# Patient Record
Sex: MALE | Race: WHITE | Employment: OTHER | URBAN - METROPOLITAN AREA
[De-identification: names, ages, dates, MRNs, and addresses within clinical notes are randomized per-mention and may not be internally consistent; named-entity substitution may affect disease eponyms.]

---

## 2017-01-20 ENCOUNTER — OFFICE VISIT (OUTPATIENT)
Dept: INTERNAL MEDICINE | Facility: CLINIC | Age: 82
End: 2017-01-20
Payer: COMMERCIAL

## 2017-01-20 DIAGNOSIS — I10 ESSENTIAL HYPERTENSION: Primary | ICD-10-CM

## 2017-01-20 DIAGNOSIS — K40.90 LEFT INGUINAL HERNIA: ICD-10-CM

## 2017-01-20 DIAGNOSIS — M54.16 LUMBAR RADICULOPATHY: ICD-10-CM

## 2017-01-20 DIAGNOSIS — N18.30 CKD (CHRONIC KIDNEY DISEASE) STAGE 3, GFR 30-59 ML/MIN (H): ICD-10-CM

## 2017-01-20 PROCEDURE — 99214 OFFICE O/P EST MOD 30 MIN: CPT | Performed by: INTERNAL MEDICINE

## 2017-01-20 RX ORDER — LOSARTAN POTASSIUM 100 MG/1
100 TABLET ORAL DAILY
Qty: 90 TABLET | Refills: 3 | Status: SHIPPED | OUTPATIENT
Start: 2017-01-20 | End: 2017-02-27

## 2017-01-20 NOTE — PATIENT INSTRUCTIONS
Increase Losartan to 50mg tab, 2 tabs daily in AM until run  Out of current meds. Then change to 100mg tab, 1 tab daily for blood pressure  Nonfasting BMP  lab in 2-3 weeks  Referral to General surgery re:  Hernia. Call for appt. Will need preop exam  Before future surgery  After hernia issue resolved, will then refer onto back surgeon if wish to get consultation   Limit sodium intake to 2500mg/day   Reduce carbohydrate (sugars, starchy foods such as bread, rice, potato, pasta, etc) intake  in your diet and increase the amount of color on your plate with fruits and vegetables

## 2017-01-20 NOTE — MR AVS SNAPSHOT
After Visit Summary   1/20/2017    Isael Garcia    MRN: [id number]           Patient Information     Date Of Birth          4/25/1934        Visit Information        Provider Department      1/20/2017 8:00 AM Madhu Velasquez MD [facility]        Today's Diagnoses     Advanced directives, counseling/discussion    -  1     CKD (chronic kidney disease) stage 3, GFR 30-59 ml/min         Left inguinal hernia           Care Instructions    Increase Losartan to 50mg tab, 2 tabs daily in AM until run  Out of current meds. Then change to 100mg tab, 1 tab daily for blood pressure  Nonfasting BMP  lab in 2-3 weeks  Referral to General surgery re:  Hernia. Call for appt. Will need preop exam  Before future surgery  After hernia issue resolved, will then refer onto back surgeon if wish to get consultation   Limit sodium intake to 2500mg/day   Reduce carbohydrate (sugars, starchy foods such as bread, rice, potato, pasta, etc) intake  in your diet and increase the amount of color on your plate with fruits and vegetables        Follow-ups after your visit        Additional Services     GENERAL SURG ADULT REFERRAL       Your provider has referred you to: [facility]: [facility] [place] [phone]   [email or web address]    Please be aware that coverage of these services is subject to the terms and limitations of your health insurance plan.  Call member services at your health plan with any benefit or coverage questions.      Please bring the following with you to your appointment:    (1) Any X-Rays, CTs or MRIs which have been performed.  Contact the facility where they were done to arrange for  prior to your scheduled appointment.   (2) List of current medications   (3) This referral request   (4) Any documents/labs given to you for this referral                  Who to contact     If you have questions or need follow up information  "about today's clinic visit or your schedule please contact [facility][1] directly at [phone].  Normal or non-critical lab and imaging results will be communicated to you by Ideaxishart, letter or phone within 4 business days after the clinic has received the results. If you do not hear from us within 7 days, please contact the clinic through Ideaxishart or phone. If you have a critical or abnormal lab result, we will notify you by phone as soon as possible.  Submit refill requests through YuMingle or call your pharmacy and they will forward the refill request to us. Please allow 3 business days for your refill to be completed.          Additional Information About Your Visit        YuMingle Information     YuMingle gives you secure access to your electronic health record. If you see a primary care provider, you can also send messages to your care team and make appointments. If you have questions, please call your primary care clinic.  If you do not have a primary care provider, please call [phone] and they will assist you.        Care EveryWhere ID     This is your Care EveryWhere ID. This could be used by other organizations to access your [facility] medical records  [id number]        Your Vitals Were     Pulse Temperature Height BMI (Body Mass Index) Pulse Oximetry       55 97.7  F (36.5  C) (Oral) 5' 7\" (1.702 m) 25.52 kg/m2 97%        Blood Pressure from Last 3 Encounters:   01/20/17 140/74   12/16/16 150/74   05/24/16 120/62    Weight from Last 3 Encounters:   01/20/17 163 lb (73.936 kg)   12/16/16 164 lb (74.39 kg)   05/24/16 163 lb (73.936 kg)              We Performed the Following     GENERAL SURG ADULT REFERRAL          Today's Medication Changes          These changes are accurate as of: 1/20/17  8:53 AM.  If you have any questions, ask your nurse or doctor.               Stop taking these medicines if you haven't already. Please contact your care team if you have questions.     " losartan 50 MG tablet   Commonly known as:  COZAAR   Stopped by:  Madhu Velasquez MD                    Primary Care Provider Office Phone # Fax #    Madhu Velasquez [phone]       [facility] [address]  [facility] [address]        Thank you!     Thank you for choosing [facility]  for your care. Our goal is always to provide you with excellent care. Hearing back from our patients is one way we can continue to improve our services. Please take a few minutes to complete the written survey that you may receive in the mail after your visit with us. Thank you!             Your Updated Medication List - Protect others around you: Learn how to safely use, store and throw away your medicines at www.disposemymeds.org.          This list is accurate as of: 1/20/17  8:53 AM.  Always use your most recent med list.                   Brand Name Dispense Instructions for use    amoxicillin 500 MG capsule    AMOXIL    4 capsule    4 capsules (total 2 grams) orally  1 hour prior to dental procedures       apixaban ANTICOAGULANT 2.5 MG tablet    ELIQUIS     Take 1 tablet (2.5 mg) by mouth 2 times daily       aspirin  MG EC tablet      Take 325 mg by mouth daily       atorvastatin 10 MG tablet    LIPITOR    30 tablet    Take 1 tablet (10 mg) by mouth daily       metoprolol 25 MG tablet    LOPRESSOR    90 tablet    Take 0.5 tablets (12.5 mg) by mouth 2 times daily       Multi-vitamin Tabs tablet      Take 1 tablet by mouth daily       OSTEO BI-FLEX ADV TRIPLE ST Tabs      Take 1 tablet by mouth daily       TYLENOL 500 MG tablet   Generic drug:  acetaminophen      1,000 mg daily       vitamin D 2000 UNITS tablet     100 tablet    Take 2,000 Units by mouth daily

## 2017-01-20 NOTE — PROGRESS NOTES
"  SUBJECTIVE:                                                    Isael Garcia is a 82 year old male who presents to clinic today for the following health issues:      Hypertension Follow-up      Outpatient blood pressures are being checked at home.  Results are 130-134/74.    Low Salt Diet: no added salt       Amount of exercise or physical activity: 6-7 days/week for an average of greater than 60 minutes    Problems taking medications regularly: No    Medication side effects: none    Diet: regular (no restrictions)    Pt's past medical history, family history, habits, medications and allergies were reviewed with the patient today.  See snap shot for  HCM status. Most recent lab results reviewed with pt. Problem list and histories reviewed & adjusted, as indicated.  Additional history as below:    Denies chest pain, shortness of breath, abdominal pain, headache, vision changes or side effects with medications.   Hx CKD. Labs reviewed.  Patient was also been having some increasing discomfort with his left inguinal hernia. Has still been able to play pickle ball some however.   Chronic stable low back pain with some radicular symptoms into bilateral lower extremities. The MRI of the lumbar spine from [facility] 2016 in the chart. Results reviewed with the patient again using a model     Additional ROS:   Constitutional, HEENT, Cardiovascular, Pulmonary, GI and , Neuro, MSK and Psych review of systems/symptoms are otherwise negative or unchanged from previous, except as noted above.      OBJECTIVE:  /76 mmHg  Pulse 55  Temp(Src) 97.7  F (36.5  C) (Oral)  Ht 5' 7\" (1.702 m)  Wt 163 lb (73.936 kg)  BMI 25.52 kg/m2  SpO2 97%   Estimated body mass index is 25.52 kg/(m^2) as calculated from the following:    Height as of this encounter: 5' 7\" (1.702 m).    Weight as of this encounter: 163 lb (73.936 kg).  Eye: PERRL, EOMI  HENT: ear canals and TM's normal and nose and mouth without ulcers or lesions "   Neck: no adenopathy. Thyroid normal to palpation. No bruits  Pulm: Lungs clear to auscultation   CV: Regular rates and rhythm  GI: Soft, nontender, Normal active bowel sounds, No hepatosplenomegaly or masses palpable  Ext: Peripheral pulses intact. No edema.  Neuro: Normal strength and tone, sensory exam grossly normal  Back: Mild tenderness to palpation bilateral paralumbar area. Neg SLRT bilaterally.    :  Reducible left inguinal hernia with mild tenderness to palpation    Assessment/Plan:   1. Essential hypertension   needs improved control. See plan discussion below  - losartan (COZAAR) 100 MG tablet; Take 1 tablet (100 mg) by mouth daily  Dispense: 90 tablet; Refill: 3  - Basic metabolic panel; Future    2. CKD (chronic kidney disease) stage 3, GFR 30-59 ml/min   future labs as ordered. Overall stable  - Basic metabolic panel; Future    3. Left inguinal hernia   referral to general surgery to discuss surgical management. Reminded patient will need a preop appointment for clearance within 30 days of future surgery  - GENERAL SURG ADULT REFERRAL    4. Lumbar radiculopathy   see plan discussion below.  Patient declines need for additional medication at this time  for symptom control      Plan discussion:  Increase Losartan to 50mg tab, 2 tabs daily in AM until run  Out of current meds. Then change to 100mg tab, 1 tab daily for blood pressure  Nonfasting BMP  lab in 2-3 weeks  Referral to General surgery re:  Hernia. Call for appt. Will need preop exam  before future surgery  After hernia issue resolved, will then refer onto back surgeon if wish to get consultation re: surgical treatment options vs medication treatment with Gabapentin, etc  Limit sodium intake to 2500mg/day  See me in 1 month for follow-up of blood pressure           Madhu Velasquez MD  Internal Medicine Department  [facility]

## 2017-01-20 NOTE — NURSING NOTE
"Chief Complaint   Patient presents with     Hypertension       Initial /74 mmHg  Pulse 55  Temp(Src) 97.7  F (36.5  C) (Oral)  Ht 5' 7\" (1.702 m)  Wt 163 lb (73.936 kg)  BMI 25.52 kg/m2  SpO2 97% Estimated body mass index is 25.52 kg/(m^2) as calculated from the following:    Height as of this encounter: 5' 7\" (1.702 m).    Weight as of this encounter: 163 lb (73.936 kg).  BP completed using cuff size: regular    "

## 2017-01-22 VITALS
SYSTOLIC BLOOD PRESSURE: 138 MMHG | DIASTOLIC BLOOD PRESSURE: 76 MMHG | HEIGHT: 67 IN | WEIGHT: 163 LBS | OXYGEN SATURATION: 97 % | BODY MASS INDEX: 25.58 KG/M2 | HEART RATE: 55 BPM | TEMPERATURE: 97.7 F

## 2017-01-26 ENCOUNTER — TELEPHONE (OUTPATIENT)
Dept: INTERNAL MEDICINE | Facility: CLINIC | Age: 82
End: 2017-01-26

## 2017-01-26 DIAGNOSIS — I48.91 ATRIAL FIBRILLATION, UNSPECIFIED TYPE (H): Primary | ICD-10-CM

## 2017-01-26 NOTE — TELEPHONE ENCOUNTER
Reason for Call:  Other call back    Detailed comments: Pt was to gather information on Eliquis.  He would like the script changed from 2.5 to 5.    Phone Number Patient can be reached at: Home number on file [phone] (home)    Best Time: anytime    Can we leave a detailed message on this number? YES    Call taken on 1/26/2017 at 8:55 AM by ELIUD WINCHESTER

## 2017-01-30 NOTE — TELEPHONE ENCOUNTER
Rx changed to Eliquis 5mg tab, 1/2 tab twice a day as requested to try and reduce med cost and faxed to  pharmacy. Inform pt

## 2017-01-31 ENCOUNTER — OFFICE VISIT (OUTPATIENT)
Dept: SURGERY | Facility: CLINIC | Age: 82
End: 2017-01-31
Payer: COMMERCIAL

## 2017-01-31 VITALS
OXYGEN SATURATION: 98 % | WEIGHT: 165 LBS | BODY MASS INDEX: 25.01 KG/M2 | DIASTOLIC BLOOD PRESSURE: 78 MMHG | HEIGHT: 68 IN | HEART RATE: 61 BPM | SYSTOLIC BLOOD PRESSURE: 132 MMHG

## 2017-01-31 DIAGNOSIS — K40.90 INGUINAL HERNIA OF LEFT SIDE WITHOUT OBSTRUCTION OR GANGRENE: Primary | ICD-10-CM

## 2017-01-31 PROCEDURE — 99203 OFFICE O/P NEW LOW 30 MIN: CPT | Performed by: SURGERY

## 2017-01-31 NOTE — MR AVS SNAPSHOT
After Visit Summary   1/31/2017    Isael Garcia    MRN: [id number]           Patient Information     Date Of Birth          4/25/1934        Visit Information        Provider Department      1/31/2017 9:30 AM Ranjith Lozoya MD [facility]      Care Instructions    Your surgery is scheduled for 2/28/17 1:00 pm at [facility]        Follow-ups after your visit        Your next 10 appointments already scheduled     Feb 28, 2017   Procedure with Ranjith Lozoya MD   [facility] (--)    [address]  [facility] [address] [phone]              Who to contact     If you have questions or need follow up information about today's clinic visit or your schedule please contact [facility] directly at [phone].  Normal or non-critical lab and imaging results will be communicated to you by MyChart, letter or phone within 4 business days after the clinic has received the results. If you do not hear from us within 7 days, please contact the clinic through ALENTYhart or phone. If you have a critical or abnormal lab result, we will notify you by phone as soon as possible.  Submit refill requests through ProteoGenix or call your pharmacy and they will forward the refill request to us. Please allow 3 business days for your refill to be completed.          Additional Information About Your Visit        MyChart Information     ProteoGenix gives you secure access to your electronic health record. If you see a primary care provider, you can also send messages to your care team and make appointments. If you have questions, please call your primary care clinic.  If you do not have a primary care provider, please call [phone] and they will assist you.        Care EveryWhere ID     This is your Care EveryWhere ID. This could be used by other organizations to access your [employer]  "medical records  [id number]        Your Vitals Were     Pulse Height BMI (Body Mass Index) Pulse Oximetry          61 5' 8\" (1.727 m) 25.09 kg/m2 98%         Blood Pressure from Last 3 Encounters:   01/31/17 132/78   01/20/17 138/76   12/16/16 150/74    Weight from Last 3 Encounters:   01/31/17 165 lb (74.844 kg)   01/20/17 163 lb (73.936 kg)   12/16/16 164 lb (74.39 kg)              Today, you had the following     No orders found for display       Primary Care Provider Office Phone # Fax #    Madhu Velasquez [phone]       [facility] [address] [facility] [address]        Thank you!     Thank you for choosing [facility]  for your care. Our goal is always to provide you with excellent care. Hearing back from our patients is one way we can continue to improve our services. Please take a few minutes to complete the written survey that you may receive in the mail after your visit with us. Thank you!             Your Updated Medication List - Protect others around you: Learn how to safely use, store and throw away your medicines at www.disposemymeds.org.          This list is accurate as of: 1/31/17 10:31 AM.  Always use your most recent med list.                   Brand Name Dispense Instructions for use    amoxicillin 500 MG capsule    AMOXIL    4 capsule    4 capsules (total 2 grams) orally  1 hour prior to dental procedures       apixaban ANTICOAGULANT 5 MG tablet    ELIQUIS    90 tablet    1/2 tab twice a day       aspirin  MG EC tablet      Take 325 mg by mouth daily       atorvastatin 10 MG tablet    LIPITOR    30 tablet    Take 1 tablet (10 mg) by mouth daily       losartan 100 MG tablet    COZAAR    90 tablet    Take 1 tablet (100 mg) by mouth daily       metoprolol 25 MG tablet    LOPRESSOR    90 tablet    Take 0.5 tablets (12.5 mg) by mouth 2 times daily       Multi-vitamin Tabs tablet      Take 1 tablet by mouth daily       OSTEO BI-FLEX ADV TRIPLE ST " Tabs      Take 1 tablet by mouth daily       TYLENOL 500 MG tablet   Generic drug:  acetaminophen      1,000 mg daily       vitamin D 2000 UNITS tablet     100 tablet    Take 2,000 Units by mouth daily

## 2017-01-31 NOTE — NURSING NOTE
Pain Location:L groin    Pain type: dull    Bulge: Yes    Bulge reducible: No    Symptoms: None    Time Frame of Hernia: 1 year(s) ago

## 2017-01-31 NOTE — Clinical Note
2017    RE:  Isael Garcia-:  34    HPI: We are asked by Dr. Madhu Velasquez to see Mr. Garcia in consultation concerning a groin bulge.  Isael is a 82 year old male who presents for evaluation of left groin bulge.  Symptoms began  1 year  ago.  This is described as aching. The pain occurs with prolonged standing.  Associated symptoms include none. The patient has noticed a bulge. The patient has not had a previous herniorrhaphy in this location. Employment does not require heavy lifting.    Cough: No    Past Medical History:   has a past medical history of Unspecified essential hypertension; Allergic rhinitis, cause unspecified; Unspecified cataract; Lumbago (); Shingles (); CKD (chronic kidney disease) stage 3, GFR 30-59 ml/min (2013); Sleep related leg cramps (2013); Vitamin D deficiency disease (2013); OA (osteoarthritis) (2015); PVD (peripheral vascular disease) (H) (3/7/2016); AAA (abdominal aortic aneurysm) (H) (May 2016); and Atrial fibrillation (H).    ROS:  The 10 point review of systems is negative other than noted in the HPI and above.    PE:    General- Well-developed, well-nourished, patient able to stand without difficulty.  HEENT- Normocephalic and atraumatic. Pupils equal and round.  Mucous membranes moist.  Sclera are nonicteric.   Respirations- are regular and non labored  Abdomen is abdomen is soft without significant tenderness, masses, organomegaly or guarding     Umbilicus is non-tender  Groins reveal old scar on right. Angiogram access site on right.    Hernia- Left inguinal hernia is present with valsalva              Right inguinal hernia is not present with valsalva              The hernia is reducible              Testicles are normal   No inguinal lymphadenopathy is present  Neurologic- I find no inguinal neuropathy, he seems to be alert and oriented.               Assesment: Primary, reducible left inguinal hernia.    Plan:    We have  discussed the laparoscopic and open options for hernia repair, the choice of observation, reduction techniques, incarceration and strangulation signs, symptoms and importance as well as need to seek emergency treatment.    We have discussed surgery in detail, including risk, benefits, complications, mesh, infection, nerve and cord damage and their sequelae including chronic pain and testicular loss, lifting and activity limits after surgery. He has been given literature to review. We will schedule surgery at patient's convenience.  He is on apixaban and aspirin, which will need to be held for operation.      Ranjith Lozoya MD

## 2017-02-01 NOTE — PROGRESS NOTES
HPI: We are asked by Dr. Madhu Velasquez to see Mr. Garcia in consultation concerning a groin bulge.  Isael is a 82 year old male who presents for evaluation of left groin bulge.  Symptoms began  1 year  ago.  This is described as aching. The pain occurs with prolonged standing.  Associated symptoms include none. The patient has noticed a bulge. The patient has not had a previous herniorrhaphy in this location. Employment does not require heavy lifting.      Cough: No      Past Medical History:   has a past medical history of Unspecified essential hypertension; Allergic rhinitis, cause unspecified; Unspecified cataract; Lumbago (2000); Shingles (7/09); CKD (chronic kidney disease) stage 3, GFR 30-59 ml/min (2/4/2013); Sleep related leg cramps (2/6/2013); Vitamin D deficiency disease (2/6/2013); OA (osteoarthritis) (2/21/2015); PVD (peripheral vascular disease) (H) (3/7/2016); AAA (abdominal aortic aneurysm) (H) (May 2016); and Atrial fibrillation (H).    Past Surgical History:  Past Surgical History   Procedure Laterality Date     C nonspecific procedure  1960     right inguinal hernia repair     C nonspecific procedure  1990s     TURP     C nonspecific procedure       tonsillectomy     C nonspecific procedure  April 2011     Right total knee arthroplasty     C nonspecific procedure   approx 2009      left  cataract extraction/IOL placement     C nonspecific procedure  May 2016     Endovascular AAA stent graft placed at [facility]. [place] clnic suggests abx prophylaxis for life for procedures (dental/etc)          Social History:  Social History     Social History     Marital Status:      Spouse Name: N/A     Number of Children: N/A     Years of Education: N/A     Occupational History     Not on file.     Social History Main Topics     Smoking status: Never Smoker      Smokeless tobacco: Never Used     Alcohol Use: No     Drug Use: No     Sexual Activity: No     Other Topics Concern     Not on file     Social  History Narrative        Family History:  Family History   Problem Relation Age of Onset     Prostate Cancer Brother          ROS:  The 10 point review of systems is negative other than noted in the HPI and above.    PE:    General- Well-developed, well-nourished, patient able to stand without difficulty.  HEENT- Normocephalic and atraumatic. Pupils equal and round.  Mucous membranes moist.  Sclera are nonicteric.   Respirations- are regular and non labored  Abdomen is abdomen is soft without significant tenderness, masses, organomegaly or guarding     Umbilicus is non-tender  Groins reveal old scar on right. Angiogram access site on right.    Hernia- Left inguinal hernia is present with valsalva              Right inguinal hernia is not present with valsalva              The hernia is reducible              Testicles are normal   No inguinal lymphadenopathy is present  Neurologic- I find no inguinal neuropathy, he seems to be alert and oriented.                  Assesment: Primary, reducible left inguinal hernia.    Plan:    We have discussed the laparoscopic and open options for hernia repair, the choice of observation, reduction techniques, incarceration and strangulation signs, symptoms and importance as well as need to seek emergency treatment.    We have discussed surgery in detail, including risk, benefits, complications, mesh, infection, nerve and cord damage and their sequelae including chronic pain and testicular loss, lifting and activity limits after surgery. He has been given literature to review. We will schedule surgery at patient's convenience.  He is on apixaban and aspirin, which will need to be held for operation.      Ranijth Lozoya MD    Please route or send letter to:  Primary Care Provider (PCP)    HPI      ROS      Physical Exam

## 2017-02-09 DIAGNOSIS — N18.30 CKD (CHRONIC KIDNEY DISEASE) STAGE 3, GFR 30-59 ML/MIN (H): ICD-10-CM

## 2017-02-09 DIAGNOSIS — I10 ESSENTIAL HYPERTENSION: ICD-10-CM

## 2017-02-09 LAB
ANION GAP SERPL CALCULATED.3IONS-SCNC: 6 MMOL/L (ref 3–14)
BUN SERPL-MCNC: 26 MG/DL (ref 7–30)
CALCIUM SERPL-MCNC: 8.5 MG/DL (ref 8.5–10.1)
CHLORIDE SERPL-SCNC: 105 MMOL/L (ref 94–109)
CO2 SERPL-SCNC: 28 MMOL/L (ref 20–32)
CREAT SERPL-MCNC: 1.97 MG/DL (ref 0.66–1.25)
GFR SERPL CREATININE-BSD FRML MDRD: 33 ML/MIN/1.7M2
GLUCOSE SERPL-MCNC: 98 MG/DL (ref 70–99)
POTASSIUM SERPL-SCNC: 4.9 MMOL/L (ref 3.4–5.3)
SODIUM SERPL-SCNC: 139 MMOL/L (ref 133–144)

## 2017-02-09 PROCEDURE — 36415 COLL VENOUS BLD VENIPUNCTURE: CPT | Performed by: INTERNAL MEDICINE

## 2017-02-09 PROCEDURE — 80048 BASIC METABOLIC PNL TOTAL CA: CPT | Performed by: INTERNAL MEDICINE

## 2017-02-16 ENCOUNTER — OFFICE VISIT (OUTPATIENT)
Dept: INTERNAL MEDICINE | Facility: CLINIC | Age: 82
End: 2017-02-16
Payer: COMMERCIAL

## 2017-02-16 VITALS
BODY MASS INDEX: 24.94 KG/M2 | DIASTOLIC BLOOD PRESSURE: 80 MMHG | HEART RATE: 56 BPM | OXYGEN SATURATION: 98 % | WEIGHT: 164 LBS | SYSTOLIC BLOOD PRESSURE: 138 MMHG | TEMPERATURE: 97.8 F

## 2017-02-16 DIAGNOSIS — Z01.818 PREOP GENERAL PHYSICAL EXAM: Primary | ICD-10-CM

## 2017-02-16 DIAGNOSIS — D64.9 ANEMIA, UNSPECIFIED TYPE: ICD-10-CM

## 2017-02-16 DIAGNOSIS — N18.30 CKD (CHRONIC KIDNEY DISEASE) STAGE 3, GFR 30-59 ML/MIN (H): ICD-10-CM

## 2017-02-16 DIAGNOSIS — K40.90 UNILATERAL INGUINAL HERNIA WITHOUT OBSTRUCTION OR GANGRENE, RECURRENCE NOT SPECIFIED: ICD-10-CM

## 2017-02-16 LAB
ANION GAP SERPL CALCULATED.3IONS-SCNC: 6 MMOL/L (ref 3–14)
BUN SERPL-MCNC: 31 MG/DL (ref 7–30)
CALCIUM SERPL-MCNC: 8.8 MG/DL (ref 8.5–10.1)
CHLORIDE SERPL-SCNC: 106 MMOL/L (ref 94–109)
CO2 SERPL-SCNC: 28 MMOL/L (ref 20–32)
CREAT SERPL-MCNC: 1.84 MG/DL (ref 0.66–1.25)
FERRITIN SERPL-MCNC: 260 NG/ML (ref 26–388)
GFR SERPL CREATININE-BSD FRML MDRD: 35 ML/MIN/1.7M2
GLUCOSE SERPL-MCNC: 86 MG/DL (ref 70–99)
HGB BLD-MCNC: 13.4 G/DL (ref 13.3–17.7)
POTASSIUM SERPL-SCNC: 4.9 MMOL/L (ref 3.4–5.3)
SODIUM SERPL-SCNC: 140 MMOL/L (ref 133–144)

## 2017-02-16 PROCEDURE — 99215 OFFICE O/P EST HI 40 MIN: CPT | Performed by: INTERNAL MEDICINE

## 2017-02-16 PROCEDURE — 80048 BASIC METABOLIC PNL TOTAL CA: CPT | Performed by: INTERNAL MEDICINE

## 2017-02-16 PROCEDURE — 36415 COLL VENOUS BLD VENIPUNCTURE: CPT | Performed by: INTERNAL MEDICINE

## 2017-02-16 PROCEDURE — 82728 ASSAY OF FERRITIN: CPT | Performed by: INTERNAL MEDICINE

## 2017-02-16 PROCEDURE — 93000 ELECTROCARDIOGRAM COMPLETE: CPT | Performed by: INTERNAL MEDICINE

## 2017-02-16 PROCEDURE — 85018 HEMOGLOBIN: CPT | Performed by: INTERNAL MEDICINE

## 2017-02-16 NOTE — MR AVS SNAPSHOT
After Visit Summary   2/16/2017    Isael Garcia    MRN: [id number]           Patient Information     Date Of Birth          4/25/1934        Visit Information        Provider Department      2/16/2017 10:00 AM Madhu Velasquez MD [facility]        Today's Diagnoses     Preop general physical exam    -  1    Unilateral inguinal hernia without obstruction or gangrene, recurrence not specified        CKD (chronic kidney disease) stage 3, GFR 30-59 ml/min        Anemia, unspecified type          Care Instructions    Stop Aspirin 1 week prior to surgery  Hold Eliquis 3 days prior to surgery  Stop Losartan and start Amlodipine 5mg tab, 1 tab daily for blood pressure  Repeat nonfasting BMP lab in 2-3 weeks  See me in 4 weeks for follow-up blood pressure  Take Metoprolol the  AM of surgery. Hold other meds the AM of surgery  No solid food after midnight prior to surgery. May have clear liquids up to 8 AM the morning of surgery (5 hrs prior to surgery)  Resume Aspirin and Eliquis post op as previous        Follow-ups after your visit        Your next 10 appointments already scheduled     Feb 28, 2017 12:45 PM CST   [facility] Same Day Surgery with Ranjith Lozoya MD, Tiff Oliveira PA-C   [facility] ([facility])    [facility] ([facility])   [phone]            Feb 28, 2017   Procedure with Ranjith Lozoya MD   [facility] (--)    [address]  [facility] [id number] [phone]              Future tests that were ordered for you today     Open Future Orders        Priority Expected Expires Ordered    Basic metabolic panel Routine 3/13/2017 2/27/2018 2/27/2017            Who to contact     If you have questions or need follow up information about today's clinic visit or your schedule please contact [facility] directly at [phone].  Normal or non-critical lab and imaging results will be communicated to you by MyChart, letter or phone within 4 business days after the clinic has received the results. If you do not hear from us within 7 days, please contact the clinic through retickrhart or phone. If you have a critical or abnormal lab result, we will notify you by phone as soon as possible.  Submit refill requests through [employer] or call your pharmacy and they will forward the refill request to us. Please allow 3 business days for your refill to be completed.          Additional Information About Your Visit        retickrharPPT Reasearch Information     [employer] gives you secure access to your electronic health record. If you see a primary care provider, you can also send messages to your care team and make appointments. If you have questions, please call your primary care clinic.  If you do not have a primary care provider, please call [phone] and they will assist you.        Care EveryWhere ID     This is your Care EveryWhere ID. This could be used by other organizations to access your [place] medical records  [id number]        Your Vitals Were     Pulse Temperature Pulse Oximetry BMI (Body Mass Index)          56 97.8  F (36.6  C) (Oral) 98% 24.94 kg/m2         Blood Pressure from Last 3 Encounters:   02/16/17 138/80   01/31/17 132/78   01/20/17 138/76    Weight from Last 3 Encounters:   02/16/17 164 lb (74.4 kg)   01/31/17 165 lb (74.8 kg)   01/20/17 163 lb (73.9 kg)              We Performed the Following     Basic metabolic panel     EKG 12-lead complete w/read - Clinics     Ferritin     Hemoglobin        Primary Care Provider Office Phone # Fax #    Madhu Velasquez [phone]       [facility] [address]  [facility] [address]        Thank you!     Thank you for choosing [facility]  for your care. Our goal is always to provide you with excellent care. Hearing back  from our patients is one way we can continue to improve our services. Please take a few minutes to complete the written survey that you may receive in the mail after your visit with us. Thank you!             Your Updated Medication List - Protect others around you: Learn how to safely use, store and throw away your medicines at www.disposemymeds.org.          This list is accurate as of: 2/16/17 11:59 PM.  Always use your most recent med list.                   Brand Name Dispense Instructions for use    amoxicillin 500 MG capsule    AMOXIL    4 capsule    4 capsules (total 2 grams) orally  1 hour prior to dental procedures       apixaban ANTICOAGULANT 5 MG tablet    ELIQUIS    90 tablet    1/2 tab twice a day       aspirin  MG EC tablet      Take 325 mg by mouth daily       atorvastatin 10 MG tablet    LIPITOR    30 tablet    Take 1 tablet (10 mg) by mouth daily       losartan 100 MG tablet    COZAAR    90 tablet    Take 1 tablet (100 mg) by mouth daily       metoprolol 25 MG tablet    LOPRESSOR    90 tablet    Take 0.5 tablets (12.5 mg) by mouth 2 times daily       Multi-vitamin Tabs tablet      Take 1 tablet by mouth daily       OSTEO BI-FLEX ADV TRIPLE ST Tabs      Take 1 tablet by mouth daily       TYLENOL 500 MG tablet   Generic drug:  acetaminophen      1,000 mg daily       vitamin D 2000 UNITS tablet     100 tablet    Take 2,000 Units by mouth daily

## 2017-02-16 NOTE — NURSING NOTE
"Chief Complaint   Patient presents with     Pre-Op Exam       Initial /80  Pulse 56  Temp 97.8  F (36.6  C) (Oral)  Wt 164 lb (74.4 kg)  SpO2 98%  BMI 24.94 kg/m2 Estimated body mass index is 24.94 kg/(m^2) as calculated from the following:    Height as of 1/31/17: 5' 8\" (1.727 m).    Weight as of this encounter: 164 lb (74.4 kg).  Medication Reconciliation: complete     Lexus Godfrey, NAYE       "

## 2017-02-16 NOTE — PROGRESS NOTES
[facility]  [address] [address]  [phone]  Dept: [phone]    PRE-OP EVALUATION:  Today's date: 2017    Isael Garcia (: 1934) presents for pre-operative evaluation assessment as requested by Dr. Lozoya.  He requires evaluation and anesthesia risk assessment prior to undergoing surgery/procedure for treatment of left inguinal hernia  Proposed procedure:  LIH repair    Date of Surgery/ Procedure: 2017  Time of Surgery/ Procedure: 1:00pm  Hospital/Surgical Facility: [facility]    Primary Physician: Madhu Velasquez  Type of Anesthesia Anticipated: to be determined          HPI:                                                      Brief HPI related to upcoming procedure:  History of a left groin bulge with discomfort for over one year. Symptoms of been worsening more recently. Patient was noted on exam to have a left reducible inguinal hernia. Following referral to general surgery for consultation, patient has elected to undergo surgical treatment as above. Please see below for other medical issues. History of hypertension and chronic kidney disease stage 3. GFR has worsened some with increasing dosage of Losartan.  Pt does have a history of peripheral vascular disease and s/p previous infrarenal AAA repair in 2016. Pt declines current claudication sx in BLEs now. He is playing pickle ball 2x/week and other general walking without chest pain or shortness of breath or leg cramps. Pt has hx intermittent A fib and is on anticoagulation with Eliquis         MEDICAL HISTORY:                                                      Patient Active Problem List    Diagnosis Date Noted     Atrial fibrillation (H)      Priority: Medium     PVD (peripheral vascular disease) (H) 2016     Priority: Medium     OA (osteoarthritis) 2015     Health Care Home 2013     DX V65.8 REPLACED WITH [id number] [facility] (2013)         Sleep  related leg cramps 02/06/2013     Vitamin D deficiency disease 02/06/2013     CKD (chronic kidney disease) stage 3, GFR 30-59 ml/min 02/04/2013     Advanced directives, counseling/discussion 09/16/2011     Advance Care Planning 1/20/2017: ACP Review of Chart / Resources Provided:  Reviewed chart for advance care plan.  Isael Garcia has an up to date advance care plan on file.  Added by Arabella Garcia        Advance Directive Problem List Overview:   Name Relationship Phone    Primary Health Care Agent            Alternative Health Care Agent          Patient states has Advance Directive and will bring in a copy to clinic. 9/16/2011          S/P TKR (total knee replacement) 05/18/2011     (Problem list name updated by automated process. Provider to review and confirm.)       Aftercare following joint replacement 05/18/2011     Essential hypertension      Problem list name updated by automated process. Provider to review       Allergic rhinitis      Problem list name updated by automated process. Provider to review        Past Medical History   Diagnosis Date     AAA (abdominal aortic aneurysm) (H) May 2016     infrarenal s/p graft placement at [facility]     Allergic rhinitis, cause unspecified      Atrial fibrillation (H)      CKD (chronic kidney disease) stage 3, GFR 30-59 ml/min 2/4/2013     Lumbago 2000     s/p lumbar epidural injection     OA (osteoarthritis) 2/21/2015     PVD (peripheral vascular disease) (H) 3/7/2016     Shingles 7/09      left forehead     Sleep related leg cramps 2/6/2013     Unspecified cataract      bilateral     Unspecified essential hypertension      Vitamin D deficiency disease 2/6/2013     Past Surgical History   Procedure Laterality Date     C nonspecific procedure  1960     right inguinal hernia repair     C nonspecific procedure  1990s     TURP     C nonspecific procedure       tonsillectomy     C nonspecific procedure  April 2011     Right total knee arthroplasty      C nonspecific procedure   approx 2009      left  cataract extraction/IOL placement     C nonspecific procedure  May 2016     Endovascular AAA stent graft placed at [facility]. [place] clnic suggests abx prophylaxis for life for procedures (dental/etc)     Current Outpatient Prescriptions   Medication Sig Dispense Refill     apixaban ANTICOAGULANT (ELIQUIS) 5 MG tablet 1/2 tab twice a day 90 tablet 3     losartan (COZAAR) 100 MG tablet Take 1 tablet (100 mg) by mouth daily 90 tablet 3     metoprolol (LOPRESSOR) 25 MG tablet Take 0.5 tablets (12.5 mg) by mouth 2 times daily 90 tablet 3     amoxicillin (AMOXIL) 500 MG capsule 4 capsules (total 2 grams) orally  1 hour prior to dental procedures 4 capsule 3     atorvastatin (LIPITOR) 10 MG tablet Take 1 tablet (10 mg) by mouth daily 30 tablet 11     multivitamin, therapeutic with minerals (MULTI-VITAMIN) TABS Take 1 tablet by mouth daily       aspirin  MG tablet Take 325 mg by mouth daily        acetaminophen (TYLENOL) 500 MG tablet 1,000 mg daily        Misc Natural Products (OSTEO BI-FLEX ADV TRIPLE ST) TABS Take 1 tablet by mouth daily       Cholecalciferol (VITAMIN D) 2000 UNITS tablet Take 2,000 Units by mouth daily 100 tablet 3     OTC products: None, except as noted above    Allergies   Allergen Reactions     Lisinopril Cramps     Leg cramping      Latex Allergy: NO    Social History   Substance Use Topics     Smoking status: Never Smoker     Smokeless tobacco: Never Used     Alcohol use No     History   Drug Use No       REVIEW OF SYSTEMS:                                                    C: NEGATIVE for fever, chills, change in weight  I: NEGATIVE for worrisome rashes, moles or lesions  E: NEGATIVE for vision changes or irritation. Has glasses  E/M: NEGATIVE for ear, mouth and throat problems  R: NEGATIVE for significant cough or SOB  CV: NEGATIVE for chest pain, palpitations or peripheral edema. Hx intermittent A fib  GI: NEGATIVE for nausea, abdominal  pain, heartburn, or change in bowel habits.   : NEGATIVE for frequency, dysuria, or hematuria. Hx CKD  M:  POSITIVE for chronic  Mild back pain, much improved since previous surgery. No radicular sx now  N: NEGATIVE for weakness, dizziness or paresthesias  E: POSITIVE for statin therapy  H: NEGATIVE for bleeding problems with use of Eliquis  P: NEGATIVE for changes in mood or affect    EXAM:                                                    /80  Pulse 56  Temp 97.8  F (36.6  C) (Oral)  Wt 164 lb (74.4 kg)  SpO2 98%  BMI 24.94 kg/m2  Eye: PERRL, EOMI  HENT: ear canals and TM's normal and nose and mouth without ulcers or lesions   Neck: no adenopathy. Thyroid normal to palpation. No bruits  Pulm: Lungs clear to auscultation   CV: Regular rates and rhythm  GI: Soft, nontender, Normal active bowel sounds, No hepatosplenomegaly or masses palpable  Ext: Peripheral pulses intact. Minimal BLE edema.  Neuro: Normal strength and tone, sensory exam grossly normal  : Left inguinal hernia, reducible and minimally tender to palpaiotn      DIAGNOSTICS:                                                    EKG:  Sinus bradyardia with rate 53. 1st degree AVB (252 ms). No acute ST/ changes    Recent Labs   Lab Test  02/09/17   1539  12/30/16   1003  11/14/16   0836   05/24/16   1432   09/11/15   0840   05/10/13   1053   04/28/11   1130   HGB   --    --   13.2*   --   12.1*   --   14.3   < >   --    < >   --    PLT   --    --    --    --   286   --   222   < >   --    < >   --    INR   --    --    --    --    --    --    --    --    --    --   1.03   NA  139  143  138   < >  139   < >  144   < >  144   < >  142   POTASSIUM  4.9  4.4  4.5   < >  4.2   < >  4.5   < >  4.7   < >  4.7   CR  1.97*  1.66*  1.59*   < >  1.48*   < >  1.38*   < >  1.48*   < >  1.27*   A1C   --    --    --    --    --    --    --    --   5.5   --    --     < > = values in this interval not displayed.      Component      Latest Ref Rng & Units 2/16/2017    Sodium      133 - 144 mmol/L 140   Potassium      3.4 - 5.3 mmol/L 4.9   Chloride      94 - 109 mmol/L 106   Carbon Dioxide      20 - 32 mmol/L 28   Anion Gap      3 - 14 mmol/L 6   Glucose      70 - 99 mg/dL 86   Urea Nitrogen      7 - 30 mg/dL 31 (H)   Creatinine      0.66 - 1.25 mg/dL 1.84 (H)   GFR Estimate      >60 mL/min/1.7m2 35 (L)   GFR Estimate If Black      >60 mL/min/1.7m2 43 (L)   Calcium      8.5 - 10.1 mg/dL 8.8   Ferritin      26 - 388 ng/mL 260   Hemoglobin      13.3 - 17.7 g/dL 13.4     IMPRESSION:                                                    Reason for surgery/procedure: Left inguinal hernia  Diagnosis/reason for consult: hypertension (styable), CKD3 (some recently worsening though improved slightly today compared to 1 week prior -  Worsened further with ARB), intermittent A fib (in sinus rhythm currently), PVD (no current claudication or chest pain sx with exertional exercise of pickleball), Hyperlipidemia    The proposed surgical procedure is considered INTERMEDIATE risk.    REVISED CARDIAC RISK INDEX  The patient has the following serious cardiovascular risks for perioperative complications such as (MI, PE, VFib and 3  AV Block):  No serious cardiac risks per criteria list given for preop. Pt does have hx vascular disease in general s/p surgical intervention and now assymptomatic  INTERPRETATION: 0 risks: Class I (very low risk - 0.4% complication rate)    The patient has the following additional risks for perioperative complications:  No identified additional risks    No diagnosis found.    RECOMMENDATIONS:                                                    APPROVAL GIVEN to proceed with proposed procedure, without further diagnostic evaluation  Stop Aspirin 1 week prior to surgery  Hold Eliquis 3 days prior to surgery  Stop Losartan and start Amlodipine 5mg tab, 1 tab daily for blood pressure  Repeat nonfasting BMP lab in 2-3 weeks  See me in 4 weeks for follow-up blood pressure  Take  Metoprolol the  AM of surgery. Hold other meds the AM of surgery  No solid food after midnight prior to surgery. May have clear liquids up to 8 AM the morning of surgery (5 hrs prior to surgery)  Resume Aspirin and Eliquis post op as previous            Signed Electronically by: Madhu Velasquez MD    Copy of this evaluation report is provided to requesting physician.    [place] Preop Guidelines

## 2017-02-27 ENCOUNTER — TELEPHONE (OUTPATIENT)
Dept: INTERNAL MEDICINE | Facility: CLINIC | Age: 82
End: 2017-02-27

## 2017-02-27 DIAGNOSIS — N18.30 CKD (CHRONIC KIDNEY DISEASE) STAGE 3, GFR 30-59 ML/MIN (H): Primary | ICD-10-CM

## 2017-02-27 DIAGNOSIS — I10 ESSENTIAL HYPERTENSION: ICD-10-CM

## 2017-02-27 RX ORDER — AMLODIPINE BESYLATE 5 MG/1
5 TABLET ORAL DAILY
Qty: 30 TABLET | Refills: 11 | Status: ON HOLD | OUTPATIENT
Start: 2017-02-27 | End: 2017-02-28

## 2017-02-27 NOTE — TELEPHONE ENCOUNTER
surgery center calling requesting that pre op exam encounter is completed and closed before patients surgery tomorrow.  Christina Rod, CMA

## 2017-02-27 NOTE — TELEPHONE ENCOUNTER
Isael Garcia is a 82 year old male who calls with a rash.    NURSING ASSESSMENT:   The rash began  7 day ago.   Patient's rash is located on penis/groin area.  Rash is described as flat, with a color of red with No drainage.  Rash is itching.  Associated symptoms: no  Patient denies exposure to anything, he is concerned states that his dose of losartan was recently doubled and feels this may be the cause.  Patient is not on any new medications.  Patient has not tried new soaps, detergents, perfumes or lotions.  Patients is allergic to NA.  Other members of the household have not had symptoms.  Past medical history includes NA.  Allergies:   Allergies   Allergen Reactions     Amlodipine Cramps     Leg cramping     Lisinopril Cramps     Leg cramping       MEDICATIONS:  Taking medication(s) as prescribed? Yes  Taking over the counter medication(s)? N/A  Any medication side effects? Questions if related to Losartan    Any barriers to taking medication(s) as prescribed?  No  Medication(s) improving/managing symptoms?  N/A  Medication reconciliation completed: Yes    Patient has tried topical antifungals powder, states that it helps some but is not resolving. .  Patient informed of the following home remedies  none    NURSING PLAN: Routed to provider yes, pt wants to hold Losartan to see if rash resolves, did advise to wait until hears back from PCP, should patient be seen?      RECOMMENDED DISPOSITION:  Home care advice - will wait for PCP response.   Will comply with recommendation: Yes  If further questions/concerns or if symptoms do not improve, worsen or new symptoms develop, call your PCP or [facility] as soon as possible.    Antonia Henson RN

## 2017-02-28 ENCOUNTER — HOSPITAL ENCOUNTER (OUTPATIENT)
Facility: CLINIC | Age: 82
Discharge: HOME | End: 2017-02-28
Attending: SURGERY | Admitting: SURGERY
Payer: COMMERCIAL

## 2017-02-28 ENCOUNTER — ANESTHESIA (OUTPATIENT)
Dept: SURGERY | Facility: CLINIC | Age: 82
End: 2017-02-28
Payer: COMMERCIAL

## 2017-02-28 ENCOUNTER — ANESTHESIA EVENT (OUTPATIENT)
Dept: SURGERY | Facility: CLINIC | Age: 82
End: 2017-02-28
Payer: COMMERCIAL

## 2017-02-28 ENCOUNTER — APPOINTMENT (OUTPATIENT)
Dept: SURGERY | Facility: PHYSICIAN GROUP | Age: 82
End: 2017-02-28
Payer: COMMERCIAL

## 2017-02-28 VITALS
TEMPERATURE: 98.3 F | BODY MASS INDEX: 24.94 KG/M2 | DIASTOLIC BLOOD PRESSURE: 78 MMHG | WEIGHT: 164 LBS | OXYGEN SATURATION: 97 % | RESPIRATION RATE: 16 BRPM | SYSTOLIC BLOOD PRESSURE: 149 MMHG

## 2017-02-28 DIAGNOSIS — G89.18 ACUTE POST-OPERATIVE PAIN: Primary | ICD-10-CM

## 2017-02-28 PROCEDURE — 27210794 ZZH OR GENERAL SUPPLY STERILE: Performed by: SURGERY

## 2017-02-28 PROCEDURE — C1781 MESH (IMPLANTABLE): HCPCS | Performed by: SURGERY

## 2017-02-28 PROCEDURE — 25000128 H RX IP 250 OP 636: Performed by: NURSE ANESTHETIST, CERTIFIED REGISTERED

## 2017-02-28 PROCEDURE — 11404 EXC TR-EXT B9+MARG 3.1-4 CM: CPT | Mod: 59 | Performed by: SURGERY

## 2017-02-28 PROCEDURE — 37000008 ZZH ANESTHESIA TECHNICAL FEE, 1ST 30 MIN: Performed by: SURGERY

## 2017-02-28 PROCEDURE — 49505 PRP I/HERN INIT REDUC >5 YR: CPT | Mod: AS | Performed by: PHYSICIAN ASSISTANT

## 2017-02-28 PROCEDURE — 37000009 ZZH ANESTHESIA TECHNICAL FEE, EACH ADDTL 15 MIN: Performed by: SURGERY

## 2017-02-28 PROCEDURE — 36000050 ZZH SURGERY LEVEL 2 1ST 30 MIN: Performed by: SURGERY

## 2017-02-28 PROCEDURE — 25000566 ZZH SEVOFLURANE, EA 15 MIN: Performed by: SURGERY

## 2017-02-28 PROCEDURE — 25000128 H RX IP 250 OP 636: Performed by: SURGERY

## 2017-02-28 PROCEDURE — 25000125 ZZHC RX 250: Performed by: NURSE ANESTHETIST, CERTIFIED REGISTERED

## 2017-02-28 PROCEDURE — 25000125 ZZHC RX 250: Performed by: SURGERY

## 2017-02-28 PROCEDURE — 12031 INTMD RPR S/A/T/EXT 2.5 CM/<: CPT | Mod: 59 | Performed by: SURGERY

## 2017-02-28 PROCEDURE — 40000170 ZZH STATISTIC PRE-PROCEDURE ASSESSMENT II: Performed by: SURGERY

## 2017-02-28 PROCEDURE — 25800025 ZZH RX 258: Performed by: NURSE ANESTHETIST, CERTIFIED REGISTERED

## 2017-02-28 PROCEDURE — 11402 EXC TR-EXT B9+MARG 1.1-2 CM: CPT | Mod: 59 | Performed by: SURGERY

## 2017-02-28 PROCEDURE — 88304 TISSUE EXAM BY PATHOLOGIST: CPT | Mod: 26 | Performed by: SURGERY

## 2017-02-28 PROCEDURE — 49505 PRP I/HERN INIT REDUC >5 YR: CPT | Performed by: SURGERY

## 2017-02-28 PROCEDURE — 36000052 ZZH SURGERY LEVEL 2 EA 15 ADDTL MIN: Performed by: SURGERY

## 2017-02-28 PROCEDURE — 27210995 ZZH RX 272: Performed by: SURGERY

## 2017-02-28 PROCEDURE — 88304 TISSUE EXAM BY PATHOLOGIST: CPT | Performed by: SURGERY

## 2017-02-28 PROCEDURE — 71000012 ZZH RECOVERY PHASE 1 LEVEL 1 FIRST HR: Performed by: SURGERY

## 2017-02-28 PROCEDURE — 71000027 ZZH RECOVERY PHASE 2 EACH 15 MINS: Performed by: SURGERY

## 2017-02-28 DEVICE — MESH ULTRAPRO 03X06": Type: IMPLANTABLE DEVICE | Site: INGUINAL | Status: FUNCTIONAL

## 2017-02-28 RX ORDER — FENTANYL CITRATE 50 UG/ML
25-50 INJECTION, SOLUTION INTRAMUSCULAR; INTRAVENOUS EVERY 5 MIN PRN
Status: DISCONTINUED | OUTPATIENT
Start: 2017-02-28 | End: 2017-02-28 | Stop reason: HOSPADM

## 2017-02-28 RX ORDER — BUPIVACAINE HYDROCHLORIDE AND EPINEPHRINE 5; 5 MG/ML; UG/ML
INJECTION, SOLUTION EPIDURAL; INTRACAUDAL; PERINEURAL
Status: DISCONTINUED
Start: 2017-02-28 | End: 2017-02-28 | Stop reason: HOSPADM

## 2017-02-28 RX ORDER — CEFAZOLIN SODIUM 2 G/100ML
2 INJECTION, SOLUTION INTRAVENOUS
Status: COMPLETED | OUTPATIENT
Start: 2017-02-28 | End: 2017-02-28

## 2017-02-28 RX ORDER — FENTANYL CITRATE 50 UG/ML
25-50 INJECTION, SOLUTION INTRAMUSCULAR; INTRAVENOUS
Status: DISCONTINUED | OUTPATIENT
Start: 2017-02-28 | End: 2017-02-28 | Stop reason: HOSPADM

## 2017-02-28 RX ORDER — LIDOCAINE HYDROCHLORIDE 20 MG/ML
INJECTION, SOLUTION INFILTRATION; PERINEURAL PRN
Status: DISCONTINUED | OUTPATIENT
Start: 2017-02-28 | End: 2017-02-28

## 2017-02-28 RX ORDER — EPHEDRINE SULFATE 50 MG/ML
INJECTION, SOLUTION INTRAMUSCULAR; INTRAVENOUS; SUBCUTANEOUS PRN
Status: DISCONTINUED | OUTPATIENT
Start: 2017-02-28 | End: 2017-02-28

## 2017-02-28 RX ORDER — ONDANSETRON 4 MG/1
4 TABLET, ORALLY DISINTEGRATING ORAL EVERY 30 MIN PRN
Status: DISCONTINUED | OUTPATIENT
Start: 2017-02-28 | End: 2017-02-28 | Stop reason: HOSPADM

## 2017-02-28 RX ORDER — SODIUM CHLORIDE, SODIUM LACTATE, POTASSIUM CHLORIDE, CALCIUM CHLORIDE 600; 310; 30; 20 MG/100ML; MG/100ML; MG/100ML; MG/100ML
INJECTION, SOLUTION INTRAVENOUS CONTINUOUS PRN
Status: DISCONTINUED | OUTPATIENT
Start: 2017-02-28 | End: 2017-02-28

## 2017-02-28 RX ORDER — SODIUM CHLORIDE, SODIUM LACTATE, POTASSIUM CHLORIDE, CALCIUM CHLORIDE 600; 310; 30; 20 MG/100ML; MG/100ML; MG/100ML; MG/100ML
INJECTION, SOLUTION INTRAVENOUS CONTINUOUS
Status: DISCONTINUED | OUTPATIENT
Start: 2017-02-28 | End: 2017-02-28 | Stop reason: HOSPADM

## 2017-02-28 RX ORDER — FENTANYL CITRATE 50 UG/ML
INJECTION, SOLUTION INTRAMUSCULAR; INTRAVENOUS PRN
Status: DISCONTINUED | OUTPATIENT
Start: 2017-02-28 | End: 2017-02-28

## 2017-02-28 RX ORDER — ONDANSETRON 2 MG/ML
INJECTION INTRAMUSCULAR; INTRAVENOUS PRN
Status: DISCONTINUED | OUTPATIENT
Start: 2017-02-28 | End: 2017-02-28

## 2017-02-28 RX ORDER — CEFAZOLIN SODIUM 1 G/3ML
1 INJECTION, POWDER, FOR SOLUTION INTRAMUSCULAR; INTRAVENOUS SEE ADMIN INSTRUCTIONS
Status: DISCONTINUED | OUTPATIENT
Start: 2017-02-28 | End: 2017-02-28 | Stop reason: HOSPADM

## 2017-02-28 RX ORDER — HYDROCODONE BITARTRATE AND ACETAMINOPHEN 5; 325 MG/1; MG/1
1-2 TABLET ORAL EVERY 4 HOURS PRN
Qty: 30 TABLET | Refills: 0 | Status: SHIPPED | OUTPATIENT
Start: 2017-02-28 | End: 2017-04-29

## 2017-02-28 RX ORDER — NALOXONE HYDROCHLORIDE 0.4 MG/ML
.1-.4 INJECTION, SOLUTION INTRAMUSCULAR; INTRAVENOUS; SUBCUTANEOUS
Status: DISCONTINUED | OUTPATIENT
Start: 2017-02-28 | End: 2017-02-28 | Stop reason: HOSPADM

## 2017-02-28 RX ORDER — MEPERIDINE HYDROCHLORIDE 25 MG/ML
12.5 INJECTION INTRAMUSCULAR; INTRAVENOUS; SUBCUTANEOUS
Status: DISCONTINUED | OUTPATIENT
Start: 2017-02-28 | End: 2017-02-28 | Stop reason: HOSPADM

## 2017-02-28 RX ORDER — ONDANSETRON 2 MG/ML
4 INJECTION INTRAMUSCULAR; INTRAVENOUS EVERY 30 MIN PRN
Status: DISCONTINUED | OUTPATIENT
Start: 2017-02-28 | End: 2017-02-28 | Stop reason: HOSPADM

## 2017-02-28 RX ORDER — BUPIVACAINE HYDROCHLORIDE AND EPINEPHRINE 5; 5 MG/ML; UG/ML
INJECTION, SOLUTION EPIDURAL; INTRACAUDAL; PERINEURAL
Status: DISCONTINUED
Start: 2017-02-28 | End: 2017-02-28 | Stop reason: WASHOUT

## 2017-02-28 RX ORDER — PROPOFOL 10 MG/ML
INJECTION, EMULSION INTRAVENOUS PRN
Status: DISCONTINUED | OUTPATIENT
Start: 2017-02-28 | End: 2017-02-28

## 2017-02-28 RX ORDER — PHYSOSTIGMINE SALICYLATE 1 MG/ML
1.2 INJECTION INTRAVENOUS
Status: DISCONTINUED | OUTPATIENT
Start: 2017-02-28 | End: 2017-02-28 | Stop reason: HOSPADM

## 2017-02-28 RX ORDER — MAGNESIUM HYDROXIDE 1200 MG/15ML
LIQUID ORAL PRN
Status: DISCONTINUED | OUTPATIENT
Start: 2017-02-28 | End: 2017-02-28 | Stop reason: HOSPADM

## 2017-02-28 RX ORDER — HYDROCODONE BITARTRATE AND ACETAMINOPHEN 5; 325 MG/1; MG/1
1-2 TABLET ORAL
Status: DISCONTINUED | OUTPATIENT
Start: 2017-02-28 | End: 2017-02-28 | Stop reason: HOSPADM

## 2017-02-28 RX ORDER — DEXAMETHASONE SODIUM PHOSPHATE 4 MG/ML
INJECTION, SOLUTION INTRA-ARTICULAR; INTRALESIONAL; INTRAMUSCULAR; INTRAVENOUS; SOFT TISSUE PRN
Status: DISCONTINUED | OUTPATIENT
Start: 2017-02-28 | End: 2017-02-28

## 2017-02-28 RX ORDER — LIDOCAINE HYDROCHLORIDE 10 MG/ML
INJECTION, SOLUTION EPIDURAL; INFILTRATION; INTRACAUDAL; PERINEURAL
Status: DISCONTINUED
Start: 2017-02-28 | End: 2017-02-28 | Stop reason: HOSPADM

## 2017-02-28 RX ADMIN — CEFAZOLIN SODIUM 2 G: 2 INJECTION, SOLUTION INTRAVENOUS at 13:17

## 2017-02-28 RX ADMIN — FENTANYL CITRATE 25 MCG: 50 INJECTION, SOLUTION INTRAMUSCULAR; INTRAVENOUS at 14:18

## 2017-02-28 RX ADMIN — Medication 5 MG: at 13:50

## 2017-02-28 RX ADMIN — PROPOFOL 100 MG: 10 INJECTION, EMULSION INTRAVENOUS at 13:13

## 2017-02-28 RX ADMIN — ONDANSETRON 4 MG: 2 INJECTION INTRAMUSCULAR; INTRAVENOUS at 13:21

## 2017-02-28 RX ADMIN — FENTANYL CITRATE 25 MCG: 50 INJECTION, SOLUTION INTRAMUSCULAR; INTRAVENOUS at 13:39

## 2017-02-28 RX ADMIN — PROPOFOL 50 MG: 10 INJECTION, EMULSION INTRAVENOUS at 13:14

## 2017-02-28 RX ADMIN — SODIUM CHLORIDE, POTASSIUM CHLORIDE, SODIUM LACTATE AND CALCIUM CHLORIDE: 600; 310; 30; 20 INJECTION, SOLUTION INTRAVENOUS at 13:12

## 2017-02-28 RX ADMIN — LIDOCAINE HYDROCHLORIDE 40 MG: 20 INJECTION, SOLUTION INFILTRATION; PERINEURAL at 13:13

## 2017-02-28 RX ADMIN — FENTANYL CITRATE 25 MCG: 50 INJECTION, SOLUTION INTRAMUSCULAR; INTRAVENOUS at 13:11

## 2017-02-28 RX ADMIN — DEXAMETHASONE SODIUM PHOSPHATE 4 MG: 4 INJECTION, SOLUTION INTRAMUSCULAR; INTRAVENOUS at 13:21

## 2017-02-28 RX ADMIN — FENTANYL CITRATE 25 MCG: 50 INJECTION, SOLUTION INTRAMUSCULAR; INTRAVENOUS at 13:13

## 2017-02-28 ASSESSMENT — ENCOUNTER SYMPTOMS: DYSRHYTHMIAS: 1

## 2017-02-28 NOTE — IP AVS SNAPSHOT
MRN:[id number]                      After Visit Summary   2/28/2017    Isael Garcia    MRN: [id number]           Thank you!     Thank you for choosing [place] for your care. Our goal is always to provide you with excellent care. Hearing back from our patients is one way we can continue to improve our services. Please take a few minutes to complete the written survey that you may receive in the mail after you visit with us. Thank you!        Patient Information     Date Of Birth          4/25/1934        About your hospital stay     You were admitted on:  February 28, 2017 You last received care in the:  [facility]    You were discharged on:  February 28, 2017       Who to Call     For medical emergencies, please call 911.  For non-urgent questions about your medical care, please call your primary care provider or clinic, [phone]  For questions related to your surgery, please call your surgery clinic        Attending Provider     Provider Specialty    Ranjith Lozoya MD Surgery       Primary Care Provider Office Phone # Fax #    Madhu Velasquez [phone]       [facility] [address] [facility] [address]        After Care Instructions     Discharge Instructions       Follow up with Dr. Lozoya or PA, at [address] [address], within 2 weeks, call office for appointment at [phone].    IF you are doing well and have no questions or concerns, you may call our nurse to update on your condition instead of coming in for appointment.            Discharge Instructions       May restart Eliquis tomorrow (3/1)            No lifting        No lifting over 20 lbs and no strenuous physical activity for 2 weeks                  Further instructions from your care team       Same Day Surgery Discharge Instructions for  Sedation and General Anesthesia       It's not unusual to feel dizzy, light-headed or faint for up to  24 hours after surgery or while taking pain medication.  If you have these symptoms: sit for a few minutes before standing and have someone assist you when you get up to walk or use the bathroom.      You should rest and relax for the next 24 hours. We recommend you make arrangements to have an adult stay with you for at least 24 hours after your discharge.  Avoid hazardous and strenuous activity.      DO NOT DRIVE any vehicle or operate mechanical equipment for 24 hours following the end of your surgery.  Even though you may feel normal, your reactions may be affected by the medication you have received.      Do not drink alcoholic beverages for 24 hours following surgery.       Slowly progress to your regular diet as you feel able. It's not unusual to feel nauseated and/or vomit after receiving anesthesia.  If you develop these symptoms, drink clear liquids (apple juice, ginger ale, broth, 7-up, etc. ) until you feel better.  If your nausea and vomiting persists for 24 hours, please notify your surgeon.        All narcotic pain medications, along with inactivity and anesthesia, can cause constipation. Drinking plenty of liquids and increasing fiber intake will help.      For any questions of a medical nature, call your surgeon.      Do not make important decisions for 24 hours.      If you had general anesthesia, you may have a sore throat for a couple of days related to the breathing tube used during surgery.  You may use Cepacol lozenges to help with this discomfort.  If it worsens or if you develop a fever, contact your surgeon.       If you feel your pain is not well managed with the pain medications prescribed by your surgeon, please contact your surgeon's office to let them know so they can address your concerns.               [facility]   Discharge Instructions: Post-Operative Hernia  [facility]    DIET    No restrictions.      Suggest plenty of liquids and high fiber foods  to keep stools soft.      May take 1 oz. (2 tablespoons) Milk of Magnesia the evening following surgery to encourage bowel movement.    BANDAGE    Take the bandage off 48 hours after surgery.      If you have tape strips leave them on.  If they become loose, take them off.      Apply ice to groin periodically during the first 48 hours after surgery.    SHOWER    You may shower after the bandage is off.  Pat the incision dry.    ACTIVITY    You may do what is comfortable.    It is not wise to lift weights, or do anything that requires maximal physical effort for several weeks.      Individual restrictions should be discussed with your surgeon.    You may drive when you are comfortable enough to drive safely.  (Usually 3-4 days.)    WORK      You may return to non-physical work whenever you are comfortable.  (If you can drive, you probably can work.)  Physical work will be decided individually.    PAIN    You may have post-operative pain for several days.    Use the pain medication as directed at your discretion.    Expect gradual resolution of pain over several days.      EXPECTATIONS    You can expect: some swelling; black and blue areas possibly involving the testicles and penis; some numbness.  These are not dangerous.    RETURN APPOINTMENT    Please make your post-operative appointment for 10-15 days after surgery.      We recommend making this appointment soon after your surgery date has been confirmed.    CALL OUR CLINICAL OFFICE AT [phone] IF YOU HAVE:    Fever or chills    Drainage from the incision(s)    Significant bleeding    Increasing pain after the first 36 hours    Any other concerns                           Pending Results     Date and Time Order Name Status Description    2/28/2017 1357 Surgical pathology exam In process             Admission Information     Date & Time Provider Department Dept. Phone    2/28/2017 Ranjith Lozoya MD [facility] Same Day Surgery [phone]       Your Vitals Were     Blood Pressure Temperature Respirations Weight Pulse Oximetry BMI (Body Mass Index)    126/68 98.3  F (36.8  C) (Temporal) 16 74.4 kg (164 lb) 97% 24.94 kg/m2      [employer] Information     [employer] gives you secure access to your electronic health record. If you see a primary care provider, you can also send messages to your care team and make appointments. If you have questions, please call your primary care clinic.  If you do not have a primary care provider, please call [phone] and they will assist you.        Care EveryWhere ID     This is your Care EveryWhere ID. This could be used by other organizations to access your [place] medical records  [id number]           Review of your medicines      START taking        Dose / Directions    HYDROcodone-acetaminophen 5-325 MG per tablet   Commonly known as:  NORCO   Used for:  Acute post-operative pain        Dose:  1-2 tablet   Take 1-2 tablets by mouth every 4 hours as needed for other (Moderate to Severe Pain)   Quantity:  30 tablet   Refills:  0         CONTINUE these medicines which have NOT CHANGED        Dose / Directions    amoxicillin 500 MG capsule   Commonly known as:  AMOXIL   Used for:  PVD (peripheral vascular disease) (H), S/P AAA repair        4 capsules (total 2 grams) orally  1 hour prior to dental procedures   Quantity:  4 capsule   Refills:  3       apixaban ANTICOAGULANT 5 MG tablet   Commonly known as:  ELIQUIS   Used for:  Atrial fibrillation, unspecified type (H)        1/2 tab twice a day   Quantity:  90 tablet   Refills:  3       aspirin  MG EC tablet        Dose:  325 mg   Take 325 mg by mouth daily   Refills:  0       atorvastatin 10 MG tablet   Commonly known as:  LIPITOR   Used for:  Hyperlipidemia LDL goal <70        Dose:  10 mg   Take 1 tablet (10 mg) by mouth daily   Quantity:  30 tablet   Refills:  11       metoprolol 25 MG tablet   Commonly known as:  LOPRESSOR   Used for:  Tachycardia        Dose:   12.5 mg   Take 0.5 tablets (12.5 mg) by mouth 2 times daily   Quantity:  90 tablet   Refills:  3       Multi-vitamin Tabs tablet        Dose:  1 tablet   Take 1 tablet by mouth daily   Refills:  0       OSTEO BI-FLEX ADV TRIPLE ST Tabs        Dose:  1 tablet   Take 1 tablet by mouth daily   Refills:  0       TYLENOL 500 MG tablet   Generic drug:  acetaminophen        Dose:  1000 mg   1,000 mg daily   Refills:  0       vitamin D 2000 UNITS tablet   Used for:  Vitamin D deficiency        Dose:  2000 Units   Take 2,000 Units by mouth daily   Quantity:  100 tablet   Refills:  3            Where to get your medicines      Some of these will need a paper prescription and others can be bought over the counter. Ask your nurse if you have questions.     Bring a paper prescription for each of these medications     HYDROcodone-acetaminophen 5-325 MG per tablet                Protect others around you: Learn how to safely use, store and throw away your medicines at www.disposemymeds.org.             Medication List: This is a list of all your medications and when to take them. Check marks below indicate your daily home schedule. Keep this list as a reference.      Medications           Morning Afternoon Evening Bedtime As Needed    amoxicillin 500 MG capsule   Commonly known as:  AMOXIL   4 capsules (total 2 grams) orally  1 hour prior to dental procedures                                apixaban ANTICOAGULANT 5 MG tablet   Commonly known as:  ELIQUIS   1/2 tab twice a day                                aspirin  MG EC tablet   Take 325 mg by mouth daily                                atorvastatin 10 MG tablet   Commonly known as:  LIPITOR   Take 1 tablet (10 mg) by mouth daily                                HYDROcodone-acetaminophen 5-325 MG per tablet   Commonly known as:  NORCO   Take 1-2 tablets by mouth every 4 hours as needed for other (Moderate to Severe Pain)                                metoprolol 25 MG tablet    Commonly known as:  LOPRESSOR   Take 0.5 tablets (12.5 mg) by mouth 2 times daily                                Multi-vitamin Tabs tablet   Take 1 tablet by mouth daily                                OSTEO BI-FLEX ADV TRIPLE ST Tabs   Take 1 tablet by mouth daily                                TYLENOL 500 MG tablet   1,000 mg daily   Generic drug:  acetaminophen                                vitamin D 2000 UNITS tablet   Take 2,000 Units by mouth daily

## 2017-02-28 NOTE — DISCHARGE INSTRUCTIONS
Same Day Surgery Discharge Instructions for  Sedation and General Anesthesia       It's not unusual to feel dizzy, light-headed or faint for up to 24 hours after surgery or while taking pain medication.  If you have these symptoms: sit for a few minutes before standing and have someone assist you when you get up to walk or use the bathroom.      You should rest and relax for the next 24 hours. We recommend you make arrangements to have an adult stay with you for at least 24 hours after your discharge.  Avoid hazardous and strenuous activity.      DO NOT DRIVE any vehicle or operate mechanical equipment for 24 hours following the end of your surgery.  Even though you may feel normal, your reactions may be affected by the medication you have received.      Do not drink alcoholic beverages for 24 hours following surgery.       Slowly progress to your regular diet as you feel able. It's not unusual to feel nauseated and/or vomit after receiving anesthesia.  If you develop these symptoms, drink clear liquids (apple juice, ginger ale, broth, 7-up, etc. ) until you feel better.  If your nausea and vomiting persists for 24 hours, please notify your surgeon.        All narcotic pain medications, along with inactivity and anesthesia, can cause constipation. Drinking plenty of liquids and increasing fiber intake will help.      For any questions of a medical nature, call your surgeon.      Do not make important decisions for 24 hours.      If you had general anesthesia, you may have a sore throat for a couple of days related to the breathing tube used during surgery.  You may use Cepacol lozenges to help with this discomfort.  If it worsens or if you develop a fever, contact your surgeon.       If you feel your pain is not well managed with the pain medications prescribed by your surgeon, please contact your surgeon's office to let them know so they can address your concerns.               [facility]    Discharge Instructions: Post-Operative Hernia  [facility]    DIET    No restrictions.      Suggest plenty of liquids and high fiber foods to keep stools soft.      May take 1 oz. (2 tablespoons) Milk of Magnesia the evening following surgery to encourage bowel movement.    BANDAGE    Take the bandage off 48 hours after surgery.      If you have tape strips leave them on.  If they become loose, take them off.      Apply ice to groin periodically during the first 48 hours after surgery.    SHOWER    You may shower after the bandage is off.  Pat the incision dry.    ACTIVITY    You may do what is comfortable.    It is not wise to lift weights, or do anything that requires maximal physical effort for several weeks.      Individual restrictions should be discussed with your surgeon.    You may drive when you are comfortable enough to drive safely.  (Usually 3-4 days.)    WORK      You may return to non-physical work whenever you are comfortable.  (If you can drive, you probably can work.)  Physical work will be decided individually.    PAIN    You may have post-operative pain for several days.    Use the pain medication as directed at your discretion.    Expect gradual resolution of pain over several days.      EXPECTATIONS    You can expect: some swelling; black and blue areas possibly involving the testicles and penis; some numbness.  These are not dangerous.    RETURN APPOINTMENT    Please make your post-operative appointment for 10-15 days after surgery.      We recommend making this appointment soon after your surgery date has been confirmed.    CALL OUR CLINICAL OFFICE AT [phone] IF YOU HAVE:    Fever or chills    Drainage from the incision(s)    Significant bleeding    Increasing pain after the first 36 hours    Any other concerns

## 2017-02-28 NOTE — IP AVS SNAPSHOT
[facility] Same Day Surgery    [address]    [place] [address]    Phone:  [phone]    Fax:  [phone]                                       After Visit Summary   2/28/2017    Isael Garcia    MRN: [id number]           After Visit Summary Signature Page     I have received my discharge instructions, and my questions have been answered. I have discussed any challenges I see with this plan with the nurse or doctor.    ..........................................................................................................................................  Patient/Patient Representative Signature      ..........................................................................................................................................  Patient Representative Print Name and Relationship to Patient    ..................................................               ................................................  Date                                            Time    ..........................................................................................................................................  Reviewed by Signature/Title    ...................................................              ..............................................  Date                                                            Time

## 2017-02-28 NOTE — ANESTHESIA CARE TRANSFER NOTE
Patient: Isael Garcia    Procedure(s):  open left inguinal hernia repair with mesh; excision left upper arm mass; excision chest wall mass - Wound Class: I-Clean   - Wound Class: I-Clean   - Wound Class: I-Clean    Diagnosis: left inguinal hernia, left arm mass, chest wall mass  Diagnosis Additional Information: No value filed.    Anesthesia Type:   General, LMA     Note:  Airway :LMA and Face Mask  Patient transferred to:PACU  Comments: 1429:  To par, spontaneous respirations      Vitals: (Last set prior to Anesthesia Care Transfer)    CRNA VITALS  2/28/2017 1359 - 2/28/2017 1429      2/28/2017             Pulse: 67    SpO2: 99 %    Resp Rate (set): 10                Electronically Signed By: MAGNUS Peralta CRNA  February 28, 2017  2:29 PM

## 2017-02-28 NOTE — BRIEF OP NOTE
[facility] Brief Operative Note    Pre-operative diagnosis: left inguinal hernia, left arm mass, chest wall mass   Post-operative diagnosis Left inguinal hernia, mass mid chest and left upper arm      Procedure: Procedure(s):  open left inguinal hernia repair with mesh; excision left upper arm mass; excision chest wall mass - Wound Class: I-Clean   - Wound Class: I-Clean   - Wound Class: I-Clean   Surgeon(s): Surgeon(s) and Role:  Panel 1:     * Ranjith Lozoya MD - Primary    Panel 2:     * Ranjith Lozoya MD - Primary    Panel 3:     * Ranjith Lozoya MD - Primary   Estimated blood loss: 4 mL    Specimens:   ID Type Source Tests Collected by Time Destination   A : left upper arm mass Tissue Arm, Left SURGICAL PATHOLOGY EXAM Ranjith Lozoya MD 2/28/2017  2:16 PM    B : chest wall mass  Tissue Chest SURGICAL PATHOLOGY EXAM Ranjith Lozoya MD 2/28/2017 12:28 PM       Findings: Same as above.  Ultrapro mesh    Tiff Oliveira PA-C

## 2017-02-28 NOTE — ANESTHESIA POSTPROCEDURE EVALUATION
Patient: Isael Garcia    Procedure(s):  open left inguinal hernia repair with mesh; excision left upper arm mass; excision chest wall mass - Wound Class: I-Clean   - Wound Class: I-Clean   - Wound Class: I-Clean    Diagnosis:left inguinal hernia, left arm mass, chest wall mass  Diagnosis Additional Information: No value filed.    Anesthesia Type:  General, LMA    Note:  Anesthesia Post Evaluation    Patient location during evaluation: PACU  Patient participation: Able to fully participate in evaluation  Level of consciousness: awake  Pain management: adequate  Airway patency: patent  Cardiovascular status: acceptable  Respiratory status: acceptable  Hydration status: acceptable  PONV: none     Anesthetic complications: None          Last vitals:  Vitals:    02/28/17 1445 02/28/17 1500 02/28/17 1515   BP: 135/75 131/66 126/68   Resp: 16 20 16   Temp:   36.8  C (98.3  F)   SpO2: 96% 97%          Electronically Signed By: Sam Hinton MD  February 28, 2017  3:22 PM

## 2017-02-28 NOTE — ANESTHESIA PREPROCEDURE EVALUATION
"  Anesthesia Evaluation     . Pt has had prior anesthetic.     No history of anesthetic complications     ROS/MED HX    ENT/Pulmonary:       Neurologic:       Cardiovascular:     (+) Dyslipidemia, hypertension-range: has taken no BP medicine for past several days, Peripheral Vascular Disease (S/P AAA repair at [place])-- Other, --. Taking blood thinners (off Eliquis X 5 days) : . . . :. dysrhythmias (intermittent; ) a-fib, . Previous cardiac testing date:results:date: results:ECG reviewed date:2/16/2017 results:Sinus fermín; first degree AV block date: results:          METS/Exercise Tolerance: Comment: Plays \"pickle ball\" regularly    Hematologic:         Musculoskeletal:   (+) arthritis (osteo), , , -       GI/Hepatic:         Renal/Genitourinary:     (+) chronic renal disease (Creat: 1.84 2/16/2017), type: CRI, Pt does not require dialysis, Pt has no history of transplant,       Endo:         Psychiatric:         Infectious Disease:         Malignancy:         Other:               Physical Exam  Normal systems: cardiovascular and pulmonary    Airway   Mallampati: II  Neck ROM: limited    Dental     Cardiovascular       Pulmonary                     Anesthesia Plan      History & Physical Review  History and physical reviewed and following examination; no interval change.    ASA Status:  3 .    NPO Status:  > 6 hours    Plan for General and LMA with Intravenous and Propofol induction. Maintenance will be Inhalation.    PONV prophylaxis:  Ondansetron (or other 5HT-3) and Dexamethasone or Solumedrol       Postoperative Care  Postoperative pain management:  IV analgesics and Oral pain medications.      Consents  Anesthetic plan, risks, benefits and alternatives discussed with:  Patient..                          .  DPreop diagnosis: left inguinal hernia, left arm mass, chest wall mass  Procedure(s):  HERNIORRHAPHY INGUINAL  EXCISE MASS UPPER EXTREMITY  EXCISE MASS TRUNK  Allergies   Allergen Reactions     Lisinopril " Cramps     Leg cramping       No current facility-administered medications on file prior to encounter.   Current Outpatient Prescriptions on File Prior to Encounter:  atorvastatin (LIPITOR) 10 MG tablet Take 1 tablet (10 mg) by mouth daily   multivitamin, therapeutic with minerals (MULTI-VITAMIN) TABS Take 1 tablet by mouth daily   acetaminophen (TYLENOL) 500 MG tablet 1,000 mg daily    Misc Natural Products (OSTEO BI-FLEX ADV TRIPLE ST) TABS Take 1 tablet by mouth daily   Cholecalciferol (VITAMIN D) 2000 UNITS tablet Take 2,000 Units by mouth daily   apixaban ANTICOAGULANT (ELIQUIS) 5 MG tablet 1/2 tab twice a day   metoprolol (LOPRESSOR) 25 MG tablet Take 0.5 tablets (12.5 mg) by mouth 2 times daily   amoxicillin (AMOXIL) 500 MG capsule 4 capsules (total 2 grams) orally  1 hour prior to dental procedures   aspirin  MG tablet Take 325 mg by mouth daily      Hemoglobin   Date Value Ref Range Status   02/16/2017 13.4 13.3 - 17.7 g/dL Final       Potassium   Date Value Ref Range Status   02/16/2017 4.9 3.4 - 5.3 mmol/L Final

## 2017-02-28 NOTE — TELEPHONE ENCOUNTER
Spoke with pt earlier tonight. Pt to try Clotrimazole cream prn groin rash. Losartan changed to Amlodipine in addition not due to skin issue but due to worsened GFR and ? Bilateral RYAN given hx abd vasculature pathology. Pt will have repeat BMP lab in 2 weeks as ordered and see me in 3-4 weeks for blood pressure recheck. Rx faxed

## 2017-02-28 NOTE — H&P (VIEW-ONLY)
[facility]  [address] [address]  [phone]  Dept: [phone]    PRE-OP EVALUATION:  Today's date: 2017    Isael Garcia (: 1934) presents for pre-operative evaluation assessment as requested by Dr. Lozoya.  He requires evaluation and anesthesia risk assessment prior to undergoing surgery/procedure for treatment of left inguinal hernia  Proposed procedure:  LIH repair    Date of Surgery/ Procedure: 2017  Time of Surgery/ Procedure: 1:00pm  Hospital/Surgical Facility: [facility]    Primary Physician: Madhu Velasquez  Type of Anesthesia Anticipated: to be determined          HPI:                                                      Brief HPI related to upcoming procedure:  History of a left groin bulge with discomfort for over one year. Symptoms of been worsening more recently. Patient was noted on exam to have a left reducible inguinal hernia. Following referral to general surgery for consultation, patient has elected to undergo surgical treatment as above. Please see below for other medical issues. History of hypertension and chronic kidney disease stage 3. GFR has worsened some with increasing dosage of Losartan.  Pt does have a history of peripheral vascular disease and s/p previous infrarenal AAA repair in 2016. Pt declines current claudication sx in BLEs now. He is playing pickle ball 2x/week and other general walking without chest pain or shortness of breath or leg cramps. Pt has hx intermittent A fib and is on anticoagulation with Eliquis         MEDICAL HISTORY:                                                      Patient Active Problem List    Diagnosis Date Noted     Atrial fibrillation (H)      Priority: Medium     PVD (peripheral vascular disease) (H) 2016     Priority: Medium     OA (osteoarthritis) 2015     Health Care Home 2013     DX V65.8 REPLACED WITH [id number] (2013)         Sleep  related leg cramps 02/06/2013     Vitamin D deficiency disease 02/06/2013     CKD (chronic kidney disease) stage 3, GFR 30-59 ml/min 02/04/2013     Advanced directives, counseling/discussion 09/16/2011     Advance Care Planning 1/20/2017: ACP Review of Chart / Resources Provided:  Reviewed chart for advance care plan.  Isael Garcia has an up to date advance care plan on file.  Added by Arabella Garcia        Advance Directive Problem List Overview:   Name Relationship Phone    Primary Health Care Agent            Alternative Health Care Agent          Patient states has Advance Directive and will bring in a copy to clinic. 9/16/2011          S/P TKR (total knee replacement) 05/18/2011     (Problem list name updated by automated process. Provider to review and confirm.)       Aftercare following joint replacement 05/18/2011     Essential hypertension      Problem list name updated by automated process. Provider to review       Allergic rhinitis      Problem list name updated by automated process. Provider to review        Past Medical History   Diagnosis Date     AAA (abdominal aortic aneurysm) (H) May 2016     infrarenal s/p graft placement at [facility]     Allergic rhinitis, cause unspecified      Atrial fibrillation (H)      CKD (chronic kidney disease) stage 3, GFR 30-59 ml/min 2/4/2013     Lumbago 2000     s/p lumbar epidural injection     OA (osteoarthritis) 2/21/2015     PVD (peripheral vascular disease) (H) 3/7/2016     Shingles 7/09      left forehead     Sleep related leg cramps 2/6/2013     Unspecified cataract      bilateral     Unspecified essential hypertension      Vitamin D deficiency disease 2/6/2013     Past Surgical History   Procedure Laterality Date     C nonspecific procedure  1960     right inguinal hernia repair     C nonspecific procedure  1990s     TURP     C nonspecific procedure       tonsillectomy     C nonspecific procedure  April 2011     Right total knee arthroplasty      C nonspecific procedure   approx 2009      left  cataract extraction/IOL placement     C nonspecific procedure  May 2016     Endovascular AAA stent graft placed at [facility]. [place] [facility] suggests abx prophylaxis for life for procedures (dental/etc)     Current Outpatient Prescriptions   Medication Sig Dispense Refill     apixaban ANTICOAGULANT (ELIQUIS) 5 MG tablet 1/2 tab twice a day 90 tablet 3     losartan (COZAAR) 100 MG tablet Take 1 tablet (100 mg) by mouth daily 90 tablet 3     metoprolol (LOPRESSOR) 25 MG tablet Take 0.5 tablets (12.5 mg) by mouth 2 times daily 90 tablet 3     amoxicillin (AMOXIL) 500 MG capsule 4 capsules (total 2 grams) orally  1 hour prior to dental procedures 4 capsule 3     atorvastatin (LIPITOR) 10 MG tablet Take 1 tablet (10 mg) by mouth daily 30 tablet 11     multivitamin, therapeutic with minerals (MULTI-VITAMIN) TABS Take 1 tablet by mouth daily       aspirin  MG tablet Take 325 mg by mouth daily        acetaminophen (TYLENOL) 500 MG tablet 1,000 mg daily        Misc Natural Products (OSTEO BI-FLEX ADV TRIPLE ST) TABS Take 1 tablet by mouth daily       Cholecalciferol (VITAMIN D) 2000 UNITS tablet Take 2,000 Units by mouth daily 100 tablet 3     OTC products: None, except as noted above    Allergies   Allergen Reactions     Lisinopril Cramps     Leg cramping      Latex Allergy: NO    Social History   Substance Use Topics     Smoking status: Never Smoker     Smokeless tobacco: Never Used     Alcohol use No     History   Drug Use No       REVIEW OF SYSTEMS:                                                    C: NEGATIVE for fever, chills, change in weight  I: NEGATIVE for worrisome rashes, moles or lesions  E: NEGATIVE for vision changes or irritation. Has glasses  E/M: NEGATIVE for ear, mouth and throat problems  R: NEGATIVE for significant cough or SOB  CV: NEGATIVE for chest pain, palpitations or peripheral edema. Hx intermittent A fib  GI: NEGATIVE for nausea, abdominal  pain, heartburn, or change in bowel habits.   : NEGATIVE for frequency, dysuria, or hematuria. Hx CKD  M:  POSITIVE for chronic  Mild back pain, much improved since previous surgery. No radicular sx now  N: NEGATIVE for weakness, dizziness or paresthesias  E: POSITIVE for statin therapy  H: NEGATIVE for bleeding problems with use of Eliquis  P: NEGATIVE for changes in mood or affect    EXAM:                                                    /80  Pulse 56  Temp 97.8  F (36.6  C) (Oral)  Wt 164 lb (74.4 kg)  SpO2 98%  BMI 24.94 kg/m2  Eye: PERRL, EOMI  HENT: ear canals and TM's normal and nose and mouth without ulcers or lesions   Neck: no adenopathy. Thyroid normal to palpation. No bruits  Pulm: Lungs clear to auscultation   CV: Regular rates and rhythm  GI: Soft, nontender, Normal active bowel sounds, No hepatosplenomegaly or masses palpable  Ext: Peripheral pulses intact. Minimal BLE edema.  Neuro: Normal strength and tone, sensory exam grossly normal  : Left inguinal hernia, reducible and minimally tender to palpaiotn      DIAGNOSTICS:                                                    EKG:  Sinus bradyardia with rate 53. 1st degree AVB (252 ms). No acute ST/ changes    Recent Labs   Lab Test  02/09/17   1539  12/30/16   1003  11/14/16   0836   05/24/16   1432   09/11/15   0840   05/10/13   1053   04/28/11   1130   HGB   --    --   13.2*   --   12.1*   --   14.3   < >   --    < >   --    PLT   --    --    --    --   286   --   222   < >   --    < >   --    INR   --    --    --    --    --    --    --    --    --    --   1.03   NA  139  143  138   < >  139   < >  144   < >  144   < >  142   POTASSIUM  4.9  4.4  4.5   < >  4.2   < >  4.5   < >  4.7   < >  4.7   CR  1.97*  1.66*  1.59*   < >  1.48*   < >  1.38*   < >  1.48*   < >  1.27*   A1C   --    --    --    --    --    --    --    --   5.5   --    --     < > = values in this interval not displayed.      Component      Latest Ref Rng & Units 2/16/2017    Sodium      133 - 144 mmol/L 140   Potassium      3.4 - 5.3 mmol/L 4.9   Chloride      94 - 109 mmol/L 106   Carbon Dioxide      20 - 32 mmol/L 28   Anion Gap      3 - 14 mmol/L 6   Glucose      70 - 99 mg/dL 86   Urea Nitrogen      7 - 30 mg/dL 31 (H)   Creatinine      0.66 - 1.25 mg/dL 1.84 (H)   GFR Estimate      >60 mL/min/1.7m2 35 (L)   GFR Estimate If Black      >60 mL/min/1.7m2 43 (L)   Calcium      8.5 - 10.1 mg/dL 8.8   Ferritin      26 - 388 ng/mL 260   Hemoglobin      13.3 - 17.7 g/dL 13.4     IMPRESSION:                                                    Reason for surgery/procedure: Left inguinal hernia  Diagnosis/reason for consult: hypertension (styable), CKD3 (some recently worsening though improved slightly today compared to 1 week prior -  Worsened further with ARB), intermittent A fib (in sinus rhythm currently), PVD (no current claudication or chest pain sx with exertional exercise of pickleball), Hyperlipidemia    The proposed surgical procedure is considered INTERMEDIATE risk.    REVISED CARDIAC RISK INDEX  The patient has the following serious cardiovascular risks for perioperative complications such as (MI, PE, VFib and 3  AV Block):  No serious cardiac risks per criteria list given for preop. Pt does have hx vascular disease in general s/p surgical intervention and now assymptomatic  INTERPRETATION: 0 risks: Class I (very low risk - 0.4% complication rate)    The patient has the following additional risks for perioperative complications:  No identified additional risks    No diagnosis found.    RECOMMENDATIONS:                                                    APPROVAL GIVEN to proceed with proposed procedure, without further diagnostic evaluation  Stop Aspirin 1 week prior to surgery  Hold Eliquis 3 days prior to surgery  Stop Losartan and start Amlodipine 5mg tab, 1 tab daily for blood pressure  Repeat nonfasting BMP lab in 2-3 weeks  See me in 4 weeks for follow-up blood pressure  Take  Metoprolol the  AM of surgery. Hold other meds the AM of surgery  No solid food after midnight prior to surgery. May have clear liquids up to 8 AM the morning of surgery (5 hrs prior to surgery)  Resume Aspirin and Eliquis post op as previous            Signed Electronically by: Madhu Velasquez MD    Copy of this evaluation report is provided to requesting physician.    [facility] Preop Guidelines

## 2017-02-28 NOTE — PROGRESS NOTES
Transfer to Phase II placed on hold.  Patient unable to keep O2 sat > 90 on RA.  O2 sat would drop to 88 and with IS  and deep breathing it would go up to  93.  MDA and family informed.  Family at bedside.

## 2017-03-01 NOTE — OP NOTE
PREOPERATIVE DIAGNOSES:     1.  Left indirect inguinal hernia.   2.  Anterior chest wall mass.   3.  Large left lateral upper arm mass.      POSTOPERATIVE DIAGNOSES:     1.  Left indirect inguinal hernia.   2.  Anterior chest wall mass,   3.  Large left lateral upper arm mass.      PROCEDURES:    1.  Open repair of left inguinal hernia with mesh.   2.  Wide excision of chest wall mass.   3.  Wide excision of large lateral upper left arm mass:        SURGEON:  Ranjith Lozoya MD      ASSISTANT:  Cheryl Halsted, PA-C assisted during the entirety of the case.  Her help was essential    for retraction, cauterization, suturing and dissection.      INDICATIONS:  The patient Isael Garcia is an 82-year-old man with an inguinal hernia and    2 masses as noted above.  He elected to have the masses excised and the hernia repaired.  We discussed the risks of recurrent hernia or cyst, bleeding, infection, scarring, anesthesia and/or unsightly postoperative appearance.  The patient appeared to understand his choices and wished to proceed.      DESCRIPTION OF PROCEDURE:  The patient was brought to the operating room on 02/28/2017. Under general anesthesia, local anesthetic was instilled in the left lower quadrant.        A transverse inguinal incision was made.  We dissected down through the external oblique after instillation of further anesthetic in this area.  We split the external oblique.  We encircled the cord and the sac with a Penrose drain.  The large indirect sac was carefully dissected away and reduced.     An indirect lipoma was also reduced.  The direct floor was fairly strong.  There was no femoral hernia.     I selected a 15 x 7 cm rectangle of UltraPro mesh which was cut appropriately.  It was secured to the inguinal ligament with running 2-0 Novafil.  This was secured superiorly with interrupted 2-0 PDS with one suture of Novafil in the center.  The mesh was slit laterally and sutured to itself around the cord     to create a new internal ring that would not admit a fingertip.  There was a good pulse in the cord following this.  The mesh lay with no tension.  The external oblique was closed over the mesh with running 2-0 Vicryl.  Sponge count was reported as correct.  Further Marcaine was instilled.  The deep tissues were closed with 3-0 Vicryl, and the skin was closed with 4-0 Vicryl and Steri-Strips.  A sterile dressing was applied.        An elliptical incision was made around the anterior chest wall mass.  The mass was sharply dissected out, and the base was dissected out with cautery.  The mass was set aside.  Undermining was done    in both directions.  The wound was closed with a deep layer of 2-0 Vicryl and a skin layer    of 4-0 Monocryl, and a sterile dressing was applied.  Sponge count was reported as correct.        The left upper arm was prepped and draped.  A vertical incision was made over the palpable mass,    and a small piece of skin was taken from what looked like a pore from a probable cyst.  The palpable mass lay very superficially, and there was very little skin lying over this.  We dissected medially and then posteriorly and then dissected laterally and anteriorly.  Bit by bit we freed up the mass.  We then went posterior to the mass in the deep layer using cautery and sharp dissection.  The mass was then excised en bloc.  Hemostasis was assured with cautery.  No other masses were seen.  Sponge count was done and was reported as correct.  Deep tissues were closed with 3-0 Vicryl, and local anesthetic was instilled.  Skin was closed with 4-0 Monocryl and Steri-Strips.  Sterile dressing was applied.  Sponge count was again reported as correct.         LATOYA STACY MD             D: 2017 19:46   T: 2017 15:54   MT: [id number]      Name:     JOHN MATA   MRN:      [id number]        Account:        [id number]   :      1934           Procedure Date: 2017      Document:  [id number]       cc: Madhu Velasquez MD

## 2017-03-02 LAB — COPATH REPORT: NORMAL

## 2017-03-22 ENCOUNTER — TELEPHONE (OUTPATIENT)
Dept: INTERNAL MEDICINE | Facility: CLINIC | Age: 82
End: 2017-03-22

## 2017-03-22 DIAGNOSIS — I10 ESSENTIAL HYPERTENSION: Primary | ICD-10-CM

## 2017-03-22 DIAGNOSIS — N18.30 CKD (CHRONIC KIDNEY DISEASE) STAGE 3, GFR 30-59 ML/MIN (H): ICD-10-CM

## 2017-03-22 DIAGNOSIS — Z53.9 ERRONEOUS ENCOUNTER--DISREGARD: Primary | ICD-10-CM

## 2017-03-22 DIAGNOSIS — E78.5 HYPERLIPIDEMIA LDL GOAL <70: ICD-10-CM

## 2017-03-22 LAB
ANION GAP SERPL CALCULATED.3IONS-SCNC: 7 MMOL/L (ref 3–14)
BUN SERPL-MCNC: 29 MG/DL (ref 7–30)
CALCIUM SERPL-MCNC: 8.7 MG/DL (ref 8.5–10.1)
CHLORIDE SERPL-SCNC: 110 MMOL/L (ref 94–109)
CHOLEST SERPL-MCNC: 136 MG/DL
CO2 SERPL-SCNC: 28 MMOL/L (ref 20–32)
CREAT SERPL-MCNC: 1.65 MG/DL (ref 0.66–1.25)
GFR SERPL CREATININE-BSD FRML MDRD: 40 ML/MIN/1.7M2
GLUCOSE SERPL-MCNC: 89 MG/DL (ref 70–99)
HDLC SERPL-MCNC: 38 MG/DL
LDLC SERPL CALC-MCNC: 82 MG/DL
NONHDLC SERPL-MCNC: 98 MG/DL
POTASSIUM SERPL-SCNC: 4.4 MMOL/L (ref 3.4–5.3)
SODIUM SERPL-SCNC: 145 MMOL/L (ref 133–144)
TRIGL SERPL-MCNC: 82 MG/DL

## 2017-03-22 PROCEDURE — 80048 BASIC METABOLIC PNL TOTAL CA: CPT | Performed by: INTERNAL MEDICINE

## 2017-03-22 PROCEDURE — 80061 LIPID PANEL: CPT | Performed by: INTERNAL MEDICINE

## 2017-03-22 PROCEDURE — 36415 COLL VENOUS BLD VENIPUNCTURE: CPT | Performed by: INTERNAL MEDICINE

## 2017-03-22 RX ORDER — AMLODIPINE BESYLATE 5 MG/1
5 TABLET ORAL DAILY
Qty: 30 TABLET | Refills: 11 | Status: SHIPPED | OUTPATIENT
Start: 2017-03-22 | End: 2017-04-27 | Stop reason: DRUGHIGH

## 2017-03-22 NOTE — TELEPHONE ENCOUNTER
" Pt was seen for preop mid February and taken off of Losartan and started on Amlodipine 5mg daily for blood pressure instead. Was instructed to have labs done in 1 month and see me a few days later for blood pressure recheck. A nurse right before surgery  Did \"med reconcile\" in chart and cancelled pt's Amlodipine from chart. Pt needs to be taking Amlodipine 5mg daily for  blood pressure and, if not taking now, needs to restart. RX put on file if needed at pt's pharmacy. Labs today show choelsterol at goal and  Kidney function still abnormal but improved off of Losartan Was a little dehydrated  With fasting labs today. Pt to have an extra glass of water/day also. See me in the next 1 month for follow-up of blood pressure while taking Amlodipine 5mg daily for blood pressure. Continue other meds also.  Inform pt  "

## 2017-03-29 ENCOUNTER — OFFICE VISIT (OUTPATIENT)
Dept: URGENT CARE | Facility: URGENT CARE | Age: 82
End: 2017-03-29
Payer: COMMERCIAL

## 2017-03-29 VITALS
TEMPERATURE: 96.5 F | SYSTOLIC BLOOD PRESSURE: 145 MMHG | DIASTOLIC BLOOD PRESSURE: 82 MMHG | HEART RATE: 77 BPM | BODY MASS INDEX: 25.54 KG/M2 | WEIGHT: 168 LBS

## 2017-03-29 DIAGNOSIS — L03.113 CELLULITIS OF RIGHT UPPER EXTREMITY: Primary | ICD-10-CM

## 2017-03-29 PROCEDURE — 99213 OFFICE O/P EST LOW 20 MIN: CPT | Performed by: PHYSICIAN ASSISTANT

## 2017-03-29 RX ORDER — SULFAMETHOXAZOLE/TRIMETHOPRIM 800-160 MG
1 TABLET ORAL 2 TIMES DAILY
Qty: 20 TABLET | Refills: 0 | Status: SHIPPED | OUTPATIENT
Start: 2017-03-29 | End: 2017-04-08

## 2017-03-29 ASSESSMENT — ENCOUNTER SYMPTOMS
HEADACHES: 0
ABDOMINAL PAIN: 0
FEVER: 0
SHORTNESS OF BREATH: 0

## 2017-03-29 NOTE — PATIENT INSTRUCTIONS
Follow up with primary care in 2 days if symptoms have not improved. Return to clinic or go to ER if symptoms worsen.      Discharge Instructions for Cellulitis  You have been diagnosed with cellulitis. This is an infection in the deepest layer of the skin. In some cases, the infection also affects the muscle. Cellulitis is caused by bacteria. The bacteria can enter the body through broken skin. This can happen with a cut, scratch, animal bite, or an insect bite that has been scratched. You may have been treated in the hospital with antibiotics and fluids. You will likely be given a prescription for antibiotics to take at home. This sheet will help you take care of yourself at home.  Home Care  When you are home:    Take the prescribed antibiotic medication you are given as directed until it is gone. Take it even if you feel better. It treats the infection and stops it from returning. Not taking all of the medication can make future infections hard to treat.    Keep the infected area clean.    When possible, raise the infected area above the level of your heart. This helps keep swelling down.    Talk to your doctor if you are in pain. Ask what kind of over-the-counter medication you can take for pain.    Apply clean bandages as advised.    Take your temperature once a day for a week.    Wash your hands often to prevent spreading the infection.  In the future, wash your hands before and after you touch cuts, scratches, or bandages. This will help prevent infection.   When to Call Your Doctor  Call your doctor immediately if you have any of the following:    Vomiting    Fever of100.4 F (38 C) or higher, or as directed by your health care provider    Shaking chills    Redness that gets worse in or around the infected area    Swelling of the infected area    Pain that gets worse in or around the infected area    Difficulty or pain when moving the joints above or below the infected area    Discharge or pus draining from  the area     [id number] [employer]. [address] [place] [address], [place] [address]. All rights reserved. This information is not intended as a substitute for professional medical care. Always follow your healthcare professional's instructions.

## 2017-03-29 NOTE — MR AVS SNAPSHOT
After Visit Summary   3/29/2017    Isael Garcia    MRN: [id number]           Patient Information     Date Of Birth          4/25/1934        Visit Information        Provider Department      3/29/2017 10:30 AM Gin Rogers PA-C [facility]        Today's Diagnoses     Cellulitis of right upper extremity    -  1      Care Instructions    Follow up with primary care in 2 days if symptoms have not improved. Return to clinic or go to ER if symptoms worsen.      Discharge Instructions for Cellulitis  You have been diagnosed with cellulitis. This is an infection in the deepest layer of the skin. In some cases, the infection also affects the muscle. Cellulitis is caused by bacteria. The bacteria can enter the body through broken skin. This can happen with a cut, scratch, animal bite, or an insect bite that has been scratched. You may have been treated in the hospital with antibiotics and fluids. You will likely be given a prescription for antibiotics to take at home. This sheet will help you take care of yourself at home.  Home Care  When you are home:    Take the prescribed antibiotic medication you are given as directed until it is gone. Take it even if you feel better. It treats the infection and stops it from returning. Not taking all of the medication can make future infections hard to treat.    Keep the infected area clean.    When possible, raise the infected area above the level of your heart. This helps keep swelling down.    Talk to your doctor if you are in pain. Ask what kind of over-the-counter medication you can take for pain.    Apply clean bandages as advised.    Take your temperature once a day for a week.    Wash your hands often to prevent spreading the infection.  In the future, wash your hands before and after you touch cuts, scratches, or bandages. This will help prevent infection.   When to Call Your Doctor  Call your doctor  immediately if you have any of the following:    Vomiting    Fever of100.4 F (38 C) or higher, or as directed by your health care provider    Shaking chills    Redness that gets worse in or around the infected area    Swelling of the infected area    Pain that gets worse in or around the infected area    Difficulty or pain when moving the joints above or below the infected area    Discharge or pus draining from the area     [id number] [employer]. [address] [place] [address], [place] [address]. All rights reserved. This information is not intended as a substitute for professional medical care. Always follow your healthcare professional's instructions.                  Follow-ups after your visit        Follow-up notes from your care team     Return if symptoms worsen or fail to improve.      Your next 10 appointments already scheduled     Apr 27, 2017 10:00 AM CDT   Office Visit with Madhu Velasquez MD   [facility]    [address] [place] [id number] [phone]           Bring a current list of meds and any records pertaining to this visit.  For Physicals, please bring immunization records and any forms needing to be filled out.  Please arrive 10 minutes early to complete paperwork.              Who to contact     If you have questions or need follow up information about today's clinic visit or your schedule please contact [facility] directly at [phone].  Normal or non-critical lab and imaging results will be communicated to you by MyChart, letter or phone within 4 business days after the clinic has received the results. If you do not hear from us within 7 days, please contact the clinic through MyChart or phone. If you have a critical or abnormal lab result, we will notify you by phone as soon as possible.  Submit refill requests through [employer] or call your pharmacy and they will forward the  refill request to us. Please allow 3 business days for your refill to be completed.          Additional Information About Your Visit        [employer] Information     [employer] gives you secure access to your electronic health record. If you see a primary care provider, you can also send messages to your care team and make appointments. If you have questions, please call your primary care clinic.  If you do not have a primary care provider, please call [phone] and they will assist you.        Care EveryWhere ID     This is your Care EveryWhere ID. This could be used by other organizations to access your [facility] medical records  [id number]        Your Vitals Were     Pulse Temperature BMI (Body Mass Index)             77 96.5  F (35.8  C) (Oral) 25.54 kg/m2          Blood Pressure from Last 3 Encounters:   03/29/17 145/82   02/28/17 149/78   02/16/17 138/80    Weight from Last 3 Encounters:   03/29/17 168 lb (76.2 kg)   02/28/17 164 lb (74.4 kg)   02/16/17 164 lb (74.4 kg)              Today, you had the following     No orders found for display         Today's Medication Changes          These changes are accurate as of: 3/29/17 10:56 AM.  If you have any questions, ask your nurse or doctor.               Start taking these medicines.        Dose/Directions    sulfamethoxazole-trimethoprim 800-160 MG per tablet   Commonly known as:  BACTRIM DS   Used for:  Cellulitis of right upper extremity   Started by:  Gin Rogers PA-C        Dose:  1 tablet   Take 1 tablet by mouth 2 times daily for 10 days   Quantity:  20 tablet   Refills:  0            Where to get your medicines      These medications were sent to [facility] [address] [address] & [address], [place] [address]     Phone:  [phone]     sulfamethoxazole-trimethoprim 800-160 MG per tablet                Primary Care Provider Office Phone # Fax #    Madhu Velasquez [phone]  [phone]       [facility] [address]  [facility] [address]        Thank you!     Thank you for choosing [facility]  for your care. Our goal is always to provide you with excellent care. Hearing back from our patients is one way we can continue to improve our services. Please take a few minutes to complete the written survey that you may receive in the mail after your visit with us. Thank you!             Your Updated Medication List - Protect others around you: Learn how to safely use, store and throw away your medicines at www.disposemymeds.org.          This list is accurate as of: 3/29/17 10:56 AM.  Always use your most recent med list.                   Brand Name Dispense Instructions for use    amLODIPine 5 MG tablet    NORVASC    30 tablet    Take 1 tablet (5 mg) by mouth daily       amoxicillin 500 MG capsule    AMOXIL    4 capsule    4 capsules (total 2 grams) orally  1 hour prior to dental procedures       apixaban ANTICOAGULANT 5 MG tablet    ELIQUIS    90 tablet    1/2 tab twice a day       aspirin  MG EC tablet      Take 325 mg by mouth daily       atorvastatin 10 MG tablet    LIPITOR    30 tablet    Take 1 tablet (10 mg) by mouth daily       HYDROcodone-acetaminophen 5-325 MG per tablet    NORCO    30 tablet    Take 1-2 tablets by mouth every 4 hours as needed for other (Moderate to Severe Pain)       metoprolol 25 MG tablet    LOPRESSOR    90 tablet    Take 0.5 tablets (12.5 mg) by mouth 2 times daily       Multi-vitamin Tabs tablet      Take 1 tablet by mouth daily       OSTEO BI-FLEX ADV TRIPLE ST Tabs      Take 1 tablet by mouth daily       sulfamethoxazole-trimethoprim 800-160 MG per tablet    BACTRIM DS    20 tablet    Take 1 tablet by mouth 2 times daily for 10 days       TYLENOL 500 MG tablet   Generic drug:  acetaminophen      1,000 mg daily       vitamin D 2000 UNITS tablet     100 tablet    Take 2,000 Units by mouth daily

## 2017-03-29 NOTE — PROGRESS NOTES
HPI  March 29, 2017    HPI: Isael Garcia is a 82 year old male who complains of moderate erythema, swelling, and tenderness to RUE onset 5 days ago. Pt denies injury or trauma. Symptoms are constant in duration. No treatments tried. Denies fever/chills, purulent drainage, decreased ROM in R elbow, numbness, or any other symptoms.     Past Medical History:   Diagnosis Date     AAA (abdominal aortic aneurysm) (H) May 2016    infrarenal s/p graft placement at [facility]     Allergic rhinitis, cause unspecified      Atrial fibrillation (H)      CKD (chronic kidney disease) stage 3, GFR 30-59 ml/min 2/4/2013     Lumbago 2000    s/p lumbar epidural injection     OA (osteoarthritis) 2/21/2015     PVD (peripheral vascular disease) (H) 3/7/2016     Shingles 7/09     left forehead     Sleep related leg cramps 2/6/2013     Unspecified cataract     bilateral     Unspecified essential hypertension      Vitamin D deficiency disease 2/6/2013     Past Surgical History:   Procedure Laterality Date     C NONSPECIFIC PROCEDURE  1960    right inguinal hernia repair     C NONSPECIFIC PROCEDURE  1990s    TURP     C NONSPECIFIC PROCEDURE      tonsillectomy     C NONSPECIFIC PROCEDURE  April 2011    Right total knee arthroplasty     C NONSPECIFIC PROCEDURE   approx 2009     left  cataract extraction/IOL placement     C NONSPECIFIC PROCEDURE  May 2016    Endovascular AAA stent graft placed at [facility]. [facility] suggests abx prophylaxis for life for procedures (dental/etc)     EXCISE MASS TRUNK N/A 2/28/2017    Procedure: EXCISE MASS TRUNK;  Surgeon: Ranjith Lozoya MD;  Location: [facility]     EXCISE MASS UPPER EXTREMITY Left 2/28/2017    Procedure: EXCISE MASS UPPER EXTREMITY;  Surgeon: Ranjith Lozoya MD;  Location: [facility]     HERNIORRHAPHY INGUINAL Left 2/28/2017    Procedure: HERNIORRHAPHY INGUINAL;  Surgeon: Ranjith Lozoya MD;  Location: [facility]     Social History   Substance Use Topics     Smoking status: Never  Smoker     Smokeless tobacco: Never Used     Alcohol use No       Problem list, Medication list, Allergies, and Medical/Social/Surgical histories reviewed in [facility] and updated as appropriate.      Review of Systems   Constitutional: Negative for fever.   Respiratory: Negative for shortness of breath.    Cardiovascular: Negative for chest pain.   Gastrointestinal: Negative for abdominal pain.   Musculoskeletal: Negative for joint pain.   Skin: Negative for rash.        Erythema, swelling, tenderness RUE   Neurological: Negative for headaches.   All other systems reviewed and are negative.        Physical Exam   Constitutional: He is oriented to person, place, and time and well-developed, well-nourished, and in no distress.   HENT:   Head: Normocephalic and atraumatic.   Cardiovascular: Normal rate, regular rhythm and normal heart sounds.    Pulses:       Radial pulses are 2+ on the right side   Pulmonary/Chest: Effort normal and breath sounds normal.   Musculoskeletal: Normal range of motion.        Right elbow: Normal.  Neurological: He is alert and oriented to person, place, and time. Gait normal.   Skin: Skin is warm and dry. There is erythema.        Nursing note and vitals reviewed.    Vital Signs  /82 (BP Location: Left arm, Patient Position: Chair, Cuff Size: Adult Regular)  Pulse 77  Temp 96.5  F (35.8  C) (Oral)  Wt 168 lb (76.2 kg)  BMI 25.54 kg/m2     Diagnostic Test Results:  none     ASSESSMENT/PLAN      ICD-10-CM    1. Cellulitis of right upper extremity L03.113 sulfamethoxazole-trimethoprim (BACTRIM DS) 800-160 MG per tablet      Pt afebrile, NAD. C/w cellulitis- no sign of involvement of joint, no abscess. Rx Bactrim, f/u in 2 days if no improvement.      I have discussed any lab or imaging results, the patient's diagnosis, and my plan of treatment with the patient and/or family. Patient is aware to come back in if with worsening symptoms or if no relief despite treatment plan.  Patient  voiced understanding and had no further questions.       Follow Up: Return if symptoms worsen or fail to improve.    ANDREA Mata, PA-C  [facility]

## 2017-03-29 NOTE — NURSING NOTE
"Chief Complaint   Patient presents with     Urgent Care     lump just below right elbow for3-4 days.        Initial /81 (BP Location: Left arm, Patient Position: Chair, Cuff Size: Adult Regular)  Pulse 75  Temp 96.5  F (35.8  C) (Oral)  Wt 168 lb (76.2 kg)  BMI 25.54 kg/m2 Estimated body mass index is 25.54 kg/(m^2) as calculated from the following:    Height as of 1/31/17: 5' 8\" (1.727 m).    Weight as of this encounter: 168 lb (76.2 kg).  Medication Reconciliation: complete    "

## 2017-04-27 ENCOUNTER — OFFICE VISIT (OUTPATIENT)
Dept: INTERNAL MEDICINE | Facility: CLINIC | Age: 82
End: 2017-04-27
Payer: COMMERCIAL

## 2017-04-27 VITALS
HEART RATE: 59 BPM | BODY MASS INDEX: 24.94 KG/M2 | OXYGEN SATURATION: 97 % | WEIGHT: 164 LBS | DIASTOLIC BLOOD PRESSURE: 68 MMHG | SYSTOLIC BLOOD PRESSURE: 142 MMHG | TEMPERATURE: 97.5 F

## 2017-04-27 DIAGNOSIS — Z86.79 S/P AAA REPAIR: ICD-10-CM

## 2017-04-27 DIAGNOSIS — E78.5 HYPERLIPIDEMIA LDL GOAL <70: ICD-10-CM

## 2017-04-27 DIAGNOSIS — N18.30 CKD (CHRONIC KIDNEY DISEASE) STAGE 3, GFR 30-59 ML/MIN (H): ICD-10-CM

## 2017-04-27 DIAGNOSIS — I10 ESSENTIAL HYPERTENSION: ICD-10-CM

## 2017-04-27 DIAGNOSIS — Z98.890 S/P AAA REPAIR: ICD-10-CM

## 2017-04-27 PROCEDURE — 99214 OFFICE O/P EST MOD 30 MIN: CPT | Performed by: INTERNAL MEDICINE

## 2017-04-27 RX ORDER — AMOXICILLIN 500 MG/1
CAPSULE ORAL
Qty: 4 CAPSULE | Refills: 3 | Status: SHIPPED | OUTPATIENT
Start: 2017-04-27 | End: 2018-01-16

## 2017-04-27 RX ORDER — ATORVASTATIN CALCIUM 10 MG/1
10 TABLET, FILM COATED ORAL DAILY
Qty: 90 TABLET | Refills: 3 | Status: SHIPPED | OUTPATIENT
Start: 2017-04-27 | End: 2018-05-14

## 2017-04-27 RX ORDER — AMLODIPINE BESYLATE 5 MG/1
TABLET ORAL
Qty: 145 TABLET | Refills: 3 | Status: SHIPPED | OUTPATIENT
Start: 2017-04-27 | End: 2018-01-29

## 2017-04-27 NOTE — PATIENT INSTRUCTIONS
Increase Amlodipine to 5mg tab, 1/2 tab in AM and 1 tab in PM for blood pressure. If increased lightheadedness, then go back to 1 tab in PM as you are doing now and contact clinic [phone] or send message in [employer]  Nonfasting kidney lab in 3 months. See me a few days later to review results and follow-upon blood pressure   Continue other medications

## 2017-04-27 NOTE — NURSING NOTE
"Chief Complaint   Patient presents with     Hypertension       Initial /68  Pulse 59  Temp 97.5  F (36.4  C) (Oral)  Wt 164 lb (74.4 kg)  SpO2 97%  BMI 24.94 kg/m2 Estimated body mass index is 24.94 kg/(m^2) as calculated from the following:    Height as of 1/31/17: 5' 8\" (1.727 m).    Weight as of this encounter: 164 lb (74.4 kg).  Medication Reconciliation: complete    "

## 2017-04-27 NOTE — PROGRESS NOTES
SUBJECTIVE:                                                    Isael Garcia is a 83 year old male who presents to clinic today for the following health issues:      Hypertension Follow-up      Outpatient blood pressures are being checked at home.  Results are 114-150/ ?.    Low Salt Diet: no added salt       Amount of exercise or physical activity: 6-7 days/week for an average of greater than 60 minutes    Problems taking medications regularly: No    Medication side effects: none    Diet: regular (no restrictions)  Pt's past medical history, family history, habits, medications and allergies were reviewed with the patient today.  See snap shot for  HCM status. Most recent lab results reviewed with pt. Problem list and histories reviewed & adjusted, as indicated.  Additional history as below:      Denies chest pain, shortness of breath, abdominal pain, headache, vision changes or side effects with medications.Playing pickleball 2x/week.  BPs occ high and elevated some today. BMPs reviewed and GFR improving. Has dental appt coming up. Needs abx per [facility] MD notes after previous AAA repair       Lab Results   Component Value Date    GLC 89 03/22/2017     Lab Results   Component Value Date    A1C 5.5 05/10/2013     Lab Results   Component Value Date    CHOL 136 03/22/2017     Lab Results   Component Value Date    LDL 82 03/22/2017     Lab Results   Component Value Date    HDL 38 03/22/2017     Lab Results   Component Value Date    TRIG 82 03/22/2017     Lab Results   Component Value Date    CR 1.65 03/22/2017     Lab Results   Component Value Date    ALT 31 07/15/2016     Lab Results   Component Value Date    AST 20 07/15/2016     Lab Results   Component Value Date    MICROL <5 09/11/2015     Lab Results   Component Value Date    TSH 1.89 09/11/2015       Additional ROS:   Constitutional, HEENT, Cardiovascular, Pulmonary, GI and , Neuro, MSK and Psych review of systems/symptoms are otherwise negative or  "unchanged from previous, except as noted above.      OBJECTIVE:  /68  Pulse 59  Temp 97.5  F (36.4  C) (Oral)  Wt 164 lb (74.4 kg)  SpO2 97%  BMI 24.94 kg/m2   Estimated body mass index is 24.94 kg/(m^2) as calculated from the following:    Height as of 1/31/17: 5' 8\" (1.727 m).    Weight as of this encounter: 164 lb (74.4 kg).  Eye: PERRL, EOMI  HENT: ear canals and TM's normal and nose and mouth without ulcers or lesions   Neck: no adenopathy. Thyroid normal to palpation. No bruits  Pulm: Lungs clear to auscultation   CV: Regular rates and rhythm  GI: Soft, nontender, Normal active bowel sounds, No hepatosplenomegaly or masses palpable  Ext: Peripheral pulses intact. Trace BLE edema.  Neuro: Normal strength and tone, sensory exam grossly normal    Assessment/Plan: (See plan discussion below for further details)  1. Essential hypertension  Needs improved blood pressure control. See plan discussion below  - amLODIPine (NORVASC) 5 MG tablet; 1/2 tab in AM and 1 tab in PM  Dispense: 145 tablet; Refill: 3    2. Hyperlipidemia LDL goal <70  Lipids at goal. Continue current medication. Repeat labs in one year  - atorvastatin (LIPITOR) 10 MG tablet; Take 1 tablet (10 mg) by mouth daily  Dispense: 90 tablet; Refill: 3  - Lipid panel reflex to direct LDL; Future  - Comprehensive metabolic panel; Future    3. S/P AAA repair  [place] surgeons have recommended prophylaxis prior to dental procedures with previous surgical history. Patient having dental appointment. Amoxicillin 1 hour prior to procedure as below  - amoxicillin (AMOXIL) 500 MG capsule; 4 capsules (total 2 grams) orally  1 hour prior to dental procedures  Dispense: 4 capsule; Refill: 3    4. CKD (chronic kidney disease) stage 3, GFR 30-59 ml/min  Improved. Repeat labs in 3 months      Plan discussion:  Increase Amlodipine to 5mg tab, 1/2 tab in AM and 1 tab in PM for blood pressure. If increased lightheadedness, then go back to 1 tab in PM as you are doing " now and contact clinic [phone] or send message in [employer]  Nonfasting kidney lab in 3 months. See me a few days later to review results and follow-upon blood pressure   Continue other medications  Amoxicillin prior to dental appt  per [place] with hx AAA repair    Madhu Velasquez MD  Internal Medicine Department  [facility]

## 2017-04-27 NOTE — MR AVS SNAPSHOT
After Visit Summary   4/27/2017    Isael Garcia    MRN: [id number]           Patient Information     Date Of Birth          4/25/1934        Visit Information        Provider Department      4/27/2017 10:00 AM Madhu [facility]        Today's Diagnoses     Essential hypertension        Hyperlipidemia LDL goal <70          Care Instructions    Increase Amlodipine to 5mg tab, 1/2 tab in AM and 1 tab in PM for blood pressure. If increased lightheadedness, then go back to 1 tab in PM as you are doing now and contact clinic [phone] or send message in [employer]  Nonfasting kidney lab in 3 months. See me a few days later to review results and follow-upon blood pressure   Continue other medications        Follow-ups after your visit        Who to contact     If you have questions or need follow up information about today's clinic visit or your schedule please contact [facility] directly at [phone].  Normal or non-critical lab and imaging results will be communicated to you by [employer], letter or phone within 4 business days after the clinic has received the results. If you do not hear from us within 7 days, please contact the clinic through 51edj or phone. If you have a critical or abnormal lab result, we will notify you by phone as soon as possible.  Submit refill requests through [employer] or call your pharmacy and they will forward the refill request to us. Please allow 3 business days for your refill to be completed.          Additional Information About Your Visit        [employer] Information     [employer] gives you secure access to your electronic health record. If you see a primary care provider, you can also send messages to your care team and make appointments. If you have questions, please call your primary care clinic.  If you do not have a primary care provider, please call [phone] and they will assist you.        Care  EveryWhere ID     This is your Care EveryWhere ID. This could be used by other organizations to access your [facility] medical records  [id number]        Your Vitals Were     Pulse Temperature Pulse Oximetry BMI (Body Mass Index)          59 97.5  F (36.4  C) (Oral) 97% 24.94 kg/m2         Blood Pressure from Last 3 Encounters:   04/27/17 142/68   03/29/17 145/82   02/28/17 149/78    Weight from Last 3 Encounters:   04/27/17 164 lb (74.4 kg)   03/29/17 168 lb (76.2 kg)   02/28/17 164 lb (74.4 kg)              Today, you had the following     No orders found for display         Today's Medication Changes          These changes are accurate as of: 4/27/17 10:45 AM.  If you have any questions, ask your nurse or doctor.               These medicines have changed or have updated prescriptions.        Dose/Directions    amLODIPine 5 MG tablet   Commonly known as:  NORVASC   This may have changed:    - how much to take  - how to take this  - when to take this  - additional instructions   Used for:  Essential hypertension   Changed by:  Madhu Velasquez MD        1/2 tab in AM and 1 tab in PM   Quantity:  145 tablet   Refills:  3            Where to get your medicines      These medications were sent to [facility] [address] [place] [address] & [address]  [address] [address]     Phone:  [phone]     amLODIPine 5 MG tablet    atorvastatin 10 MG tablet                Primary Care Provider Office Phone # Fax #    Madhu Velasquez [phone]       [facility] [address] [facility] [address]        Thank you!     Thank you for choosing [facility]  for your care. Our goal is always to provide you with excellent care. Hearing back from our patients is one way we can continue to improve our services. Please take a few minutes to complete the written survey that you may receive in the mail after your visit with us. Thank  you!             Your Updated Medication List - Protect others around you: Learn how to safely use, store and throw away your medicines at www.disposemymeds.org.          This list is accurate as of: 4/27/17 10:45 AM.  Always use your most recent med list.                   Brand Name Dispense Instructions for use    amLODIPine 5 MG tablet    NORVASC    145 tablet    1/2 tab in AM and 1 tab in PM       amoxicillin 500 MG capsule    AMOXIL    4 capsule    4 capsules (total 2 grams) orally  1 hour prior to dental procedures       apixaban ANTICOAGULANT 5 MG tablet    ELIQUIS    90 tablet    1/2 tab twice a day       aspirin  MG EC tablet      Take 325 mg by mouth daily       atorvastatin 10 MG tablet    LIPITOR    90 tablet    Take 1 tablet (10 mg) by mouth daily       HYDROcodone-acetaminophen 5-325 MG per tablet    NORCO    30 tablet    Take 1-2 tablets by mouth every 4 hours as needed for other (Moderate to Severe Pain)       metoprolol 25 MG tablet    LOPRESSOR    90 tablet    Take 0.5 tablets (12.5 mg) by mouth 2 times daily       Multi-vitamin Tabs tablet      Take 1 tablet by mouth daily       OSTEO BI-FLEX ADV TRIPLE ST Tabs      Take 1 tablet by mouth daily       TYLENOL 500 MG tablet   Generic drug:  acetaminophen      1,000 mg daily       vitamin D 2000 UNITS tablet     100 tablet    Take 2,000 Units by mouth daily

## 2017-05-26 ENCOUNTER — DOCUMENTATION ONLY (OUTPATIENT)
Dept: OTHER | Facility: CLINIC | Age: 82
End: 2017-05-26

## 2017-05-26 DIAGNOSIS — Z71.89 ACP (ADVANCE CARE PLANNING): Chronic | ICD-10-CM

## 2017-08-22 DIAGNOSIS — E78.5 HYPERLIPIDEMIA LDL GOAL <70: ICD-10-CM

## 2017-08-22 LAB
ALBUMIN SERPL-MCNC: 3.6 G/DL (ref 3.4–5)
ALP SERPL-CCNC: 52 U/L (ref 40–150)
ALT SERPL W P-5'-P-CCNC: 26 U/L (ref 0–70)
ANION GAP SERPL CALCULATED.3IONS-SCNC: 8 MMOL/L (ref 3–14)
AST SERPL W P-5'-P-CCNC: 22 U/L (ref 0–45)
BILIRUB SERPL-MCNC: 0.3 MG/DL (ref 0.2–1.3)
BUN SERPL-MCNC: 30 MG/DL (ref 7–30)
CALCIUM SERPL-MCNC: 8.5 MG/DL (ref 8.5–10.1)
CHLORIDE SERPL-SCNC: 107 MMOL/L (ref 94–109)
CO2 SERPL-SCNC: 23 MMOL/L (ref 20–32)
CREAT SERPL-MCNC: 1.79 MG/DL (ref 0.66–1.25)
GFR SERPL CREATININE-BSD FRML MDRD: 36 ML/MIN/1.7M2
GLUCOSE SERPL-MCNC: 131 MG/DL (ref 70–99)
POTASSIUM SERPL-SCNC: 4.4 MMOL/L (ref 3.4–5.3)
PROT SERPL-MCNC: 7 G/DL (ref 6.8–8.8)
SODIUM SERPL-SCNC: 138 MMOL/L (ref 133–144)

## 2017-08-22 PROCEDURE — 80053 COMPREHEN METABOLIC PANEL: CPT | Performed by: INTERNAL MEDICINE

## 2017-08-22 PROCEDURE — 36415 COLL VENOUS BLD VENIPUNCTURE: CPT | Performed by: INTERNAL MEDICINE

## 2017-08-22 RX ORDER — ATORVASTATIN CALCIUM 10 MG/1
TABLET, FILM COATED ORAL
Qty: 90 TABLET | Refills: 0 | OUTPATIENT
Start: 2017-08-22

## 2017-09-05 ENCOUNTER — OFFICE VISIT (OUTPATIENT)
Dept: INTERNAL MEDICINE | Facility: CLINIC | Age: 82
End: 2017-09-05
Payer: COMMERCIAL

## 2017-09-05 VITALS
WEIGHT: 168 LBS | OXYGEN SATURATION: 98 % | DIASTOLIC BLOOD PRESSURE: 70 MMHG | HEART RATE: 64 BPM | SYSTOLIC BLOOD PRESSURE: 138 MMHG | TEMPERATURE: 97.5 F | BODY MASS INDEX: 25.54 KG/M2

## 2017-09-05 DIAGNOSIS — N18.30 CKD (CHRONIC KIDNEY DISEASE) STAGE 3, GFR 30-59 ML/MIN (H): ICD-10-CM

## 2017-09-05 DIAGNOSIS — G89.29 CHRONIC BILATERAL LOW BACK PAIN WITHOUT SCIATICA: Primary | ICD-10-CM

## 2017-09-05 DIAGNOSIS — I48.91 ATRIAL FIBRILLATION, UNSPECIFIED TYPE (H): ICD-10-CM

## 2017-09-05 DIAGNOSIS — M54.50 CHRONIC BILATERAL LOW BACK PAIN WITHOUT SCIATICA: Primary | ICD-10-CM

## 2017-09-05 PROCEDURE — 99214 OFFICE O/P EST MOD 30 MIN: CPT | Mod: 25 | Performed by: INTERNAL MEDICINE

## 2017-09-05 PROCEDURE — 99207 C PAF COMPLETED  NO CHARGE: CPT | Performed by: INTERNAL MEDICINE

## 2017-09-05 NOTE — NURSING NOTE
"Chief Complaint   Patient presents with     Results       Initial /70  Pulse 64  Temp 97.5  F (36.4  C) (Oral)  Wt 168 lb (76.2 kg)  SpO2 98%  BMI 25.54 kg/m2 Estimated body mass index is 25.54 kg/(m^2) as calculated from the following:    Height as of 1/31/17: 5' 8\" (1.727 m).    Weight as of this encounter: 168 lb (76.2 kg).  Medication Reconciliation: complete  "

## 2017-09-05 NOTE — PATIENT INSTRUCTIONS
Referral to [facility] for physical therapy. They will call to schedule  Continue current meds  Repeat fasting labs in 6 months

## 2017-09-05 NOTE — MR AVS SNAPSHOT
After Visit Summary   9/5/2017    Isael Garcia    MRN: [id number]           Patient Information     Date Of Birth          4/25/1934        Visit Information        Provider Department      9/5/2017 9:30 AM Madhu Velasquez MD [facility]        Today's Diagnoses     Chronic bilateral low back pain without sciatica    -  1      Care Instructions     Referral to [facility] for physical therapy. They will call to schedule  Continue current meds  Repeat fasting labs in 6 months          Follow-ups after your visit        Additional Services     [facility] PT, HAND, AND CHIROPRACTIC REFERRAL       **This order will print in the MAO Scheduling Office**    Physical Therapy, Hand Therapy and Chiropractic Care are available through:    *[facility]  *[facility]  *[facility]    Call one number to schedule at any of the above locations: [phone].    Your provider has referred you to: Physical Therapy at [facility] or [facility]    Indication/Reason for Referral: low back pain  Onset of Illness: > 1 year  Therapy Orders: Evaluate and Treat  Special Programs: None  Special Request: None    Nia Kramer      Additional Comments for the Therapist or Chiropractor:  MRI Lumbar spine   EPIC  from 2016    Please be aware that coverage of these services is subject to the terms and limitations of your health insurance plan.  Call member services at your health plan with any benefit or coverage questions.      Please bring the following to your appointment:    *Your personal calendar for scheduling future appointments  *Comfortable clothing                  Who to contact     If you have questions or need follow up information about today's clinic visit or your schedule please contact [facility] directly at [phone].  Normal or non-critical lab and imaging results will be communicated to you by MyChart, letter or phone within  4 business days after the clinic has received the results. If you do not hear from us within 7 days, please contact the clinic through [employer] or phone. If you have a critical or abnormal lab result, we will notify you by phone as soon as possible.  Submit refill requests through [employer] or call your pharmacy and they will forward the refill request to us. Please allow 3 business days for your refill to be completed.          Additional Information About Your Visit        [employer] Information     [employer] gives you secure access to your electronic health record. If you see a primary care provider, you can also send messages to your care team and make appointments. If you have questions, please call your primary care clinic.  If you do not have a primary care provider, please call [phone] and they will assist you.        Care EveryWhere ID     This is your Care EveryWhere ID. This could be used by other organizations to access your [place] medical records  [id number]        Your Vitals Were     Pulse Temperature Pulse Oximetry BMI (Body Mass Index)          64 97.5  F (36.4  C) (Oral) 98% 25.54 kg/m2         Blood Pressure from Last 3 Encounters:   09/05/17 138/70   04/27/17 142/68   03/29/17 145/82    Weight from Last 3 Encounters:   09/05/17 168 lb (76.2 kg)   04/27/17 164 lb (74.4 kg)   03/29/17 168 lb (76.2 kg)              We Performed the Following     [facility] PT, HAND, AND CHIROPRACTIC REFERRAL     PAF COMPLETED        Primary Care Provider Office Phone # Fax #    Madhu Velasquez [phone]       [address] [place] [id number]        Equal Access to Services     [facility] CenterJEANNINE : Hadii aad ku hadasho Soursulaali, waaxda luqadaha, qaybta kaalmada sav peterson. So [facility] [phone].    ATENCIÓN: Si habla español, tiene a manzo disposición servicios gratuitos de asistencia lingüística. Llame al [phone].    We comply with applicable federal civil rights  laws and [place] laws. We do not discriminate on the basis of race, color, national origin, age, disability sex, sexual orientation or gender identity.            Thank you!     Thank you for choosing [facility]  for your care. Our goal is always to provide you with excellent care. Hearing back from our patients is one way we can continue to improve our services. Please take a few minutes to complete the written survey that you may receive in the mail after your visit with us. Thank you!             Your Updated Medication List - Protect others around you: Learn how to safely use, store and throw away your medicines at www.disposemymeds.org.          This list is accurate as of: 9/5/17 10:19 AM.  Always use your most recent med list.                   Brand Name Dispense Instructions for use Diagnosis    amLODIPine 5 MG tablet    NORVASC    145 tablet    1/2 tab in AM and 1 tab in PM    Essential hypertension       amoxicillin 500 MG capsule    AMOXIL    4 capsule    4 capsules (total 2 grams) orally  1 hour prior to dental procedures    S/P AAA repair       apixaban ANTICOAGULANT 5 MG tablet    ELIQUIS    90 tablet    1/2 tab twice a day    Atrial fibrillation, unspecified type (H)       aspirin  MG EC tablet      Take 325 mg by mouth daily        atorvastatin 10 MG tablet    LIPITOR    90 tablet    Take 1 tablet (10 mg) by mouth daily    Hyperlipidemia LDL goal <70       metoprolol 25 MG tablet    LOPRESSOR    90 tablet    Take 0.5 tablets (12.5 mg) by mouth 2 times daily    Tachycardia       Multi-vitamin Tabs tablet      Take 1 tablet by mouth daily        OSTEO BI-FLEX ADV TRIPLE ST Tabs      Take 1 tablet by mouth daily        TYLENOL 500 MG tablet   Generic drug:  acetaminophen      1,000 mg daily        vitamin D 2000 UNITS tablet     100 tablet    Take 2,000 Units by mouth daily    Vitamin D deficiency

## 2017-09-05 NOTE — PROGRESS NOTES
"  SUBJECTIVE:   Isael Garcia is a 83 year old male who presents to clinic today for the following health issues:    Review Lab Results and discuss back pain    Problems taking medications regularly: No    Medication side effects: none    Diet: regular (no restrictions)      Pt's past medical history, family history, habits, medications and allergies were reviewed with the patient today.  See snap shot for  HCM status. Most recent lab results reviewed with pt. Problem list and histories reviewed & adjusted, as indicated.  Additional history as below:    Having low back pain that is \"cramping lifestyle. \" Dull achiness in bilateral lumbar area. No radiation into distal BLEs. Pain worse with standing and leaning over. OK with sitting and moving a lot.  No B/B control loss. Patient denies weakness in his legs. Denies recent chest pain, shortness of breath, abdominal pain.  Fairly stable chronic kidney disease status as below:    Component      Latest Ref Rng & Units 12/30/2016 2/9/2017 2/16/2017 3/22/2017   Sodium      133 - 144 mmol/L 143 139 140 145 (H)   Potassium      3.4 - 5.3 mmol/L 4.4 4.9 4.9 4.4   Chloride      94 - 109 mmol/L 109 105 106 110 (H)   Carbon Dioxide      20 - 32 mmol/L 24 28 28 28   Anion Gap      3 - 14 mmol/L 10 6 6 7   Glucose      70 - 99 mg/dL 97 98 86 89   Urea Nitrogen      7 - 30 mg/dL 29 26 31 (H) 29   Creatinine      0.66 - 1.25 mg/dL 1.66 (H) 1.97 (H) 1.84 (H) 1.65 (H)   GFR Estimate      >60 mL/min/1.7m2 40 (L) 33 (L) 35 (L) 40 (L)   GFR Estimate If Black      >60 mL/min/1.7m2 48 (L) 40 (L) 43 (L) 48 (L)   Calcium      8.5 - 10.1 mg/dL 9.1 8.5 8.8 8.7   Bilirubin Total      0.2 - 1.3 mg/dL       Albumin      3.4 - 5.0 g/dL       Protein Total      6.8 - 8.8 g/dL       Alkaline Phosphatase      40 - 150 U/L       ALT      0 - 70 U/L       AST      0 - 45 U/L         Component      Latest Ref Rng & Units 8/22/2017   Sodium      133 - 144 mmol/L 138   Potassium      3.4 - 5.3 " mmol/L 4.4   Chloride      94 - 109 mmol/L 107   Carbon Dioxide      20 - 32 mmol/L 23   Anion Gap      3 - 14 mmol/L 8   Glucose      70 - 99 mg/dL 131 (H)   Urea Nitrogen      7 - 30 mg/dL 30   Creatinine      0.66 - 1.25 mg/dL 1.79 (H)   GFR Estimate      >60 mL/min/1.7m2 36 (L)   GFR Estimate If Black      >60 mL/min/1.7m2 44 (L)   Calcium      8.5 - 10.1 mg/dL 8.5   Bilirubin Total      0.2 - 1.3 mg/dL 0.3   Albumin      3.4 - 5.0 g/dL 3.6   Protein Total      6.8 - 8.8 g/dL 7.0   Alkaline Phosphatase      40 - 150 U/L 52   ALT      0 - 70 U/L 26   AST      0 - 45 U/L 22       MRI LS spine from Feb 2016 reviewed with pt. Summary of scan as below. Not good candidate for surgical treatment of back given multiple level disease and absence of actual radicular sx currently    IMPRESSION:    1. At L2-L3 there is moderate-to-severe left foraminal stenosis.  Moderate central and left subarticular stenosis affecting the  descending left L3 nerve root.  2. At L3-L4 there is moderate central stenosis. Right central focal  disc protrusion appears to contact and may mildly compress the  descending right L4 nerve root. Mild-to-moderate left foraminal  stenosis and left subarticular stenosis affecting the descending left  L4 nerve root.  3. At L4-L5 there is moderate-to-severe right and mild left foraminal  stenosis. Moderate central stenosis. Severe right subarticular  stenosis affecting the descending right L5 nerve root.  4. At L5-S1 there is moderate-to-severe bilateral foraminal stenosis,  slightly greater on the right. Right greater than left subarticular  stenosis with contact and possible compression of the descending right  S1 nerve root.  5. Small infrarenal abdominal aortic aneurysm only partially imaged  likely measures approximately 3.4 cm in diameter. Recommend followup  aortic ultrasound.     COSME WHITE MD     Additional ROS:   Constitutional, HEENT, Cardiovascular, Pulmonary, GI and , Neuro, MSK and Psych  "review of systems/symptoms are otherwise negative or unchanged from previous, except as noted above.      OBJECTIVE:  /70  Pulse 64  Temp 97.5  F (36.4  C) (Oral)  Wt 168 lb (76.2 kg)  SpO2 98%  BMI 25.54 kg/m2   Estimated body mass index is 25.54 kg/(m^2) as calculated from the following:    Height as of 1/31/17: 5' 8\" (1.727 m).    Weight as of this encounter: 168 lb (76.2 kg).  Eye: PERRL, EOMI  HENT: ear canals and TM's normal and nose and mouth without ulcers or lesions   Neck: no adenopathy. Thyroid normal to palpation. No bruits  Pulm: Lungs clear to auscultation   CV: Irregular rates and rhythm  GI: Soft, nontender, Normal active bowel sounds, No hepatosplenomegaly or masses palpable  Ext: Peripheral pulses mildly reduced by palpable distal BLE.  Trace BLE ankle edema.  Neuro: Normal strength and tone, sensory exam grossly normal    Assessment/Plan: (See plan discussion below for further details)  1. Chronic bilateral low back pain without sciatica  Sx appear mostly mechanical. Not candidate for LESI. See plan below. May use Tylenol therapy/ heat/stretching in AM  - [facility] PT, HAND, AND CHIROPRACTIC REFERRAL    2. CKD (chronic kidney disease) stage 3, GFR 30-59 ml/min  Overall stable. Pt off NSAIDS.  Future labs as ordered  - Comprehensive metabolic panel; Future  - Lipid panel reflex to direct LDL; Future  - Albumin Random Urine Quantitative with Creat Ratio; Future  - CBC with platelets; Future    3. Atrial fibrillation, unspecified type (H)  Good rate control with Metoprolol. On Eliquis for AC  - metoprolol (LOPRESSOR) 25 MG tablet; Take 0.5 tablets (12.5 mg) by mouth 2 times daily  Dispense: 90 tablet; Refill: 3    Plan discussion:   Referral to [facility] for physical therapy. They will call to schedule  Continue current meds. RF done  Repeat fasting labs in 6 months       Madhu Velasquez MD  Internal Medicine Department  [facility]        "

## 2017-09-06 RX ORDER — METOPROLOL TARTRATE 25 MG/1
12.5 TABLET, FILM COATED ORAL 2 TIMES DAILY
Qty: 90 TABLET | Refills: 3 | Status: SHIPPED | OUTPATIENT
Start: 2017-09-06 | End: 2018-09-10

## 2017-09-14 DIAGNOSIS — R00.0 TACHYCARDIA: ICD-10-CM

## 2017-09-14 RX ORDER — METOPROLOL TARTRATE 25 MG/1
TABLET, FILM COATED ORAL
Qty: 90 TABLET | Refills: 0 | OUTPATIENT
Start: 2017-09-14

## 2018-01-16 DIAGNOSIS — Z86.79 S/P AAA REPAIR: ICD-10-CM

## 2018-01-16 DIAGNOSIS — Z98.890 S/P AAA REPAIR: ICD-10-CM

## 2018-01-16 RX ORDER — AMOXICILLIN 500 MG/1
CAPSULE ORAL
Qty: 4 CAPSULE | Refills: 3 | Status: ON HOLD | OUTPATIENT
Start: 2018-01-16 | End: 2020-04-06

## 2018-01-16 NOTE — TELEPHONE ENCOUNTER
Patient having a dental procedure tomorrow and is wondering if he still needs to use the amoxicillin (AMOXIL) 500 MG capsule, if so he would like this sent over to his preferred pharmacy asap.

## 2018-01-16 NOTE — TELEPHONE ENCOUNTER
Pt to take Amoxicillin  500mg capsule, 4 capsules  (2 grams) 1 hr prior to the dental procedure. Rx faxed. Inform pt

## 2018-01-29 DIAGNOSIS — I10 ESSENTIAL HYPERTENSION: ICD-10-CM

## 2018-01-31 NOTE — TELEPHONE ENCOUNTER
"Requested Prescriptions   Pending Prescriptions Disp Refills     amLODIPine (NORVASC) 5 MG tablet [Pharmacy Med Name: AMLODIPINE BESYLATE 5MG TABLETS]  Last Written Prescription Date:  4/27/2017  Last Fill Quantity: 145,  # refills: 3   Last Office Visit with G provider:  9/05/2017   Future Office Visit:      145 tablet 0     Sig: TAKE 1/2 TABLET BY MOUTH EVERY MORNING AND 1 TABLET BY MOUTH EVERY EVENING    Calcium Channel Blockers Protocol  Failed    1/29/2018  5:22 PM       Failed - Normal serum creatinine on file in past 12 months    Recent Labs   Lab Test  08/22/17   1457   CR  1.79*            Passed - Blood pressure under 140/90    BP Readings from Last 3 Encounters:   09/05/17 138/70   04/27/17 142/68   03/29/17 145/82                Passed - Recent or future visit with authorizing provider    Patient had office visit in the last year or has a visit in the next 30 days with authorizing provider.  See \"Patient Info\" tab in inbasket, or \"Choose Columns\" in Meds & Orders section of the refill encounter.            Passed - Patient is age 18 or older          "

## 2018-02-01 DIAGNOSIS — I48.91 ATRIAL FIBRILLATION, UNSPECIFIED TYPE (H): ICD-10-CM

## 2018-02-01 RX ORDER — AMLODIPINE BESYLATE 5 MG/1
TABLET ORAL
Qty: 145 TABLET | Refills: 1 | Status: SHIPPED | OUTPATIENT
Start: 2018-02-01 | End: 2018-08-09

## 2018-02-02 RX ORDER — APIXABAN 5 MG/1
TABLET, FILM COATED ORAL
Qty: 90 TABLET | Refills: 3 | Status: SHIPPED | OUTPATIENT
Start: 2018-02-02 | End: 2018-09-10

## 2018-02-02 NOTE — TELEPHONE ENCOUNTER
Patient has creatinine greater than 1.5 and age greater than 80 so correct dose is 2.5 mg twice a day.  refill done.

## 2018-05-14 DIAGNOSIS — E78.5 HYPERLIPIDEMIA LDL GOAL <70: ICD-10-CM

## 2018-05-14 NOTE — TELEPHONE ENCOUNTER
"Requested Prescriptions   Pending Prescriptions Disp Refills     atorvastatin (LIPITOR) 10 MG tablet [Pharmacy Med Name: ATORVASTATIN 10MG TABLETS]  Last Written Prescription Date:  4/27/2017  Last Fill Quantity: 90,  # refills: 3   Last office visit: 9/5/2017 with prescribing provider:  9/05/2017   Future Office Visit:     90 tablet 0     Sig: TAKE 1 TABLET(10 MG) BY MOUTH DAILY    Statins Protocol Failed    5/14/2018  9:45 AM       Failed - LDL on file in past 12 months    Recent Labs   Lab Test  03/22/17   0952   LDL  82            Passed - No abnormal creatine kinase in past 12 months    Recent Labs   Lab Test  07/15/16   0917   CKT  109               Passed - Recent (12 mo) or future (30 days) visit within the authorizing provider's specialty    Patient had office visit in the last 12 months or has a visit in the next 30 days with authorizing provider or within the authorizing provider's specialty.  See \"Patient Info\" tab in inbasket, or \"Choose Columns\" in Meds & Orders section of the refill encounter.           Passed - Patient is age 18 or older          "

## 2018-05-14 NOTE — LETTER
[facility] [place]  [address] [id number] [phone]            Isael Partha Garcia  [address] [place] [id number]        May 15, 2018    Dear Isael,    While refilling your prescription today, we noticed that you are due to have labs drawn.  We will refill your prescription for 30 days, but a follow-up appointment must be made before any additional refills can be approved.     Taking care of your health is important to us and we look forward to seeing you in the near future.  Please call us at [phone] or [phone] (or use [employer]) to schedule an appointment.     Please disregard this notice if you have already made an appointment.    Sincerely,        [facility]

## 2018-05-15 RX ORDER — ATORVASTATIN CALCIUM 10 MG/1
TABLET, FILM COATED ORAL
Qty: 30 TABLET | Refills: 0 | Status: SHIPPED | OUTPATIENT
Start: 2018-05-15 | End: 2018-06-18

## 2018-05-22 DIAGNOSIS — N18.30 CKD (CHRONIC KIDNEY DISEASE) STAGE 3, GFR 30-59 ML/MIN (H): ICD-10-CM

## 2018-05-22 LAB
ALBUMIN SERPL-MCNC: 3.8 G/DL (ref 3.4–5)
ALP SERPL-CCNC: 55 U/L (ref 40–150)
ALT SERPL W P-5'-P-CCNC: 28 U/L (ref 0–70)
ANION GAP SERPL CALCULATED.3IONS-SCNC: 7 MMOL/L (ref 3–14)
AST SERPL W P-5'-P-CCNC: 23 U/L (ref 0–45)
BILIRUB SERPL-MCNC: 0.6 MG/DL (ref 0.2–1.3)
BUN SERPL-MCNC: 27 MG/DL (ref 7–30)
CALCIUM SERPL-MCNC: 8.8 MG/DL (ref 8.5–10.1)
CHLORIDE SERPL-SCNC: 108 MMOL/L (ref 94–109)
CHOLEST SERPL-MCNC: 111 MG/DL
CO2 SERPL-SCNC: 26 MMOL/L (ref 20–32)
CREAT SERPL-MCNC: 1.68 MG/DL (ref 0.66–1.25)
CREAT UR-MCNC: 63 MG/DL
ERYTHROCYTE [DISTWIDTH] IN BLOOD BY AUTOMATED COUNT: 13.1 % (ref 10–15)
GFR SERPL CREATININE-BSD FRML MDRD: 39 ML/MIN/1.7M2
GLUCOSE SERPL-MCNC: 91 MG/DL (ref 70–99)
HCT VFR BLD AUTO: 42.6 % (ref 40–53)
HDLC SERPL-MCNC: 45 MG/DL
HGB BLD-MCNC: 14 G/DL (ref 13.3–17.7)
LDLC SERPL CALC-MCNC: 53 MG/DL
MCH RBC QN AUTO: 32.6 PG (ref 26.5–33)
MCHC RBC AUTO-ENTMCNC: 32.9 G/DL (ref 31.5–36.5)
MCV RBC AUTO: 99 FL (ref 78–100)
MICROALBUMIN UR-MCNC: <5 MG/L
MICROALBUMIN/CREAT UR: NORMAL MG/G CR (ref 0–17)
NONHDLC SERPL-MCNC: 66 MG/DL
PLATELET # BLD AUTO: 189 10E9/L (ref 150–450)
POTASSIUM SERPL-SCNC: 4.5 MMOL/L (ref 3.4–5.3)
PROT SERPL-MCNC: 7.2 G/DL (ref 6.8–8.8)
RBC # BLD AUTO: 4.3 10E12/L (ref 4.4–5.9)
SODIUM SERPL-SCNC: 141 MMOL/L (ref 133–144)
TRIGL SERPL-MCNC: 63 MG/DL
WBC # BLD AUTO: 6.6 10E9/L (ref 4–11)

## 2018-05-22 PROCEDURE — 82043 UR ALBUMIN QUANTITATIVE: CPT | Performed by: INTERNAL MEDICINE

## 2018-05-22 PROCEDURE — 36415 COLL VENOUS BLD VENIPUNCTURE: CPT | Performed by: INTERNAL MEDICINE

## 2018-05-22 PROCEDURE — 85027 COMPLETE CBC AUTOMATED: CPT | Performed by: INTERNAL MEDICINE

## 2018-05-22 PROCEDURE — 80061 LIPID PANEL: CPT | Performed by: INTERNAL MEDICINE

## 2018-05-22 PROCEDURE — 80053 COMPREHEN METABOLIC PANEL: CPT | Performed by: INTERNAL MEDICINE

## 2018-06-18 DIAGNOSIS — E78.5 HYPERLIPIDEMIA LDL GOAL <70: ICD-10-CM

## 2018-06-19 RX ORDER — ATORVASTATIN CALCIUM 10 MG/1
TABLET, FILM COATED ORAL
Qty: 90 TABLET | Refills: 0 | Status: SHIPPED | OUTPATIENT
Start: 2018-06-19 | End: 2018-09-10

## 2018-06-19 NOTE — TELEPHONE ENCOUNTER
"Requested Prescriptions   Pending Prescriptions Disp Refills     atorvastatin (LIPITOR) 10 MG tablet [Pharmacy Med Name: ATORVASTATIN 10MG TABLETS] 30 tablet 0     Sig: TAKE 1 TABLET BY MOUTH DAILY    Statins Protocol Passed    6/18/2018  9:45 AM       Passed - LDL on file in past 12 months    Recent Labs   Lab Test  05/22/18   1106   LDL  53            Passed - No abnormal creatine kinase in past 12 months    Recent Labs   Lab Test  07/15/16   0917   CKT  109               Passed - Recent (12 mo) or future (30 days) visit within the authorizing provider's specialty    Patient had office visit in the last 12 months or has a visit in the next 30 days with authorizing provider or within the authorizing provider's specialty.  See \"Patient Info\" tab in inbasket, or \"Choose Columns\" in Meds & Orders section of the refill encounter.           Passed - Patient is age 18 or older        Last office visit: 9/5/2017 with prescribing provider:     Future Office Visit:    Prescription approved per [facility] Refill Protocol.    "

## 2018-07-06 ENCOUNTER — TRANSFERRED RECORDS (OUTPATIENT)
Dept: HEALTH INFORMATION MANAGEMENT | Facility: CLINIC | Age: 83
End: 2018-07-06

## 2018-08-06 ENCOUNTER — TRANSFERRED RECORDS (OUTPATIENT)
Dept: HEALTH INFORMATION MANAGEMENT | Facility: CLINIC | Age: 83
End: 2018-08-06

## 2018-08-09 DIAGNOSIS — N18.30 CKD (CHRONIC KIDNEY DISEASE) STAGE 3, GFR 30-59 ML/MIN (H): Primary | ICD-10-CM

## 2018-08-09 DIAGNOSIS — I10 ESSENTIAL HYPERTENSION: ICD-10-CM

## 2018-08-10 RX ORDER — AMLODIPINE BESYLATE 5 MG/1
TABLET ORAL
Qty: 45 TABLET | Refills: 0 | Status: SHIPPED | OUTPATIENT
Start: 2018-08-10 | End: 2018-09-10

## 2018-08-10 NOTE — TELEPHONE ENCOUNTER
Pt not seen in nearly a year. BP med refilled for 30 days. Pt to have nonfasting  Kidney lab done sometime in the next 3 weeks and then see me a few days after to review reuslts and f/u on BP control, etc. Lab ordered in chart. Assist pt with scheduling appts

## 2018-08-10 NOTE — TELEPHONE ENCOUNTER
"Requested Prescriptions   Pending Prescriptions Disp Refills     amLODIPine (NORVASC) 5 MG tablet [Pharmacy Med Name: AMLODIPINE BESYLATE 5MG TABLETS] 145 tablet 0    Last Written Prescription Date:  2/1/2018  Last Fill Quantity: 145,  # refills: 1   Last office visit: 9/5/2017 with prescribing provider:  9/5/2017   Future Office Visit:     Sig: TAKE 1/2 TABLET BY MOUTH EVERY MORNING AND 1 TABLET BY MOUTH EVERY EVENING    Calcium Channel Blockers Protocol  Failed    8/9/2018  6:56 PM       Failed - Normal serum creatinine on file in past 12 months    Recent Labs   Lab Test  05/22/18   1106   CR  1.68*            Passed - Blood pressure under 140/90 in past 12 months    BP Readings from Last 3 Encounters:   09/05/17 138/70   04/27/17 142/68   03/29/17 145/82                Passed - Recent (12 mo) or future (30 days) visit within the authorizing provider's specialty    Patient had office visit in the last 12 months or has a visit in the next 30 days with authorizing provider or within the authorizing provider's specialty.  See \"Patient Info\" tab in inbasket, or \"Choose Columns\" in Meds & Orders section of the refill encounter.           Passed - Patient is age 18 or older          "

## 2018-09-06 NOTE — PROGRESS NOTES
"SUBJECTIVE:  Isael Garcia is a 84 year old male who presents regarding  HTN, CKD, A fib, etc.      Pt's past medical history, family history, habits, medications and allergies were reviewed with the patient today.  See snap shot for  HCM status. Most recent lab results reviewed with pt. Problem list and histories reviewed & adjusted, as indicated.  Additional history as below:    Denies chest pain, shortness of breath, abdominal pain, headache, vision changes or side effects with medications.   Seeing OK with glasses. No exam in past 1 year   No palpitations with hx A fib. On Eliquis  Has some chronic right lumbar pain issues radiating to RLE. [facility] and had  L5-S1 transforaminal steroid injection Aug 2018 with shortterm relief. Will be seeing radiology interventionalist at [facility] to discuss other treatment options besides surgery. No B/B incontinence. Denies  LR weakness     Lab Results   Component Value Date     09/07/2018       Lab Results   Component Value Date    CHOL 111 05/22/2018     Lab Results   Component Value Date    LDL 53 05/22/2018     Lab Results   Component Value Date    HDL 45 05/22/2018     Lab Results   Component Value Date    TRIG 63 05/22/2018     Lab Results   Component Value Date    CR 1.62 09/07/2018     Lab Results   Component Value Date    ALT 28 05/22/2018     Lab Results   Component Value Date    AST 23 05/22/2018     Lab Results   Component Value Date    MICROL <5 05/22/2018     Lab Results   Component Value Date    TSH 1.89 09/11/2015          Additional ROS:   Constitutional, HEENT, Cardiovascular, Pulmonary, GI and , Neuro, MSK and Psych review of systems/symptoms are otherwise negative or unchanged from previous, except as noted above.      OBJECTIVE:  /70  Pulse 63  Resp 15  Ht 5' 7\" (1.702 m)  Wt 165 lb 4.8 oz (75 kg)  SpO2 98%  BMI 25.89 kg/m2   Estimated body mass index is 25.54 kg/(m^2) as calculated from the following:    " "Height as of 1/31/17: 5' 8\" (1.727 m).    Weight as of 9/5/17: 168 lb (76.2 kg).     HENT: ear canals and TM's normal and nose and mouth without ulcers or lesions   Neck: no adenopathy. Thyroid normal to palpation. No bruits  Pulm: Lungs clear to auscultation   CV: Irregular rates and rhythm  GI: Soft, nontender, Normal active bowel sounds, No hepatosplenomegaly or masses palpable  Ext: Peripheral pulses intact. No edema.  Neuro: Normal strength and tone, sensory exam grossly normal    Assessment/Plan: (See plan discussion below for further details)  1. Atrial fibrillation, unspecified type (H)  Controlled with meds  - metoprolol tartrate (LOPRESSOR) 25 MG tablet; Take 0.5 tablets (12.5 mg) by mouth 2 times daily  Dispense: 90 tablet; Refill: 3  - apixaban ANTICOAGULANT (ELIQUIS) 5 MG tablet; TAKE 1/2 TABLET BY MOUTH TWICE A DAY  Dispense: 90 tablet; Refill: 3    2. Hyperlipidemia LDL goal <70  Controlled with med. See plan discussion below.   - atorvastatin (LIPITOR) 10 MG tablet; Take 1 tablet (10 mg) by mouth daily  Dispense: 90 tablet; Refill: 3  - Comprehensive metabolic panel; Future  - Lipid panel reflex to direct LDL Fasting; Future    3. Essential hypertension  contgrolled  - amLODIPine (NORVASC) 5 MG tablet; Take 1 tablet (5 mg) by mouth daily  Dispense: 90 tablet; Refill: 3    4. CKD (chronic kidney disease) stage 3, GFR 30-59 ml/min  stable  - Comprehensive metabolic panel; Future  - Lipid panel reflex to direct LDL Fasting; Future  - Basic metabolic panel; Future  - CBC with platelets; Future  - Albumin Random Urine Quantitative with Creat Ratio; Future    5. Lumbar radiculopathy   Chronic. Pt to see [facility] per  [facility] recs    6. Need for prophylactic vaccination with tetanus-diphtheria (TD)  Pt prefers to get at pharmacy    Plan discussion:  Continue current meds  Prescriptions refilled.    Call  [phone]  to schedule a future lab appointment  non-fasting in 6 months.   Follow up with me a few " days after these future labs are drawn to review results and other medical issues as necessary  Back  management per [facility]  Would recommend flu shot this Fall (pt declines at this time)              Madhu Velasquez MD  Internal Medicine Department  [facility]

## 2018-09-06 NOTE — PATIENT INSTRUCTIONS
Continue current meds  Prescriptions refilled.    Call  [phone]  to schedule a future lab appointment  non-fasting in 6 months.   Follow up with me a few days after these future labs are drawn to review results and other medical issues as necessary  Back  management per [facility]  Would recommend flu shot this Fall

## 2018-09-07 DIAGNOSIS — N18.30 CKD (CHRONIC KIDNEY DISEASE) STAGE 3, GFR 30-59 ML/MIN (H): ICD-10-CM

## 2018-09-07 LAB
ANION GAP SERPL CALCULATED.3IONS-SCNC: 7 MMOL/L (ref 3–14)
BUN SERPL-MCNC: 24 MG/DL (ref 7–30)
CALCIUM SERPL-MCNC: 8.8 MG/DL (ref 8.5–10.1)
CHLORIDE SERPL-SCNC: 111 MMOL/L (ref 94–109)
CO2 SERPL-SCNC: 25 MMOL/L (ref 20–32)
CREAT SERPL-MCNC: 1.62 MG/DL (ref 0.66–1.25)
GFR SERPL CREATININE-BSD FRML MDRD: 41 ML/MIN/1.7M2
GLUCOSE SERPL-MCNC: 115 MG/DL (ref 70–99)
POTASSIUM SERPL-SCNC: 4.4 MMOL/L (ref 3.4–5.3)
SODIUM SERPL-SCNC: 143 MMOL/L (ref 133–144)

## 2018-09-07 PROCEDURE — 80048 BASIC METABOLIC PNL TOTAL CA: CPT | Performed by: INTERNAL MEDICINE

## 2018-09-07 PROCEDURE — 36415 COLL VENOUS BLD VENIPUNCTURE: CPT | Performed by: INTERNAL MEDICINE

## 2018-09-10 ENCOUNTER — OFFICE VISIT (OUTPATIENT)
Dept: INTERNAL MEDICINE | Facility: CLINIC | Age: 83
End: 2018-09-10
Payer: COMMERCIAL

## 2018-09-10 VITALS
RESPIRATION RATE: 15 BRPM | WEIGHT: 165.3 LBS | SYSTOLIC BLOOD PRESSURE: 136 MMHG | BODY MASS INDEX: 25.94 KG/M2 | HEIGHT: 67 IN | DIASTOLIC BLOOD PRESSURE: 70 MMHG | HEART RATE: 63 BPM | OXYGEN SATURATION: 98 %

## 2018-09-10 DIAGNOSIS — N18.30 CKD (CHRONIC KIDNEY DISEASE) STAGE 3, GFR 30-59 ML/MIN (H): ICD-10-CM

## 2018-09-10 DIAGNOSIS — M54.16 LUMBAR RADICULOPATHY: ICD-10-CM

## 2018-09-10 DIAGNOSIS — I48.91 ATRIAL FIBRILLATION, UNSPECIFIED TYPE (H): ICD-10-CM

## 2018-09-10 DIAGNOSIS — E78.5 HYPERLIPIDEMIA LDL GOAL <70: ICD-10-CM

## 2018-09-10 DIAGNOSIS — I10 ESSENTIAL HYPERTENSION: ICD-10-CM

## 2018-09-10 DIAGNOSIS — Z23 NEED FOR PROPHYLACTIC VACCINATION WITH TETANUS-DIPHTHERIA (TD): Primary | ICD-10-CM

## 2018-09-10 PROCEDURE — 99214 OFFICE O/P EST MOD 30 MIN: CPT | Performed by: INTERNAL MEDICINE

## 2018-09-10 RX ORDER — METOPROLOL TARTRATE 25 MG/1
12.5 TABLET, FILM COATED ORAL 2 TIMES DAILY
Qty: 90 TABLET | Refills: 3 | Status: SHIPPED | OUTPATIENT
Start: 2018-09-10 | End: 2019-03-07

## 2018-09-10 RX ORDER — ATORVASTATIN CALCIUM 10 MG/1
10 TABLET, FILM COATED ORAL DAILY
Qty: 90 TABLET | Refills: 3 | Status: SHIPPED | OUTPATIENT
Start: 2018-09-10 | End: 2019-03-07

## 2018-09-10 RX ORDER — AMLODIPINE BESYLATE 5 MG/1
5 TABLET ORAL DAILY
Qty: 90 TABLET | Refills: 3 | Status: SHIPPED | OUTPATIENT
Start: 2018-09-10 | End: 2019-03-07

## 2018-09-10 NOTE — MR AVS SNAPSHOT
After Visit Summary   9/10/2018    Isael Garcia    MRN: [id number]           Patient Information     Date Of Birth          4/25/1934        Visit Information        Provider Department      9/10/2018 11:30 AM Madhu Velasquez MD [facility]        Today's Diagnoses     Preop general physical exam    -  1    Need for prophylactic vaccination with tetanus-diphtheria (TD)        Atrial fibrillation, unspecified type (H)        Hyperlipidemia LDL goal <70        Essential hypertension          Care Instructions    Continue current meds  Prescriptions refilled.    Call  [phone]  to schedule a future lab appointment  non-fasting in 6 months.   Follow up with me a few days after these future labs are drawn to review results and other medical issues as necessary  Neck management per  [facility]                 Follow-ups after your visit        Who to contact     If you have questions or need follow up information about today's clinic visit or your schedule please contact [facility] directly at [phone].  Normal or non-critical lab and imaging results will be communicated to you by "2,10E+07"hart, letter or phone within 4 business days after the clinic has received the results. If you do not hear from us within 7 days, please contact the clinic through [employer] or phone. If you have a critical or abnormal lab result, we will notify you by phone as soon as possible.  Submit refill requests through [employer] or call your pharmacy and they will forward the refill request to us. Please allow 3 business days for your refill to be completed.          Additional Information About Your Visit        MyChart Information     [employer] gives you secure access to your electronic health record. If you see a primary care provider, you can also send messages to your care team and make appointments. If you have questions, please call your primary care clinic.  If you  "do not have a primary care provider, please call [phone] and they will assist you.        Care EveryWhere ID     This is your Care EveryWhere ID. This could be used by other organizations to access your [place] medical records  [id number]        Your Vitals Were     Pulse Respirations Height Pulse Oximetry BMI (Body Mass Index)       63 15 5' 7\" (1.702 m) 98% 25.89 kg/m2        Blood Pressure from Last 3 Encounters:   09/10/18 136/70   09/05/17 138/70   04/27/17 142/68    Weight from Last 3 Encounters:   09/10/18 165 lb 4.8 oz (75 kg)   09/05/17 168 lb (76.2 kg)   04/27/17 164 lb (74.4 kg)              We Performed the Following     PAF COMPLETED          Today's Medication Changes          These changes are accurate as of 9/10/18 12:17 PM.  If you have any questions, ask your nurse or doctor.               These medicines have changed or have updated prescriptions.        Dose/Directions    amLODIPine 5 MG tablet   Commonly known as:  NORVASC   This may have changed:  See the new instructions.   Used for:  Essential hypertension        Dose:  5 mg   Take 1 tablet (5 mg) by mouth daily   Quantity:  90 tablet   Refills:  3       atorvastatin 10 MG tablet   Commonly known as:  LIPITOR   This may have changed:  See the new instructions.   Used for:  Hyperlipidemia LDL goal <70        Dose:  10 mg   Take 1 tablet (10 mg) by mouth daily   Quantity:  90 tablet   Refills:  3            Where to get your medicines      These medications were sent to [facility] [address] [place] [address] & [address], [place] [address]     Phone:  [phone]     amLODIPine 5 MG tablet    apixaban ANTICOAGULANT 5 MG tablet    atorvastatin 10 MG tablet    metoprolol tartrate 25 MG tablet                Primary Care Provider Office Phone # Fax #    Madhu Velsaquez [phone]       [address] [address]        Equal Access to Services     [facility] " GAAR : Hadii aad ku hadjosianeo Francescoali, waaxda luqadaha, qaybta kaalmada ademonica, sav orozcon aliyadameon lozada diya . So [facility] [phone].    ATENCIÓN: Si habla español, tiene a manzo disposición servicios gratuitos de asistencia lingüística. Llame al [phone].    We comply with applicable federal civil rights laws and [place] laws. We do not discriminate on the basis of race, color, national origin, age, disability, sex, sexual orientation, or gender identity.            Thank you!     Thank you for choosing [facility]  for your care. Our goal is always to provide you with excellent care. Hearing back from our patients is one way we can continue to improve our services. Please take a few minutes to complete the written survey that you may receive in the mail after your visit with us. Thank you!             Your Updated Medication List - Protect others around you: Learn how to safely use, store and throw away your medicines at www.disposemymeds.org.          This list is accurate as of 9/10/18 12:17 PM.  Always use your most recent med list.                   Brand Name Dispense Instructions for use Diagnosis    amLODIPine 5 MG tablet    NORVASC    90 tablet    Take 1 tablet (5 mg) by mouth daily    Essential hypertension       amoxicillin 500 MG capsule    AMOXIL    4 capsule    4 capsules (total 2 grams) orally  1 hour prior to dental procedures    S/P AAA repair       apixaban ANTICOAGULANT 5 MG tablet    ELIQUIS    90 tablet    TAKE 1/2 TABLET BY MOUTH TWICE A DAY    Atrial fibrillation, unspecified type (H)       aspirin 325 MG EC tablet      Take 325 mg by mouth daily        atorvastatin 10 MG tablet    LIPITOR    90 tablet    Take 1 tablet (10 mg) by mouth daily    Hyperlipidemia LDL goal <70       metoprolol tartrate 25 MG tablet    LOPRESSOR    90 tablet    Take 0.5 tablets (12.5 mg) by mouth 2 times daily    Atrial fibrillation, unspecified type (H)       Multi-vitamin  Tabs tablet      Take 1 tablet by mouth daily        OSTEO BI-FLEX ADV TRIPLE ST Tabs      Take 1 tablet by mouth daily        TYLENOL 500 MG tablet   Generic drug:  acetaminophen      1,000 mg daily        vitamin D 2000 units tablet     100 tablet    Take 2,000 Units by mouth daily    Vitamin D deficiency

## 2019-03-07 ENCOUNTER — TELEPHONE (OUTPATIENT)
Dept: INTERNAL MEDICINE | Facility: CLINIC | Age: 84
End: 2019-03-07

## 2019-03-07 DIAGNOSIS — I10 ESSENTIAL HYPERTENSION: ICD-10-CM

## 2019-03-07 DIAGNOSIS — E78.5 HYPERLIPIDEMIA LDL GOAL <70: ICD-10-CM

## 2019-03-07 DIAGNOSIS — I48.91 ATRIAL FIBRILLATION, UNSPECIFIED TYPE (H): ICD-10-CM

## 2019-03-07 RX ORDER — METOPROLOL TARTRATE 25 MG/1
12.5 TABLET, FILM COATED ORAL 2 TIMES DAILY
Qty: 90 TABLET | Refills: 1 | Status: SHIPPED | OUTPATIENT
Start: 2019-03-07 | End: 2019-08-23

## 2019-03-07 RX ORDER — ATORVASTATIN CALCIUM 10 MG/1
10 TABLET, FILM COATED ORAL DAILY
Qty: 90 TABLET | Refills: 1 | Status: SHIPPED | OUTPATIENT
Start: 2019-03-07 | End: 2019-08-23

## 2019-03-07 RX ORDER — AMLODIPINE BESYLATE 5 MG/1
5 TABLET ORAL DAILY
Qty: 90 TABLET | Refills: 1 | Status: SHIPPED | OUTPATIENT
Start: 2019-03-07 | End: 2019-08-23

## 2019-03-07 NOTE — TELEPHONE ENCOUNTER
Pt due for nonfasting lab to f/u on kidney function. Lab is ordered.  Assist pt with scheduling lab appt  Refills of prescription meds sent to [facility] mail order pharmacy for 90 days and 1 refill. Pt to also have fasting labs done in early September before running out of medication and then see me in clinic  a couple days after labs done in September to review results, blood pressure stratus, etc Inform pt

## 2019-03-07 NOTE — TELEPHONE ENCOUNTER
Reason for Call:  Other call back    Detailed comments: Patient would like for his medications to be sent to [facility] [phone].  Patient needs Amlodipine sent to [facility] asap, patient is almost out of medication.    Phone Number Patient can be reached at: Home number on file [phone] (home)    Best Time: anytime    Can we leave a detailed message on this number? YES    Call taken on 3/7/2019 at 12:18 PM by Darius Escobar

## 2019-03-11 NOTE — TELEPHONE ENCOUNTER
Patient informed. Lab only scheduled for this month. And reminded him to follow up in September.    Neelam HARRELL RN, BSN, PHN

## 2019-03-12 DIAGNOSIS — E78.5 HYPERLIPIDEMIA LDL GOAL <70: ICD-10-CM

## 2019-03-12 DIAGNOSIS — N18.30 CKD (CHRONIC KIDNEY DISEASE) STAGE 3, GFR 30-59 ML/MIN (H): ICD-10-CM

## 2019-03-12 LAB
ALBUMIN SERPL-MCNC: 3.7 G/DL (ref 3.4–5)
ALP SERPL-CCNC: 61 U/L (ref 40–150)
ALT SERPL W P-5'-P-CCNC: 23 U/L (ref 0–70)
ANION GAP SERPL CALCULATED.3IONS-SCNC: 6 MMOL/L (ref 3–14)
AST SERPL W P-5'-P-CCNC: 23 U/L (ref 0–45)
BILIRUB SERPL-MCNC: 0.6 MG/DL (ref 0.2–1.3)
BUN SERPL-MCNC: 26 MG/DL (ref 7–30)
CALCIUM SERPL-MCNC: 9.2 MG/DL (ref 8.5–10.1)
CHLORIDE SERPL-SCNC: 110 MMOL/L (ref 94–109)
CHOLEST SERPL-MCNC: 110 MG/DL
CO2 SERPL-SCNC: 24 MMOL/L (ref 20–32)
CREAT SERPL-MCNC: 1.62 MG/DL (ref 0.66–1.25)
CREAT UR-MCNC: 84 MG/DL
ERYTHROCYTE [DISTWIDTH] IN BLOOD BY AUTOMATED COUNT: 13.1 % (ref 10–15)
GFR SERPL CREATININE-BSD FRML MDRD: 38 ML/MIN/{1.73_M2}
GLUCOSE SERPL-MCNC: 87 MG/DL (ref 70–99)
HCT VFR BLD AUTO: 42.5 % (ref 40–53)
HDLC SERPL-MCNC: 33 MG/DL
HGB BLD-MCNC: 14 G/DL (ref 13.3–17.7)
LDLC SERPL CALC-MCNC: 61 MG/DL
MCH RBC QN AUTO: 32.1 PG (ref 26.5–33)
MCHC RBC AUTO-ENTMCNC: 32.9 G/DL (ref 31.5–36.5)
MCV RBC AUTO: 98 FL (ref 78–100)
MICROALBUMIN UR-MCNC: <5 MG/L
MICROALBUMIN/CREAT UR: NORMAL MG/G CR (ref 0–17)
NONHDLC SERPL-MCNC: 77 MG/DL
PLATELET # BLD AUTO: 194 10E9/L (ref 150–450)
POTASSIUM SERPL-SCNC: 4.6 MMOL/L (ref 3.4–5.3)
PROT SERPL-MCNC: 7.1 G/DL (ref 6.8–8.8)
RBC # BLD AUTO: 4.36 10E12/L (ref 4.4–5.9)
SODIUM SERPL-SCNC: 140 MMOL/L (ref 133–144)
TRIGL SERPL-MCNC: 81 MG/DL
WBC # BLD AUTO: 6.7 10E9/L (ref 4–11)

## 2019-03-12 PROCEDURE — 85027 COMPLETE CBC AUTOMATED: CPT | Performed by: INTERNAL MEDICINE

## 2019-03-12 PROCEDURE — 80053 COMPREHEN METABOLIC PANEL: CPT | Performed by: INTERNAL MEDICINE

## 2019-03-12 PROCEDURE — 36415 COLL VENOUS BLD VENIPUNCTURE: CPT | Performed by: INTERNAL MEDICINE

## 2019-03-12 PROCEDURE — 80061 LIPID PANEL: CPT | Performed by: INTERNAL MEDICINE

## 2019-03-12 PROCEDURE — 82043 UR ALBUMIN QUANTITATIVE: CPT | Performed by: INTERNAL MEDICINE

## 2019-05-30 ENCOUNTER — HOSPITAL ENCOUNTER (INPATIENT)
Facility: CLINIC | Age: 84
LOS: 2 days | Discharge: HOME | DRG: 038 | End: 2019-06-01
Attending: EMERGENCY MEDICINE | Admitting: INTERNAL MEDICINE
Payer: COMMERCIAL

## 2019-05-30 ENCOUNTER — APPOINTMENT (OUTPATIENT)
Dept: CARDIOLOGY | Facility: CLINIC | Age: 84
DRG: 038 | End: 2019-05-30
Attending: INTERNAL MEDICINE
Payer: COMMERCIAL

## 2019-05-30 ENCOUNTER — APPOINTMENT (OUTPATIENT)
Dept: CT IMAGING | Facility: CLINIC | Age: 84
DRG: 038 | End: 2019-05-30
Attending: EMERGENCY MEDICINE
Payer: COMMERCIAL

## 2019-05-30 ENCOUNTER — NURSE TRIAGE (OUTPATIENT)
Dept: INTERNAL MEDICINE | Facility: CLINIC | Age: 84
End: 2019-05-30

## 2019-05-30 ENCOUNTER — APPOINTMENT (OUTPATIENT)
Dept: MRI IMAGING | Facility: CLINIC | Age: 84
DRG: 038 | End: 2019-05-30
Attending: INTERNAL MEDICINE
Payer: COMMERCIAL

## 2019-05-30 DIAGNOSIS — I65.21 CAROTID STENOSIS, ASYMPTOMATIC, RIGHT: ICD-10-CM

## 2019-05-30 DIAGNOSIS — I48.91 ATRIAL FIBRILLATION, UNSPECIFIED TYPE (H): ICD-10-CM

## 2019-05-30 DIAGNOSIS — I10 ESSENTIAL HYPERTENSION: ICD-10-CM

## 2019-05-30 DIAGNOSIS — I63.49 CEREBROVASCULAR ACCIDENT (CVA) DUE TO EMBOLISM OF OTHER CEREBRAL ARTERY (H): Primary | ICD-10-CM

## 2019-05-30 DIAGNOSIS — R29.898 TRANSIENT RIGHT LEG WEAKNESS: ICD-10-CM

## 2019-05-30 DIAGNOSIS — I63.132 CEREBROVASCULAR ACCIDENT (CVA) DUE TO EMBOLISM OF LEFT CAROTID ARTERY (H): ICD-10-CM

## 2019-05-30 DIAGNOSIS — I65.22 SYMPTOMATIC CAROTID ARTERY STENOSIS, LEFT: ICD-10-CM

## 2019-05-30 PROBLEM — G45.9 TIA (TRANSIENT ISCHEMIC ATTACK): Status: ACTIVE | Noted: 2019-05-30

## 2019-05-30 PROBLEM — I63.139: Status: ACTIVE | Noted: 2019-05-30

## 2019-05-30 LAB
ALBUMIN SERPL-MCNC: 3.5 G/DL (ref 3.4–5)
ALP SERPL-CCNC: 55 U/L (ref 40–150)
ALT SERPL W P-5'-P-CCNC: 24 U/L (ref 0–70)
ANION GAP SERPL CALCULATED.3IONS-SCNC: 5 MMOL/L (ref 3–14)
ANION GAP SERPL CALCULATED.3IONS-SCNC: 5 MMOL/L (ref 3–14)
ANION GAP SERPL CALCULATED.3IONS-SCNC: NORMAL MMOL/L (ref 6–17)
APTT PPP: 33 SEC (ref 22–37)
APTT PPP: NORMAL SEC (ref 22–37)
AST SERPL W P-5'-P-CCNC: 17 U/L (ref 0–45)
BASOPHILS # BLD AUTO: 0 10E9/L (ref 0–0.2)
BASOPHILS NFR BLD AUTO: 0.1 %
BILIRUB DIRECT SERPL-MCNC: <0.1 MG/DL (ref 0–0.2)
BILIRUB SERPL-MCNC: 0.2 MG/DL (ref 0.2–1.3)
BUN SERPL-MCNC: 27 MG/DL (ref 7–30)
BUN SERPL-MCNC: 29 MG/DL (ref 7–30)
BUN SERPL-MCNC: NORMAL MG/DL (ref 7–30)
CALCIUM SERPL-MCNC: 8.4 MG/DL (ref 8.5–10.1)
CALCIUM SERPL-MCNC: 8.4 MG/DL (ref 8.5–10.1)
CALCIUM SERPL-MCNC: NORMAL MG/DL (ref 8.5–10.1)
CHLORIDE SERPL-SCNC: 111 MMOL/L (ref 94–109)
CHLORIDE SERPL-SCNC: 112 MMOL/L (ref 94–109)
CHLORIDE SERPL-SCNC: NORMAL MMOL/L (ref 94–109)
CHOLEST SERPL-MCNC: 101 MG/DL
CO2 SERPL-SCNC: 23 MMOL/L (ref 20–32)
CO2 SERPL-SCNC: 24 MMOL/L (ref 20–32)
CO2 SERPL-SCNC: NORMAL MMOL/L (ref 20–32)
CREAT SERPL-MCNC: 1.57 MG/DL (ref 0.66–1.25)
CREAT SERPL-MCNC: 1.65 MG/DL (ref 0.66–1.25)
CREAT SERPL-MCNC: NORMAL MG/DL (ref 0.66–1.25)
DIFFERENTIAL METHOD BLD: ABNORMAL
EOSINOPHIL # BLD AUTO: 0.2 10E9/L (ref 0–0.7)
EOSINOPHIL NFR BLD AUTO: 2.1 %
ERYTHROCYTE [DISTWIDTH] IN BLOOD BY AUTOMATED COUNT: 13 % (ref 10–15)
ERYTHROCYTE [DISTWIDTH] IN BLOOD BY AUTOMATED COUNT: 13.1 % (ref 10–15)
ERYTHROCYTE [DISTWIDTH] IN BLOOD BY AUTOMATED COUNT: 13.1 % (ref 10–15)
GFR SERPL CREATININE-BSD FRML MDRD: 37 ML/MIN/{1.73_M2}
GFR SERPL CREATININE-BSD FRML MDRD: 40 ML/MIN/{1.73_M2}
GFR SERPL CREATININE-BSD FRML MDRD: NORMAL ML/MIN/{1.73_M2}
GLUCOSE BLDC GLUCOMTR-MCNC: 75 MG/DL (ref 70–99)
GLUCOSE SERPL-MCNC: 84 MG/DL (ref 70–99)
GLUCOSE SERPL-MCNC: 94 MG/DL (ref 70–99)
GLUCOSE SERPL-MCNC: NORMAL MG/DL (ref 70–99)
HBA1C MFR BLD: 5.6 % (ref 0–5.6)
HCT VFR BLD AUTO: 39.7 % (ref 40–53)
HCT VFR BLD AUTO: 41.4 % (ref 40–53)
HCT VFR BLD AUTO: 41.5 % (ref 40–53)
HDLC SERPL-MCNC: 34 MG/DL
HGB BLD-MCNC: 13.6 G/DL (ref 13.3–17.7)
HGB BLD-MCNC: 13.9 G/DL (ref 13.3–17.7)
HGB BLD-MCNC: 14.2 G/DL (ref 13.3–17.7)
IMM GRANULOCYTES # BLD: 0 10E9/L (ref 0–0.4)
IMM GRANULOCYTES NFR BLD: 0.1 %
INR PPP: 1.21 (ref 0.86–1.14)
INR PPP: 1.22 (ref 0.86–1.14)
INR PPP: NORMAL (ref 0.86–1.14)
INTERPRETATION ECG - MUSE: NORMAL
LDLC SERPL CALC-MCNC: 50 MG/DL
LYMPHOCYTES # BLD AUTO: 1.5 10E9/L (ref 0.8–5.3)
LYMPHOCYTES NFR BLD AUTO: 20.6 %
MCH RBC QN AUTO: 31.7 PG (ref 26.5–33)
MCH RBC QN AUTO: 32.3 PG (ref 26.5–33)
MCH RBC QN AUTO: 32.6 PG (ref 26.5–33)
MCHC RBC AUTO-ENTMCNC: 33.5 G/DL (ref 31.5–36.5)
MCHC RBC AUTO-ENTMCNC: 34.3 G/DL (ref 31.5–36.5)
MCHC RBC AUTO-ENTMCNC: 34.3 G/DL (ref 31.5–36.5)
MCV RBC AUTO: 94 FL (ref 78–100)
MCV RBC AUTO: 95 FL (ref 78–100)
MCV RBC AUTO: 95 FL (ref 78–100)
MONOCYTES # BLD AUTO: 0.6 10E9/L (ref 0–1.3)
MONOCYTES NFR BLD AUTO: 7.8 %
NEUTROPHILS # BLD AUTO: 5 10E9/L (ref 1.6–8.3)
NEUTROPHILS NFR BLD AUTO: 69.3 %
NONHDLC SERPL-MCNC: 67 MG/DL
NRBC # BLD AUTO: 0 10*3/UL
NRBC BLD AUTO-RTO: 0 /100
PLATELET # BLD AUTO: 179 10E9/L (ref 150–450)
PLATELET # BLD AUTO: 187 10E9/L (ref 150–450)
PLATELET # BLD AUTO: 190 10E9/L (ref 150–450)
POTASSIUM SERPL-SCNC: 4.5 MMOL/L (ref 3.4–5.3)
POTASSIUM SERPL-SCNC: 4.6 MMOL/L (ref 3.4–5.3)
POTASSIUM SERPL-SCNC: NORMAL MMOL/L (ref 3.4–5.3)
PROT SERPL-MCNC: 6.8 G/DL (ref 6.8–8.8)
RBC # BLD AUTO: 4.21 10E12/L (ref 4.4–5.9)
RBC # BLD AUTO: 4.35 10E12/L (ref 4.4–5.9)
RBC # BLD AUTO: 4.39 10E12/L (ref 4.4–5.9)
SODIUM SERPL-SCNC: 139 MMOL/L (ref 133–144)
SODIUM SERPL-SCNC: 141 MMOL/L (ref 133–144)
SODIUM SERPL-SCNC: NORMAL MMOL/L (ref 133–144)
TRIGL SERPL-MCNC: 84 MG/DL
TROPONIN I SERPL-MCNC: <0.015 UG/L (ref 0–0.04)
TROPONIN I SERPL-MCNC: <0.015 UG/L (ref 0–0.04)
TSH SERPL DL<=0.005 MIU/L-ACNC: 1.43 MU/L (ref 0.4–4)
WBC # BLD AUTO: 5.8 10E9/L (ref 4–11)
WBC # BLD AUTO: 6.1 10E9/L (ref 4–11)
WBC # BLD AUTO: 7.2 10E9/L (ref 4–11)

## 2019-05-30 PROCEDURE — 25000128 H RX IP 250 OP 636

## 2019-05-30 PROCEDURE — 70498 CT ANGIOGRAPHY NECK: CPT

## 2019-05-30 PROCEDURE — 36415 COLL VENOUS BLD VENIPUNCTURE: CPT

## 2019-05-30 PROCEDURE — 12000000 ZZH R&B MED SURG/OB

## 2019-05-30 PROCEDURE — 99207 ZZC CDG-CODE CATEGORY CHANGED: CPT | Performed by: INTERNAL MEDICINE

## 2019-05-30 PROCEDURE — 40000895 ZZH STATISTIC SLP IP EVAL DEFER: Performed by: SPEECH-LANGUAGE PATHOLOGIST

## 2019-05-30 PROCEDURE — G0378 HOSPITAL OBSERVATION PER HR: HCPCS

## 2019-05-30 PROCEDURE — 83036 HEMOGLOBIN GLYCOSYLATED A1C: CPT | Performed by: INTERNAL MEDICINE

## 2019-05-30 PROCEDURE — 93005 ELECTROCARDIOGRAM TRACING: CPT

## 2019-05-30 PROCEDURE — 36415 COLL VENOUS BLD VENIPUNCTURE: CPT | Performed by: INTERNAL MEDICINE

## 2019-05-30 PROCEDURE — 93306 TTE W/DOPPLER COMPLETE: CPT | Mod: 26 | Performed by: INTERNAL MEDICINE

## 2019-05-30 PROCEDURE — 40000264 ECHOCARDIOGRAM COMPLETE

## 2019-05-30 PROCEDURE — 80076 HEPATIC FUNCTION PANEL: CPT | Performed by: INTERNAL MEDICINE

## 2019-05-30 PROCEDURE — 00000146 ZZHCL STATISTIC GLUCOSE BY METER IP

## 2019-05-30 PROCEDURE — 80048 BASIC METABOLIC PNL TOTAL CA: CPT | Performed by: EMERGENCY MEDICINE

## 2019-05-30 PROCEDURE — 85025 COMPLETE CBC W/AUTO DIFF WBC: CPT | Performed by: EMERGENCY MEDICINE

## 2019-05-30 PROCEDURE — 85027 COMPLETE CBC AUTOMATED: CPT | Performed by: INTERNAL MEDICINE

## 2019-05-30 PROCEDURE — 70450 CT HEAD/BRAIN W/O DYE: CPT

## 2019-05-30 PROCEDURE — 85027 COMPLETE CBC AUTOMATED: CPT

## 2019-05-30 PROCEDURE — 85610 PROTHROMBIN TIME: CPT | Performed by: EMERGENCY MEDICINE

## 2019-05-30 PROCEDURE — 99223 1ST HOSP IP/OBS HIGH 75: CPT | Mod: AI | Performed by: INTERNAL MEDICINE

## 2019-05-30 PROCEDURE — 85730 THROMBOPLASTIN TIME PARTIAL: CPT | Performed by: EMERGENCY MEDICINE

## 2019-05-30 PROCEDURE — 25000132 ZZH RX MED GY IP 250 OP 250 PS 637: Performed by: INTERNAL MEDICINE

## 2019-05-30 PROCEDURE — 25000128 H RX IP 250 OP 636: Performed by: EMERGENCY MEDICINE

## 2019-05-30 PROCEDURE — 25500064 ZZH RX 255 OP 636: Performed by: INTERNAL MEDICINE

## 2019-05-30 PROCEDURE — 25800030 ZZH RX IP 258 OP 636: Performed by: INTERNAL MEDICINE

## 2019-05-30 PROCEDURE — 25000125 ZZHC RX 250: Performed by: EMERGENCY MEDICINE

## 2019-05-30 PROCEDURE — 99291 CRITICAL CARE FIRST HOUR: CPT | Mod: 25

## 2019-05-30 PROCEDURE — 80048 BASIC METABOLIC PNL TOTAL CA: CPT | Performed by: INTERNAL MEDICINE

## 2019-05-30 PROCEDURE — A9585 GADOBUTROL INJECTION: HCPCS | Performed by: INTERNAL MEDICINE

## 2019-05-30 PROCEDURE — 84484 ASSAY OF TROPONIN QUANT: CPT | Performed by: INTERNAL MEDICINE

## 2019-05-30 PROCEDURE — 85610 PROTHROMBIN TIME: CPT

## 2019-05-30 PROCEDURE — 80061 LIPID PANEL: CPT | Performed by: INTERNAL MEDICINE

## 2019-05-30 PROCEDURE — 99222 1ST HOSP IP/OBS MODERATE 55: CPT | Mod: 57 | Performed by: SURGERY

## 2019-05-30 PROCEDURE — 70553 MRI BRAIN STEM W/O & W/DYE: CPT

## 2019-05-30 PROCEDURE — 84443 ASSAY THYROID STIM HORMONE: CPT | Performed by: INTERNAL MEDICINE

## 2019-05-30 RX ORDER — ACETAMINOPHEN 650 MG/1
650 SUPPOSITORY RECTAL EVERY 4 HOURS PRN
Status: DISCONTINUED | OUTPATIENT
Start: 2019-05-30 | End: 2019-05-31

## 2019-05-30 RX ORDER — ATORVASTATIN CALCIUM 40 MG/1
40 TABLET, FILM COATED ORAL EVERY EVENING
Status: DISCONTINUED | OUTPATIENT
Start: 2019-05-30 | End: 2019-06-01 | Stop reason: HOSPADM

## 2019-05-30 RX ORDER — ACETAMINOPHEN 325 MG/1
650 TABLET ORAL EVERY 4 HOURS PRN
Status: DISCONTINUED | OUTPATIENT
Start: 2019-05-30 | End: 2019-05-30

## 2019-05-30 RX ORDER — AMLODIPINE BESYLATE 5 MG/1
5 TABLET ORAL DAILY
Status: DISCONTINUED | OUTPATIENT
Start: 2019-05-31 | End: 2019-06-01 | Stop reason: HOSPADM

## 2019-05-30 RX ORDER — ONDANSETRON 2 MG/ML
4 INJECTION INTRAMUSCULAR; INTRAVENOUS EVERY 6 HOURS PRN
Status: DISCONTINUED | OUTPATIENT
Start: 2019-05-30 | End: 2019-05-31

## 2019-05-30 RX ORDER — SODIUM CHLORIDE 9 MG/ML
INJECTION, SOLUTION INTRAVENOUS CONTINUOUS
Status: ACTIVE | OUTPATIENT
Start: 2019-05-30 | End: 2019-05-30

## 2019-05-30 RX ORDER — ACETAMINOPHEN 500 MG
1000 TABLET ORAL EVERY 8 HOURS PRN
Status: DISCONTINUED | OUTPATIENT
Start: 2019-05-30 | End: 2019-05-31

## 2019-05-30 RX ORDER — IOPAMIDOL 755 MG/ML
120 INJECTION, SOLUTION INTRAVASCULAR ONCE
Status: COMPLETED | OUTPATIENT
Start: 2019-05-30 | End: 2019-05-30

## 2019-05-30 RX ORDER — LABETALOL 20 MG/4 ML (5 MG/ML) INTRAVENOUS SYRINGE
10-40 EVERY 10 MIN PRN
Status: DISCONTINUED | OUTPATIENT
Start: 2019-05-30 | End: 2019-05-31

## 2019-05-30 RX ORDER — OXYCODONE HYDROCHLORIDE 5 MG/1
5-10 TABLET ORAL
Status: DISCONTINUED | OUTPATIENT
Start: 2019-05-30 | End: 2019-06-01

## 2019-05-30 RX ORDER — HYDRALAZINE HYDROCHLORIDE 20 MG/ML
10-20 INJECTION INTRAMUSCULAR; INTRAVENOUS
Status: DISCONTINUED | OUTPATIENT
Start: 2019-05-30 | End: 2019-05-31

## 2019-05-30 RX ORDER — NALOXONE HYDROCHLORIDE 0.4 MG/ML
.1-.4 INJECTION, SOLUTION INTRAMUSCULAR; INTRAVENOUS; SUBCUTANEOUS
Status: DISCONTINUED | OUTPATIENT
Start: 2019-05-30 | End: 2019-05-31

## 2019-05-30 RX ORDER — ONDANSETRON 4 MG/1
4 TABLET, ORALLY DISINTEGRATING ORAL EVERY 6 HOURS PRN
Status: DISCONTINUED | OUTPATIENT
Start: 2019-05-30 | End: 2019-05-31

## 2019-05-30 RX ORDER — GADOBUTROL 604.72 MG/ML
6 INJECTION INTRAVENOUS ONCE
Status: COMPLETED | OUTPATIENT
Start: 2019-05-30 | End: 2019-05-30

## 2019-05-30 RX ADMIN — IOPAMIDOL 70 ML: 755 INJECTION, SOLUTION INTRAVENOUS at 12:01

## 2019-05-30 RX ADMIN — SODIUM CHLORIDE 100 ML: 9 INJECTION, SOLUTION INTRAVENOUS at 12:01

## 2019-05-30 RX ADMIN — SODIUM CHLORIDE: 9 INJECTION, SOLUTION INTRAVENOUS at 14:39

## 2019-05-30 RX ADMIN — METOPROLOL TARTRATE 12.5 MG: 25 TABLET ORAL at 20:45

## 2019-05-30 RX ADMIN — HEPARIN SODIUM 850 UNITS/HR: 10000 INJECTION, SOLUTION INTRAVENOUS at 22:43

## 2019-05-30 RX ADMIN — GADOBUTROL 6 ML: 604.72 INJECTION INTRAVENOUS at 17:17

## 2019-05-30 RX ADMIN — ATORVASTATIN CALCIUM 40 MG: 40 TABLET, FILM COATED ORAL at 20:45

## 2019-05-30 ASSESSMENT — ENCOUNTER SYMPTOMS
NECK PAIN: 0
SPEECH DIFFICULTY: 0
CHILLS: 0
HEADACHES: 0
NUMBNESS: 1
WEAKNESS: 1
FEVER: 0

## 2019-05-30 ASSESSMENT — MIFFLIN-ST. JEOR
SCORE: 1376.76
SCORE: 1392.07

## 2019-05-30 ASSESSMENT — VISUAL ACUITY: OU: NORMAL ACUITY;GLASSES

## 2019-05-30 NOTE — CONSULTS
[facility]      Stroke Consult Note    Reason for Consult:  Stroke Code    HPI  Isael Garcia is a 85 year old male who presented with right sided weakness. He was normal when he went to bed last night around 10:30pm, got up to use the bathroom around 3:00am and felt he was fine at that time, but notes that when he awoke around 8:30am he felt his right side was weak and slightly numb. He couldn't get his right leg to work right when he tried walking, but was able to get to the bathroom and have breakfast. He denies any trouble with speech or swallowing his food at breakfast, but had a friend drive him into the ED later because his right side still was not working right. Stroke code was called in the ED. On exam just prior to CT, patient reports being 95% back to normal, no focal weakness identified on exam. Imaging negative for acute abnormality. Patient takes eliquis for Afib, so with that and last known well around 3:00am, he is outside the window for alteplase. Carotid stenosis identified, but no occlusion, so no endovascular intervention sought. Recommend admission for TIA workup.    TPA Treatment   Not given due to outside the time window, minor / isolated / quickly resolving stroke symptoms, current / recent anticoagulant use with INR > 1.7.    Endovascular Treatment  Not initiated due to absence of proximal vessel occlusion    Impression  Transient Ischemic Attack    Recommendations  Transient Ischemic Attack Recommendations  - ABCD2 Score: 6  - Neurochecks  - Euthermia, Euglycemia  - Statin  - MRI/MRA Stroke Protocol  - TTE with Bubble Study  - 24-hour Telemetry  - Bedside Glucose Monitoring  - A1c, Lipid Panel  - PT/OT/SLP  - Stroke Education  - continue eliquis as PTA    Patient Follow-up    - final recommendation pending work-up      Please contact the Stroke Service with any questions.    Text Page    Lo Norris, MSN, FNP-BC, RN CNRN  JANETN    __________________________________________________    Past Medical History:   Diagnosis Date     AAA (abdominal aortic aneurysm) (H) May 2016    infrarenal s/p graft placement at [facility]     Allergic rhinitis, cause unspecified      Atrial fibrillation (H)      CKD (chronic kidney disease) stage 3, GFR 30-59 ml/min 2/4/2013     Lumbago 2000    s/p lumbar epidural injection     OA (osteoarthritis) 2/21/2015     PVD (peripheral vascular disease) (H) 3/7/2016     Shingles 7/09     left forehead     Sleep related leg cramps 2/6/2013     Unspecified cataract     bilateral     Unspecified essential hypertension      Vitamin D deficiency disease 2/6/2013       Past Surgical History:   Procedure Laterality Date     C NONSPECIFIC PROCEDURE  1960    right inguinal hernia repair     C NONSPECIFIC PROCEDURE  1990s    TURP     C NONSPECIFIC PROCEDURE      tonsillectomy     C NONSPECIFIC PROCEDURE  April 2011    Right total knee arthroplasty     C NONSPECIFIC PROCEDURE   approx 2009     left  cataract extraction/IOL placement     C NONSPECIFIC PROCEDURE  May 2016    Endovascular AAA stent graft placed at [facility]. [facility] suggests abx prophylaxis for life for procedures (dental/etc)     EXCISE MASS TRUNK N/A 2/28/2017    Procedure: EXCISE MASS TRUNK;  Surgeon: aRnjith Lozoya MD;  Location: [facility]     EXCISE MASS UPPER EXTREMITY Left 2/28/2017    Procedure: EXCISE MASS UPPER EXTREMITY;  Surgeon: Ranjith Lozoya MD;  Location: [facility]     HERNIORRHAPHY INGUINAL Left 2/28/2017    Procedure: HERNIORRHAPHY INGUINAL;  Surgeon: Ranjith Lozoya MD;  Location: [facility]       Medications   No current facility-administered medications for this encounter.      Current Outpatient Medications   Medication Sig     acetaminophen (TYLENOL) 500 MG tablet 1,000 mg daily      amLODIPine (NORVASC) 5 MG tablet Take 1 tablet (5 mg) by mouth daily     amoxicillin (AMOXIL) 500 MG capsule 4 capsules (total 2 grams) orally  1 hour  prior to dental procedures     apixaban ANTICOAGULANT (ELIQUIS) 5 MG tablet TAKE 1/2 TABLET BY MOUTH TWICE A DAY     aspirin  MG tablet Take 325 mg by mouth daily      atorvastatin (LIPITOR) 10 MG tablet Take 1 tablet (10 mg) by mouth daily     Cholecalciferol (VITAMIN D) 2000 UNITS tablet Take 2,000 Units by mouth daily     metoprolol tartrate (LOPRESSOR) 25 MG tablet Take 0.5 tablets (12.5 mg) by mouth 2 times daily     Misc Natural Products (OSTEO BI-FLEX ADV TRIPLE ST) TABS Take 1 tablet by mouth daily     multivitamin, therapeutic with minerals (MULTI-VITAMIN) TABS Take 1 tablet by mouth daily         Allergies   Allergies   Allergen Reactions     Lisinopril Cramps     Leg cramping       Family History   Family History     Problem (# of Occurrences) Relation (Name,Age of Onset)    Prostate Cancer (1) Brother          Social History   Social History     Socioeconomic History     Marital status:      Spouse name: Not on file     Number of children: Not on file     Years of education: Not on file     Highest education level: Not on file   Occupational History     Not on file   Social Needs     Financial resource strain: Not on file     Food insecurity:     Worry: Not on file     Inability: Not on file     Transportation needs:     Medical: Not on file     Non-medical: Not on file   Tobacco Use     Smoking status: Never Smoker     Smokeless tobacco: Never Used   Substance and Sexual Activity     Alcohol use: No     Drug use: No     Sexual activity: Never   Lifestyle     Physical activity:     Days per week: Not on file     Minutes per session: Not on file     Stress: Not on file   Relationships     Social connections:     Talks on phone: Not on file     Gets together: Not on file     Attends [employer] service: Not on file     Active member of club or organization: Not on file     Attends meetings of clubs or organizations: Not on file     Relationship status: Not on file     Intimate partner violence:      Fear of current or ex partner: Not on file     Emotionally abused: Not on file     Physically abused: Not on file     Forced sexual activity: Not on file   Other Topics Concern     Parent/sibling w/ CABG, MI or angioplasty before 65F 55M? Not Asked   Social History Narrative     Not on file          ROS  The 10 point Review of Systems is negative other than noted in the HPI or here.     PHYSICAL EXAMINATION  B/P:     136/65 (05/30/19 1221)    Heart Rate: 52 (05/30/19 1202)    Pulse: 50 (05/30/19 1221)   Resp:  13 (05/30/19 1202)  Temp: 97.6  F (36.4  C)   Oral (05/30/19 1118)    Neuro:       Mental Status Exam:   Awake, alert, oriented X3. Speech and language are intact. Mental status is normal       Cranial Nerves:  Pupils 3 mm, reactive. EOMI. Face sensation is normal. Face is symmetric. Tongue and uvula are midline. Other CN are normal           Motor:  5/5 X 4. Tone and bulk are normal           Reflexes:  Normal DTR. Toes downgoing.        Sensory:  Normal to light touch               Coordination:   Intact finger-to-nose        Gait:  No significant difficulties      Stroke Scales      NIHSS  Interval and Comments Interval: baseline (05/30/19 1150)   1a. Level of Consciousness 0-->Alert, keenly responsive   1b. LOC Questions 0-->Answers both questions correctly   1c. LOC Commands 0-->Performs both tasks correctly   2.   Best Gaze 0-->Normal   3.   Visual 0-->No visual loss   4.   Facial Palsy 0-->Normal symmetrical movements   5a. Motor Arm, Left 0-->No drift, limb holds 90 (or 45) degrees for full 10 secs   5b. Motor Arm, Right 0-->No drift, limb holds 90 (or 45) degrees for full 10 secs   6a. Motor Leg, Left 0-->No drift, leg holds 30 degree position for full 5 secs   6b. Motor Leg, right 0-->No drift, leg holds 30 degree position for full 5 secs   7.   Limb Ataxia 0-->Absent   8.   Sensory 0-->Normal, no sensory loss   9.   Best Language 0-->No aphasia, normal   10. Dysarthria 0-->Normal   11.  Extinction and Inattention  0-->No abnormality   Total 0 (05/30/19 1150)       Labs/Imaging  Labs and imaging were reviewed and used in developing plan; pertinent results included.  Data   CBC  WBC (10e9/L)   Date Value   05/30/2019 7.2   03/12/2019 6.7   05/22/2018 6.6    RBC Count (10e12/L)   Date Value   05/30/2019 4.35 (L)   03/12/2019 4.36 (L)   05/22/2018 4.30 (L)    Hemoglobin (g/dL)   Date Value   05/30/2019 14.2   03/12/2019 14.0   05/22/2018 14.0      Hematocrit (%)   Date Value   05/30/2019 41.4   03/12/2019 42.5   05/22/2018 42.6    Platelet Count (10e9/L)   Date Value   05/30/2019 187   03/12/2019 194   05/22/2018 189         BMP  Sodium (mmol/L)   Date Value   05/30/2019 Canceled, Test credited, specimen discarded   03/12/2019 140   09/07/2018 143    Potassium (mmol/L)   Date Value   05/30/2019 Canceled, Test credited, specimen discarded   03/12/2019 4.6   09/07/2018 4.4    Chloride (mmol/L)   Date Value   05/30/2019 Canceled, Test credited, specimen discarded   03/12/2019 110 (H)   09/07/2018 111 (H)      Carbon Dioxide (mmol/L)   Date Value   05/30/2019 Canceled, Test credited, specimen discarded   03/12/2019 24   09/07/2018 25    Glucose (mg/dL)   Date Value   05/30/2019 Canceled, Test credited, specimen discarded   03/12/2019 87   09/07/2018 115 (H)    Urea Nitrogen (mg/dL)   Date Value   05/30/2019 Canceled, Test credited, specimen discarded   03/12/2019 26   09/07/2018 24      Creatinine (mg/dL)   Date Value   05/30/2019 Canceled, Test credited, specimen discarded   03/12/2019 1.62 (H)   09/07/2018 1.62 (H)    Calcium (mg/dL)   Date Value   05/30/2019 Canceled, Test credited, specimen discarded   03/12/2019 9.2   09/07/2018 8.8         INR Troponin I A1C   INR (no units)   Date Value   05/30/2019 Canceled, Test credited, specimen discarded   04/28/2011 1.03    No results found for: TROPI Hemoglobin A1C (%)   Date Value   05/10/2013 5.5        Liver Panel  Protein Total (g/dL)   Date Value    03/12/2019 7.1   05/22/2018 7.2   08/22/2017 7.0    Albumin (g/dL)   Date Value   03/12/2019 3.7   05/22/2018 3.8   08/22/2017 3.6    Bilirubin Total (mg/dL)   Date Value   03/12/2019 0.6   05/22/2018 0.6   08/22/2017 0.3      Alkaline Phosphatase (U/L)   Date Value   03/12/2019 61   05/22/2018 55   08/22/2017 52    AST (U/L)   Date Value   03/12/2019 23   05/22/2018 23   08/22/2017 22    ALT (U/L)   Date Value   03/12/2019 23   05/22/2018 28   08/22/2017 26      No results found for: BILIDIRECT       Lipid Profile  Cholesterol (mg/dL)   Date Value   03/12/2019 110   05/22/2018 111   03/22/2017 136    HDL Cholesterol (mg/dL)   Date Value   03/12/2019 33 (L)   05/22/2018 45   03/22/2017 38 (L)    LDL Cholesterol Calculated (mg/dL)   Date Value   03/12/2019 61   05/22/2018 53   03/22/2017 82      Triglycerides (mg/dL)   Date Value   03/12/2019 81   05/22/2018 63   03/22/2017 82    Cholesterol/HDL Ratio (no units)   Date Value   09/11/2015 3.5   02/11/2014 4.4   05/10/2013 4.5              I have personally spent a total of 60 minutes providing critical care services supervising this patient's stroke code activation.  I personally reviewed all lab values and radiology images.  I evaluated and directed care for the patient's critical condition.     Stroke Code Data  (for stroke code without tele)  Stroke code activated 05/30/19   1134   First stroke provider response 05/30/19   1137   Last known normal 05/29/19   2230   Time of discovery   (or onset of symptoms) 05/30/19   0830   Head CT read by me 05/30/19   1155   Was stroke code de-escalated? Yes 05/30/19 1206  other (see comments) rapidly improving symptoms, outside treatment window

## 2019-05-30 NOTE — PLAN OF CARE
Discharge Planner SLP   Patient plan for discharge: Did not meet with pt  Current status: SLP: ST orders received. Chart reviewed and consulted RN. Pt passed nursing screen. No SLP services indicated at this time.  Barriers to return to prior living situation: None  Recommendations for discharge: Per PT/OT  Rationale for recommendations: No SLP needs identified at this time       Entered by: Ciara Fortune 05/30/2019 2:36 PM

## 2019-05-30 NOTE — H&P
Admitted:     05/30/2019      HISTORY OF PRESENT ILLNESS:  This is an 85-year-old male with history of atrial fibrillation on chronic anticoagulation with Eliquis, history of hypertension, peripheral vascular disease, chronic kidney disease stage III, osteoarthritis, vitamin D deficiency, comes to the ER with complaint of right-sided weakness.      According to the patient, he was feeling well last night when he went to bed around 10-10:30.  He woke up in the middle of the night around 3 a.m. and was fine at that time as well.  This morning around 8:30 when he woke up, he had a strange feeling in his right leg and right arm, unable to describe fully how it felt like.  When he tried to get up, he had some weakness in his right leg and his right leg was not moving as it is supposed to move normally.  He denies having any headache, double vision, slurred speech or dizziness or lightheadedness at that time.  Denies any chest pain, back pain, dysuria, hematuria, constipation, diarrhea.  Then he tried to come around 10-10:30 to the ER and then his symptoms started to get better by then.      The patient told me that he was running out of Eliquis, so he is taking once a day and taking aspirin twice a day to compensate for it.  So he did not take the dose of Eliquis last evening but took the additional dose of his aspirin 325 mg.  He denies any fever, chills, chest pain or palpitation at that time.      Now at the time of my visit, the patient is feeling much better.  I think his weakness is resolved and he is back to his baseline.  Denies any fever, chills, chest pain, dizziness, lightheadedness.  No dysuria, hematuria, constipation, diarrhea.  The patient has no history of any hemoptysis, hematemesis, melena or hematochezia.  In the ER, the patient had a code stroke, had a CT of the head done and will reportedly be seen by the neurologist.      The rest of the review of system is negative.      ASSESSMENT AND PLAN:   1.   Transient ischemic attack:  Need to rule out acute ischemic stroke.  This is an 85-year-old male with history of atrial fibrillation on chronic anticoagulation with Eliquis.  He did skip a dose of Eliquis last night and took additional dose of aspirin instead.  He presented with right-sided weakness which is suggestive of possible transient ischemic attack  versus acute ischemic stroke.  CT scan of the head is negative for any acute stroke, hemorrhage or mass lesion.  CT of the head and neck:  No large vessel occlusion but does show ulcerative plaque in the proximal left internal carotid artery with 90% stenosis and moderate atherosclerotic disease in the right carotid bifurcation with 70% stenosis of the proximal internal carotid artery.  No evidence of any intracranial thromboembolism.  At this time, we will refer him to observation, keep him on telemetry.  PT and OT needs to evaluate him.  We will continue with Eliquis 2.5 mg b.i.d. and aspirin 325 mg daily. MRI Brain with and without Contrast, Echocardiogram. Neurology consult, and we will consult Vascular Surgery as well to evaluate for significant carotid artery stenosis.   2.  Bilateral carotid artery stenosis, left more than right:  We will consult Vascular Surgery to evaluate the patient.   3.  Hypertension:  We will give him permissive hypertension.  We will start his medication, amlodipine and metoprolol from tomorrow.   4.  Chronic kidney disease, stage III:  I do not have his recent labs here yet.  We will follow that.   5.  History of abdominal aortic aneurysm, status post as per patient endovascular repair.  He is on aspirin and metoprolol for that.  We will continue with that.   6.  Chronic back pain:  Stable on Tylenol.  We will keep him on that.   7.  Atrial fibrillation:  Rate control with metoprolol.  We will do the EKG today.   8.  Chronic anticoagulation with Eliquis 2.5 mg b.i.d.  Agree with that.  His previous creatinine was 1.62 on 03/2019  and being on more than 8 years, appropriate dose for atrial fibrillation.   9.  Deep venous thrombosis prophylaxis:  With Eliquis.  Continue with that.      CODE STATUS:  I had a detailed discussion with the patient regarding his code status and he wants to be FULL CODE at this time.      The case was discussed with the ER physician and the nursing staff taking care of the patient.         TALIA BLACKBURN MD             D: 2019   T: 2019   MT: LACY      Name:     JOHN MATA   MRN:      [id number]        Account:      [id number]   :      1934        Admitted:     2019                   Document: [id number]       cc: Madhu Velasquez MD

## 2019-05-30 NOTE — PLAN OF CARE
PRIMARY DIAGNOSIS: SYNCOPE/TIA  OUTPATIENT/OBSERVATION GOALS TO BE MET BEFORE DISCHARGE:  1. Orthostatic performed: N/A    2. Diagnostic testing complete & at baseline neurologic testing: No    3. Cleared by consultants (if involved): No    4. Interpretation of cardiac rhythm per telemetry tech: SB w/1st degree AV block    5. Tolerating adequate PO diet and medications: Yes    6. Return to near baseline physical activity or neurologic status: Yes    Discharge Planner Nurse   Safe discharge environment identified: Yes  Barriers to discharge: Yes       Entered by: Kyra Friend 05/30/2019 3:02 PM     Please review provider order for any additional goals.   Nurse to notify provider when observation goals have been met and patient is ready for discharge.    A&Ox4. Bradycardic, other VSS. Neuros intact. Denies numbness/tingling. Tele- SB w/1st degree AV block. Ambulating SBA. Passed swallow test, advanced to regular diet. IV infusing. Denies pain. MRI checklist faxed.

## 2019-05-30 NOTE — TELEPHONE ENCOUNTER
"    Reason for Disposition    New neurologic deficit that is present NOW, sudden onset of ANY of the following: * Weakness of the face, arm, or leg on one side of the body * Numbness of the face, arm, or leg on one side of the body * Loss of speech or garbled speech    Additional Information    Negative: Difficult to awaken or acting confused (e.g., disoriented, slurred speech)    Answer Assessment - Initial Assessment Questions  1. SYMPTOM: \"What is the main symptom you are concerned about?\" (e.g., weakness, numbness)      Numbness and weakness of both right arm and right leg.  2. ONSET: \"When did this start?\" (minutes, hours, days; while sleeping)      Started this morning when he woke up.   3. LAST NORMAL: \"When was the last time you were normal (no symptoms)?\"      Everything was normal when he went to bed.   4. PATTERN \"Does this come and go, or has it been constant since it started?\"  \"Is it present now?\"      This is the first time he has experienced this.   5. CARDIAC SYMPTOMS: \"Have you had any of the following symptoms: chest pain, difficulty breathing, palpitations?\"      No chest pain or SOB.  6. NEUROLOGIC SYMPTOMS: \"Have you had any of the following symptoms: headache--slight dull pain in head, dizziness--balance & coordination are off, vision loss--NO, double vision--NO, changes in speech--NO, unsteady on your feet--YES?\"    Protocols used: NEUROLOGIC DEFICIT-A-OH      "

## 2019-05-30 NOTE — PLAN OF CARE
RECEIVING UNIT ED HANDOFF REVIEW    ED Nurse Handoff Report was reviewed by: Kyra Friend on May 30, 2019 at 1:15 PM

## 2019-05-30 NOTE — PHARMACY-ADMISSION MEDICATION HISTORY
Admission medication history interview status for the 5/30/2019  admission is complete. See EPIC admission navigator for prior to admission medications     Medication history source reliability:Good    Actions taken by pharmacist (provider contacted, etc):None     Additional medication history information not noted on PTA med list : patient interview and Sure[employer]s. Patient reports he took his medications this morning.  He takes Eliquis 5 mg tablets -  One-half tablet (2.5 mg) PO twice daily. He tries to take his multivitamin daily, though he sometimes forgets. He takes Lipitor in the mornings.     Medication reconciliation/reorder completed by provider prior to medication history? Yes    Time spent in this activity: 5    Prior to Admission medications    Medication Sig Last Dose Taking? Auth Provider   acetaminophen (TYLENOL) 500 MG tablet Take 1,000 mg by mouth daily  5/30/2019 at Unknown time Yes Madhu Velasquez MD   amLODIPine (NORVASC) 5 MG tablet Take 1 tablet (5 mg) by mouth daily 5/30/2019 at Unknown time Yes Madhu Velasquez MD   amoxicillin (AMOXIL) 500 MG capsule 4 capsules (total 2 grams) orally  1 hour prior to dental procedures just prior to dental procedures Yes Madhu Velasquez MD   apixaban ANTICOAGULANT (ELIQUIS) 5 MG tablet TAKE 1/2 TABLET BY MOUTH TWICE A DAY 5/30/2019 at AM Yes Madhu Velasquez MD   aspirin  MG tablet Take 325 mg by mouth daily  5/30/2019 at Unknown time Yes Madhu Velasquez MD   atorvastatin (LIPITOR) 10 MG tablet Take 1 tablet (10 mg) by mouth daily 5/30/2019 at Unknown time Yes Madhu Velasquez MD   Cholecalciferol (VITAMIN D) 2000 UNITS tablet Take 2,000 Units by mouth daily 5/30/2019 at Unknown time Yes Madhu Velasquez MD   metoprolol tartrate (LOPRESSOR) 25 MG tablet Take 0.5 tablets (12.5 mg) by mouth 2 times daily 5/30/2019 at Unknown time Yes Madhu Velasquez MD   Misc Natural Products (OSTEO BI-FLEX ADV TRIPLE ST) TABS Take 1 tablet by mouth daily 5/30/2019 at Unknown time Yes Eva  Madhu CLARK MD   multivitamin, therapeutic with minerals (MULTI-VITAMIN) TABS Take 1 tablet by mouth daily Past Week at Unknown time Yes Veum, Madhu CLARK MD

## 2019-05-30 NOTE — ED PROVIDER NOTES
"  History     Chief Complaint:  Numbness    HPI   Isael Garcia is an 85 year old male with a history of atrial fibrillation on Eliquis who presents with numbness. The patient states he was feeling fine last night when he went to bed around 1030 and woke up sometime around 0300 to use the bathroom, still asymptomatic at that time. Around 0845 this morning, the patient states he woke up and noticed some numbness and weakness in his right leg and arm. He notes he was able to get dressed, but almost felt what he describes as \"tipsy\" and off-balanced throughout the rest of the morning, so he called his primary care clinic who recommended he come to the ED for further evaluation. Here, he notes his symptoms are almost totally resolved upon arrival but still does not feel quite back to normal. He denies any neck pain, headache, chest pain, speech changes, or other acute symptoms.    Allergies:  Lisinopril    Medications:    Amlodipine  Eliquis  Aspirin 325 mg  Atorvastatin  Metoprolol    Past Medical History:    Abdominal aortic aneurysm  Allergic rhinitis  Atrial fibrillation  Chronic kidney disease  Osteoarthritis  Peripheral vascular disease  Hypertension  Cataracts    Past Surgical History:    Right inguinal hernia repair  Transurethral resection of prostate  Tonsillectomy  Right total knee arthroplasty  Left cataract extraction with intraocular lens placement  Abdominal aortic aneurysm graft placement  Excise mass trunk  Excise mass, left upper extremity  Inguinal hernia repair    Family History:    Prostate cancer    Social History:  Smoking status: No  Alcohol use: No  Drug use: No  PCP: Madhu Velasquez  Marital Status:  [2]     Review of Systems   Constitutional: Negative for chills and fever.   Cardiovascular: Negative for chest pain.   Musculoskeletal: Negative for neck pain.   Neurological: Positive for weakness and numbness. Negative for speech difficulty and headaches.   All other systems " "reviewed and are negative.    Physical Exam     Patient Vitals for the past 24 hrs:   BP Temp Temp src Pulse Heart Rate Resp SpO2 Height Weight   05/30/19 1348 143/66 95.9  F (35.5  C) Oral (!) 46 123 16 96 % -- --   05/30/19 1336 144/70 -- -- (!) 47 -- 16 100 % -- --   05/30/19 1315 130/68 -- -- 51 -- -- 97 % -- --   05/30/19 1300 133/70 -- -- (!) 46 -- -- 98 % -- --   05/30/19 1245 124/70 -- -- (!) 46 -- -- 99 % -- --   05/30/19 1230 122/59 -- -- 53 -- -- 96 % -- --   05/30/19 1221 136/65 -- -- 50 -- -- -- -- --   05/30/19 1202 131/57 -- -- -- 52 13 -- -- --   05/30/19 1140 145/78 -- -- -- -- -- -- -- --   05/30/19 1118 146/73 97.6  F (36.4  C) Oral 58 -- 14 97 % 1.702 m (5' 7\") 74.8 kg (165 lb)     Physical Exam  General:          Cooperative  Head:               The scalp, face, and head appear normal and symmetric  Eyes:               Sclera white; PERRL; EOMI  ENT:                External ears and nares normal  Neck:               No meningismus or bruit  CV:                  Rate as above with regular rhythm   Resp:               Breath sounds clear and equal bilaterally  GI:                   Abdomen is soft, non-tender, non-distended  MS:                  Moves all extremities  Neuro:             Speech is normal and fluent. No apparent deficit                          Undressed self, hung clothes in closet in room    Emergency Department Course   ECG (12:56:53):  Rate 46 bpm. MD interval 278. QRS duration 78. QT/QTc 454/397. P-R-T axes 35 -16 2. Sinus bradycardia with 1st degree AV block. Inferior infarct, age undetermined. Abnormal ECG. No significant change compared to ECG dated 2/16/17. Interpreted at 1258 by Danitza Connelly MD.    Imaging:  Radiographic findings were communicated with the patient who voiced understanding of the findings.    CT Head w/o Contrast:  Mild cerebral atrophy. No evidence for intracranial  hemorrhage or any acute process.  As read by Radiology.    CTA Head Neck with " Contrast:  1. Ulcerative plaque in the proximal left internal carotid artery with  90% stenosis.  2. Moderate atherosclerotic disease of the right carotid bifurcation  with 70% stenosis of the proximal internal carotid artery.  3. No evidence for any intracranial thromboembolism.  As read by Radiology.    Laboratory:  CBC: WNL (WBC 7.2, HGB 14.2, )  BMP: Chloride 111 (H), Creatinine 1.65 (H), GFR 37 (L), Calcium 8.4 (L), o/w WNL  INR: 1.22 (H)  PTT: 33    Emergency Department Course:  Past medical records, nursing notes, and vitals reviewed.   1130: I performed an exam of the patient, as documented above.   ECG performed, results above.  IV inserted and blood drawn. The patient was placed on continuous cardiac monitoring and pulse oximetry.    1133: Code stroke called.    1142: I spoke to Lo Norris CNP on call for neurology after she evaluated the patient.    1145: The patient was sent for CT/CTA head neck, results above.     1216: I spoke to Dr. Hoffmann of radiology and discussed results of the imaging. No masses or hemorrhage on CT.     1219: I spoke with Lo Hernandez CNP of neurology again. We will de-escalate at this time.    1232: I spoke to Dr. Robins of the hospitalist service who accepts the patient for admission. Plan discussed with the patient and family who agree with admission. The patient will be admitted to an observation bed for further management and treatment.    1338: Patient left the ED.     Impression & Plan    CMS Diagnoses: The patient has stroke symptoms:           ED Stroke specific documentation           NIHSS PDF          Protocol PDF     Patient last known well time: 0300  ED Provider first to bedside at: 1130  CT Results received at: 1216    tPA:   Not given due to outside the time window, minor / isolated / quickly resolving stroke symptoms, current / recent anticoagulant use with INR > 1.7.    If treating with tPA: Ensure SBP<185 and DBP<105 prior to treatment with IV tPA.   Administering IV tPA after treatment with IV labetalol, hydralazine, or nicardipine is reasonable once BP control is established.    Endovascular Retrieval:  Not initiated due to absence of proximal vessel occlusion    [employer] Stroke Scale (Baseline)  Time Performed: 1130      Score    Level of consciousness: (0)   Alert, keenly responsive    LOC questions: (0)   Answers both questions correctly    LOC commands: (0)   Performs both tasks correctly    Best gaze: (0)   Normal    Visual: (0)   No visual loss    Facial palsy: (0)   Normal symmetrical movements    Motor arm (left): (0)   No drift    Motor arm (right): (0)   No drift    Motor leg (left): (0)   No drift    Motor leg (right): (0)   No drift    Limb ataxia: (0)   Absent    Sensory: (0)   Normal- no sensory loss    Best language: (0)   Normal- no aphasia    Dysarthria: (0)   Normal    Extinction and inattention: (0)   No abnormality        Total Score:  0      Stroke Mimics were considered (including migraine headache, seizure disorder, hypoglycemia (or hyperglycemia), head or spinal trauma, CNS infection, Toxin ingestion and shock state (e.g. sepsis) .    Medical Decision Making:  Isael Garcia is an 85 year old male who presents to the ED for evaluation of right-sided weakness when he woke up this morning. Fortunately, he is nearly back to baseline right now. At this point, he has no deficits that I can appreciate. However, given the fact that he states he is not quite back to normal, I think a code stroke is appropriate. Certainly, if his symptoms are stuttering, he could be an interventional candidate for IR. The case was discussed with neurology who evaluated him at bedside. Ultimately, his work-up demonstrated no large vessel occlusion. He is not a TPA candidate due to improving symptoms and being on anticoagulants. He has an ABCD2 score of 6 and will be brought into the hospital for further management. The case was  discussed with radiology showing high-grade stenosis in the left carotid, which may be contributing to his symptoms and will need to have a plan made during his hospitalization.    Diagnosis:    ICD-10-CM   1. TIA (transient ischemic attack) G45.9   2. Transient right arm & leg weakness R29.898   3. Carotid stenosis, left I65.22     Disposition: Admitted to observation    Geneva Espinosa  5/30/2019    EMERGENCY DEPARTMENT    I, Geneva Espinosa, am serving as a scribe at 11:30 AM on 5/30/2019 to document services personally performed by Danitza Connelly MD based on my observations and the provider's statements to me.     Danitza Connelly MD  05/31/19 2101

## 2019-05-30 NOTE — PROVIDER NOTIFICATION
Lazaro ORDONEZ about Echo result complete and MRI results pending.    Dr. Gonsalves talked to the pt. Will update Dr. Ahn.   Recommending to hold the Eliquis and start heparin drip. Waiting for orders.

## 2019-05-30 NOTE — H&P
[facility]    History and Physical  Hospitalist       Date of Admission:  5/30/2019    Assessment & Plan      This is an 85-year-old male with history of atrial fibrillation on chronic anticoagulation with Eliquis, history of hypertension, peripheral vascular disease, chronic kidney disease stage III, osteoarthritis, vitamin D deficiency, comes to the ER with complaint of right-sided weakness.     ASSESSMENT AND PLAN:   1.  Transient ischemic attack:  Need to rule out acute ischemic stroke.  This is an 85-year-old male with history of atrial fibrillation on chronic anticoagulation with Eliquis.  He did skip a dose of Eliquis last night and took additional dose of aspirin instead.  He presented with right-sided weakness which is suggestive of possible transient ischemic attack  versus acute ischemic stroke.  CT scan of the head is negative for any acute stroke, hemorrhage or mass lesion.  CT of the head and neck:  No large vessel occlusion but does show ulcerative plaque in the proximal left internal carotid artery with 90% stenosis and moderate atherosclerotic disease in the right carotid bifurcation with 70% stenosis of the proximal internal carotid artery.  No evidence of any intracranial thromboembolism.  At this time, we will refer him to observation, keep him on telemetry.  PT and OT needs to evaluate him.  We will continue with Eliquis 2.5 mg b.i.d. and aspirin 325 mg daily. MRI Brain with and without Contrast, Echocardiogram. Neurology consult, and we will consult Vascular Surgery as well to evaluate for significant carotid artery stenosis.   2.  Bilateral carotid artery stenosis, left more than right:  We will consult Vascular Surgery to evaluate the patient.   3.  Hypertension:  We will give him permissive hypertension.  We will start his medication, amlodipine and metoprolol from tomorrow.   4.  Chronic kidney disease, stage III:  I do not have his recent labs here yet.  We will follow  that.   5.  History of abdominal aortic aneurysm, status post as per patient endovascular repair.  He is on aspirin and metoprolol for that.  We will continue with that.   6.  Chronic back pain:  Stable on Tylenol.  We will keep him on that.   7.  Atrial fibrillation:  Rate control with metoprolol.  We will do the EKG today.   8.  Chronic anticoagulation with Eliquis 2.5 mg b.i.d.  Agree with that.  His previous creatinine was 1.62 on 03/2019 and being on more than 8 years, appropriate dose for atrial fibrillation.   9.  Deep venous thrombosis prophylaxis:  With Eliquis.  Continue with that.      CODE STATUS:  I had a detailed discussion with the patient regarding his code status and he wants to be FULL CODE at this time.      The case was discussed with the ER physician and the nursing staff taking care of the patient.         NATAN ROBINS MD         DVT Prophylaxis: Eliquis     Code Status: Full Code    Disposition: Expected discharge in 1 days once stable.    Natan Robins MD    Primary Care Physician   Madhu Velasquez    Chief Complaint   Right sided weakness.     History is obtained from the patient    History of Present Illness   Admitted:     05/30/2019      HISTORY OF PRESENT ILLNESS:  This is an 85-year-old male with history of atrial fibrillation on chronic anticoagulation with Eliquis, history of hypertension, peripheral vascular disease, chronic kidney disease stage III, osteoarthritis, vitamin D deficiency, comes to the ER with complaint of right-sided weakness.      According to the patient, he was feeling well last night when he went to bed around 10-10:30.  He woke up in the middle of the night around 3 a.m. and was fine at that time as well.  This morning around 8:30 when he woke up, he had a strange feeling in his right leg and right arm, unable to describe fully how it felt like.  When he tried to get up, he had some weakness in his right leg and his right leg was not moving as it is supposed to  move normally.  He denies having any headache, double vision, slurred speech or dizziness or lightheadedness at that time.  Denies any chest pain, back pain, dysuria, hematuria, constipation, diarrhea.  Then he tried to come around 10-10:30 to the ER and then his symptoms started to get better by then.      The patient told me that he was running out of Eliquis, so he is taking once a day and taking aspirin twice a day to compensate for it.  So he did not take the dose of Eliquis last evening but took the additional dose of his aspirin 325 mg.  He denies any fever, chills, chest pain or palpitation at that time.      Now at the time of my visit, the patient is feeling much better.  I think his weakness is resolved and he is back to his baseline.  Denies any fever, chills, chest pain, dizziness, lightheadedness.  No dysuria, hematuria, constipation, diarrhea.  The patient has no history of any hemoptysis, hematemesis, melena or hematochezia.  In the ER, the patient had a code stroke, had a CT of the head done and will reportedly be seen by the neurologist.      The rest of the review of system is negative.     Past Medical History    I have reviewed this patient's medical history and updated it with pertinent information if needed.   Past Medical History:   Diagnosis Date     AAA (abdominal aortic aneurysm) (H) May 2016    infrarenal s/p graft placement at [facility]     Allergic rhinitis, cause unspecified      Atrial fibrillation (H)      CKD (chronic kidney disease) stage 3, GFR 30-59 ml/min (H) 2/4/2013     Lumbago 2000    s/p lumbar epidural injection     OA (osteoarthritis) 2/21/2015     PVD (peripheral vascular disease) (H) 3/7/2016     Shingles 7/09     left forehead     Sleep related leg cramps 2/6/2013     Unspecified cataract     bilateral     Unspecified essential hypertension      Vitamin D deficiency disease 2/6/2013       Past Surgical History   I have reviewed this patient's surgical history and  updated it with pertinent information if needed.  Past Surgical History:   Procedure Laterality Date     C NONSPECIFIC PROCEDURE  1960    right inguinal hernia repair     C NONSPECIFIC PROCEDURE      TURP     C NONSPECIFIC PROCEDURE      tonsillectomy     C NONSPECIFIC PROCEDURE  2011    Right total knee arthroplasty     C NONSPECIFIC PROCEDURE   2009     left  cataract extraction/IOL placement     C NONSPECIFIC PROCEDURE  May 2016    Endovascular AAA stent graft placed at [facility]. [place] [facility] suggests abx prophylaxis for life for procedures (dental/etc)     EXCISE MASS TRUNK N/A 2017    Procedure: EXCISE MASS TRUNK;  Surgeon: Ranjith Lozoya MD;  [facility]     EXCISE MASS UPPER EXTREMITY Left 2017    Procedure: EXCISE MASS UPPER EXTREMITY;  Surgeon: Ranjith Lozoya MD;  [facility]     HERNIORRHAPHY INGUINAL Left 2017    Procedure: HERNIORRHAPHY INGUINAL;  Surgeon: Ranjith Lozoya MD;  [facility]  [facility]       Prior to Admission Medications   Prior to Admission Medications   Prescriptions Last Dose Informant Patient Reported? Taking?   Cholecalciferol (VITAMIN D) 2000 UNITS tablet   No No   Sig: Take 2,000 Units by mouth daily   Misc Natural Products (OSTEO BI-FLEX ADV TRIPLE ST) TABS   Yes No   Sig: Take 1 tablet by mouth daily   acetaminophen (TYLENOL) 500 MG tablet   Yes No   Si,000 mg daily    amLODIPine (NORVASC) 5 MG tablet   No No   Sig: Take 1 tablet (5 mg) by mouth daily   amoxicillin (AMOXIL) 500 MG capsule   No No   Si capsules (total 2 grams) orally  1 hour prior to dental procedures   apixaban ANTICOAGULANT (ELIQUIS) 5 MG tablet   No No   Sig: TAKE 1/2 TABLET BY MOUTH TWICE A DAY   aspirin  MG tablet   Yes No   Sig: Take 325 mg by mouth daily    atorvastatin (LIPITOR) 10 MG tablet   No No   Sig: Take 1 tablet (10 mg) by mouth daily   metoprolol tartrate (LOPRESSOR) 25 MG tablet   No No   Sig: Take 0.5 tablets (12.5 mg) by mouth 2 times  daily   multivitamin, therapeutic with minerals (MULTI-VITAMIN) TABS   Yes No   Sig: Take 1 tablet by mouth daily      Facility-Administered Medications: None     Allergies   Allergies   Allergen Reactions     Lisinopril Cramps     Leg cramping       Social History   I have reviewed this patient's social history and updated it with pertinent information if needed. Isael Garcia  reports that he has never smoked. He has never used smokeless tobacco. He reports that he does not drink alcohol or use drugs.    Family History   I have reviewed this patient's family history and updated it with pertinent information if needed.   Family History   Problem Relation Age of Onset     Prostate Cancer Brother        Review of Systems   CONSTITUTIONAL:  negative  EYES:  negative  HEENT:  negative  RESPIRATORY:  negative  CARDIOVASCULAR:  negative  GASTROINTESTINAL:  negative  GENITOURINARY:  negative  INTEGUMENT/BREAST:  negative  HEMATOLOGIC/LYMPHATIC:  negative  ALLERGIC/IMMUNOLOGIC:  negative  ENDOCRINE:  negative  MUSCULOSKELETAL:  positive for  Back pain  NEUROLOGICAL:  positive for weakness  BEHAVIOR/PSYCH:  negative    Physical Exam   Temp: 97.6  F (36.4  C) Temp src: Oral BP: 136/65 Pulse: 50 Heart Rate: 52 Resp: 13 SpO2: 97 % O2 Device: None (Room air)    Vital Signs with Ranges  Temp:  [97.6  F (36.4  C)] 97.6  F (36.4  C)  Pulse:  [50-58] 50  Heart Rate:  [52] 52  Resp:  [13-14] 13  BP: (131-146)/(57-78) 136/65  SpO2:  [97 %] 97 %  165 lbs 0 oz    Constitutional: Awake, alert, cooperative, no apparent distress.  Eyes: Conjunctiva and pupils examined and normal.  HEENT: Moist mucous membranes, normal dentition.  Respiratory: Clear to auscultation bilaterally, no crackles or wheezing.  Cardiovascular: Regular rate and rhythm, normal S1 and S2, and no murmur noted.  GI: Soft, non-distended, non-tender, normal bowel sounds.  Lymph/Hematologic: No anterior cervical or supraclavicular adenopathy.  Skin: No rashes,  no cyanosis, no edema.  Musculoskeletal: No joint swelling, erythema or tenderness.  Neurologic: Cranial nerves 2-12 intact, normal strength and sensation.  Psychiatric: Alert, oriented to person, place and time, no obvious anxiety or depression.    Data   Data reviewed today:  I personally reviewed CT head and CTA head as reported below, no EKG or labs available at this time. Ordered.    Recent Labs   Lab 05/30/19  1143   WBC 7.2   HGB 14.2   MCV 95      INR Canceled, Test credited, specimen discarded   NA Canceled, Test credited, specimen discarded   POTASSIUM Canceled, Test credited, specimen discarded   CHLORIDE Canceled, Test credited, specimen discarded   CO2 Canceled, Test credited, specimen discarded   BUN Canceled, Test credited, specimen discarded   CR Canceled, Test credited, specimen discarded   ANIONGAP Canceled, Test credited, specimen discarded   KULDIP Canceled, Test credited, specimen discarded   GLC Canceled, Test credited, specimen discarded       Recent Results (from the past 24 hour(s))   CT Head w/o Contrast    Narrative    CT SCAN OF THE HEAD WITHOUT CONTRAST   5/30/2019 12:02 PM     HISTORY: Transient ischemic attack, right-sided weakness. Initial  exam. Code Stroke.    TECHNIQUE: Axial images of the head and coronal reformations without  IV contrast material. Radiation dose for this scan was reduced using  automated exposure control, adjustment of the mA and/or kV according  to patient size, or iterative reconstruction technique.    COMPARISON: None.    FINDINGS: Mild cerebral atrophy is present. The brain parenchyma,  brainstem, ventricular system, subarachnoid spaces, and vascular  structures are normal in appearance. Vascular calcifications are noted  at the skull base.      Impression    IMPRESSION: Mild cerebral atrophy. No evidence for intracranial  hemorrhage or any acute process.    SADE JAIMES MD   CTA Head Neck with Contrast    Narrative    CTA  HEAD AND NECK WITH CONTRAST  5/30/2019 12:12 PM     HISTORY: Transient ischemic attack. Right- sided weakness.    TECHNIQUE: Axial images were obtained through the head and neck with  intravenous contrast. 70 mL of Isovue-370 was given. Multiplanar  reconstructions were performed. 3-D reconstructions off a remote  workstation for CT angiography were also acquired. Carotid stenoses  were evaluated by comparing the caliber of the proximal internal  carotid artery to the caliber of the distal internal carotid artery.  Radiation dose for this scan was reduced using automated exposure  control, adjustment of the mA and/or kV according to patient size, or  iterative reconstruction technique.    FINDINGS:    Brachiocephalic vessels: There is a ductus bump or shallow ulcer  crater off the thoracic arch along with some calcified plaque. The  proximal brachiocephalic vessels show some minimal calcified plaque  but no stenosis.    Right carotid system: There is moderate calcified and noncalcified  plaque at the carotid bifurcation resulting in approximately 70%  stenosis.    Left carotid system: There is extensive noncalcified and calcified  plaque in the proximal left internal carotid artery with an associated  ulcer crater. This is causing 90% stenosis.    Right vertebral artery: Normal.    Left vertebral artery: Normal.    [place]: Normal. Incidental note is made of direct origin of  the left posterior cerebral artery off the left internal carotid  artery.      Impression    IMPRESSION:  1. Ulcerative plaque in the proximal left internal carotid artery with  90% stenosis.  2. Moderate atherosclerotic disease of the right carotid bifurcation  with 70% stenosis of the proximal internal carotid artery.  3. No evidence for any intracranial thromboembolism.

## 2019-05-30 NOTE — CONSULTS
[facility] PATIENT CONSULTATION NOTE  Consulted by:hospitalist  Reason for consultation: L ICA stenosis    HPI:  Isael Garcia is a 85 year old male admitted with transient R sided weakness for approximately 3 hours prior to resolution. He denies visual changes or trouble speaking. He was admitted for CVA workup and was found to have a 80% L ICA stenosis consistent with symptomatic carotid stenosis. He denies a history of stroke or CAD. He has never been a smoker.    Review Of Systems: see HPI    PAST MEDICAL HISTORY:  Past Medical History:   Diagnosis Date     AAA (abdominal aortic aneurysm) (H) May 2016    infrarenal s/p graft placement at [facility]     Allergic rhinitis, cause unspecified      Atrial fibrillation (H)      CKD (chronic kidney disease) stage 3, GFR 30-59 ml/min (H) 2/4/2013     Lumbago 2000    s/p lumbar epidural injection     OA (osteoarthritis) 2/21/2015     PVD (peripheral vascular disease) (H) 3/7/2016     Shingles 7/09     left forehead     Sleep related leg cramps 2/6/2013     Unspecified cataract     bilateral     Unspecified essential hypertension      Vitamin D deficiency disease 2/6/2013       PAST SURGICAL HISTORY:  Past Surgical History:   Procedure Laterality Date     C NONSPECIFIC PROCEDURE  1960    right inguinal hernia repair     C NONSPECIFIC PROCEDURE  1990s    TURP     C NONSPECIFIC PROCEDURE      tonsillectomy     C NONSPECIFIC PROCEDURE  April 2011    Right total knee arthroplasty     C NONSPECIFIC PROCEDURE   approx 2009     left  cataract extraction/IOL placement     C NONSPECIFIC PROCEDURE  May 2016    Endovascular AAA stent graft placed at [facility]. [facility] suggests abx prophylaxis for life for procedures (dental/etc)     EXCISE MASS TRUNK N/A 2/28/2017    Procedure: EXCISE MASS TRUNK;  Surgeon: Ranjith Lozoya MD;  Location: [facility]     EXCISE MASS UPPER EXTREMITY Left 2/28/2017    Procedure: EXCISE MASS UPPER EXTREMITY;  Surgeon: Darell  "Ranjith Doherty MD;  Location: [facility]     HERNIORRHAPHY INGUINAL Left 2/28/2017    Procedure: HERNIORRHAPHY INGUINAL;  Surgeon: Ranjith Lozoya MD;  Location: [facility]       FAMILY HISTORY:  Family History   Problem Relation Age of Onset     Prostate Cancer Brother        SOCIAL HISTORY:   Social History     Tobacco Use     Smoking status: Never Smoker     Smokeless tobacco: Never Used   Substance Use Topics     Alcohol use: No       HOME MEDICATIONS:  Prior to Admission medications    Medication Sig Start Date End Date Taking? Authorizing Provider   acetaminophen (TYLENOL) 500 MG tablet Take 1,000 mg by mouth daily  2/21/15  Yes Madhu Velasquez MD   amLODIPine (NORVASC) 5 MG tablet Take 1 tablet (5 mg) by mouth daily 3/7/19  Yes Madhu Velasquez MD   amoxicillin (AMOXIL) 500 MG capsule 4 capsules (total 2 grams) orally  1 hour prior to dental procedures 1/16/18  Yes Madhu Velasquez MD   apixaban ANTICOAGULANT (ELIQUIS) 5 MG tablet TAKE 1/2 TABLET BY MOUTH TWICE A DAY 3/7/19  Yes Madhu Velasquez MD   aspirin  MG tablet Take 325 mg by mouth daily  9/11/15  Yes Madhu Velasquez MD   atorvastatin (LIPITOR) 10 MG tablet Take 1 tablet (10 mg) by mouth daily 3/7/19  Madhu Lopez MD   Cholecalciferol (VITAMIN D) 2000 UNITS tablet Take 2,000 Units by mouth daily 2/26/14  Yes Madhu Velasquez MD   metoprolol tartrate (LOPRESSOR) 25 MG tablet Take 0.5 tablets (12.5 mg) by mouth 2 times daily 3/7/19  Yes Madhu Velasquez MD   Misc Natural Products (OSTEO BI-FLEX ADV TRIPLE ST) TABS Take 1 tablet by mouth daily 2/20/15  Yes Madhu Velasquez MD   multivitamin, therapeutic with minerals (MULTI-VITAMIN) TABS Take 1 tablet by mouth daily 5/18/16  Yes Madhu Velasquez MD       VITAL SIGNS:  /65 (BP Location: Right arm)   Pulse (!) 47   Temp 95.9  F (35.5  C) (Oral)   Resp 16   Ht 1.702 m (5' 7\")   Wt 74.8 kg (165 lb)   SpO2 96%   BMI 25.84 kg/m    No intake or output data in the 24 hours ending 05/30/19 4652    Labs:  ROUTINE IP LABS " (Last four results)  BMP  Recent Labs   Lab 05/30/19  1438 05/30/19  1243 05/30/19  1143    139 Canceled, Test credited, specimen discarded   POTASSIUM 4.5 4.6 Canceled, Test credited, specimen discarded   CHLORIDE 112* 111* Canceled, Test credited, specimen discarded   KULDIP 8.4* 8.4* Canceled, Test credited, specimen discarded   CO2 24 23 Canceled, Test credited, specimen discarded   BUN 27 29 Canceled, Test credited, specimen discarded   CR 1.57* 1.65* Canceled, Test credited, specimen discarded   GLC 84 94 Canceled, Test credited, specimen discarded     CBC  Recent Labs   Lab 05/30/19  1438 05/30/19  1143   WBC 6.1 7.2   RBC 4.21* 4.35*   HGB 13.6 14.2   HCT 39.7* 41.4   MCV 94 95   MCH 32.3 32.6   MCHC 34.3 34.3   RDW 13.1 13.1    187     INR  Recent Labs   Lab 05/30/19  1243 05/30/19  1143   INR 1.22* Canceled, Test credited, specimen discarded       PHYSICAL EXAM:  Constitutional: healthy, alert, no acute distress and cooperative   Cardiovascular: RRR  Respiratory: CTAB anteriorly, breathing unlabored without secondary muscle use  Psychiatric: mentation appears normal and affect normal/bright  Neck: no asymmetry, no scars, mild limitation to rotation  GI/Abdomen: +BS, abdomen soft, non-tender. No masses, no CVAT  MSK: able to move all extremities without weakness or ataxia  Extremities: no open lesions, extremities warm and well perfused  Hematology: no bruising on visible skin  Neurological: no motor/sensory deficits, B/L upper & lower extremity strength 5/5, CN intact    IMAGING:  CTA  HEAD AND NECK WITH CONTRAST 5/30/2019 12:12 PM      HISTORY: Transient ischemic attack. Right- sided weakness.     TECHNIQUE: Axial images were obtained through the head and neck with  intravenous contrast. 70 mL of Isovue-370 was given. Multiplanar  reconstructions were performed. 3-D reconstructions off a remote  workstation for CT angiography were also acquired. Carotid stenoses  were evaluated by comparing the  caliber of the proximal internal  carotid artery to the caliber of the distal internal carotid artery.  Radiation dose for this scan was reduced using automated exposure  control, adjustment of the mA and/or kV according to patient size, or  iterative reconstruction technique.     FINDINGS:     Brachiocephalic vessels: There is a ductus bump or shallow ulcer  crater off the thoracic arch along with some calcified plaque. The  proximal brachiocephalic vessels show some minimal calcified plaque  but no stenosis.     Right carotid system: There is moderate calcified and noncalcified  plaque at the carotid bifurcation resulting in approximately 70%  stenosis.     Left carotid system: There is extensive noncalcified and calcified  plaque in the proximal left internal carotid artery with an associated  ulcer crater. This is causing 80% stenosis.     Right vertebral artery: Normal.     Left vertebral artery: Normal.     [place]: Normal. Incidental note is made of direct origin of  the left posterior cerebral artery off the left internal carotid  artery.                                                                      IMPRESSION:  1. Ulcerative plaque in the proximal left internal carotid artery with  80% stenosis.  2. Moderate atherosclerotic disease of the right carotid bifurcation  with 70% stenosis of the proximal internal carotid artery.  3. No evidence for any intracranial thromboembolism.    MRI OF THE BRAIN WITHOUT AND WITH CONTRAST 5/30/2019 5:00 PM      COMPARISON: Head CT same day.     HISTORY: Right-sided weakness      TECHNIQUE: Axial diffusion-weighted with ADC map, axial T2-weighted  with fat saturation, axial T1-weighted, axial turboFLAIR and coronal  T1-weighted images of the brain were acquired without intravenous  contrast. Following intravenous administration of gadolinium (6mL  Gadavist), axial T1-weighted images of the brain were acquired.      FINDINGS: There are tiny recent cortical  infarcts at the high lateral  aspect of the left parietal lobe (series 602, image 39) and in the  posterolateral aspect of the left occipital lobe (series 602, image  25). No other recent infarcts noted.     There is moderate diffuse cerebral volume loss. There are a few  scattered focal and minimal patchy periventricular areas of abnormal  T2 signal hyperintensity in the cerebral white matter bilaterally that  are consistent with sequela of chronic small vessel ischemic disease.  The ventricles and basal cisterns are within normal limits in  configuration given the degree of cerebral volume loss. There is no  midline shift. There are no extra-axial fluid collections. There is no  evidence for acute intracranial hemorrhage. There is no abnormal  contrast enhancement in the brain or its coverings.      There is no sinusitis or mastoiditis.                                                                      IMPRESSION:   1. Two tiny recent cortical infarcts in the left parietal and left  occipital lobes. No other recent infarcts.  2. Diffuse cerebral volume loss and cerebral white matter changes  consistent with chronic small vessel ischemic disease.     Patient Active Problem List   Diagnosis     Essential hypertension     Allergic rhinitis     S/P TKR (total knee replacement)     Aftercare following joint replacement     ACP (advance care planning)     CKD (chronic kidney disease) stage 3, GFR 30-59 ml/min (H)     Sleep related leg cramps     Vitamin D deficiency disease     Health Care Home     OA (osteoarthritis)     PVD (peripheral vascular disease) (H)     Atrial fibrillation (H)     TIA (transient ischemic attack)       ASSESSMENT:  85M with symptomatic L ICA stenosis with soft plaque.      PLAN:  Will need CEA which patient & family agrees to  Stop Eliquis and start heparin bridge for potential OR  Defer to neuro on Plavix - already on ASA & high dose statin      Alva Gonsalves MD  Vascular Surgery  Fellow  [facility]   Pager: [phone]      STAFF: Patient admitted with onset this morning of initially right leg tingling and weakness followed by his right arm or is quite unstable.  No aphasia.  No prior symptoms.  Symptoms lasted approximately 3 hours and are almost resolved when he arrived in the hospital.  Patient is undergone evaluation including an MRI which revealed small multiple infarcts in the left hemisphere explaining his TIA symptoms.  Echocardiogram revealed normal cardiac function with no thrombus but a patent foramen ovale.    CTA was performed earlier today and the images are reviewed with the patient along with his daughter and brother were in the room with him now.  Right carotid system has some calcification with a smooth approximately 70% stenosis.  At the left proximal ICA there is a 80% stenosis with a central ulcer which is the most likely etiology of his embolic infarct.  Vertebral arteries along with intracerebral arteries and [place] were normal.    Apparently patient is undergone an endovascular repair of a AAA at the [facility] in the past of these records are not available to us and patient just reports that he had a stent in his aorta.  He has a history of sciatica due to back issues.  He reports that he notices pain in the left hip and leg after walking several minutes that resolved with resting for several minutes.  We know from a FLY in 2016 that he had severe PAD in the right leg and mild to moderate changes in the left leg.  It is unclear whether any of these issues were evaluated at the [facility] and records will need to be reviewed for ongoing care.    While at the [facility] he was found to have intermittent atrial fibrillation and thus started on Eliquis which she was taking at the time of the cerebral event.  This is now on hold.    On my exam patient is alert and appropriate.  Strength and motor and sensory function normal to his right arm  and leg.  +3 femoral pulses bilaterally.  +3 right posterior tibial pulse with no palpable left popliteal or pedal pulses.  No ischemic changes to either leg and no edema.    Impression: #1.  Symptomatic ulcerated left carotid stenosis.  This is the most likely cause of his TIA.  This occurred despite anticoagulation.  I recommend a left carotid enterectomy and vein patch angioplasty with intraoperative shunting and EEG monitoring.  This will be scheduled performed later tomorrow if we are able to get on the OR schedule.  Our procedure with its risks and benefits discussed at length with the patient all questions answered.                        #2.  Suspect peripheral artery disease particularly involving the left leg.  Once he is recovered from his carotid surgery further vascular evaluation and review of the records from the [facility] will be performed.    I spent over 30 minutes of face-to-face time with the patient his family today.      Pedro Ahn MD

## 2019-05-30 NOTE — ED TRIAGE NOTES
Patient comes in with complaints of right sided weakness and numbness when he woke up this morning at 0845. Denies speaking changes. Denies vision changes or confusion. States mild headache this morning, took something for the headache and now has no headache, per patient.

## 2019-05-30 NOTE — PLAN OF CARE
PT: order received; Per chart review, patient noted to be admitted to Observation today and is currently undergoing workup for stroke. Evaluation will be rescheduled for 5/31/19.

## 2019-05-30 NOTE — ED NOTES
"[facility]  ED Nurse Handoff Report    ED Chief complaint: Numbness      ED Diagnosis:   Final diagnoses:   TIA (transient ischemic attack)   Transient right arm & leg weakness   Carotid stenosis, left       Code Status: not addressed in er.    Allergies:   Allergies   Allergen Reactions     Lisinopril Cramps     Leg cramping       Activity level - Baseline/Home:  Independent    Activity Level - Current:   Stand with Assist     Needed?: No    Isolation: No  Infection: Not Applicable  Bariatric?: No    Vital Signs:   Vitals:    05/30/19 1118 05/30/19 1140 05/30/19 1202   BP: 146/73 145/78 131/57   Pulse: 58     Resp: 14  13   Temp: 97.6  F (36.4  C)     TempSrc: Oral     SpO2: 97%     Weight: 74.8 kg (165 lb)     Height: 1.702 m (5' 7\")         Cardiac Rhythm: ,        Pain level:      Is this patient confused?: No   Does this patient have a guardian?  No         If yes, is there guardianship documents in the Epic \"Code/ACP\" activity?  N/A         Guardian Notified?  N/A  [place] - Suicide Severity Rating Scale Completed?  Yes  If yes, what color did the patient score?  White    Patient Report: Initial Complaint: weakness. Pt states he woke with right arm and leg weakness. Unable to  with hand. Pt states he is 95% improved on arrival to room 27.strong equal push/pull with feet. Strong equal hand grasps  Focused Assessment: see above  Tests Performed: blood,head ct  Abnormal Results: labs being redrawn  Treatments provided: 0    Family Comments: friend here    OBS brochure/video discussed/provided to patient/family: Yes              Name of person given brochure if not patient: 0                Relationship to patient:0    ED Medications:   Medications   100mL Saline Flush (100 mLs Intravenous Given 5/30/19 1201)   iopamidol (ISOVUE-370) solution 120 mL (70 mLs Intravenous Given 5/30/19 1201)       Drips infusing?:  No    For the majority of the shift this patient was Green. "   Interventions performed were 0  .    Severe Sepsis OR Septic Shock Diagnosis Present: No    To be done/followed up on inpatient unit:  monitor pt    ED NURSE PHONE NUMBER:[phone]

## 2019-05-31 ENCOUNTER — ANESTHESIA (OUTPATIENT)
Dept: SURGERY | Facility: CLINIC | Age: 84
DRG: 038 | End: 2019-05-31
Payer: COMMERCIAL

## 2019-05-31 ENCOUNTER — ANESTHESIA EVENT (OUTPATIENT)
Dept: SURGERY | Facility: CLINIC | Age: 84
DRG: 038 | End: 2019-05-31
Payer: COMMERCIAL

## 2019-05-31 PROBLEM — I63.9 STROKE (H): Status: ACTIVE | Noted: 2019-05-31

## 2019-05-31 LAB
APTT PPP: 76 SEC (ref 22–37)
GLUCOSE BLDC GLUCOMTR-MCNC: 65 MG/DL (ref 70–99)
GLUCOSE BLDC GLUCOMTR-MCNC: 76 MG/DL (ref 70–99)
LMWH PPP CHRO-ACNC: 1.09 IU/ML

## 2019-05-31 PROCEDURE — 27211022 ZZHC OR IOM SUPPLIES OPNP: Performed by: SURGERY

## 2019-05-31 PROCEDURE — 27210794 ZZH OR GENERAL SUPPLY STERILE: Performed by: SURGERY

## 2019-05-31 PROCEDURE — 25000128 H RX IP 250 OP 636: Performed by: ANESTHESIOLOGY

## 2019-05-31 PROCEDURE — 25000128 H RX IP 250 OP 636

## 2019-05-31 PROCEDURE — 25800030 ZZH RX IP 258 OP 636: Performed by: SURGERY

## 2019-05-31 PROCEDURE — 85730 THROMBOPLASTIN TIME PARTIAL: CPT | Performed by: INTERNAL MEDICINE

## 2019-05-31 PROCEDURE — 25800030 ZZH RX IP 258 OP 636: Performed by: ANESTHESIOLOGY

## 2019-05-31 PROCEDURE — 71000012 ZZH RECOVERY PHASE 1 LEVEL 1 FIRST HR: Performed by: SURGERY

## 2019-05-31 PROCEDURE — 25000125 ZZHC RX 250: Performed by: NURSE ANESTHETIST, CERTIFIED REGISTERED

## 2019-05-31 PROCEDURE — 35301 RECHANNELING OF ARTERY: CPT | Mod: LT | Performed by: SURGERY

## 2019-05-31 PROCEDURE — 37000008 ZZH ANESTHESIA TECHNICAL FEE, 1ST 30 MIN: Performed by: SURGERY

## 2019-05-31 PROCEDURE — 00000146 ZZHCL STATISTIC GLUCOSE BY METER IP

## 2019-05-31 PROCEDURE — 12000000 ZZH R&B MED SURG/OB

## 2019-05-31 PROCEDURE — 25000125 ZZHC RX 250: Performed by: SURGERY

## 2019-05-31 PROCEDURE — 95940 IONM IN OPERATNG ROOM 15 MIN: CPT | Performed by: SURGERY

## 2019-05-31 PROCEDURE — 71000013 ZZH RECOVERY PHASE 1 LEVEL 1 EA ADDTL HR: Performed by: SURGERY

## 2019-05-31 PROCEDURE — 25000566 ZZH SEVOFLURANE, EA 15 MIN: Performed by: SURGERY

## 2019-05-31 PROCEDURE — 25000128 H RX IP 250 OP 636: Performed by: NURSE ANESTHETIST, CERTIFIED REGISTERED

## 2019-05-31 PROCEDURE — 03CN0ZZ EXTIRPATION OF MATTER FROM LEFT EXTERNAL CAROTID ARTERY, OPEN APPROACH: ICD-10-PCS | Performed by: SURGERY

## 2019-05-31 PROCEDURE — 99232 SBSQ HOSP IP/OBS MODERATE 35: CPT | Performed by: PSYCHIATRY & NEUROLOGY

## 2019-05-31 PROCEDURE — 25000128 H RX IP 250 OP 636: Performed by: SURGERY

## 2019-05-31 PROCEDURE — 25000128 H RX IP 250 OP 636: Performed by: INTERNAL MEDICINE

## 2019-05-31 PROCEDURE — 36415 COLL VENOUS BLD VENIPUNCTURE: CPT | Performed by: INTERNAL MEDICINE

## 2019-05-31 PROCEDURE — 36000063 ZZH SURGERY LEVEL 4 EA 15 ADDTL MIN: Performed by: SURGERY

## 2019-05-31 PROCEDURE — C1758 CATHETER, URETERAL: HCPCS | Performed by: SURGERY

## 2019-05-31 PROCEDURE — 03UL07Z SUPPLEMENT LEFT INTERNAL CAROTID ARTERY WITH AUTOLOGOUS TISSUE SUBSTITUTE, OPEN APPROACH: ICD-10-PCS | Performed by: SURGERY

## 2019-05-31 PROCEDURE — 36000093 ZZH SURGERY LEVEL 4 1ST 30 MIN: Performed by: SURGERY

## 2019-05-31 PROCEDURE — 85520 HEPARIN ASSAY: CPT | Performed by: INTERNAL MEDICINE

## 2019-05-31 PROCEDURE — 99232 SBSQ HOSP IP/OBS MODERATE 35: CPT | Performed by: INTERNAL MEDICINE

## 2019-05-31 PROCEDURE — 06BP0ZZ EXCISION OF RIGHT SAPHENOUS VEIN, OPEN APPROACH: ICD-10-PCS | Performed by: SURGERY

## 2019-05-31 PROCEDURE — 25000132 ZZH RX MED GY IP 250 OP 250 PS 637: Performed by: INTERNAL MEDICINE

## 2019-05-31 PROCEDURE — 25800030 ZZH RX IP 258 OP 636: Performed by: NURSE ANESTHETIST, CERTIFIED REGISTERED

## 2019-05-31 PROCEDURE — 03CL0ZZ EXTIRPATION OF MATTER FROM LEFT INTERNAL CAROTID ARTERY, OPEN APPROACH: ICD-10-PCS | Performed by: SURGERY

## 2019-05-31 PROCEDURE — 40000170 ZZH STATISTIC PRE-PROCEDURE ASSESSMENT II: Performed by: SURGERY

## 2019-05-31 PROCEDURE — 21000003 ZZH R&B HEART CARE OVERFLOW

## 2019-05-31 PROCEDURE — 37000009 ZZH ANESTHESIA TECHNICAL FEE, EACH ADDTL 15 MIN: Performed by: SURGERY

## 2019-05-31 RX ORDER — METOCLOPRAMIDE 5 MG/1
5 TABLET ORAL EVERY 6 HOURS PRN
Status: DISCONTINUED | OUTPATIENT
Start: 2019-05-31 | End: 2019-06-01

## 2019-05-31 RX ORDER — ONDANSETRON 4 MG/1
4 TABLET, ORALLY DISINTEGRATING ORAL EVERY 6 HOURS PRN
Status: DISCONTINUED | OUTPATIENT
Start: 2019-05-31 | End: 2019-06-01

## 2019-05-31 RX ORDER — HEPARIN SODIUM 1000 [USP'U]/ML
INJECTION, SOLUTION INTRAVENOUS; SUBCUTANEOUS PRN
Status: DISCONTINUED | OUTPATIENT
Start: 2019-05-31 | End: 2019-05-31 | Stop reason: HOSPADM

## 2019-05-31 RX ORDER — HYDROMORPHONE HYDROCHLORIDE 1 MG/ML
.3-.5 INJECTION, SOLUTION INTRAMUSCULAR; INTRAVENOUS; SUBCUTANEOUS EVERY 5 MIN PRN
Status: DISCONTINUED | OUTPATIENT
Start: 2019-05-31 | End: 2019-05-31 | Stop reason: HOSPADM

## 2019-05-31 RX ORDER — CEFAZOLIN SODIUM 1 G/3ML
INJECTION, POWDER, FOR SOLUTION INTRAMUSCULAR; INTRAVENOUS PRN
Status: DISCONTINUED | OUTPATIENT
Start: 2019-05-31 | End: 2019-05-31

## 2019-05-31 RX ORDER — HYDRALAZINE HYDROCHLORIDE 20 MG/ML
2.5-5 INJECTION INTRAMUSCULAR; INTRAVENOUS EVERY 10 MIN PRN
Status: DISCONTINUED | OUTPATIENT
Start: 2019-05-31 | End: 2019-05-31 | Stop reason: HOSPADM

## 2019-05-31 RX ORDER — LIDOCAINE HYDROCHLORIDE 20 MG/ML
INJECTION, SOLUTION INFILTRATION; PERINEURAL PRN
Status: DISCONTINUED | OUTPATIENT
Start: 2019-05-31 | End: 2019-05-31

## 2019-05-31 RX ORDER — FENTANYL CITRATE 50 UG/ML
25-50 INJECTION, SOLUTION INTRAMUSCULAR; INTRAVENOUS
Status: DISCONTINUED | OUTPATIENT
Start: 2019-05-31 | End: 2019-05-31 | Stop reason: HOSPADM

## 2019-05-31 RX ORDER — SODIUM CHLORIDE, SODIUM LACTATE, POTASSIUM CHLORIDE, CALCIUM CHLORIDE 600; 310; 30; 20 MG/100ML; MG/100ML; MG/100ML; MG/100ML
INJECTION, SOLUTION INTRAVENOUS CONTINUOUS
Status: DISCONTINUED | OUTPATIENT
Start: 2019-05-31 | End: 2019-05-31 | Stop reason: HOSPADM

## 2019-05-31 RX ORDER — ACETAMINOPHEN 500 MG
1000 TABLET ORAL EVERY 8 HOURS
Status: DISCONTINUED | OUTPATIENT
Start: 2019-05-31 | End: 2019-06-01 | Stop reason: HOSPADM

## 2019-05-31 RX ORDER — CEFAZOLIN SODIUM 2 G/100ML
2 INJECTION, SOLUTION INTRAVENOUS ONCE
Status: COMPLETED | OUTPATIENT
Start: 2019-05-31 | End: 2019-05-31

## 2019-05-31 RX ORDER — LIDOCAINE 40 MG/G
CREAM TOPICAL
Status: DISCONTINUED | OUTPATIENT
Start: 2019-05-31 | End: 2019-06-01

## 2019-05-31 RX ORDER — DEXAMETHASONE SODIUM PHOSPHATE 4 MG/ML
4 INJECTION, SOLUTION INTRA-ARTICULAR; INTRALESIONAL; INTRAMUSCULAR; INTRAVENOUS; SOFT TISSUE
Status: DISCONTINUED | OUTPATIENT
Start: 2019-05-31 | End: 2019-05-31 | Stop reason: HOSPADM

## 2019-05-31 RX ORDER — LABETALOL 20 MG/4 ML (5 MG/ML) INTRAVENOUS SYRINGE
10
Status: DISCONTINUED | OUTPATIENT
Start: 2019-05-31 | End: 2019-05-31 | Stop reason: HOSPADM

## 2019-05-31 RX ORDER — FENTANYL CITRATE 50 UG/ML
INJECTION, SOLUTION INTRAMUSCULAR; INTRAVENOUS PRN
Status: DISCONTINUED | OUTPATIENT
Start: 2019-05-31 | End: 2019-05-31

## 2019-05-31 RX ORDER — BUPIVACAINE HYDROCHLORIDE 5 MG/ML
INJECTION, SOLUTION PERINEURAL PRN
Status: DISCONTINUED | OUTPATIENT
Start: 2019-05-31 | End: 2019-05-31 | Stop reason: HOSPADM

## 2019-05-31 RX ORDER — METOCLOPRAMIDE HYDROCHLORIDE 5 MG/ML
5 INJECTION INTRAMUSCULAR; INTRAVENOUS EVERY 6 HOURS PRN
Status: DISCONTINUED | OUTPATIENT
Start: 2019-05-31 | End: 2019-06-01

## 2019-05-31 RX ORDER — HYDRALAZINE HYDROCHLORIDE 20 MG/ML
10-20 INJECTION INTRAMUSCULAR; INTRAVENOUS
Status: DISCONTINUED | OUTPATIENT
Start: 2019-05-31 | End: 2019-06-01

## 2019-05-31 RX ORDER — MAGNESIUM HYDROXIDE 1200 MG/15ML
LIQUID ORAL PRN
Status: DISCONTINUED | OUTPATIENT
Start: 2019-05-31 | End: 2019-05-31 | Stop reason: HOSPADM

## 2019-05-31 RX ORDER — ONDANSETRON 2 MG/ML
4 INJECTION INTRAMUSCULAR; INTRAVENOUS EVERY 30 MIN PRN
Status: DISCONTINUED | OUTPATIENT
Start: 2019-05-31 | End: 2019-05-31 | Stop reason: HOSPADM

## 2019-05-31 RX ORDER — PROPOFOL 10 MG/ML
INJECTION, EMULSION INTRAVENOUS PRN
Status: DISCONTINUED | OUTPATIENT
Start: 2019-05-31 | End: 2019-05-31

## 2019-05-31 RX ORDER — ONDANSETRON 2 MG/ML
4 INJECTION INTRAMUSCULAR; INTRAVENOUS EVERY 6 HOURS PRN
Status: DISCONTINUED | OUTPATIENT
Start: 2019-05-31 | End: 2019-06-01

## 2019-05-31 RX ORDER — GLYCOPYRROLATE 0.2 MG/ML
INJECTION, SOLUTION INTRAMUSCULAR; INTRAVENOUS PRN
Status: DISCONTINUED | OUTPATIENT
Start: 2019-05-31 | End: 2019-05-31

## 2019-05-31 RX ORDER — NALOXONE HYDROCHLORIDE 0.4 MG/ML
.1-.4 INJECTION, SOLUTION INTRAMUSCULAR; INTRAVENOUS; SUBCUTANEOUS
Status: DISCONTINUED | OUTPATIENT
Start: 2019-05-31 | End: 2019-05-31

## 2019-05-31 RX ORDER — PROCHLORPERAZINE MALEATE 5 MG
5 TABLET ORAL EVERY 6 HOURS PRN
Status: DISCONTINUED | OUTPATIENT
Start: 2019-05-31 | End: 2019-06-01

## 2019-05-31 RX ORDER — FENTANYL CITRATE 50 UG/ML
100 INJECTION, SOLUTION INTRAMUSCULAR; INTRAVENOUS ONCE
Status: COMPLETED | OUTPATIENT
Start: 2019-05-31 | End: 2019-05-31

## 2019-05-31 RX ORDER — ONDANSETRON 4 MG/1
4 TABLET, ORALLY DISINTEGRATING ORAL EVERY 30 MIN PRN
Status: DISCONTINUED | OUTPATIENT
Start: 2019-05-31 | End: 2019-05-31 | Stop reason: HOSPADM

## 2019-05-31 RX ORDER — ONDANSETRON 2 MG/ML
INJECTION INTRAMUSCULAR; INTRAVENOUS PRN
Status: DISCONTINUED | OUTPATIENT
Start: 2019-05-31 | End: 2019-05-31

## 2019-05-31 RX ORDER — HEPARIN SODIUM 1000 [USP'U]/ML
INJECTION, SOLUTION INTRAVENOUS; SUBCUTANEOUS PRN
Status: DISCONTINUED | OUTPATIENT
Start: 2019-05-31 | End: 2019-05-31

## 2019-05-31 RX ORDER — SODIUM CHLORIDE, SODIUM LACTATE, POTASSIUM CHLORIDE, CALCIUM CHLORIDE 600; 310; 30; 20 MG/100ML; MG/100ML; MG/100ML; MG/100ML
INJECTION, SOLUTION INTRAVENOUS CONTINUOUS PRN
Status: DISCONTINUED | OUTPATIENT
Start: 2019-05-31 | End: 2019-05-31

## 2019-05-31 RX ORDER — NALOXONE HYDROCHLORIDE 0.4 MG/ML
.1-.4 INJECTION, SOLUTION INTRAMUSCULAR; INTRAVENOUS; SUBCUTANEOUS
Status: DISCONTINUED | OUTPATIENT
Start: 2019-05-31 | End: 2019-06-01

## 2019-05-31 RX ORDER — LABETALOL 20 MG/4 ML (5 MG/ML) INTRAVENOUS SYRINGE
10-40 EVERY 10 MIN PRN
Status: DISCONTINUED | OUTPATIENT
Start: 2019-05-31 | End: 2019-06-01

## 2019-05-31 RX ORDER — NEOSTIGMINE METHYLSULFATE 1 MG/ML
VIAL (ML) INJECTION PRN
Status: DISCONTINUED | OUTPATIENT
Start: 2019-05-31 | End: 2019-05-31

## 2019-05-31 RX ADMIN — SODIUM CHLORIDE, POTASSIUM CHLORIDE, SODIUM LACTATE AND CALCIUM CHLORIDE: 600; 310; 30; 20 INJECTION, SOLUTION INTRAVENOUS at 17:45

## 2019-05-31 RX ADMIN — MIDAZOLAM HYDROCHLORIDE 1 MG: 1 INJECTION, SOLUTION INTRAMUSCULAR; INTRAVENOUS at 17:30

## 2019-05-31 RX ADMIN — FENTANYL CITRATE 50 MCG: 50 INJECTION, SOLUTION INTRAMUSCULAR; INTRAVENOUS at 20:33

## 2019-05-31 RX ADMIN — ROCURONIUM BROMIDE 20 MG: 10 INJECTION INTRAVENOUS at 20:33

## 2019-05-31 RX ADMIN — METOPROLOL TARTRATE 12.5 MG: 25 TABLET ORAL at 08:30

## 2019-05-31 RX ADMIN — FENTANYL CITRATE 25 MCG: 50 INJECTION, SOLUTION INTRAMUSCULAR; INTRAVENOUS at 19:10

## 2019-05-31 RX ADMIN — HEPARIN SODIUM 700 UNITS/HR: 10000 INJECTION, SOLUTION INTRAVENOUS at 10:15

## 2019-05-31 RX ADMIN — ONDANSETRON 4 MG: 2 INJECTION INTRAMUSCULAR; INTRAVENOUS at 21:24

## 2019-05-31 RX ADMIN — ACETAMINOPHEN 500 MG: 500 TABLET, FILM COATED ORAL at 15:27

## 2019-05-31 RX ADMIN — PHENYLEPHRINE HYDROCHLORIDE 0.25 MCG/KG/MIN: 10 INJECTION INTRAVENOUS at 19:26

## 2019-05-31 RX ADMIN — PHENYLEPHRINE HYDROCHLORIDE 100 MCG: 10 INJECTION INTRAVENOUS at 20:19

## 2019-05-31 RX ADMIN — PHENYLEPHRINE HYDROCHLORIDE 100 MCG: 10 INJECTION INTRAVENOUS at 19:18

## 2019-05-31 RX ADMIN — SODIUM CHLORIDE, POTASSIUM CHLORIDE, SODIUM LACTATE AND CALCIUM CHLORIDE: 600; 310; 30; 20 INJECTION, SOLUTION INTRAVENOUS at 20:58

## 2019-05-31 RX ADMIN — FENTANYL CITRATE 75 MCG: 50 INJECTION, SOLUTION INTRAMUSCULAR; INTRAVENOUS at 19:44

## 2019-05-31 RX ADMIN — PROPOFOL 200 MG: 10 INJECTION, EMULSION INTRAVENOUS at 19:10

## 2019-05-31 RX ADMIN — SODIUM CHLORIDE, SODIUM LACTATE, POTASSIUM CHLORIDE, CALCIUM CHLORIDE: 600; 310; 30; 20 INJECTION, SOLUTION INTRAVENOUS at 19:17

## 2019-05-31 RX ADMIN — AMLODIPINE BESYLATE 5 MG: 5 TABLET ORAL at 08:30

## 2019-05-31 RX ADMIN — LABETALOL 20 MG/4 ML (5 MG/ML) INTRAVENOUS SYRINGE 5 MG: at 21:36

## 2019-05-31 RX ADMIN — LIDOCAINE HYDROCHLORIDE 100 MG: 20 INJECTION, SOLUTION INFILTRATION; PERINEURAL at 19:10

## 2019-05-31 RX ADMIN — ROCURONIUM BROMIDE 50 MG: 10 INJECTION INTRAVENOUS at 19:10

## 2019-05-31 RX ADMIN — GLYCOPYRROLATE 0.6 MG: 0.2 INJECTION, SOLUTION INTRAMUSCULAR; INTRAVENOUS at 21:29

## 2019-05-31 RX ADMIN — NEOSTIGMINE METHYLSULFATE 3 MG: 1 INJECTION, SOLUTION INTRAVENOUS at 21:29

## 2019-05-31 RX ADMIN — CEFAZOLIN 1 G: 1 INJECTION, POWDER, FOR SOLUTION INTRAMUSCULAR; INTRAVENOUS at 21:28

## 2019-05-31 RX ADMIN — FENTANYL CITRATE 50 MCG: 50 INJECTION, SOLUTION INTRAMUSCULAR; INTRAVENOUS at 17:30

## 2019-05-31 RX ADMIN — LABETALOL 20 MG/4 ML (5 MG/ML) INTRAVENOUS SYRINGE 5 MG: at 21:33

## 2019-05-31 RX ADMIN — CEFAZOLIN SODIUM 2 G: 2 INJECTION, SOLUTION INTRAVENOUS at 19:28

## 2019-05-31 RX ADMIN — ASPIRIN 325 MG: 325 TABLET, DELAYED RELEASE ORAL at 08:30

## 2019-05-31 RX ADMIN — PHENYLEPHRINE HYDROCHLORIDE 100 MCG: 10 INJECTION INTRAVENOUS at 21:17

## 2019-05-31 RX ADMIN — HEPARIN SODIUM 5000 UNITS: 1000 INJECTION, SOLUTION INTRAVENOUS; SUBCUTANEOUS at 20:14

## 2019-05-31 RX ADMIN — PHENYLEPHRINE HYDROCHLORIDE 100 MCG: 10 INJECTION INTRAVENOUS at 20:41

## 2019-05-31 ASSESSMENT — ACTIVITIES OF DAILY LIVING (ADL)
ADLS_ACUITY_SCORE: 15
ADLS_ACUITY_SCORE: 13
ADLS_ACUITY_SCORE: 15

## 2019-05-31 ASSESSMENT — ENCOUNTER SYMPTOMS: DYSRHYTHMIAS: 1

## 2019-05-31 NOTE — PROGRESS NOTES
[facility]  Neurology Daily Note      Admission Date:5/30/2019   Date of service: 05/31/2019   Hospital Day: 2      Assessment & Plan   _______________________________  #. (I63.49) Cerebrovascular accident (CVA) due to embolism of other cerebral artery (H)  (primary encounter diagnosis)  --CT negative for acute abnormality  --CTA with carotid stenosis, no occlusion  --MRI brain with small infarcts in cortical left parietal and occipital lobes  -----likely related to ulcerated severe stenosis of L carotid  --------vascular surgery consulted as below  --on eliquis and aspirin PTA  #. (R29.898) Transient right arm & leg weakness  --related to small stroke  #. (I65.22) Symptomatic carotid artery stenosis, left  --80% stenosis with ulceration  -----vascular surgery consulted, plan for CEA  #. (I65.21) Carotid stenosis, asymptomatic, right  --70% stenosis  #. (I48.91) Atrial fibrillation, unspecified type (H)  --on eliquis PTA  ----on hold for CEA, heparin drip started  -----restart eliquis after procedure when ok from vascular  #. (I10) Essential hypertension  --management per primary service  #. PT/OT/Speech  --continue evaluations  #. Nutrition  --Per speech therapy evaluation   #. DVT Prophylaxis  --Mechanical + heparin    Code Status: Full Code    Disposition: pending     Interval History   _______________________________  Patient presented with right sided weakness present when he woke up in the morning, improving in the ED. CT negative for acute abnormality. CTA with severe carotid stenosis. MRI identified small infarcts in cortical left parietal and occipital lobes, likely related to carotid stenosis. On Eliquis for Afib. Vascular surgery was consulted and planning for CEA, stopped eliquis and put patient on heparin drip.   Today no new neurological concerns. Essentially back to baseline. Awaiting CEA, possibly this afternoon.    Review of Systems   _______________________________  The Review of  Systems is negative other than noted in the HPI  Physical Exam   _______________________________  Vitals: Temp: 98  F (36.7  C) Temp src: Oral BP: 115/53 Pulse: 52 Heart Rate: 58 Resp: 16 SpO2: 95 % O2 Device: None (Room air)    Vital Signs with Ranges: Temp:  [95.5  F (35.3  C)-98  F (36.7  C)] 98  F (36.7  C)  Pulse:  [46-59] 52  Heart Rate:  [] 58  Resp:  [13-16] 16  BP: (115-146)/(53-80) 115/53  SpO2:  [93 %-100 %] 95 %    General Appearance:  No acute distress  Neuro:       Mental Status Exam:   Awake, alert, oriented X3. Speech and language are intact. Mental status is normal       Cranial Nerves:  Pupils 3 mm, reactive. EOMI. Face sensation is normal. Face is symmetric. Tongue and uvula are midline. Other CN are normal           Motor:  5/5 X 4. Tone and bulk are normal           Reflexes:  Normal DTR. Toes downgoing.        Sensory:  Normal to light touch               Coordination:   Intact finger-to-nose        Gait:  No significant difficulties  Cardiovascular: Regular rate and rhythm, no m/r/g  Lungs: Clear to auscultation  Abdomen: Soft, not tender, not distended  Extremities: No clubbing, no cyanosis, no edema    Medications   _______________________________    HEParin 850 Units/hr (05/30/19 2243)     - MEDICATION INSTRUCTIONS -       - MEDICATION INSTRUCTIONS -         amLODIPine  5 mg Oral Daily     aspirin  325 mg Oral Daily     atorvastatin  40 mg Oral QPM     metoprolol tartrate  12.5 mg Oral BID       Data   _______________________________      Lab Data:   All data was reviewed by me personally  CBC RESULTS:  Recent Labs   Lab Test 05/30/19  1917 05/30/19  1438 05/30/19  1143   WBC 5.8 6.1 7.2   RBC 4.39* 4.21* 4.35*   HGB 13.9 13.6 14.2   HCT 41.5 39.7* 41.4    179 187     Basic Metabolic Panel:  Recent Labs   Lab Test 05/30/19  1438 05/30/19  1243 05/30/19  1143    139 Canceled, Test credited, specimen discarded   POTASSIUM 4.5 4.6 Canceled, Test credited, specimen discarded    CHLORIDE 112* 111* Canceled, Test credited, specimen discarded   CO2 24 23 Canceled, Test credited, specimen discarded   BUN 27 29 Canceled, Test credited, specimen discarded   CR 1.57* 1.65* Canceled, Test credited, specimen discarded   GLC 84 94 Canceled, Test credited, specimen discarded   KULDIP 8.4* 8.4* Canceled, Test credited, specimen discarded     Liver panel:  Recent Labs   Lab Test 05/30/19  1438 03/12/19  1004 05/22/18  1106 08/22/17  1457 07/15/16  0917   PROTTOTAL 6.8 7.1 7.2 7.0 7.1   ALBUMIN 3.5 3.7 3.8 3.6 3.6   BILITOTAL 0.2 0.6 0.6 0.3 0.3   ALKPHOS 55 61 55 52 50   AST 17 23 23 22 20   ALT 24 23 28 26 31     Coagulation  Recent Labs   Lab Test 05/31/19  0725 05/30/19  1917 05/30/19  1243 05/30/19  1143 04/28/11  1130   INR  --  1.21* 1.22* Canceled, Test credited, specimen discarded 1.03   PTT 76*  --  33 Canceled, Test credited, specimen discarded 28      Lipid Profile:  Recent Labs   Lab Test 05/30/19  1438 03/12/19  1004  09/11/15  0840 02/11/14  1233   CHOL 101 110   < > 130 149   HDL 34* 33*   < > 37* 34*   LDL 50 61   < > 82 101   TRIG 84 81   < > 57 73   CHOLHDLRATIO  --   --   --  3.5 4.4    < > = values in this interval not displayed.     Thyroid Panel:  Recent Labs   Lab Test 05/30/19  1438 09/11/15  0840 05/10/13  1053   TSH 1.43 1.89 2.46      Vitamin B12:   Recent Labs   Lab Test 11/14/16  0836   B12 307      Vitamin D level:   Recent Labs   Lab Test 09/11/15  0840 06/03/14  0936 02/11/14  1233 05/10/13  1053 02/04/13  1015   VITDT 57 40 29* 27* 20*     A1C:   Recent Labs   Lab Test 05/30/19  1438 05/10/13  1053   A1C 5.6 5.5     Troponin I:   Recent Labs   Lab Test 05/30/19  2147 05/30/19  1438   TROPI <0.015 <0.015     CK:   Recent Labs   Lab Test 07/15/16  0917 05/10/13  1053    83       CRP inflammation:   Recent Labs   Lab Test 05/10/13  1053   CRP <5.0     ESR:   Recent Labs   Lab Test 05/10/13  1053   SED 8       JEFERSON:   Recent Labs   Lab Test 05/10/13  1053   ANNIE <1.0  Interpretation:  Negative       UA Results:  Recent Labs   Lab Test 07/14/14  1110   COLOR Yellow   APPEARANCE Clear   URINEGLC Negative   URINEBILI Negative   URINEKETONE Negative   SG 1.015   UBLD Negative   URINEPH 5.5   PROTEIN Negative   UROBILINOGEN 0.2   NITRITE Negative   LEUKEST Negative   RBCU O - 2   WBCU O - 2        Cardiac US:   The left ventricle is normal in size. Left ventricular systolic function is normal. The visual ejection fraction is estimated at 55-60%. The aortic valve is trileaflet with aortic valve sclerosis. There is mild (1+) aortic regurgitation. A contrast injection (Bubble Study) was performed that was mildly positive for flow across the interatrial septum. Right to left atrial shunt, small a patent foramen ovale is present. Mild aortic root dilatation. There is no comparison study available.       Neurophysiology:   --       Imaging:   All imaging studies were reviewed personally  CT head 5/30/19:   Mild cerebral atrophy. No evidence for intracranial hemorrhage or any acute process.    CTA head/neck 5/30/19:   1. Ulcerative plaque in the proximal left internal carotid artery with 80% stenosis.  2. Moderate atherosclerotic disease of the right carotid bifurcation with 70% stenosis of the proximal internal carotid artery.  3. No evidence for any intracranial thromboembolism.    MRI brain 5/30/19:   1. Two tiny recent cortical infarcts in the left parietal and left occipital lobes. No other recent infarcts.  2. Diffuse cerebral volume loss and cerebral white matter changes consistent with chronic small vessel ischemic disease.       Text Page    Lo Norris, MSN, FNP-BC, RN CNRN SCRN

## 2019-05-31 NOTE — PLAN OF CARE
OT; Spoke with PT pt currently with not weakness and scheduled for CEA today, will hold OT today until after procedure.

## 2019-05-31 NOTE — PLAN OF CARE
PT-Reviewed chart and discussed with nurse. Patient is currently not having any weakness on right side and is up amb in room independently. Scheduled for CEA this afternoon. Nurse and therapist agreed to hold PT today and check tomorrow after CEA.

## 2019-05-31 NOTE — PROGRESS NOTES
Cross cover note     Paged by RN regarding heparin.  Patient currently on Eliquis for A. fib, Eliquis discontinued by vascular surgery.  Vascular team planning for possible  Left CEA tomorrow and recommend IV heparin.  Started on low intensity IV heparin.  Discussed with patient's RN.

## 2019-05-31 NOTE — PLAN OF CARE
A&O x4, VSS except bradycardic. No c/o chest pain or SOB. Neuro's intact. DP pulses weak. IV fluids at 100ml/hr. Echo and MRI completed.  Tele is SB w/1st degree AVB and occasionally PVC's. BG 95. Trop 1x negative. C/o a slight dull headachePt was seen from Dr. Ahn and updated about the next steps. Pt will be on a heparin drip and transferred to station 33, procedure scheduled for tomorrow. Tolerating regular diet and ambulates SBA. Pt has restless legs and like to walk. Will continue to monitor. Report given to JENNIFER Peterson/station 33.

## 2019-05-31 NOTE — PLAN OF CARE
A/Ox4. Neuros intact. Up independent. Pulses weak but palpable. LS-diminished, Bs+. Heparin running @ 8.5. Plan is to go to surgery today after 3pm.

## 2019-05-31 NOTE — PROGRESS NOTES
"VASCULAR SURGERY PROGRESS NOTE    Subjective:  No acute events overnight. Started heparin gtt.    Objective:    Intake/Output Summary (Last 24 hours) at 5/31/2019 0725  Last data filed at 5/30/2019 2300  Gross per 24 hour   Intake 1085 ml   Output --   Net 1085 ml     Labs:  ROUTINE IP LABS (Last four results)  BMP  Recent Labs   Lab 05/30/19  1438 05/30/19  1243 05/30/19  1143    139 Canceled, Test credited, specimen discarded   POTASSIUM 4.5 4.6 Canceled, Test credited, specimen discarded   CHLORIDE 112* 111* Canceled, Test credited, specimen discarded   KULDIP 8.4* 8.4* Canceled, Test credited, specimen discarded   CO2 24 23 Canceled, Test credited, specimen discarded   BUN 27 29 Canceled, Test credited, specimen discarded   CR 1.57* 1.65* Canceled, Test credited, specimen discarded   GLC 84 94 Canceled, Test credited, specimen discarded     CBC  Recent Labs   Lab 05/30/19  1917 05/30/19  1438 05/30/19  1143   WBC 5.8 6.1 7.2   RBC 4.39* 4.21* 4.35*   HGB 13.9 13.6 14.2   HCT 41.5 39.7* 41.4   MCV 95 94 95   MCH 31.7 32.3 32.6   MCHC 33.5 34.3 34.3   RDW 13.0 13.1 13.1    179 187     INR  Recent Labs   Lab 05/30/19 1917 05/30/19  1243 05/30/19  1143   INR 1.21* 1.22* Canceled, Test credited, specimen discarded     PHYSICAL EXAM:  /80 (BP Location: Left arm)   Pulse 52   Temp 97.3  F (36.3  C) (Oral)   Resp 16   Ht 1.702 m (5' 7.01\")   Wt 73.3 kg (161 lb 9.6 oz)   SpO2 96%   BMI 25.30 kg/m    General: The patient is alert and oriented. Appropriate. No acute distress  Psych: pleasant affect, answers questions appropriately  Skin: Color appropriate for race, warm, dry.  Respiratory: The patient does not require supplemental oxygen. Breathing unlabored  GI:  Abdomen soft, nontender to light palpation.  Neuro: no focal deficits      ASSESSMENT:  85M with symptomatic L ICA stenosis      PLAN:  NPO this AM at 7  Possible OR this PM  Heparin gtt in interim - hold 2 hours prior  Continue " ASA/statin    Alva Gonsalves MD  Vascular Surgery Fellow  [facility] [phone]

## 2019-05-31 NOTE — ANESTHESIA PREPROCEDURE EVALUATION
Anesthesia Pre-Procedure Evaluation    Patient: Isael Garcia   MRN: [id number] : 1934          Preoperative Diagnosis: CAROTID ULCER.    Procedure(s):  LEFT ENDARTERECTOMY, CAROTID WITH EEG.    Past Medical History:   Diagnosis Date     AAA (abdominal aortic aneurysm) (H) May 2016    infrarenal s/p graft placement at [facility]     Allergic rhinitis, cause unspecified      Atrial fibrillation (H)      CKD (chronic kidney disease) stage 3, GFR 30-59 ml/min (H) 2013     Lumbago 2000    s/p lumbar epidural injection     OA (osteoarthritis) 2015     PVD (peripheral vascular disease) (H) 3/7/2016     Shingles      left forehead     Sleep related leg cramps 2013     Unspecified cataract     bilateral     Unspecified essential hypertension      Vitamin D deficiency disease 2013     Past Surgical History:   Procedure Laterality Date     C NONSPECIFIC PROCEDURE      right inguinal hernia repair     C NONSPECIFIC PROCEDURE      TURP     C NONSPECIFIC PROCEDURE      tonsillectomy     C NONSPECIFIC PROCEDURE  2011    Right total knee arthroplasty     C NONSPECIFIC PROCEDURE   2009     left  cataract extraction/IOL placement     C NONSPECIFIC PROCEDURE  May 2016    Endovascular AAA stent graft placed at [facility]. [facility] suggests abx prophylaxis for life for procedures (dental/etc)     EXCISE MASS TRUNK N/A 2017    Procedure: EXCISE MASS TRUNK;  Surgeon: Ranjith Lozoya MD;  Location: [facility]     EXCISE MASS UPPER EXTREMITY Left 2017    Procedure: EXCISE MASS UPPER EXTREMITY;  Surgeon: Ranjith Lozoya MD;  Location: [facility]     HERNIORRHAPHY INGUINAL Left 2017    Procedure: HERNIORRHAPHY INGUINAL;  Surgeon: Ranjith Lozoya MD;  Location: [facility]       Anesthesia Evaluation     . Pt has had prior anesthetic.     No history of anesthetic complications          ROS/MED HX    ENT/Pulmonary:      (-) sleep apnea   Neurologic:     (+)TIA date:  "5/30/2019     Cardiovascular: Comment: S/p AAA repair    (+) Dyslipidemia, hypertension-range: has taken no BP medicine for past several days, Peripheral Vascular Disease (S/P AAA repair at [place])-- Other, Carotid Stenosis and Symptomatic, --. Taking blood thinners (off Eliquis X 5 days) : . . . :. dysrhythmias (intermittent; ) a-fib, . congenital heart disease PFO:, Previous cardiac testing Echodate:5/31/2019results:The left ventricle is normal in size.  Left ventricular systolic function is normal.  The visual ejection fraction is estimated at 55-60%.  The aortic valve is trileaflet with aortic valve sclerosis.  There is mild (1+) aortic regurgitation.  A contrast injection (Bubble Study) was performed that was mildly positive for  flow across the interatrial septum.  Right to left atrial shunt, small  A patent foramen ovale is present.  Mild aortic root dilatation.  There is no comparison study available.date: results:ECG reviewed date:5/2019 results:Sinus bradycardia. Inferior infarct date: results:          METS/Exercise Tolerance: Comment: Plays \"pickle ball\" regularly    Hematologic:         Musculoskeletal:   (+) arthritis (osteo),  -       GI/Hepatic:  - neg GI/hepatic ROS      (-) GERD   Renal/Genitourinary:     (+) chronic renal disease (Creat: 1.84 2/16/2017), type: CRI, Pt does not require dialysis, Pt has no history of transplant,       Endo:  - neg endo ROS       Psychiatric:         Infectious Disease:         Malignancy:         Other:                                 Lab Results   Component Value Date    WBC 5.8 05/30/2019    HGB 13.9 05/30/2019    HCT 41.5 05/30/2019     05/30/2019    CRP <5.0 05/10/2013    SED 8 05/10/2013     05/30/2019    POTASSIUM 4.5 05/30/2019    CHLORIDE 112 (H) 05/30/2019    CO2 24 05/30/2019    BUN 27 05/30/2019    CR 1.57 (H) 05/30/2019    GLC 84 05/30/2019    KULDIP 8.4 (L) 05/30/2019    MAG 2.0 07/08/2009    ALBUMIN 3.5 05/30/2019    PROTTOTAL 6.8 05/30/2019    " "ALT 24 05/30/2019    AST 17 05/30/2019    ALKPHOS 55 05/30/2019    BILITOTAL 0.2 05/30/2019    PTT 76 (H) 05/31/2019    INR 1.21 (H) 05/30/2019    TSH 1.43 05/30/2019       Preop Vitals  BP Readings from Last 3 Encounters:   05/31/19 128/59   09/10/18 136/70   09/05/17 138/70    Pulse Readings from Last 3 Encounters:   05/31/19 52   09/10/18 63   09/05/17 64      Resp Readings from Last 3 Encounters:   05/31/19 16   09/10/18 15   02/28/17 16    SpO2 Readings from Last 3 Encounters:   05/31/19 97%   09/10/18 98%   09/05/17 98%      Temp Readings from Last 1 Encounters:   05/31/19 36.3  C (97.3  F) (Oral)    Ht Readings from Last 1 Encounters:   05/30/19 1.702 m (5' 7.01\")      Wt Readings from Last 1 Encounters:   05/30/19 73.3 kg (161 lb 9.6 oz)    Estimated body mass index is 25.3 kg/m  as calculated from the following:    Height as of this encounter: 1.702 m (5' 7.01\").    Weight as of this encounter: 73.3 kg (161 lb 9.6 oz).       Anesthesia Plan      History & Physical Review  History and physical reviewed and following examination; no interval change.    ASA Status:  3 emergent.        Plan for General and ETT with Intravenous and Propofol induction. Maintenance will be Balanced.    PONV prophylaxis:  Ondansetron (or other 5HT-3)  Additional equipment: Arterial Line and 2nd IV      Postoperative Care  Postoperative pain management:  Multi-modal analgesia.      Consents                 Rosie Marin MD  "

## 2019-05-31 NOTE — PLAN OF CARE
A/O x 4, neuros intact. VSS ex fermín. Tele NS with 1st degree AVB  Diminished LS +Flatus +BS +BM Voiding adequately. Denies nausea.  Denies pain. Up independent. NPO ex medications for surgery this evening, surgery set to be for 1700 this evening. Heparin at 7, hold heparin 2 hours prior to procedure.

## 2019-05-31 NOTE — PLAN OF CARE
PRIMARY DIAGNOSIS: SYNCOPE/TIA  OUTPATIENT/OBSERVATION GOALS TO BE MET BEFORE DISCHARGE:  1. Orthostatic performed: N/A    2. Diagnostic testing complete & at baseline neurologic testing: Echo in process, MRI scheduled    3. Cleared by consultants (if involved): No    4. Interpretation of cardiac rhythm per telemetry tech: SB w/1st degree AV block    5. Tolerating adequate PO diet and medications: Yes    6. Return to near baseline physical activity or neurologic status: Yes    Discharge Planner Nurse   Safe discharge environment identified: Yes  Barriers to discharge: Yes       Entered by: Elizabeth Bianchi 05/30/2019 8:58 PM     Please review provider order for any additional goals.   Nurse to notify provider when observation goals have been met and patient is ready for discharge.    A&Ox4. Bradycardic, other VSS. Neuros intact. Denies numbness/tingling. Tele- SB w/1st degree AV block. Ambulating SBA. Passed swallow test, advanced to regular diet. IV infusing. Denies pain. MRI checklist faxed.

## 2019-05-31 NOTE — PLAN OF CARE
PRIMARY DIAGNOSIS: SYNCOPE/TIA  OUTPATIENT/OBSERVATION GOALS TO BE MET BEFORE DISCHARGE:  1. Orthostatic performed: N/A    2. Diagnostic testing complete & at baseline neurologic testing: Echo and MRI results competed    3. Cleared by consultants (if involved): No, pt will be inpatient    4. Interpretation of cardiac rhythm per telemetry tech: SB w/1st degree AV block and occasionally PVC'x    5. Tolerating adequate PO diet and medications: Yes    6. Return to near baseline physical activity or neurologic status: Yes    Discharge Planner Nurse   Safe discharge environment identified: Yes  Barriers to discharge: Yes       Entered by: Elizabeth Bianchi 05/30/2019 8:59 PM     Please review provider order for any additional goals.   Nurse to notify provider when observation goals have been met and patient is ready for discharge.    A&Ox4. Bradycardic, other VSS. Neuros intact. Denies numbness/tingling. Tele- SB w/1st degree AV block. Ambulating SBA. Passed swallow test, advanced to regular diet. IV infusing. Denies pain. MRI checklist faxed.

## 2019-05-31 NOTE — PLAN OF CARE
Patient is alert and orientated X 4 up independently, vital signs stable except fermín. Denies pain, CMS and Neuro intact. Dorsalis pedis 1+ and posterior tibial 2+. Heparin infusing at 8.5 ml/hr.

## 2019-05-31 NOTE — PROGRESS NOTES
[facility]    Hospitalist Progress Note      Assessment & Plan   Isael Garcia is a 85 year old male  with history of atrial fibrillation on chronic anticoagulation with Eliquis, history of hypertension, peripheral vascular disease, chronic kidney disease stage III, osteoarthritis, vitamin D deficiency, comes to the ER with complaint of right-sided weakness    Ischemic stroke  Symptomatic L ICA stenosis  He presented with right-sided weakness that improved.   CTA showed Ulcerative plaque in the proximal left internal carotid artery with 80% stenosis and moderate atherosclerotic disease of the right carotid bifurcation with 70% stenosis of the proximal internal carotid artery.  MRI showed two tiny recent cortical infarcts in the left parietal and left occipital lobes.   - TTE LVEF 55-60%, bubble study mildly positive, PFO present  - LDL 50, HDL 34, TG 84  - Eliquis  On hold>>currently on heparin drip  - on ASA, statin  - vascular surgery following, plan for possible OR this afternoon  - neuro following  - PT/OT    Bilateral carotid artery stenosis, left more than right  - management as above for treatment of left symptomatic ICA stenosis    Hypertension:    - continue with amlodipine and metoprolol     Chronic kidney disease, stage III:   - cr baseline around 1.6-1.8. Currently at baseline    History of abdominal aortic aneurysm, status post as per patient endovascular repair.      Chronic back pain:  Stable on Tylenol.      Atrial fibrillation:  Rate control with metoprolol.  on heparin drip as above. eliquis on hold    DVT Prophylaxis: Heparin   Code Status: Full Code    Disposition: Expected discharge above surgery, likely in 2 days    Jake Tirado MD  Text Page  (7am to 6pm)    Interval History   States everything is back to normal. Denies focal weakness today or headaches, nausea or change in vision.   Plan for possible L CEA this afternoon.    -Data reviewed today: I reviewed  all new labs and imaging results over the last 24 hours. I personally reviewed the brain MRI image(s) showing as mentioned above and the echo report image(s) showing as mentioned above.    Physical Exam   Temp: 98  F (36.7  C) Temp src: Oral BP: 115/53 Pulse: 52 Heart Rate: 58 Resp: 16 SpO2: 95 % O2 Device: None (Room air)    Vitals:    05/30/19 1118 05/30/19 1552   Weight: 74.8 kg (165 lb) 73.3 kg (161 lb 9.6 oz)     Vital Signs with Ranges  Temp:  [95.5  F (35.3  C)-98  F (36.7  C)] 98  F (36.7  C)  Pulse:  [46-59] 52  Heart Rate:  [] 58  Resp:  [13-16] 16  BP: (115-146)/(53-80) 115/53  SpO2:  [93 %-100 %] 95 %  I/O last 3 completed shifts:  In: 1085 [P.O.:250; I.V.:835]  Out: -     Constitutional: Alert, awake and no apparent distress  Respiratory: Clear to ausculation bilaterally, no wheezing  Cardiovascular: fermín, regular  GI: soft and non-tender  Skin/Integumen: warm and dry  Other:      Medications     HEParin 850 Units/hr (05/30/19 2243)     - MEDICATION INSTRUCTIONS -       - MEDICATION INSTRUCTIONS -         amLODIPine  5 mg Oral Daily     aspirin  325 mg Oral Daily     atorvastatin  40 mg Oral QPM     metoprolol tartrate  12.5 mg Oral BID       Data   Recent Labs   Lab 05/30/19  2147 05/30/19  1917 05/30/19  1438 05/30/19  1243 05/30/19  1143   WBC  --  5.8 6.1  --  7.2   HGB  --  13.9 13.6  --  14.2   MCV  --  95 94  --  95   PLT  --  190 179  --  187   INR  --  1.21*  --  1.22* Canceled, Test credited, specimen discarded   NA  --   --  141 139 Canceled, Test credited, specimen discarded   POTASSIUM  --   --  4.5 4.6 Canceled, Test credited, specimen discarded   CHLORIDE  --   --  112* 111* Canceled, Test credited, specimen discarded   CO2  --   --  24 23 Canceled, Test credited, specimen discarded   BUN  --   --  27 29 Canceled, Test credited, specimen discarded   CR  --   --  1.57* 1.65* Canceled, Test credited, specimen discarded   ANIONGAP  --   --  5 5 Canceled, Test credited, specimen  discarded   KULDIP  --   --  8.4* 8.4* Canceled, Test credited, specimen discarded   GLC  --   --  84 94 Canceled, Test credited, specimen discarded   ALBUMIN  --   --  3.5  --   --    PROTTOTAL  --   --  6.8  --   --    BILITOTAL  --   --  0.2  --   --    ALKPHOS  --   --  55  --   --    ALT  --   --  24  --   --    AST  --   --  17  --   --    TROPI <0.015  --  <0.015  --   --        Recent Results (from the past 24 hour(s))   CT Head w/o Contrast    Narrative    CT SCAN OF THE HEAD WITHOUT CONTRAST   5/30/2019 12:02 PM     HISTORY: Transient ischemic attack, right-sided weakness. Initial  exam. Code Stroke.    TECHNIQUE: Axial images of the head and coronal reformations without  IV contrast material. Radiation dose for this scan was reduced using  automated exposure control, adjustment of the mA and/or kV according  to patient size, or iterative reconstruction technique.    COMPARISON: None.    FINDINGS: Mild cerebral atrophy is present. The brain parenchyma,  brainstem, ventricular system, subarachnoid spaces, and vascular  structures are normal in appearance. Vascular calcifications are noted  at the skull base.      Impression    IMPRESSION: Mild cerebral atrophy. No evidence for intracranial  hemorrhage or any acute process.    SADE JAIMES MD   CTA Head Neck with Contrast    Narrative    CTA  HEAD AND NECK WITH CONTRAST 5/30/2019 12:12 PM     HISTORY: Transient ischemic attack. Right- sided weakness.    TECHNIQUE: Axial images were obtained through the head and neck with  intravenous contrast. 70 mL of Isovue-370 was given. Multiplanar  reconstructions were performed. 3-D reconstructions off a remote  workstation for CT angiography were also acquired. Carotid stenoses  were evaluated by comparing the caliber of the proximal internal  carotid artery to the caliber of the distal internal carotid artery.  Radiation dose for this scan was reduced using automated exposure  control, adjustment of the mA and/or kV  according to patient size, or  iterative reconstruction technique.    FINDINGS:    Brachiocephalic vessels: There is a ductus bump or shallow ulcer  crater off the thoracic arch along with some calcified plaque. The  proximal brachiocephalic vessels show some minimal calcified plaque  but no stenosis.    Right carotid system: There is moderate calcified and noncalcified  plaque at the carotid bifurcation resulting in approximately 70%  stenosis.    Left carotid system: There is extensive noncalcified and calcified  plaque in the proximal left internal carotid artery with an associated  ulcer crater. This is causing 80% stenosis.    Right vertebral artery: Normal.    Left vertebral artery: Normal.    [place]: Normal. Incidental note is made of direct origin of  the left posterior cerebral artery off the left internal carotid  artery.      Impression    IMPRESSION:  1. Ulcerative plaque in the proximal left internal carotid artery with  80% stenosis.  2. Moderate atherosclerotic disease of the right carotid bifurcation  with 70% stenosis of the proximal internal carotid artery.  3. No evidence for any intracranial thromboembolism.    SADE JAIMES MD   MRI Brain w & w/o contrast    Narrative    MRI OF THE BRAIN WITHOUT AND WITH CONTRAST 5/30/2019 5:00 PM     COMPARISON: Head CT same day.    HISTORY: Right-sided weakness     TECHNIQUE: Axial diffusion-weighted with ADC map, axial T2-weighted  with fat saturation, axial T1-weighted, axial turboFLAIR and coronal  T1-weighted images of the brain were acquired without intravenous  contrast. Following intravenous administration of gadolinium (6mL  Gadavist), axial T1-weighted images of the brain were acquired.     FINDINGS: There are tiny recent cortical infarcts at the high lateral  aspect of the left parietal lobe (series 602, image 39) and in the  posterolateral aspect of the left occipital lobe (series 602, image  25). No other recent infarcts noted.    There is  moderate diffuse cerebral volume loss. There are a few  scattered focal and minimal patchy periventricular areas of abnormal  T2 signal hyperintensity in the cerebral white matter bilaterally that  are consistent with sequela of chronic small vessel ischemic disease.  The ventricles and basal cisterns are within normal limits in  configuration given the degree of cerebral volume loss. There is no  midline shift. There are no extra-axial fluid collections. There is no  evidence for acute intracranial hemorrhage. There is no abnormal  contrast enhancement in the brain or its coverings.     There is no sinusitis or mastoiditis.      Impression    IMPRESSION:   1. Two tiny recent cortical infarcts in the left parietal and left  occipital lobes. No other recent infarcts.  2. Diffuse cerebral volume loss and cerebral white matter changes  consistent with chronic small vessel ischemic disease.     BARRETT HEWITT MD

## 2019-05-31 NOTE — ANESTHESIA PROCEDURE NOTES
ARTERIAL LINE PROCEDURE NOTE:   Pre-Procedure  Performed by Rosie Marin MD  Location: pre-op      Pre-Anesthestic Checklist: patient identified, IV checked, risks and benefits discussed, informed consent, monitors and equipment checked and pre-op evaluation    Timeout  Correct Patient: Yes   Correct Procedure: Yes   Correct Site: Yes     Correct Position: Yes     .   Procedure Documentation  Procedure: arterial line  ASA Emergent  Supine  Insertion Site:radial, left.Skin infiltrated with 1 mL of 1% lidocaine. Injection technique: Seldinger Technique and Filipe's test completed  .  .  Patient Prep;all elements of maximal sterile barrier technique followed, mask, hat, sterile gown, sterile gloves, draped, hand hygiene, chlorhexidine gluconate and isopropyl alcohol    Assessment/Narrative    Catheter: 20 gauge, 12 cm     Secured by other  Tegaderm dressing used.    Arterial waveform: Yes IBP within 10% of NIBP: Yes

## 2019-06-01 VITALS
HEART RATE: 61 BPM | BODY MASS INDEX: 25.36 KG/M2 | TEMPERATURE: 98.4 F | WEIGHT: 161.6 LBS | DIASTOLIC BLOOD PRESSURE: 55 MMHG | SYSTOLIC BLOOD PRESSURE: 105 MMHG | RESPIRATION RATE: 16 BRPM | OXYGEN SATURATION: 92 % | HEIGHT: 67 IN

## 2019-06-01 PROCEDURE — 40000893 ZZH STATISTIC PT IP EVAL DEFER: Performed by: PHYSICAL THERAPIST

## 2019-06-01 PROCEDURE — 25000132 ZZH RX MED GY IP 250 OP 250 PS 637: Performed by: SURGERY

## 2019-06-01 PROCEDURE — 25000132 ZZH RX MED GY IP 250 OP 250 PS 637

## 2019-06-01 PROCEDURE — 40000894 ZZH STATISTIC OT IP EVAL DEFER: Performed by: REHABILITATION PRACTITIONER

## 2019-06-01 PROCEDURE — 99239 HOSP IP/OBS DSCHRG MGMT >30: CPT | Performed by: INTERNAL MEDICINE

## 2019-06-01 RX ORDER — ASPIRIN 81 MG/1
81 TABLET ORAL DAILY
Status: DISCONTINUED | OUTPATIENT
Start: 2019-06-01 | End: 2019-06-01 | Stop reason: HOSPADM

## 2019-06-01 RX ADMIN — ASPIRIN 81 MG: 81 TABLET, COATED ORAL at 10:40

## 2019-06-01 RX ADMIN — ACETAMINOPHEN 1000 MG: 500 TABLET, FILM COATED ORAL at 06:33

## 2019-06-01 RX ADMIN — Medication 2 LOZENGE: at 00:10

## 2019-06-01 RX ADMIN — ACETAMINOPHEN 1000 MG: 500 TABLET, FILM COATED ORAL at 00:10

## 2019-06-01 RX ADMIN — APIXABAN 2.5 MG: 2.5 TABLET, FILM COATED ORAL at 10:40

## 2019-06-01 ASSESSMENT — ACTIVITIES OF DAILY LIVING (ADL)
ADLS_ACUITY_SCORE: 15
ADLS_ACUITY_SCORE: 15
ADLS_ACUITY_SCORE: 14
ADLS_ACUITY_SCORE: 14

## 2019-06-01 NOTE — PROGRESS NOTES
[facility]  Neurology Daily Note      Admission Date:5/30/2019   Date of service: 06/01/2019   Hospital Day: 3            Assessment & Plan   Acute ischemic stroke 2/2 symptomatic carotid artery, now s/p CEA:  - resume eliquis and aspirin  - follow up with vascular neurology  - SBP goal normotension, ideally <140  - follow up with N for stroke follow up, 4-6 weeks  - atorvastatin 40 mg daily    Tiarra Nielson MD  Stroke fellow      -----------------------------------------------------------------------------------    Interim:  POD 1 after CEA, pt is doing well and eager to return home.      Review of Systems  5 point ROS negative aside from some urinary retention which is improving.      Physical Exam   _______________________________  Vitals: Temp: 98.4  F (36.9  C) Temp src: Oral BP: 105/55 Pulse: 61 Heart Rate: 58 Resp: 16 SpO2: 92 % O2 Device: None (Room air) Oxygen Delivery: 2 LPM  Vital Signs with Ranges: Temp:  [96.8  F (36  C)-98.4  F (36.9  C)] 98.4  F (36.9  C)  Pulse:  [52-70] 61  Heart Rate:  [50-71] 58  Resp:  [8-17] 16  BP: ()/(49-66) 105/55  MAP:  [72 mmHg-81 mmHg] 78 mmHg  Arterial Line BP: (116-132)/(47-53) 128/50  SpO2:  [91 %-100 %] 92 %    General Appearance:  No acute distress  Neuro:       Mental Status Exam:   Awake, alert. No aphasia in casual conversation.       Cranial Nerves:  Face symmetric, EOM intact, gaze conjugate, no dysarthria       Motor:  No pronator drift, standing without support, no apparent leg weakness.       Reflexes:  defer       Sensory:  No numbness reported               Coordination:   Intact finger-to-nose        Gait:  Standing, able to walk few steps w/o any difficulty.    Medications   _______________________________    - MEDICATION INSTRUCTIONS -         acetaminophen  1,000 mg Oral Q8H     amLODIPine  5 mg Oral Daily     apixaban ANTICOAGULANT  2.5 mg Oral BID     aspirin  81 mg Oral Daily     atorvastatin  40 mg Oral QPM     metoprolol  tartrate  12.5 mg Oral BID     sodium chloride (PF)  3 mL Intracatheter Q8H       Data   _______________________________      Lab Data:   All data was reviewed by me personally  CBC RESULTS:  Recent Labs   Lab Test 05/30/19 1917 05/30/19  1438 05/30/19  1143   WBC 5.8 6.1 7.2   RBC 4.39* 4.21* 4.35*   HGB 13.9 13.6 14.2   HCT 41.5 39.7* 41.4    179 187     Basic Metabolic Panel:  Recent Labs   Lab Test 05/30/19 1438 05/30/19  1243 05/30/19  1143    139 Canceled, Test credited, specimen discarded   POTASSIUM 4.5 4.6 Canceled, Test credited, specimen discarded   CHLORIDE 112* 111* Canceled, Test credited, specimen discarded   CO2 24 23 Canceled, Test credited, specimen discarded   BUN 27 29 Canceled, Test credited, specimen discarded   CR 1.57* 1.65* Canceled, Test credited, specimen discarded   GLC 84 94 Canceled, Test credited, specimen discarded   KULDIP 8.4* 8.4* Canceled, Test credited, specimen discarded     Liver panel:  Recent Labs   Lab Test 05/30/19 1438 03/12/19  1004 05/22/18  1106 08/22/17  1457 07/15/16  0917   PROTTOTAL 6.8 7.1 7.2 7.0 7.1   ALBUMIN 3.5 3.7 3.8 3.6 3.6   BILITOTAL 0.2 0.6 0.6 0.3 0.3   ALKPHOS 55 61 55 52 50   AST 17 23 23 22 20   ALT 24 23 28 26 31     Coagulation  Recent Labs   Lab Test 05/31/19  0725 05/30/19 1917 05/30/19  1243 05/30/19  1143 04/28/11  1130   INR  --  1.21* 1.22* Canceled, Test credited, specimen discarded 1.03   PTT 76*  --  33 Canceled, Test credited, specimen discarded 28   AXA 1.09*  --   --   --   --       Lipid Profile:  Recent Labs   Lab Test 05/30/19  1438 03/12/19  1004  09/11/15  0840 02/11/14  1233   CHOL 101 110   < > 130 149   HDL 34* 33*   < > 37* 34*   LDL 50 61   < > 82 101   TRIG 84 81   < > 57 73   CHOLHDLRATIO  --   --   --  3.5 4.4    < > = values in this interval not displayed.     Thyroid Panel:  Recent Labs   Lab Test 05/30/19  1438 09/11/15  0840 05/10/13  1053   TSH 1.43 1.89 2.46      Vitamin B12:   Recent Labs   Lab Test  11/14/16  0836   B12 307      Vitamin D level:   Recent Labs   Lab Test 09/11/15  0840 06/03/14  0936 02/11/14  1233 05/10/13  1053 02/04/13  1015   VITDT 57 40 29* 27* 20*     A1C:   Recent Labs   Lab Test 05/30/19  1438 05/10/13  1053   A1C 5.6 5.5     Troponin I:   Recent Labs   Lab Test 05/30/19  2147 05/30/19  1438   TROPI <0.015 <0.015     CK:   Recent Labs   Lab Test 07/15/16  0917 05/10/13  1053    83       CRP inflammation:   Recent Labs   Lab Test 05/10/13  1053   CRP <5.0     ESR:   Recent Labs   Lab Test 05/10/13  1053   SED 8       JEFERSON:   Recent Labs   Lab Test 05/10/13  1053   ANNIE <1.0 Interpretation:  Negative       UA Results:  Recent Labs   Lab Test 07/14/14  1110   COLOR Yellow   APPEARANCE Clear   URINEGLC Negative   URINEBILI Negative   URINEKETONE Negative   SG 1.015   UBLD Negative   URINEPH 5.5   PROTEIN Negative   UROBILINOGEN 0.2   NITRITE Negative   LEUKEST Negative   RBCU O - 2   WBCU O - 2        Cardiac US:   The left ventricle is normal in size. Left ventricular systolic function is normal. The visual ejection fraction is estimated at 55-60%. The aortic valve is trileaflet with aortic valve sclerosis. There is mild (1+) aortic regurgitation. A contrast injection (Bubble Study) was performed that was mildly positive for flow across the interatrial septum. Right to left atrial shunt, small a patent foramen ovale is present. Mild aortic root dilatation. There is no comparison study available.       Neurophysiology:   --       Imaging:   All imaging studies were reviewed personally  CT head 5/30/19:   Mild cerebral atrophy. No evidence for intracranial hemorrhage or any acute process.    CTA head/neck 5/30/19:   1. Ulcerative plaque in the proximal left internal carotid artery with 80% stenosis.  2. Moderate atherosclerotic disease of the right carotid bifurcation with 70% stenosis of the proximal internal carotid artery.  3. No evidence for any intracranial thromboembolism.    MRI  brain 5/30/19:   1. Two tiny recent cortical infarcts in the left parietal and left occipital lobes. No other recent infarcts.  2. Diffuse cerebral volume loss and cerebral white matter changes consistent with chronic small vessel ischemic disease.

## 2019-06-01 NOTE — PLAN OF CARE
Discharge Planner PT   Patient plan for discharge: Home  Current status: Evaluation orders received, pt is observed up in the hallway with independence. Family at bedside confirms pt is having no issues with mobility. Does not have stairs he needs to use, has a ramp into the home. Family will stay with him for a bit at discharge.    Barriers to return to prior living situation: none  Recommendations for discharge: Agree with pt home  Rationale for recommendations: No skilled PT needs identified. Pt and family in agreement. Order completed.       Entered by: Laina Hernandez 06/01/2019 2:05 PM

## 2019-06-01 NOTE — ANESTHESIA POSTPROCEDURE EVALUATION
Patient: Isael Garcia    Procedure(s):  LEFT CAROTID ENDARTERECTOMY WITH GREAT SAPHENOUS VEIN PATCH WITH EEG MONITORING    Diagnosis:CAROTID ULCER.  Diagnosis Additional Information: No value filed.    Anesthesia Type:  General, ETT    Note:  Anesthesia Post Evaluation    Patient location during evaluation: PACU  Patient participation: Able to fully participate in evaluation  Level of consciousness: awake  Pain management: adequate  Airway patency: patent  Cardiovascular status: acceptable  Respiratory status: acceptable  Hydration status: acceptable  PONV: controlled     Anesthetic complications: None          Last vitals:  Vitals:    05/31/19 2230 05/31/19 2240 05/31/19 2250   BP:  125/59    Pulse:  53    Resp: 12 14 14   Temp: 36.6  C (97.8  F)     SpO2: 99% 98% 100%         Electronically Signed By: Sue Shelton MD  May 31, 2019  11:16 PM

## 2019-06-01 NOTE — PLAN OF CARE
Patient discharged at 2:20 PM to home.  IV was discontinued.  Belongings returned to patient.  Discharge paperwork and medications given to patient and daughter.  Patient and daughter verbalized understanding and all questions were answered.  Prescriptions given to patient.  At time of discharge, patient condition was stable and left the unit via wheelchair escorted by nursing assistant.

## 2019-06-01 NOTE — PROVIDER NOTIFICATION
Notified Dr. Ahn pt was scanned for 319ccs urine, no voiding.  Will need to cath if pt unable to void.     1206: pt voided 100ccs, PVR was 277.  Dr. Ahn paged, should we keep him, straight cath, or place hollins?  Per Dr. Ahn, keep pt longer until he is able to void on his own.     1405: pt unable to void again, states he is going to leave.   Dr. Ahn notified.  Per Dr. Ahn, ok, pt likely has some BPH.

## 2019-06-01 NOTE — PLAN OF CARE
Returned to floor from PACU at 0000. Alert and oriented x4. Vital signs stable on 2L nasal cannula. Standby assist. Tolerating regular diet. Lung sounds diminished. Bowel sounds hypoactive, denies flatus. Due to void. Bladder scanned at 0430 for 319 mL, pt stated he would to void again later. Attempted second void at 0630 with no success, pt refused straight cath. Right carotid dressing marked with moderate serosanguinous drianage. Right ankle dressing CDI. Throat pain managed with tylenol and lozenges. Denies nausea. Tele sinus bradycardic with 1st-degree AV block. Neuros intact.

## 2019-06-01 NOTE — PLAN OF CARE
Discharge Planner OT   Patient plan for discharge: Home with A family as needed  Current status: Attempted to see pt for scheduled evaluation, however, pt and vascular note indicate that pt is not currently experiencing any functional deficits and all symptoms have resolved. Pt currently demo's I functional mobility and reports no changes with functional mobility, coordination, vision, or cognition. Education provided (to pt, dtr, and SO) on signs/symptoms to watch for at home and seek out outpatient evaluation as needed. Cancel OT evaluation.  Barriers to return to prior living situation: None noted  Recommendations for discharge: Home  Rationale for recommendations: Pt does not demo any functional deficits, and no skilled OT intervention is warranted at this time.       Entered by: Salma Mcleod 06/01/2019 2:49 PM

## 2019-06-01 NOTE — PROGRESS NOTES
Vascular Surgery Progress Note:  POD#1  Left CEA    S:No complaints.  Very comfortable with only Tylenol    O:   Vitals:  BP  Min: 96/50  Max: 132/63  Temp  Av.5  F (36.4  C)  Min: 96.8  F (36  C)  Max: 98  F (36.7  C)  Pulse  Av.6  Min: 52  Max: 70  I/O last 3 completed shifts:  In: 1840 [P.O.:240; I.V.:1600]  Out: -     Physical Exam:  Alert   Neuro=A                            Wd=A   CN XII= normal                                 + palpable right pedal pulse with +3 biphasic Doppler to PT                              No palpable left pedal pulses.  Minimal PT Doppler.                                      ++2 Doppler to left distal AT      Assessment/Plan: #1.   Doing well after CEA for symptomatic ulcerated left carotis ulcerated stenosis.  Minimal Neuro changes with no need for rehab.  Restart Eliquist (for intermittent AFib).  ASA  and statin.  Home with family today.  RTO 2 weeks.                                  #2.  Left leg PAD.  Further evaluation in hospital.      Wm. Jimy MD

## 2019-06-01 NOTE — ANESTHESIA CARE TRANSFER NOTE
Patient: Isael Garcia    Procedure(s):  LEFT CAROTID ENDARTERECTOMY WITH GREAT SAPHENOUS VEIN PATCH WITH EEG MONITORING    Diagnosis: CAROTID ULCER.  Diagnosis Additional Information: No value filed.    Anesthesia Type:   General, ETT     Note:  Airway :Room Air  Patient transferred to:PACU  Comments:     TOF 4/4 with sustained tetany > 5secs. Spontaneous resp with tidal volume >400ml.. Followed commands et purposeful. Strong tongue thrust. Extubated with cuff down. Pt maintains resp. O2 sat > 97%. Simple face mask on with 10l O2.To PACU: VSS, placed on monitors with alarms on. Report given to RN.;       Vitals: (Last set prior to Anesthesia Care Transfer)    CRNA VITALS  5/31/2019 2128 - 5/31/2019 2158      5/31/2019             Pulse:  90    SpO2:  97 %                Electronically Signed By: MAGNUS Pacheco CRNA  May 31, 2019  9:58 PM

## 2019-06-01 NOTE — PROVIDER NOTIFICATION
Paged Dr. Tirado, Dr. Ahn said pt is ok to discharge today, can you complete med rec?  Pt is due to void, or I will straight cath.     Per Dr. Tirado, have pt attempt to void, do one PVR, then send home.  She will come see pt and finish med rec.

## 2019-06-01 NOTE — DISCHARGE INSTRUCTIONS
Carotid Endartectomy  Post-Operative Instructions    Typical length of stay in the hospital is 1-2 days.  On occasion, an evening in the Intensive Care Unit may be required for blood pressure regulation.    Activity    Most people are able to resume normal activities 1-2 weeks after surgery.  The key is to gradually increase your level of activity as you are able.  It is not uncommon to feel easily fatigued - this will improve with time.      You should avoid heavy lifting more than 30 lbs for 1 week.      You may return to work when you feel able - typically 1-2 weeks after surgery, depending on the type of work you do.      You should not drive a car for 1 week after surgery.  In addition,  you can not be taking prescription strength pain medication and you must feel strong enough to drive safely.    Incision Care    You can shower or bathe 2 days after surgery - avoid rubbing and soaking your incision.      You may have strips of white tape called  steri-strips  across your incision; they should stay on for 5-7 days or until the ends start to curl up.  You can then remove the steri-strips, or we will remove them at your post-operative appointment.      Avoid shaving directly over the incision for 2-3 weeks.    Common Concerns    You may notice mild skin numbness on the side of your surgery in your neck, chin, face, and earlobe.  This is due to nerve  irritation  and will gradually resolve over the next several months.  Call our office if you experience any short-lasting episode of numbness or weakness affecting one side of your body.      Some bruising and firmness around you incision can be expected.  This will soften and resolve over the next few weeks.  You may notice that some discoloration spreads to an area lower than the incision, such as the shoulder, neck or upper chest.  Likewise, swelling can occur near the incision or below the chin.  Call our office if the swelling or bruising worsens, or if you have  difficulty swallowing.    Diet    You may resume a regular diet before you leave the hospital.  You may find that it is best to try smaller, more frequent meals until your appetite returns.    Medications    You may be started on a blood thinner after surgery - typically Aspirin and / or Plavix.      You may be given a prescription for pain medication after surgery.  If you need to have this refilled, please call your pharmacy where you had it filled.  They will then call us for approval.  Please do not call after hours for a refill on pain medication.    Post-Operative Appointments    You need to see your surgeon in the clinic approximately 1-2 weeks after surgery.  Post-operative appointment:   Date: _____________________________  Time: ____________________________  Dr. ______________________________      You will have an ultrasound test and evaluation with your surgeon 90 days (3 months) after surgery.      We will notify you by mail when you are due to schedule further ultrasound testing and evaluation; typically this is done annually.     When You Should Call Our Office    Body aches, chills or temperature greater than 101      Incision redness or drainage      Loss of vision in one eye      Loss of strength / weakness in one side of your body      Severe headache      Difficulty with speech      If you will be having an invasive procedure, such as a colonoscopy or dental work within one year of your surgery, you may need to take an antibiotic prior to the procedure if you had a Dacron patch with your surgery; please call us so we can assist you with this.    Call our main number, [phone], for assistance with any concerns or questions.     Revised 5/2014

## 2019-06-01 NOTE — BRIEF OP NOTE
[facility]    Brief Operative Note    Pre-operative diagnosis: CAROTID ULCER.  Post-operative diagnosis * No post-op diagnosis entered *  Procedure: Procedure(s):  LEFT CAROTID ENDARTERECTOMY WITH GREAT SAPHENOUS VEIN PATCH WITH EEG MONITORING  Surgeon: Surgeon(s) and Role:     * Pedro Ahn MD - Primary     * Alva Gonsalves MD - Assisting  Anesthesia: General   Estimated blood loss: Less than 50 ml  Drains: None  Specimens: * No specimens in log *  Findings:   hemorrhagic ulcerated plaque in bifurcation/proximal ICA, EEG changes with clamp immediately reversed with shunting.  Complications: None.  Implants:  * No implants in log *

## 2019-06-01 NOTE — OP NOTE
Procedure Date: 05/31/2019      PREOPERATIVE DIAGNOSIS:  Symptomatic ulcerated left carotid stenosis.      POSTOPERATIVE DIAGNOSIS:  Symptomatic ulcerated left carotid stenosis.      OPERATIVE PROCEDURES:   1.  Left carotid endarterectomy with greater saphenous vein patch angioplasty.   a.  [place] right ankle greater saphenous vein.   b  Intraoperative shunting and EEG monitoring.      SURGEON:  Pedro Ahn MD      FIRST ASSISTANT:   JEFFREY TIMMONS MD (vascular fellow).      PREOPERATIVE MEDICATIONS:  Ancef 2 grams IV.      INDICATIONS:  An 85-year-old patient who had suffered a left hemispheric stroke 36 hours ago with good neurological recovery.  He was found to have an ulcerated irregular high-grade left carotid stenosis with moderate disease on the right.  The patient presents to the operating today under informed consent for semi-emergent carotid endarterectomy.      OPERATIVE PROCEDURE:  Radial arterial line was placed in preinduction.  He was brought to the operative suite, induced under general anesthesia, orally intubated without difficulty.  A rolled towel was placed under the shoulders.  He has no identified external jugular vein on the left side and thus we prepped out the right ankle and foot.  The right neck was prepped and draped in standard fashion.  Continuous EEG monitoring was performed.      Vascular exposure:  A standard left carotid incision was made in a relatively thin neck.  Dissection was carried down to the platysma.  We dissected down to the internal jugular vein.  The facial venous branches were divided between 2-0 silk suture along with several other smaller branches between 3-0 silk suture.  The patient had a relatively normal level of bifurcation which had marked inflammation consistent with his underlying disease.  The more proximal common carotid artery was free of disease and encircled with Silastic vessel loops.  We dissected free the external carotid artery, which was encircled.  We  avoided the carotid bulb and proximal ICA due to the embolic recent stroke and dissected free the more distal ICA, which measured approximately 3.5 mm in diameter and completely free of disease.  We had excellent visualization of the ansa cervicalis and hypoglossal and vagus nerves with very minimal retraction on the hypoglossal required.      Carotid endarterectomy: Heparin 5000 units intravenous were given.  After 5 minutes, the vessel was sequentially tightened.  There was a gradual slowing in the left hemispheric EEG.  An 11 blade was used to enter the common carotid.  Forbes scissors extended the arteriotomy to the distal ICA.  We had ulcerative plaque with thrombus noted as the cause his stroke.  Distal ICA was free of disease with back bleeding.  A preheparinized Sundt shunt was inserted and secured with vessel proximally and Riky clamp distally with the EEG returning back to its baseline very rapidly.      Endarterectomy was performed to the deep media with the aid of a spatula.  The distal endpoint in the ICA was made with a Ashby blade and loupe magnification with an excellent smooth minimal transition.  This was tacked down with interrupted 7-0 Prolene suture.  An eversion endarterectomy on the external carotid with a good endpoint and backbleeding.  CC endpoint was transected with a Forbes scissors with mild 1 mm step-off noted.  All loose medial fibers were removed down to a smooth plane.      Greater saphenous vein patch angioplasty:  Due to the smaller size of the ICA, it was felt the patch would be beneficial.  We made an incision in the right ankle and harvested 6 cm with an excellent greater saphenous vein.  Ligated proximally with 2-0 silk suture.  Gently dilated with 0.5% Marcaine.  The vein was spatulated.  This was slightly trimmed to decrease the size, it was not too large.  It had no valve leaflets.  This was then brought up to the neck.  This was sewn to our arteriotomy with running 6-0  Prolene suture and loupe magnification.  Prior to complete closure, the shunt was removed and the vessel was flushed and blood flow was sequentially allowed to go up the ICA with an excellent pulse noted, good hemostasis, and a stable EEG.      Our patch measured 6 cm in length.  A small amount of Surgicel was placed over this.  Neck was closed in layers with interrupted running 3-0 Vicryl suture.  Mandibular cutaneous nerve was quite diminutive and was not re-anastomosed.  Skin was closed with 4-0 Monocryl in subcuticular fashion.      Ankle was closed with interrupted 3-0 Vicryl deep and skin with 4-0 Monocryl in subcuticular fashion.  Wounds were infiltrated with 0.5% Marcaine for postop analgesia.  Due to his elevated serum creatinine, Toradol was not used.  Steri-Strips and gauze dressings applied.      The patient tolerated the procedure well.      ESTIMATED BLOOD LOSS:  Less than 20 mL.      COMPLICATIONS:  None.      The patient was neurologically intact in recovery.         PEDRO ASHBY MD             D: 2019   T: 2019   MT: JON      Name:     JOHN MATA   MRN:      [id number]        Account:        [id number]   :      1934           Procedure Date: 2019      Document: [id number]       cc: Pedro Ashby MD

## 2019-06-01 NOTE — DISCHARGE SUMMARY
[facility]  Hospitalist Discharge Summary       Date of Admission:  5/30/2019  Date of Discharge:  6/1/2019  Discharging Provider: Jake Tirado MD      Discharge Diagnoses   Ischemic stroke  Symptomatic L ICA stenosis Status post left CEA  Asymptomatic right internal carotid artery stenosis        Follow-ups Needed After Discharge   Follow-up Appointments     Follow-up and recommended labs and tests       Follow up with me,  Pedro Ahn, within 2 weeks. to evaluate   after surgery.  The following labs/tests are recommended: Left Carotid   post-op Duplex in 3 months.             Unresulted Labs Ordered in the Past 30 Days of this Admission     No orders found from 3/31/2019 to 5/31/2019.          Discharge Disposition   Discharged to home  Condition at discharge: Stable    Hospital Course      Isael Garcia is a 85 year old male  with history of atrial fibrillation on chronic anticoagulation with Eliquis, history of hypertension, peripheral vascular disease, chronic kidney disease stage III, osteoarthritis, vitamin D deficiency, comes to the ER with complaint of right-sided weakness    Ischemic stroke  Symptomatic L ICA stenosis s/p L CEA on 5/31  He presented with right-sided weakness that improved. CTA showed Ulcerative plaque in the proximal left internal carotid artery with 80% stenosis and moderate atherosclerotic disease of the right carotid bifurcation with 70% stenosis of the proximal internal carotid artery. MRI showed two tiny recent cortical infarcts in the left parietal and left occipital lobes.   - TTE LVEF 55-60%, bubble study mildly positive, PFO present  - LDL 50, HDL 34, TG 84  - Eliquis resumed post op, ASA decreased from 325 to 81mg daily per surgery.  - provided him with Rx for 1 week supply of Eliquis until his mail order arrives     Bilateral carotid artery stenosis, left more than right  - f/u with vascular surgery for R ICA  stenosis    Hypertension:  continue with amlodipine and metoprolol     Chronic kidney disease, stage III: cr baseline around 1.6-1.8. Currently at baseline    History of abdominal aortic aneurysm, status post as per patient endovascular repair.      Chronic back pain:  Stable on Tylenol.      Atrial fibrillation: metoprolol and Eliquis as above      Consultations This Hospital Stay   NEUROLOGY IP CONSULT  PHYSICAL THERAPY ADULT IP CONSULT  OCCUPATIONAL THERAPY ADULT IP CONSULT  SPEECH LANGUAGE PATH ADULT IP CONSULT  SWALLOW EVAL SPEECH PATH AT BEDSIDE IP CONSULT  SMOKING CESSATION PROGRAM IP CONSULT  VASCULAR SURGERY IP CONSULT  PHARMACY TO DOSE HEPARIN    Code Status   Full Code    Time Spent on this Encounter   I, Jake Tirado, personally saw the patient today and spent 35 minutes discharging this patient.       Jake Tirado MD  [facility]  ______________________________________________________________________    Physical Exam   Vital Signs: Temp: 98.4  F (36.9  C) Temp src: Oral BP: 105/55 Pulse: 61 Heart Rate: 58 Resp: 16 SpO2: 92 % O2 Device: None (Room air) Oxygen Delivery: 2 LPM  Weight: 161 lbs 9.6 oz  Constitutional: Alert, awake and no apparent distress  Respiratory:     Clear to ausculation bilaterally, no wheezing  Cardiovascular: regular rate and rhythm  GI: soft and non-tender  Skin/Integumen: warm and dry       Primary Care Physician   Madhu Velasquez    Discharge Orders      Follow-up and recommended labs and tests     Follow up with me,  Pedro Ahn, within 2 weeks. to evaluate after surgery.  The following labs/tests are recommended: Left Carotid post-op Duplex in 3 months.     Activity    Your activity upon discharge: activity as tolerated.  May shower.     Diet    Follow this diet upon discharge: Advance to a regular diet as tolerated       Significant Results and Procedures   Most Recent 3 CBC's:  Recent Labs   Lab Test 05/30/19  1917 05/30/19  1438  05/30/19  1143   WBC 5.8 6.1 7.2   HGB 13.9 13.6 14.2   MCV 95 94 95    179 187     Most Recent 3 BMP's:  Recent Labs   Lab Test 05/30/19  1438 05/30/19  1243 05/30/19  1143    139 Canceled, Test credited, specimen discarded   POTASSIUM 4.5 4.6 Canceled, Test credited, specimen discarded   CHLORIDE 112* 111* Canceled, Test credited, specimen discarded   CO2 24 23 Canceled, Test credited, specimen discarded   BUN 27 29 Canceled, Test credited, specimen discarded   CR 1.57* 1.65* Canceled, Test credited, specimen discarded   ANIONGAP 5 5 Canceled, Test credited, specimen discarded   KULDIP 8.4* 8.4* Canceled, Test credited, specimen discarded   GLC 84 94 Canceled, Test credited, specimen discarded   ,   Results for orders placed or performed during the hospital encounter of 05/30/19   CTA Head Neck with Contrast    Narrative    CTA  HEAD AND NECK WITH CONTRAST 5/30/2019 12:12 PM     HISTORY: Transient ischemic attack. Right- sided weakness.    TECHNIQUE: Axial images were obtained through the head and neck with  intravenous contrast. 70 mL of Isovue-370 was given. Multiplanar  reconstructions were performed. 3-D reconstructions off a remote  workstation for CT angiography were also acquired. Carotid stenoses  were evaluated by comparing the caliber of the proximal internal  carotid artery to the caliber of the distal internal carotid artery.  Radiation dose for this scan was reduced using automated exposure  control, adjustment of the mA and/or kV according to patient size, or  iterative reconstruction technique.    FINDINGS:    Brachiocephalic vessels: There is a ductus bump or shallow ulcer  crater off the thoracic arch along with some calcified plaque. The  proximal brachiocephalic vessels show some minimal calcified plaque  but no stenosis.    Right carotid system: There is moderate calcified and noncalcified  plaque at the carotid bifurcation resulting in approximately 70%  stenosis.    Left carotid  system: There is extensive noncalcified and calcified  plaque in the proximal left internal carotid artery with an associated  ulcer crater. This is causing 80% stenosis.    Right vertebral artery: Normal.    Left vertebral artery: Normal.    [place]: Normal. Incidental note is made of direct origin of  the left posterior cerebral artery off the left internal carotid  artery.      Impression    IMPRESSION:  1. Ulcerative plaque in the proximal left internal carotid artery with  80% stenosis.  2. Moderate atherosclerotic disease of the right carotid bifurcation  with 70% stenosis of the proximal internal carotid artery.  3. No evidence for any intracranial thromboembolism.    SADE JAIMES MD   CT Head w/o Contrast    Narrative    CT SCAN OF THE HEAD WITHOUT CONTRAST   5/30/2019 12:02 PM     HISTORY: Transient ischemic attack, right-sided weakness. Initial  exam. Code Stroke.    TECHNIQUE: Axial images of the head and coronal reformations without  IV contrast material. Radiation dose for this scan was reduced using  automated exposure control, adjustment of the mA and/or kV according  to patient size, or iterative reconstruction technique.    COMPARISON: None.    FINDINGS: Mild cerebral atrophy is present. The brain parenchyma,  brainstem, ventricular system, subarachnoid spaces, and vascular  structures are normal in appearance. Vascular calcifications are noted  at the skull base.      Impression    IMPRESSION: Mild cerebral atrophy. No evidence for intracranial  hemorrhage or any acute process.    SADE JAIMES MD   MRI Brain w & w/o contrast    Narrative    MRI OF THE BRAIN WITHOUT AND WITH CONTRAST 5/30/2019 5:00 PM     COMPARISON: Head CT same day.    HISTORY: Right-sided weakness     TECHNIQUE: Axial diffusion-weighted with ADC map, axial T2-weighted  with fat saturation, axial T1-weighted, axial turboFLAIR and coronal  T1-weighted images of the brain were acquired without intravenous  contrast.  Following intravenous administration of gadolinium (6mL  Gadavist), axial T1-weighted images of the brain were acquired.     FINDINGS: There are tiny recent cortical infarcts at the high lateral  aspect of the left parietal lobe (series 602, image 39) and in the  posterolateral aspect of the left occipital lobe (series 602, image  25). No other recent infarcts noted.    There is moderate diffuse cerebral volume loss. There are a few  scattered focal and minimal patchy periventricular areas of abnormal  T2 signal hyperintensity in the cerebral white matter bilaterally that  are consistent with sequela of chronic small vessel ischemic disease.  The ventricles and basal cisterns are within normal limits in  configuration given the degree of cerebral volume loss. There is no  midline shift. There are no extra-axial fluid collections. There is no  evidence for acute intracranial hemorrhage. There is no abnormal  contrast enhancement in the brain or its coverings.     There is no sinusitis or mastoiditis.      Impression    IMPRESSION:   1. Two tiny recent cortical infarcts in the left parietal and left  occipital lobes. No other recent infarcts.  2. Diffuse cerebral volume loss and cerebral white matter changes  consistent with chronic small vessel ischemic disease.     BARRETT HEWITT MD       Discharge Medications   Current Discharge Medication List      CONTINUE these medications which have CHANGED    Details   apixaban ANTICOAGULANT (ELIQUIS) 2.5 MG tablet Take 1 tablet (2.5 mg) by mouth 2 times daily for 7 days  Qty: 14 tablet, Refills: 0    Associated Diagnoses: Atrial fibrillation, unspecified type (H)      aspirin (ASA) 81 MG EC tablet Take 1 tablet (81 mg) by mouth daily  Qty: 10 tablet, Refills: 0    Associated Diagnoses: Cerebrovascular accident (CVA) due to embolism of left carotid artery (H)         CONTINUE these medications which have NOT CHANGED    Details   acetaminophen (TYLENOL) 500 MG tablet Take 1,000  mg by mouth daily       amLODIPine (NORVASC) 5 MG tablet Take 1 tablet (5 mg) by mouth daily  Qty: 90 tablet, Refills: 1    Associated Diagnoses: Essential hypertension      amoxicillin (AMOXIL) 500 MG capsule 4 capsules (total 2 grams) orally  1 hour prior to dental procedures  Qty: 4 capsule, Refills: 3    Associated Diagnoses: S/P AAA repair      atorvastatin (LIPITOR) 10 MG tablet Take 1 tablet (10 mg) by mouth daily  Qty: 90 tablet, Refills: 1    Associated Diagnoses: Hyperlipidemia LDL goal <70      Cholecalciferol (VITAMIN D) 2000 UNITS tablet Take 2,000 Units by mouth daily  Qty: 100 tablet, Refills: 3    Associated Diagnoses: Vitamin D deficiency      metoprolol tartrate (LOPRESSOR) 25 MG tablet Take 0.5 tablets (12.5 mg) by mouth 2 times daily  Qty: 90 tablet, Refills: 1    Associated Diagnoses: Atrial fibrillation, unspecified type (H)      Misc Natural Products (OSTEO BI-FLEX ADV TRIPLE ST) TABS Take 1 tablet by mouth daily      multivitamin, therapeutic with minerals (MULTI-VITAMIN) TABS Take 1 tablet by mouth daily           Allergies   Allergies   Allergen Reactions     Lisinopril Cramps     Leg cramping

## 2019-06-03 ENCOUNTER — TELEPHONE (OUTPATIENT)
Dept: OTHER | Facility: CLINIC | Age: 84
End: 2019-06-03

## 2019-06-03 ENCOUNTER — TELEPHONE (OUTPATIENT)
Dept: INTERNAL MEDICINE | Facility: CLINIC | Age: 84
End: 2019-06-03

## 2019-06-03 NOTE — TELEPHONE ENCOUNTER
Pt is s/p left carotid endarterectomy on 5/31/19 with Dr. Ahn.  Pt LVM on nurse triage line to schedule post op appointment.  Routing to scheduling to contact pt to scheduling post op for pt.    JUDITH Rueda RN

## 2019-06-03 NOTE — TELEPHONE ENCOUNTER
[place] [facility]    I called Isael Garcia today.  He suffered a left hemispheric stroke due to an ulcerated left carotid stenosis.  He underwent emergent left CEA with saphenous vein patch on 5/31/2019 with no complications.  He was discharged home in the first postoperative day back on his chronic Eliquis and aspirin.    He had no prior voiding issues.  However, on residual urines on postop day #1 they were somewhat higher than we would expect but he was still able to void.  He felt that he could do well at home and he was discharged later that afternoon.  He reports today that he is having no issues at all voiding.  Some very mild neck discomfort as expected.  No recurrent neurological problems.    We did notice on his preoperative history and exam the pulses were diminished in the left leg though Doppler signals were present and there was no concerns about acute limb ischemia that we may be having disabling claudication.  Eventually an FLY with exercise will be obtained and this was discussed.    We will see him in 2 weeks for follow-up exam in a duplex ultrasound at 3 months which may be the time when the FLY with exercise would be best done that we will discuss this.        Pedro Ahn MD

## 2019-06-03 NOTE — TELEPHONE ENCOUNTER
"Follow-up and recommended labs and tests  Follow up with me, Pedro Ahn, within 2 weeks. to evaluate after surgery. The following labs/tests are  recommended: Left Carotid post-op Duplex in 3 months.      ED for acute condition Discharge Protocol    \"Hi, my name is Neelam Yancey, a registered nurse, and I am calling from [facility].  I am calling to follow up and see how things are going for you after your recent emergency visit.\"    Tell me how you are doing now that you are home?\" pretty good. Glad things are pretty minimal. Site looks ok. Pain and swelling, using tylenol which seems to help.       Discharge Instructions    \"Let's review your discharge instructions.  What is/are the follow-up recommendations?  Pt. Response: F/u with Dr. Ahn    \"Has an appointment with your primary care provider been scheduled?\"  No (not needed) will follow up with Cardiologist. Will see prior to September for annual visit    Medications    \"Tell me what changed about your medicines when you discharged?\"    Added blood thinner    \"What questions do you have about your medications?\"   None        Call Summary    \"What questions or concerns do you have about your recent visit and your follow-up care?\"     none    \"If you have questions or things don't continue to improve, we encourage you contact us through the main clinic number (give number).  Even if the clinic is not open, triage nurses are available 24/7 to help you.     We would like you to know that our clinic has extended hours (provide information).  We also have urgent care (provide details on closest location and hours/contact info)\"    \"Thank you for your time and take care!\"      Neelam HARRELL RN, BSN, PHN              "

## 2019-06-13 ENCOUNTER — OFFICE VISIT (OUTPATIENT)
Dept: OTHER | Facility: CLINIC | Age: 84
DRG: 856 | End: 2019-06-13
Attending: SURGERY
Payer: COMMERCIAL

## 2019-06-13 VITALS — DIASTOLIC BLOOD PRESSURE: 71 MMHG | HEART RATE: 63 BPM | SYSTOLIC BLOOD PRESSURE: 127 MMHG

## 2019-06-13 DIAGNOSIS — Z95.828 HISTORY OF REPAIR OF ANEURYSM OF ABDOMINAL AORTA USING ENDOVASCULAR STENT GRAFT: ICD-10-CM

## 2019-06-13 DIAGNOSIS — I65.22 SYMPTOMATIC STENOSIS OF LEFT CAROTID ARTERY: Primary | ICD-10-CM

## 2019-06-13 DIAGNOSIS — Z98.890 HISTORY OF LEFT-SIDED CAROTID ENDARTERECTOMY: ICD-10-CM

## 2019-06-13 DIAGNOSIS — I73.9 PAD (PERIPHERAL ARTERY DISEASE) (H): ICD-10-CM

## 2019-06-13 PROCEDURE — G0463 HOSPITAL OUTPT CLINIC VISIT: HCPCS

## 2019-06-13 PROCEDURE — 99024 POSTOP FOLLOW-UP VISIT: CPT | Mod: ZP | Performed by: SURGERY

## 2019-06-13 NOTE — PROGRESS NOTES
[place] [facility]ASC[facility]    Isael Garcia returns for his first vascular follow-up.  He suffered a left hemispheric stroke with a good neurological recovery.  He underwent a urgent left CEA with greater saphenous vein patch for irregular ulcerated high-grade plaque on 5/31/2019.      Also history of hyperlipidemia with his LDL= 50 on statins.  Prior repair of a AAA at the [facility] with EVAR .  He did not have any follow-up when this was done 2 to 3 years ago.  But clinically with evidence of left leg PAD with decreased left femoral pulse and symptoms.  She notices he walks less than two thirds of a block he will get buttock and leg pain to the point that he has just stop and stand still for a minute or 2 before the symptoms resolved.  No problems with the right leg.  He has been saying this is all due to his back nerve compression issues.    He reports that his neurological symptoms have essentially completely resolved.      Exam: Alert and appropriate.  Here with a friend.              Blood pressure 127/71.  Pulse 63.               Well-healing left carotid incision with mild induration.                Normal neurological exam.                 +3 palpable right posterior tibial pulse with no palpable left.                 + Doppler with a +3 triphasic signal in the right posterior tibial artery.  +2 monophasic left anterior tibial and +1 monophasic left posterior tibial Doppler        Impression: #1.  Doing well following symptomatic left carotid ulcerated stenosis with CEA and right ankle greater saphenous vein patch.  Known mild to moderate disease in the right.  Follow-up left carotid duplex ultrasound in 3 months.  On chronic Eliquis and aspirin which she will continue.                          #2.  Status post EVAR AAA repair at [facility] several years ago with no follow-up.  This needs to be reevaluated.                          #3.  I suspect he has left leg PAD.  This may be  certainly explain at least some of his symptoms though some could be neurological also.  We will start with an FLY with exercise to help determine what further testing of his arterial system including the AAA repair.        Pedro Ahn MD     CC  Patient Care Team:  Madhu Velasquez MD as PCP - General  Tawana Larsen RN as   Madhu Velasquez MD as Assigned PCP

## 2019-06-13 NOTE — LETTER
[facility] [place] [facility] [address]  [place] [address]  Phone: [phone]  Fax: [phone]      2019       Re: Isael Garcia - 1934    Isael Gracia returns for his first vascular follow-up.  He suffered a left hemispheric stroke with a good neurological recovery.  He underwent a urgent left CEA with greater saphenous vein patch for irregular ulcerated high-grade plaque on 2019.      Also history of hyperlipidemia with his LDL= 50 on statins.  Prior repair of a AAA at the [facility] with EVAR .  He did not have any follow-up when this was done 2 to 3 years ago.  But clinically with evidence of left leg PAD with decreased left femoral pulse and symptoms.  She notices he walks less than two thirds of a block he will get buttock and leg pain to the point that he has just stop and stand still for a minute or 2 before the symptoms resolved.  No problems with the right leg.  He has been saying this is all due to his back nerve compression issues.     He reports that his neurological symptoms have essentially completely resolved.      Exam: Alert and appropriate.  Here with a friend.             Blood pressure 127/71.  Pulse 63.             Well-healing left carotid incision with mild induration.             Normal neurological exam.             +3 palpable right posterior tibial pulse with no palpable left.             + Doppler with a +3 triphasic signal in the right posterior tibial artery.  +2 monophasic left anterior tibial and +1 monophasic left posterior tibial Doppler      Impression: #1.  Doing well following symptomatic left carotid ulcerated stenosis with CEA and right ankle greater saphenous vein patch.  Known mild to moderate disease in the right. Follow-up left carotid duplex ultrasound in 3 months.  On chronic Eliquis and aspirin which she will continue.                          #2.  Status post EVAR AAA repair at [facility]  several years ago with no follow-up.  This needs to be reevaluated.                          #3.  I suspect he has left leg PAD.  This may be certainly explain at least some of his symptoms though some could be neurological also.  We will start with an FLY with exercise to help determine what further testing of his arterial system including the AAA repair.            Pedro Ahn MD

## 2019-06-13 NOTE — NURSING NOTE
"Isael Garcia is a 85 year old male who presents for:  Chief Complaint   Patient presents with     Surgical Followup     left carotid endarterectomy on 5/31/19        Vitals:    Vitals:    06/13/19 1553   BP: 127/71   BP Location: Right arm   Patient Position: Chair   Cuff Size: Adult Regular   Pulse: 63       BMI:  Estimated body mass index is 25.3 kg/m  as calculated from the following:    Height as of 5/30/19: 5' 7.01\" (1.702 m).    Weight as of 5/30/19: 161 lb 9.6 oz (73.3 kg).    Pain Score:  Data Unavailable        Marichuy Heard      "

## 2019-06-15 ENCOUNTER — ANESTHESIA EVENT (OUTPATIENT)
Dept: SURGERY | Facility: CLINIC | Age: 84
DRG: 856 | End: 2019-06-15
Payer: COMMERCIAL

## 2019-06-15 ENCOUNTER — HOSPITAL ENCOUNTER (INPATIENT)
Facility: CLINIC | Age: 84
LOS: 4 days | Discharge: HOME | DRG: 856 | End: 2019-06-19
Attending: EMERGENCY MEDICINE | Admitting: SURGERY
Payer: COMMERCIAL

## 2019-06-15 ENCOUNTER — APPOINTMENT (OUTPATIENT)
Dept: CT IMAGING | Facility: CLINIC | Age: 84
DRG: 856 | End: 2019-06-15
Attending: STUDENT IN AN ORGANIZED HEALTH CARE EDUCATION/TRAINING PROGRAM
Payer: COMMERCIAL

## 2019-06-15 DIAGNOSIS — T81.40XA POSTOPERATIVE INFECTION, UNSPECIFIED TYPE, INITIAL ENCOUNTER: ICD-10-CM

## 2019-06-15 DIAGNOSIS — N18.9 CHRONIC RENAL IMPAIRMENT, UNSPECIFIED CKD STAGE: ICD-10-CM

## 2019-06-15 DIAGNOSIS — L02.11 ABSCESS OF NECK: ICD-10-CM

## 2019-06-15 DIAGNOSIS — D72.829 LEUKOCYTOSIS, UNSPECIFIED TYPE: ICD-10-CM

## 2019-06-15 DIAGNOSIS — L02.11 NECK ABSCESS: Primary | ICD-10-CM

## 2019-06-15 LAB
ANION GAP SERPL CALCULATED.3IONS-SCNC: 4 MMOL/L (ref 3–14)
BASOPHILS # BLD AUTO: 0 10E9/L (ref 0–0.2)
BASOPHILS NFR BLD AUTO: 0.1 %
BUN SERPL-MCNC: 35 MG/DL (ref 7–30)
CALCIUM SERPL-MCNC: 8.6 MG/DL (ref 8.5–10.1)
CHLORIDE SERPL-SCNC: 107 MMOL/L (ref 94–109)
CO2 SERPL-SCNC: 26 MMOL/L (ref 20–32)
CREAT SERPL-MCNC: 1.63 MG/DL (ref 0.66–1.25)
DIFFERENTIAL METHOD BLD: ABNORMAL
EOSINOPHIL # BLD AUTO: 0.2 10E9/L (ref 0–0.7)
EOSINOPHIL NFR BLD AUTO: 1.6 %
ERYTHROCYTE [DISTWIDTH] IN BLOOD BY AUTOMATED COUNT: 13.2 % (ref 10–15)
GFR SERPL CREATININE-BSD FRML MDRD: 38 ML/MIN/{1.73_M2}
GLUCOSE SERPL-MCNC: 131 MG/DL (ref 70–99)
HCT VFR BLD AUTO: 40.3 % (ref 40–53)
HGB BLD-MCNC: 13.8 G/DL (ref 13.3–17.7)
IMM GRANULOCYTES # BLD: 0 10E9/L (ref 0–0.4)
IMM GRANULOCYTES NFR BLD: 0.2 %
LACTATE SERPL-SCNC: 1.7 MMOL/L (ref 0.4–2)
LYMPHOCYTES # BLD AUTO: 1.2 10E9/L (ref 0.8–5.3)
LYMPHOCYTES NFR BLD AUTO: 9.1 %
MCH RBC QN AUTO: 32.5 PG (ref 26.5–33)
MCHC RBC AUTO-ENTMCNC: 34.2 G/DL (ref 31.5–36.5)
MCV RBC AUTO: 95 FL (ref 78–100)
MONOCYTES # BLD AUTO: 0.7 10E9/L (ref 0–1.3)
MONOCYTES NFR BLD AUTO: 5.2 %
NEUTROPHILS # BLD AUTO: 10.6 10E9/L (ref 1.6–8.3)
NEUTROPHILS NFR BLD AUTO: 83.8 %
NRBC # BLD AUTO: 0 10*3/UL
NRBC BLD AUTO-RTO: 0 /100
PLATELET # BLD AUTO: 271 10E9/L (ref 150–450)
POTASSIUM SERPL-SCNC: 4.1 MMOL/L (ref 3.4–5.3)
RBC # BLD AUTO: 4.25 10E12/L (ref 4.4–5.9)
SODIUM SERPL-SCNC: 137 MMOL/L (ref 133–144)
WBC # BLD AUTO: 12.6 10E9/L (ref 4–11)

## 2019-06-15 PROCEDURE — 80048 BASIC METABOLIC PNL TOTAL CA: CPT | Performed by: EMERGENCY MEDICINE

## 2019-06-15 PROCEDURE — 70491 CT SOFT TISSUE NECK W/DYE: CPT

## 2019-06-15 PROCEDURE — 99222 1ST HOSP IP/OBS MODERATE 55: CPT | Performed by: NURSE PRACTITIONER

## 2019-06-15 PROCEDURE — 25000132 ZZH RX MED GY IP 250 OP 250 PS 637: Performed by: STUDENT IN AN ORGANIZED HEALTH CARE EDUCATION/TRAINING PROGRAM

## 2019-06-15 PROCEDURE — 96374 THER/PROPH/DIAG INJ IV PUSH: CPT

## 2019-06-15 PROCEDURE — 83605 ASSAY OF LACTIC ACID: CPT | Performed by: EMERGENCY MEDICINE

## 2019-06-15 PROCEDURE — 99221 1ST HOSP IP/OBS SF/LOW 40: CPT | Mod: 24 | Performed by: SURGERY

## 2019-06-15 PROCEDURE — 99285 EMERGENCY DEPT VISIT HI MDM: CPT | Mod: 25

## 2019-06-15 PROCEDURE — 87040 BLOOD CULTURE FOR BACTERIA: CPT | Performed by: EMERGENCY MEDICINE

## 2019-06-15 PROCEDURE — 25000128 H RX IP 250 OP 636: Performed by: EMERGENCY MEDICINE

## 2019-06-15 PROCEDURE — 25800030 ZZH RX IP 258 OP 636: Performed by: STUDENT IN AN ORGANIZED HEALTH CARE EDUCATION/TRAINING PROGRAM

## 2019-06-15 PROCEDURE — 99207 ZZC CONSULT E&M CHANGED TO INITIAL LEVEL: CPT | Performed by: NURSE PRACTITIONER

## 2019-06-15 PROCEDURE — 96375 TX/PRO/DX INJ NEW DRUG ADDON: CPT

## 2019-06-15 PROCEDURE — 12000000 ZZH R&B MED SURG/OB

## 2019-06-15 PROCEDURE — 85025 COMPLETE CBC W/AUTO DIFF WBC: CPT | Performed by: EMERGENCY MEDICINE

## 2019-06-15 PROCEDURE — 25800025 ZZH RX 258: Performed by: NURSE PRACTITIONER

## 2019-06-15 PROCEDURE — 82565 ASSAY OF CREATININE: CPT

## 2019-06-15 PROCEDURE — 25000125 ZZHC RX 250: Performed by: EMERGENCY MEDICINE

## 2019-06-15 PROCEDURE — 96361 HYDRATE IV INFUSION ADD-ON: CPT

## 2019-06-15 PROCEDURE — 25800030 ZZH RX IP 258 OP 636: Performed by: EMERGENCY MEDICINE

## 2019-06-15 RX ORDER — ASPIRIN 81 MG/1
81 TABLET ORAL DAILY
Status: DISCONTINUED | OUTPATIENT
Start: 2019-06-16 | End: 2019-06-19 | Stop reason: HOSPADM

## 2019-06-15 RX ORDER — HYDRALAZINE HYDROCHLORIDE 20 MG/ML
10 INJECTION INTRAMUSCULAR; INTRAVENOUS EVERY 6 HOURS PRN
Status: DISCONTINUED | OUTPATIENT
Start: 2019-06-15 | End: 2019-06-19 | Stop reason: HOSPADM

## 2019-06-15 RX ORDER — HEPARIN SODIUM 5000 [USP'U]/.5ML
5000 INJECTION, SOLUTION INTRAVENOUS; SUBCUTANEOUS EVERY 12 HOURS
Status: DISCONTINUED | OUTPATIENT
Start: 2019-06-15 | End: 2019-06-15

## 2019-06-15 RX ORDER — IOPAMIDOL 755 MG/ML
70 INJECTION, SOLUTION INTRAVASCULAR ONCE
Status: COMPLETED | OUTPATIENT
Start: 2019-06-15 | End: 2019-06-15

## 2019-06-15 RX ORDER — SODIUM CHLORIDE 9 MG/ML
INJECTION, SOLUTION INTRAVENOUS ONCE
Status: COMPLETED | OUTPATIENT
Start: 2019-06-15 | End: 2019-06-15

## 2019-06-15 RX ORDER — ATORVASTATIN CALCIUM 10 MG/1
10 TABLET, FILM COATED ORAL DAILY
Status: DISCONTINUED | OUTPATIENT
Start: 2019-06-16 | End: 2019-06-19 | Stop reason: HOSPADM

## 2019-06-15 RX ORDER — OXYCODONE HYDROCHLORIDE 5 MG/1
5 TABLET ORAL
Status: DISCONTINUED | OUTPATIENT
Start: 2019-06-15 | End: 2019-06-19 | Stop reason: HOSPADM

## 2019-06-15 RX ORDER — DEXTROSE MONOHYDRATE, SODIUM CHLORIDE, AND POTASSIUM CHLORIDE 50; 1.49; 4.5 G/1000ML; G/1000ML; G/1000ML
INJECTION, SOLUTION INTRAVENOUS CONTINUOUS
Status: DISCONTINUED | OUTPATIENT
Start: 2019-06-15 | End: 2019-06-15

## 2019-06-15 RX ORDER — NALOXONE HYDROCHLORIDE 0.4 MG/ML
.1-.4 INJECTION, SOLUTION INTRAMUSCULAR; INTRAVENOUS; SUBCUTANEOUS
Status: DISCONTINUED | OUTPATIENT
Start: 2019-06-15 | End: 2019-06-16

## 2019-06-15 RX ORDER — PIPERACILLIN SODIUM, TAZOBACTAM SODIUM 3; .375 G/15ML; G/15ML
3.38 INJECTION, POWDER, LYOPHILIZED, FOR SOLUTION INTRAVENOUS ONCE
Status: COMPLETED | OUTPATIENT
Start: 2019-06-15 | End: 2019-06-15

## 2019-06-15 RX ORDER — ONDANSETRON 2 MG/ML
4 INJECTION INTRAMUSCULAR; INTRAVENOUS EVERY 6 HOURS PRN
Status: DISCONTINUED | OUTPATIENT
Start: 2019-06-15 | End: 2019-06-19 | Stop reason: HOSPADM

## 2019-06-15 RX ORDER — HYDROMORPHONE HYDROCHLORIDE 1 MG/ML
0.3 INJECTION, SOLUTION INTRAMUSCULAR; INTRAVENOUS; SUBCUTANEOUS ONCE
Status: COMPLETED | OUTPATIENT
Start: 2019-06-15 | End: 2019-06-15

## 2019-06-15 RX ORDER — AMLODIPINE BESYLATE 5 MG/1
5 TABLET ORAL DAILY
Status: DISCONTINUED | OUTPATIENT
Start: 2019-06-16 | End: 2019-06-19 | Stop reason: HOSPADM

## 2019-06-15 RX ORDER — ACETAMINOPHEN 325 MG/1
650 TABLET ORAL EVERY 4 HOURS PRN
Status: DISCONTINUED | OUTPATIENT
Start: 2019-06-15 | End: 2019-06-19 | Stop reason: HOSPADM

## 2019-06-15 RX ORDER — ONDANSETRON 4 MG/1
4 TABLET, ORALLY DISINTEGRATING ORAL EVERY 6 HOURS PRN
Status: DISCONTINUED | OUTPATIENT
Start: 2019-06-15 | End: 2019-06-19 | Stop reason: HOSPADM

## 2019-06-15 RX ORDER — LABETALOL 20 MG/4 ML (5 MG/ML) INTRAVENOUS SYRINGE
20 EVERY 4 HOURS PRN
Status: DISCONTINUED | OUTPATIENT
Start: 2019-06-15 | End: 2019-06-19 | Stop reason: HOSPADM

## 2019-06-15 RX ADMIN — DEXTROSE AND SODIUM CHLORIDE: 5; 450 INJECTION, SOLUTION INTRAVENOUS at 22:38

## 2019-06-15 RX ADMIN — OXYCODONE HYDROCHLORIDE 5 MG: 5 TABLET ORAL at 23:54

## 2019-06-15 RX ADMIN — APIXABAN 2.5 MG: 2.5 TABLET, FILM COATED ORAL at 21:59

## 2019-06-15 RX ADMIN — METOPROLOL TARTRATE 12.5 MG: 25 TABLET, FILM COATED ORAL at 21:50

## 2019-06-15 RX ADMIN — ACETAMINOPHEN 650 MG: 325 TABLET, FILM COATED ORAL at 21:50

## 2019-06-15 RX ADMIN — SODIUM CHLORIDE: 9 INJECTION, SOLUTION INTRAVENOUS at 20:51

## 2019-06-15 RX ADMIN — POTASSIUM CHLORIDE, DEXTROSE MONOHYDRATE AND SODIUM CHLORIDE: 150; 5; 450 INJECTION, SOLUTION INTRAVENOUS at 21:56

## 2019-06-15 RX ADMIN — HYDROMORPHONE HYDROCHLORIDE 0.3 MG: 1 INJECTION, SOLUTION INTRAMUSCULAR; INTRAVENOUS; SUBCUTANEOUS at 19:17

## 2019-06-15 RX ADMIN — SODIUM CHLORIDE 1000 ML: 9 INJECTION, SOLUTION INTRAVENOUS at 18:16

## 2019-06-15 RX ADMIN — PIPERACILLIN SODIUM,TAZOBACTAM SODIUM 3.38 G: 3; .375 INJECTION, POWDER, FOR SOLUTION INTRAVENOUS at 20:46

## 2019-06-15 RX ADMIN — IOPAMIDOL 70 ML: 755 INJECTION, SOLUTION INTRAVENOUS at 18:53

## 2019-06-15 RX ADMIN — SODIUM CHLORIDE 90 ML: 9 INJECTION, SOLUTION INTRAVENOUS at 18:54

## 2019-06-15 ASSESSMENT — ENCOUNTER SYMPTOMS
DIAPHORESIS: 0
CHILLS: 0
NECK PAIN: 1
FEVER: 0
DYSRHYTHMIAS: 1
COLOR CHANGE: 1

## 2019-06-15 NOTE — CONSULTS
[facility]    Vascular Surgery Consultation    Date of Admission:  6/15/2019    Assessment & Plan   Isael Garcia is a 85 year old male who was admitted on 6/15/2019. I was asked to see the patient for left neck swelling/pain after recent L CEA.  Reviewed and discussed prelim findings with Radiology and Dr. Jett (see below).    -okay for diet now, but NPO at midnight  -plan for left neck exploration tomorrow AM 6/16   -f/u blood work and cultures  -lactate 1.7, start zosyn  -admit to Dr. Jett, Vascular Surgery    Active Problems:    * No active hospital problems. *      Aaron Farias MD    Chief Complaint    left neck swelling/pain     History is obtained from the patient    History of Present Illness   Isael Garcia is a 85 year old male with PMH of PAD s/p EVAR and stroke s/p CEA, Afib, CKD III, HTN,  who presents with left neck swelling/pain.  Patient suffered a left hemispheric stroke with good neurological recovery.  Found to have symptomatic L ICA stenosis >80%.  Patient underwent an urgent L CEA with R ankle GSV vein patch on 5/31/19 by Dr. Ahn.  Discharged on 6/1/19.    Patient seen in clinic on 6/13/19, when he was recovering well and noted to have some mild induration along the incision.    Patient had an uncomplicated hospital course and was recovering well until today, when he noted acute onset of erythema and tenderness to his neck incisions.  Denies any drainage, f/c/n/v/cp/sob.  Endorses some pain with swallowing, but denies any respiratory issues or changes in his voice.  Denies any new neurological changes.     Cr 1.66, lactate 1.7, WBC 12, Hgb 40, blood cx pending    Currently on apixaban, last take this AM.  Last ate at 2pm today.    Prelim CTA as below    FINDINGS: There is a small fluid collection just anterior to the left  sternocleidomastoid muscle. This fluid collection measures about 2 cm  in transverse dimension by 1.5 cm in AP dimension and  approximately  3.4 cm in cephalocaudad dimension. The wall of this fluid collection  is irregular and does demonstrate contrast enhancement. This would be  consistent with an abscess. This extends from the level of the hyoid  bone inferiorly to just below the level of the cricoid. There is  induration of the subcutaneous fat extending from the level of the  mandible to the level of the clavicles. There is obliteration of the  fat planes in the left neck. The left internal carotid artery and left  carotid bifurcation is well seen. No pseudoaneurysm is identified. No  significant stenosis. Calcified atherosclerotic plaque is seen at the  right carotid bifurcation.                                                                      IMPRESSION: Small fluid collection anterior to the left  sternocleidomastoid muscle compatible with a small abscess.    Past Medical History   I have reviewed this patient's medical history and updated it with pertinent information if needed.   Past Medical History:   Diagnosis Date     AAA (abdominal aortic aneurysm) (H) May 2016    infrarenal s/p graft placement at [facility]     Allergic rhinitis, cause unspecified      Atrial fibrillation (H)      CKD (chronic kidney disease) stage 3, GFR 30-59 ml/min (H) 2/4/2013     Lumbago 2000    s/p lumbar epidural injection     OA (osteoarthritis) 2/21/2015     PVD (peripheral vascular disease) (H) 3/7/2016     Shingles 7/09     left forehead     Sleep related leg cramps 2/6/2013     Unspecified cataract     bilateral     Unspecified essential hypertension      Vitamin D deficiency disease 2/6/2013       Past Surgical History   I have reviewed this patient's surgical history and updated it with pertinent information if needed.  Past Surgical History:   Procedure Laterality Date     C NONSPECIFIC PROCEDURE  1960    right inguinal hernia repair     C NONSPECIFIC PROCEDURE  1990s    TURP     C NONSPECIFIC PROCEDURE      tonsillectomy     C  NONSPECIFIC PROCEDURE  2011    Right total knee arthroplasty     C NONSPECIFIC PROCEDURE   2009     left  cataract extraction/IOL placement     C NONSPECIFIC PROCEDURE  May 2016    Endovascular AAA stent graft placed at [facility]. [place] [facility]nic suggests abx prophylaxis for life for procedures (dental/etc)     ENDARTERECTOMY CAROTID Left 2019    Procedure: LEFT CAROTID ENDARTERECTOMY WITH GREAT SAPHENOUS VEIN PATCH WITH EEG MONITORING;  Surgeon: Pedro Ahn MD;  Location: [facility] OR     EXCISE MASS TRUNK N/A 2017    Procedure: EXCISE MASS TRUNK;  Surgeon: Ranjith Lozoya MD;  Location: [facility] SD     EXCISE MASS UPPER EXTREMITY Left 2017    Procedure: EXCISE MASS UPPER EXTREMITY;  Surgeon: Ranjith Lozoya MD;  Location: [facility] SD     HERNIORRHAPHY INGUINAL Left 2017    Procedure: HERNIORRHAPHY INGUINAL;  Surgeon: Ranjith Lozoya MD;  Location:  [facility]       Prior to Admission Medications   Prior to Admission Medications   Prescriptions Last Dose Informant Patient Reported? Taking?   Cholecalciferol (VITAMIN D) 2000 UNITS tablet  Self No No   Sig: Take 2,000 Units by mouth daily   Misc Natural Products (OSTEO BI-FLEX ADV TRIPLE ST) TABS  Self Yes No   Sig: Take 1 tablet by mouth daily   acetaminophen (TYLENOL) 500 MG tablet  Self Yes No   Sig: Take 1,000 mg by mouth daily    amLODIPine (NORVASC) 5 MG tablet  Self No No   Sig: Take 1 tablet (5 mg) by mouth daily   amoxicillin (AMOXIL) 500 MG capsule  Self No No   Si capsules (total 2 grams) orally  1 hour prior to dental procedures   apixaban ANTICOAGULANT (ELIQUIS) 2.5 MG tablet   No No   Sig: Take 1 tablet (2.5 mg) by mouth 2 times daily for 7 days   aspirin (ASA) 81 MG EC tablet   No No   Sig: Take 1 tablet (81 mg) by mouth daily   atorvastatin (LIPITOR) 10 MG tablet  Self No No   Sig: Take 1 tablet (10 mg) by mouth daily   metoprolol tartrate (LOPRESSOR) 25 MG tablet  Self No No   Sig: Take 0.5 tablets (12.5 mg) by mouth 2  times daily   multivitamin, therapeutic with minerals (MULTI-VITAMIN) TABS  Self Yes No   Sig: Take 1 tablet by mouth daily      Facility-Administered Medications: None     Allergies   Allergies   Allergen Reactions     Lisinopril Cramps     Leg cramping       Social History   I have reviewed this patient's social history and updated it with pertinent information if needed. Isael Garcia  reports that he has never smoked. He has never used smokeless tobacco. He reports that he does not drink alcohol or use drugs.    Family History   I have reviewed this patient's family history and updated it with pertinent information if needed.   Family History   Problem Relation Age of Onset     Prostate Cancer Brother        Review of Systems   The 10 point Review of Systems is negative other than noted in the HPI or here.     Physical Exam   Temp: 97.7  F (36.5  C) Temp src: Oral BP: (!) 170/93 Pulse: 95   Resp: 16 SpO2: 96 % O2 Device: None (Room air)    Vital Signs with Ranges  Temp:  [97.7  F (36.5  C)] 97.7  F (36.5  C)  Pulse:  [95] 95  Resp:  [16] 16  BP: (170)/(93) 170/93  SpO2:  [96 %] 96 %  0 lbs 0 oz    Constitutional:  NAD  HEENT: normocephalic, left neck incision erythematous/edematous, no active drainage  CV: RRR  Pulm: CTAB, nonlabored respirations  GI: soft, NT/ND  MSK: warm, dry, pink  Neuro: A&O, motor/sensory grossly intact  Psych: Appropriate    Data   Results for orders placed or performed during the hospital encounter of 06/15/19 (from the past 24 hour(s))   CBC with platelets differential   Result Value Ref Range    WBC 12.6 (H) 4.0 - 11.0 10e9/L    RBC Count 4.25 (L) 4.4 - 5.9 10e12/L    Hemoglobin 13.8 13.3 - 17.7 g/dL    Hematocrit 40.3 40.0 - 53.0 %    MCV 95 78 - 100 fl    MCH 32.5 26.5 - 33.0 pg    MCHC 34.2 31.5 - 36.5 g/dL    RDW 13.2 10.0 - 15.0 %    Platelet Count 271 150 - 450 10e9/L    Diff Method Automated Method     % Neutrophils 83.8 %    % Lymphocytes 9.1 %    % Monocytes 5.2 %     % Eosinophils 1.6 %    % Basophils 0.1 %    % Immature Granulocytes 0.2 %    Nucleated RBCs 0 0 /100    Absolute Neutrophil 10.6 (H) 1.6 - 8.3 10e9/L    Absolute Lymphocytes 1.2 0.8 - 5.3 10e9/L    Absolute Monocytes 0.7 0.0 - 1.3 10e9/L    Absolute Eosinophils 0.2 0.0 - 0.7 10e9/L    Absolute Basophils 0.0 0.0 - 0.2 10e9/L    Abs Immature Granulocytes 0.0 0 - 0.4 10e9/L    Absolute Nucleated RBC 0.0    Basic metabolic panel   Result Value Ref Range    Sodium 137 133 - 144 mmol/L    Potassium 4.1 3.4 - 5.3 mmol/L    Chloride 107 94 - 109 mmol/L    Carbon Dioxide 26 20 - 32 mmol/L    Anion Gap 4 3 - 14 mmol/L    Glucose 131 (H) 70 - 99 mg/dL    Urea Nitrogen 35 (H) 7 - 30 mg/dL    Creatinine 1.63 (H) 0.66 - 1.25 mg/dL    GFR Estimate 38 (L) >60 mL/min/[1.73_m2]    GFR Estimate If Black 44 (L) >60 mL/min/[1.73_m2]    Calcium 8.6 8.5 - 10.1 mg/dL   Lactic acid   Result Value Ref Range    Lactic Acid 1.7 0.4 - 2.0 mmol/L       VASCULAR SURGERY ATTENDING NOTE:  I had received a call earlier today from Mr. Garcia.  He is about 2 weeks from his left carotid endarterectomy with vein patch angioplasty.  He started developing increasing pain and redness in the neck incision.  When I saw him in the emergency department his clinical examination is consistent with a surgical site infection.  We will get blood cultures.  We will start intravenous antibiotics.  We will also undergo CT angiogram of the head and neck.  If there is an abscess then he will be taken to the operating room for surgical debridement and washout.  The plan and rationale was explained to the patient and to his family.    Cam Jett M.D.

## 2019-06-15 NOTE — ED PROVIDER NOTES
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 The patient had a left carotid endarterectomy with great saphenous vein patch with EEG monitoring completed about 2 weeks ago on 5/31/19 by Dr. Ahn of Vascular Surgery. The patient states that the surgery was fairly uncomplicated and he hasn't had any issues until this morning when he started to notice redness and tenderness to his surgical site. The patient states that this pain started to get worse around 1300 today when he was trying to chew his lunch. The patient denies any fevers, chills or diaphoresis. The patient states he took Tylenol Extra Strength prior to arrival to the ED today with improvement in his pain. Upon arrival to the ED Dr. Jett of Vascular Surgery saw the patient prior to my arrival to his room and there is concern for infection.     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  Marital Status:       Review of Systems   Constitutional: Negative for chills, diaphoresis and fever.   Musculoskeletal: Positive for neck pain.   Skin: Positive for color change.   All other systems reviewed and are negative.    Physical Exam     Patient Vitals for the past 24 hrs:   BP Temp Temp src Pulse Resp SpO2   06/15/19 1930 144/84 -- -- 103 -- --   06/15/19 1908 155/75 -- -- 95 -- --   06/15/19 1830 145/84 -- -- 93 -- --   06/15/19 1800 (!) 135/113 -- -- -- -- --   06/15/19 1727 (!) 170/93 97.7  F (36.5  C) Oral 95 16 96 %     Physical Exam  General: Resting comfortably on the gurney, complains of left neck pain.  Head:  The scalp, face, and head appear normal, except for the left neck fullness and redness noted in   the attached photos.   Eyes:  The pupils are equal, round, and reactive to light    There is no nystagmus    Extraocular muscles are intact    Conjunctivae and sclerae are normal  ENT:    The nose is normal    Pinnae are normal    The oropharynx is normal    Uvula is in the midline  Neck:  Decreased range of motion    There is no midline cervical spine pain/tenderness    Trachea is in the midline    There is a well-healed long carotid endarterectomy scar on the left.     There is redness and tenderness most prominently noted in the inferior aspect of   the wound. This is concerning for cellulitis.     Abscess cannot be excluded based on clinical exam. There is no trevor fluctuance.   CV:  Regular rate and underlying rhythm     Normal S1/S2, no S3/S4    No pathological murmur detected  Resp:  Lungs are clear    There is no tachypnea    Non-labored    No rales    No wheezing   GI:  Abdomen is soft, there is no rigidity    No distension    No tympani    No rebound tenderness     Non-surgical without peritoneal features  MS:  Normal muscular tone    Symmetric motor strength    No major joint effusions    No asymmetric leg swelling, no calf tenderness  Skin:  No rash or acute skin  lesions noted  Neuro: Speech is normal and fluent  Psych:  Awake. Alert.      Normal affect.  Appropriate interactions.  Lymph: No anterior cervical lymphadenopathy noted            Emergency Department Course     Imaging:  Radiographic findings were communicated with the patient and family who voiced understanding of the findings.    CT Soft Tissue Neck w Contrast  Small fluid collection anterior to the left  sternocleidomastoid muscle compatible with a small abscess.  As read by radiology.    Laboratory:    1747: Lactic acid: 1.7    BMP: Creatinine 1.63 (H), Glucose 131 (H), Urea Nitrogen 35 (H), GFR Estimate 38 (L), o/w AWNL    CBC: WBC 12.6 (H), RBC 4.25 (L), Absolute Neutrophil 10.6 (H), o/w AWNL (HGB 13.8, )    Blood Culture x2: Pending    Interventions:    1816: NS 1L IV Bolus  1917: Dilaudid 0.3 mg IV  2046: Zosyn 3.375 g IV    Emergency Department Course:  Dr. Jett of Vascular Surgery saw the patient prior to my arrival to the room. I spoke with his regarding his impression of the patient and there is concern for infection.     Past medical records, nursing notes, and vitals reviewed.  1722: I performed an exam of the patient and obtained history, as documented above.    IV inserted and blood drawn.    The patient was sent for a CT scan while in the emergency department, findings above.     1958: Rechecked the patient, findings and plan explained to the patient, who consents to admission.     2026: I discussed the patient with Dr. Jett of Vascular Surgery, who will admit the patient to a monitored bed for further observation, evaluation, and treatment.    Impression & Plan      Medical Decision Making:  Patient presents with a postoperative neck infection on the left.  There is an area of cellulitis and a small contained abscess near the sternocleidomastoid muscle.  The patient does not meet criteria for trevor severe sepsis or septic shock at this time although his lactate is mildly upper limits  of normal at 1.7.  He was hydrated in the emergency department.  He was given a dose of Zosyn intravenously he is on the operating room schedule for tomorrow morning and will get this abscess evacuated.  Patient also is noted to have chronic renal insufficiency.  His Eliquis will be held this evening as he did take his dose this morning.    Diagnosis:    ICD-10-CM    1. Postoperative infection, unspecified type, initial encounter T81.40XA    2. Leukocytosis, unspecified type D72.829    3. Chronic renal impairment, unspecified CKD stage N18.9    4. Abscess of neck L02.11        Disposition:  Admitted to hospital.    Erna Sood  6/15/2019    EMERGENCY DEPARTMENT  I, Erna Sood, am serving as a scribe at 5:22 PM on 6/15/2019 to document services personally performed by Shon Barclay MD based on my observations and the provider's statements to me.        Shon Barclay MD  06/15/19 [id number]

## 2019-06-16 ENCOUNTER — APPOINTMENT (OUTPATIENT)
Dept: GENERAL RADIOLOGY | Facility: CLINIC | Age: 84
DRG: 856 | End: 2019-06-16
Attending: INTERNAL MEDICINE
Payer: COMMERCIAL

## 2019-06-16 ENCOUNTER — ANESTHESIA (OUTPATIENT)
Dept: SURGERY | Facility: CLINIC | Age: 84
DRG: 856 | End: 2019-06-16
Payer: COMMERCIAL

## 2019-06-16 LAB
ABO + RH BLD: NORMAL
ABO + RH BLD: NORMAL
ANION GAP SERPL CALCULATED.3IONS-SCNC: 7 MMOL/L (ref 3–14)
BASE DEFICIT BLDA-SCNC: 2.5 MMOL/L
BLD GP AB SCN SERPL QL: NORMAL
BLOOD BANK CMNT PATIENT-IMP: NORMAL
BUN SERPL-MCNC: 28 MG/DL (ref 7–30)
CALCIUM SERPL-MCNC: 8.2 MG/DL (ref 8.5–10.1)
CHLORIDE SERPL-SCNC: 109 MMOL/L (ref 94–109)
CO2 SERPL-SCNC: 21 MMOL/L (ref 20–32)
CREAT SERPL-MCNC: 1.58 MG/DL (ref 0.66–1.25)
GFR SERPL CREATININE-BSD FRML MDRD: 39 ML/MIN/{1.73_M2}
GLUCOSE BLDC GLUCOMTR-MCNC: 120 MG/DL (ref 70–99)
GLUCOSE BLDC GLUCOMTR-MCNC: 99 MG/DL (ref 70–99)
GLUCOSE SERPL-MCNC: 143 MG/DL (ref 70–99)
GRAM STN SPEC: ABNORMAL
HCO3 BLD-SCNC: 23 MMOL/L (ref 21–28)
HGB BLD-MCNC: 13.3 G/DL (ref 13.3–17.7)
LACTATE BLD-SCNC: 1.2 MMOL/L (ref 0.7–2)
Lab: ABNORMAL
MAGNESIUM SERPL-MCNC: 2.1 MG/DL (ref 1.6–2.3)
O2/TOTAL GAS SETTING VFR VENT: 50 %
OXYHGB MFR BLD: 98 % (ref 92–100)
PCO2 BLD: 39 MM HG (ref 35–45)
PH BLD: 7.37 PH (ref 7.35–7.45)
PHOSPHATE SERPL-MCNC: 2.6 MG/DL (ref 2.5–4.5)
PO2 BLD: 132 MM HG (ref 80–105)
POTASSIUM SERPL-SCNC: 4 MMOL/L (ref 3.4–5.3)
SODIUM SERPL-SCNC: 137 MMOL/L (ref 133–144)
SPECIMEN EXP DATE BLD: NORMAL
SPECIMEN SOURCE: ABNORMAL

## 2019-06-16 PROCEDURE — 83605 ASSAY OF LACTIC ACID: CPT | Performed by: SURGERY

## 2019-06-16 PROCEDURE — 37000008 ZZH ANESTHESIA TECHNICAL FEE, 1ST 30 MIN: Performed by: SURGERY

## 2019-06-16 PROCEDURE — 87186 SC STD MICRODIL/AGAR DIL: CPT | Performed by: SURGERY

## 2019-06-16 PROCEDURE — 25800030 ZZH RX IP 258 OP 636: Performed by: SURGERY

## 2019-06-16 PROCEDURE — 40000986 XR CHEST PORT 1 VW

## 2019-06-16 PROCEDURE — 40000986 XR ABDOMEN PORT 1 VW

## 2019-06-16 PROCEDURE — 35800 EXPLORE NECK VESSELS: CPT | Mod: 78 | Performed by: SURGERY

## 2019-06-16 PROCEDURE — 87077 CULTURE AEROBIC IDENTIFY: CPT | Performed by: SURGERY

## 2019-06-16 PROCEDURE — 80048 BASIC METABOLIC PNL TOTAL CA: CPT | Performed by: STUDENT IN AN ORGANIZED HEALTH CARE EDUCATION/TRAINING PROGRAM

## 2019-06-16 PROCEDURE — 86850 RBC ANTIBODY SCREEN: CPT | Performed by: STUDENT IN AN ORGANIZED HEALTH CARE EDUCATION/TRAINING PROGRAM

## 2019-06-16 PROCEDURE — 83735 ASSAY OF MAGNESIUM: CPT | Performed by: STUDENT IN AN ORGANIZED HEALTH CARE EDUCATION/TRAINING PROGRAM

## 2019-06-16 PROCEDURE — 85027 COMPLETE CBC AUTOMATED: CPT | Performed by: INTERNAL MEDICINE

## 2019-06-16 PROCEDURE — 36415 COLL VENOUS BLD VENIPUNCTURE: CPT | Performed by: STUDENT IN AN ORGANIZED HEALTH CARE EDUCATION/TRAINING PROGRAM

## 2019-06-16 PROCEDURE — 36000063 ZZH SURGERY LEVEL 4 EA 15 ADDTL MIN: Performed by: SURGERY

## 2019-06-16 PROCEDURE — 0WJ60ZZ INSPECTION OF NECK, OPEN APPROACH: ICD-10-PCS | Performed by: SURGERY

## 2019-06-16 PROCEDURE — 94003 VENT MGMT INPAT SUBQ DAY: CPT

## 2019-06-16 PROCEDURE — 25000132 ZZH RX MED GY IP 250 OP 250 PS 637: Performed by: STUDENT IN AN ORGANIZED HEALTH CARE EDUCATION/TRAINING PROGRAM

## 2019-06-16 PROCEDURE — 86901 BLOOD TYPING SEROLOGIC RH(D): CPT | Performed by: STUDENT IN AN ORGANIZED HEALTH CARE EDUCATION/TRAINING PROGRAM

## 2019-06-16 PROCEDURE — 86900 BLOOD TYPING SEROLOGIC ABO: CPT | Performed by: STUDENT IN AN ORGANIZED HEALTH CARE EDUCATION/TRAINING PROGRAM

## 2019-06-16 PROCEDURE — 36000093 ZZH SURGERY LEVEL 4 1ST 30 MIN: Performed by: SURGERY

## 2019-06-16 PROCEDURE — 37000009 ZZH ANESTHESIA TECHNICAL FEE, EACH ADDTL 15 MIN: Performed by: SURGERY

## 2019-06-16 PROCEDURE — 25000128 H RX IP 250 OP 636

## 2019-06-16 PROCEDURE — 99232 SBSQ HOSP IP/OBS MODERATE 35: CPT | Performed by: INTERNAL MEDICINE

## 2019-06-16 PROCEDURE — 87075 CULTR BACTERIA EXCEPT BLOOD: CPT | Performed by: SURGERY

## 2019-06-16 PROCEDURE — 25000128 H RX IP 250 OP 636: Performed by: ANESTHESIOLOGY

## 2019-06-16 PROCEDURE — 25800030 ZZH RX IP 258 OP 636: Performed by: NURSE ANESTHETIST, CERTIFIED REGISTERED

## 2019-06-16 PROCEDURE — 25000125 ZZHC RX 250: Performed by: SURGERY

## 2019-06-16 PROCEDURE — 99207 ZZC MOONLIGHTING INDICATOR: CPT | Performed by: INTERNAL MEDICINE

## 2019-06-16 PROCEDURE — 87102 FUNGUS ISOLATION CULTURE: CPT | Performed by: SURGERY

## 2019-06-16 PROCEDURE — 25000128 H RX IP 250 OP 636: Performed by: STUDENT IN AN ORGANIZED HEALTH CARE EDUCATION/TRAINING PROGRAM

## 2019-06-16 PROCEDURE — 85018 HEMOGLOBIN: CPT | Performed by: STUDENT IN AN ORGANIZED HEALTH CARE EDUCATION/TRAINING PROGRAM

## 2019-06-16 PROCEDURE — 36415 COLL VENOUS BLD VENIPUNCTURE: CPT | Performed by: SURGERY

## 2019-06-16 PROCEDURE — 99207 ZZC NON-BILLABLE SERV PER CHARTING: CPT | Performed by: NURSE PRACTITIONER

## 2019-06-16 PROCEDURE — 87070 CULTURE OTHR SPECIMN AEROBIC: CPT | Performed by: SURGERY

## 2019-06-16 PROCEDURE — 40000169 ZZH STATISTIC PRE-PROCEDURE ASSESSMENT I: Performed by: SURGERY

## 2019-06-16 PROCEDURE — 84100 ASSAY OF PHOSPHORUS: CPT | Performed by: STUDENT IN AN ORGANIZED HEALTH CARE EDUCATION/TRAINING PROGRAM

## 2019-06-16 PROCEDURE — 25000128 H RX IP 250 OP 636: Performed by: SURGERY

## 2019-06-16 PROCEDURE — 27210794 ZZH OR GENERAL SUPPLY STERILE: Performed by: SURGERY

## 2019-06-16 PROCEDURE — 25000566 ZZH SEVOFLURANE, EA 15 MIN: Performed by: SURGERY

## 2019-06-16 PROCEDURE — 40000008 ZZH STATISTIC AIRWAY CARE

## 2019-06-16 PROCEDURE — 25000132 ZZH RX MED GY IP 250 OP 250 PS 637: Performed by: NURSE PRACTITIONER

## 2019-06-16 PROCEDURE — 99207 ZZC CDG-MDM COMPONENT: MEETS LOW - DOWN CODED: CPT | Performed by: INTERNAL MEDICINE

## 2019-06-16 PROCEDURE — 85610 PROTHROMBIN TIME: CPT | Performed by: INTERNAL MEDICINE

## 2019-06-16 PROCEDURE — 82805 BLOOD GASES W/O2 SATURATION: CPT | Performed by: INTERNAL MEDICINE

## 2019-06-16 PROCEDURE — 94002 VENT MGMT INPAT INIT DAY: CPT

## 2019-06-16 PROCEDURE — 00000146 ZZHCL STATISTIC GLUCOSE BY METER IP

## 2019-06-16 PROCEDURE — 25000125 ZZHC RX 250: Performed by: NURSE ANESTHETIST, CERTIFIED REGISTERED

## 2019-06-16 PROCEDURE — 87205 SMEAR GRAM STAIN: CPT | Performed by: SURGERY

## 2019-06-16 PROCEDURE — 99291 CRITICAL CARE FIRST HOUR: CPT | Performed by: INTERNAL MEDICINE

## 2019-06-16 PROCEDURE — 25000128 H RX IP 250 OP 636: Performed by: INTERNAL MEDICINE

## 2019-06-16 PROCEDURE — 0J950ZZ DRAINAGE OF LEFT NECK SUBCUTANEOUS TISSUE AND FASCIA, OPEN APPROACH: ICD-10-PCS | Performed by: SURGERY

## 2019-06-16 PROCEDURE — 40000275 ZZH STATISTIC RCP TIME EA 10 MIN

## 2019-06-16 PROCEDURE — 25000128 H RX IP 250 OP 636: Performed by: NURSE ANESTHETIST, CERTIFIED REGISTERED

## 2019-06-16 PROCEDURE — 20000003 ZZH R&B ICU

## 2019-06-16 PROCEDURE — 25800030 ZZH RX IP 258 OP 636: Performed by: STUDENT IN AN ORGANIZED HEALTH CARE EDUCATION/TRAINING PROGRAM

## 2019-06-16 RX ORDER — NALOXONE HYDROCHLORIDE 0.4 MG/ML
.1-.4 INJECTION, SOLUTION INTRAMUSCULAR; INTRAVENOUS; SUBCUTANEOUS
Status: DISCONTINUED | OUTPATIENT
Start: 2019-06-16 | End: 2019-06-19 | Stop reason: HOSPADM

## 2019-06-16 RX ORDER — PROPOFOL 10 MG/ML
INJECTION, EMULSION INTRAVENOUS
Status: COMPLETED
Start: 2019-06-16 | End: 2019-06-16

## 2019-06-16 RX ORDER — FENTANYL CITRATE 50 UG/ML
25-50 INJECTION, SOLUTION INTRAMUSCULAR; INTRAVENOUS
Status: DISCONTINUED | OUTPATIENT
Start: 2019-06-16 | End: 2019-06-16

## 2019-06-16 RX ORDER — PROPOFOL 10 MG/ML
INJECTION, EMULSION INTRAVENOUS CONTINUOUS PRN
Status: DISCONTINUED | OUTPATIENT
Start: 2019-06-16 | End: 2019-06-16

## 2019-06-16 RX ORDER — PROPOFOL 10 MG/ML
INJECTION, EMULSION INTRAVENOUS PRN
Status: DISCONTINUED | OUTPATIENT
Start: 2019-06-16 | End: 2019-06-16

## 2019-06-16 RX ORDER — FENTANYL CITRATE 50 UG/ML
50-100 INJECTION, SOLUTION INTRAMUSCULAR; INTRAVENOUS
Status: DISCONTINUED | OUTPATIENT
Start: 2019-06-16 | End: 2019-06-16

## 2019-06-16 RX ORDER — FENTANYL CITRATE 50 UG/ML
INJECTION, SOLUTION INTRAMUSCULAR; INTRAVENOUS PRN
Status: DISCONTINUED | OUTPATIENT
Start: 2019-06-16 | End: 2019-06-16

## 2019-06-16 RX ORDER — CEFAZOLIN SODIUM 2 G/100ML
2 INJECTION, SOLUTION INTRAVENOUS
Status: DISCONTINUED | OUTPATIENT
Start: 2019-06-16 | End: 2019-06-16 | Stop reason: CLARIF

## 2019-06-16 RX ORDER — CEFAZOLIN SODIUM 1 G/3ML
1 INJECTION, POWDER, FOR SOLUTION INTRAMUSCULAR; INTRAVENOUS SEE ADMIN INSTRUCTIONS
Status: DISCONTINUED | OUTPATIENT
Start: 2019-06-16 | End: 2019-06-16 | Stop reason: CLARIF

## 2019-06-16 RX ORDER — ONDANSETRON 4 MG/1
4 TABLET, ORALLY DISINTEGRATING ORAL EVERY 30 MIN PRN
Status: DISCONTINUED | OUTPATIENT
Start: 2019-06-16 | End: 2019-06-16

## 2019-06-16 RX ORDER — SODIUM CHLORIDE, SODIUM LACTATE, POTASSIUM CHLORIDE, CALCIUM CHLORIDE 600; 310; 30; 20 MG/100ML; MG/100ML; MG/100ML; MG/100ML
INJECTION, SOLUTION INTRAVENOUS CONTINUOUS PRN
Status: DISCONTINUED | OUTPATIENT
Start: 2019-06-16 | End: 2019-06-16

## 2019-06-16 RX ORDER — MAGNESIUM HYDROXIDE 1200 MG/15ML
LIQUID ORAL PRN
Status: DISCONTINUED | OUTPATIENT
Start: 2019-06-16 | End: 2019-06-16 | Stop reason: HOSPADM

## 2019-06-16 RX ORDER — HYDROMORPHONE HYDROCHLORIDE 1 MG/ML
0.2 INJECTION, SOLUTION INTRAMUSCULAR; INTRAVENOUS; SUBCUTANEOUS EVERY 30 MIN PRN
Status: DISCONTINUED | OUTPATIENT
Start: 2019-06-16 | End: 2019-06-19 | Stop reason: HOSPADM

## 2019-06-16 RX ORDER — SODIUM CHLORIDE, SODIUM LACTATE, POTASSIUM CHLORIDE, CALCIUM CHLORIDE 600; 310; 30; 20 MG/100ML; MG/100ML; MG/100ML; MG/100ML
INJECTION, SOLUTION INTRAVENOUS CONTINUOUS
Status: DISCONTINUED | OUTPATIENT
Start: 2019-06-16 | End: 2019-06-16 | Stop reason: HOSPADM

## 2019-06-16 RX ORDER — ONDANSETRON 2 MG/ML
4 INJECTION INTRAMUSCULAR; INTRAVENOUS EVERY 30 MIN PRN
Status: DISCONTINUED | OUTPATIENT
Start: 2019-06-16 | End: 2019-06-16

## 2019-06-16 RX ORDER — SODIUM CHLORIDE, SODIUM LACTATE, POTASSIUM CHLORIDE, CALCIUM CHLORIDE 600; 310; 30; 20 MG/100ML; MG/100ML; MG/100ML; MG/100ML
INJECTION, SOLUTION INTRAVENOUS CONTINUOUS
Status: DISCONTINUED | OUTPATIENT
Start: 2019-06-16 | End: 2019-06-17

## 2019-06-16 RX ORDER — FENTANYL CITRATE 50 UG/ML
50 INJECTION, SOLUTION INTRAMUSCULAR; INTRAVENOUS ONCE
Status: COMPLETED | OUTPATIENT
Start: 2019-06-16 | End: 2019-06-16

## 2019-06-16 RX ORDER — HYDROMORPHONE HYDROCHLORIDE 1 MG/ML
.3-.5 INJECTION, SOLUTION INTRAMUSCULAR; INTRAVENOUS; SUBCUTANEOUS EVERY 5 MIN PRN
Status: DISCONTINUED | OUTPATIENT
Start: 2019-06-16 | End: 2019-06-16

## 2019-06-16 RX ORDER — SODIUM CHLORIDE, SODIUM LACTATE, POTASSIUM CHLORIDE, CALCIUM CHLORIDE 600; 310; 30; 20 MG/100ML; MG/100ML; MG/100ML; MG/100ML
INJECTION, SOLUTION INTRAVENOUS CONTINUOUS
Status: DISCONTINUED | OUTPATIENT
Start: 2019-06-16 | End: 2019-06-16

## 2019-06-16 RX ORDER — LIDOCAINE HYDROCHLORIDE 20 MG/ML
INJECTION, SOLUTION INFILTRATION; PERINEURAL PRN
Status: DISCONTINUED | OUTPATIENT
Start: 2019-06-16 | End: 2019-06-16

## 2019-06-16 RX ORDER — LIDOCAINE 40 MG/G
CREAM TOPICAL
Status: DISCONTINUED | OUTPATIENT
Start: 2019-06-16 | End: 2019-06-17

## 2019-06-16 RX ORDER — PROPOFOL 10 MG/ML
5-75 INJECTION, EMULSION INTRAVENOUS CONTINUOUS
Status: DISCONTINUED | OUTPATIENT
Start: 2019-06-16 | End: 2019-06-17

## 2019-06-16 RX ORDER — NALOXONE HYDROCHLORIDE 0.4 MG/ML
.1-.4 INJECTION, SOLUTION INTRAMUSCULAR; INTRAVENOUS; SUBCUTANEOUS
Status: DISCONTINUED | OUTPATIENT
Start: 2019-06-16 | End: 2019-06-16

## 2019-06-16 RX ORDER — HEPARIN SODIUM 5000 [USP'U]/.5ML
5000 INJECTION, SOLUTION INTRAVENOUS; SUBCUTANEOUS EVERY 8 HOURS
Status: DISCONTINUED | OUTPATIENT
Start: 2019-06-16 | End: 2019-06-16

## 2019-06-16 RX ADMIN — PROPOFOL 30 MCG/KG/MIN: 10 INJECTION, EMULSION INTRAVENOUS at 22:06

## 2019-06-16 RX ADMIN — FENTANYL CITRATE 50 MCG: 50 INJECTION, SOLUTION INTRAMUSCULAR; INTRAVENOUS at 11:29

## 2019-06-16 RX ADMIN — LIDOCAINE HYDROCHLORIDE 100 MG: 20 INJECTION, SOLUTION INFILTRATION; PERINEURAL at 10:19

## 2019-06-16 RX ADMIN — CEFEPIME 2 G: 2 INJECTION, POWDER, FOR SOLUTION INTRAVENOUS at 21:02

## 2019-06-16 RX ADMIN — PROPOFOL 40 MCG/KG/MIN: 10 INJECTION, EMULSION INTRAVENOUS at 15:50

## 2019-06-16 RX ADMIN — CEFEPIME 2 G: 2 INJECTION, POWDER, FOR SOLUTION INTRAVENOUS at 09:18

## 2019-06-16 RX ADMIN — PHENYLEPHRINE HYDROCHLORIDE 150 MCG: 10 INJECTION INTRAVENOUS at 11:04

## 2019-06-16 RX ADMIN — ACETAMINOPHEN 650 MG: 325 TABLET, FILM COATED ORAL at 03:40

## 2019-06-16 RX ADMIN — FENTANYL CITRATE 100 MCG: 50 INJECTION, SOLUTION INTRAMUSCULAR; INTRAVENOUS at 10:19

## 2019-06-16 RX ADMIN — PROPOFOL 160 MG: 10 INJECTION, EMULSION INTRAVENOUS at 10:19

## 2019-06-16 RX ADMIN — VANCOMYCIN HYDROCHLORIDE 1500 MG: 5 INJECTION, POWDER, LYOPHILIZED, FOR SOLUTION INTRAVENOUS at 03:41

## 2019-06-16 RX ADMIN — FENTANYL CITRATE 50 MCG: 50 INJECTION, SOLUTION INTRAMUSCULAR; INTRAVENOUS at 11:42

## 2019-06-16 RX ADMIN — ROCURONIUM BROMIDE 30 MG: 10 INJECTION INTRAVENOUS at 10:27

## 2019-06-16 RX ADMIN — APIXABAN 2.5 MG: 2.5 TABLET, FILM COATED ORAL at 21:01

## 2019-06-16 RX ADMIN — SUCCINYLCHOLINE CHLORIDE 80 MG: 20 INJECTION, SOLUTION INTRAMUSCULAR; INTRAVENOUS; PARENTERAL at 10:19

## 2019-06-16 RX ADMIN — PHENYLEPHRINE HYDROCHLORIDE 0.4 MCG/KG/MIN: 10 INJECTION INTRAVENOUS at 10:27

## 2019-06-16 RX ADMIN — PHENYLEPHRINE HYDROCHLORIDE 150 MCG: 10 INJECTION INTRAVENOUS at 10:32

## 2019-06-16 RX ADMIN — CEFEPIME 2 G: 2 INJECTION, POWDER, FOR SOLUTION INTRAVENOUS at 00:05

## 2019-06-16 RX ADMIN — HYDROMORPHONE HYDROCHLORIDE 0.2 MG: 1 INJECTION, SOLUTION INTRAMUSCULAR; INTRAVENOUS; SUBCUTANEOUS at 16:29

## 2019-06-16 RX ADMIN — METOPROLOL TARTRATE 12.5 MG: 25 TABLET, FILM COATED ORAL at 08:03

## 2019-06-16 RX ADMIN — ROCURONIUM BROMIDE 20 MG: 10 INJECTION INTRAVENOUS at 11:20

## 2019-06-16 RX ADMIN — OXYCODONE HYDROCHLORIDE 5 MG: 5 TABLET ORAL at 21:02

## 2019-06-16 RX ADMIN — ROCURONIUM BROMIDE 20 MG: 10 INJECTION INTRAVENOUS at 11:47

## 2019-06-16 RX ADMIN — SODIUM CHLORIDE, POTASSIUM CHLORIDE, SODIUM LACTATE AND CALCIUM CHLORIDE: 600; 310; 30; 20 INJECTION, SOLUTION INTRAVENOUS at 15:35

## 2019-06-16 RX ADMIN — APIXABAN 2.5 MG: 2.5 TABLET, FILM COATED ORAL at 08:04

## 2019-06-16 RX ADMIN — PROPOFOL 40 MG: 10 INJECTION, EMULSION INTRAVENOUS at 12:26

## 2019-06-16 RX ADMIN — FENTANYL CITRATE 50 MCG: 50 INJECTION INTRAMUSCULAR; INTRAVENOUS at 09:09

## 2019-06-16 RX ADMIN — METOPROLOL TARTRATE 12.5 MG: 25 TABLET, FILM COATED ORAL at 21:02

## 2019-06-16 RX ADMIN — SODIUM CHLORIDE, POTASSIUM CHLORIDE, SODIUM LACTATE AND CALCIUM CHLORIDE: 600; 310; 30; 20 INJECTION, SOLUTION INTRAVENOUS at 09:45

## 2019-06-16 RX ADMIN — PROPOFOL 100 MCG/KG/MIN: 10 INJECTION, EMULSION INTRAVENOUS at 12:08

## 2019-06-16 RX ADMIN — PROPOFOL 40 MG: 10 INJECTION, EMULSION INTRAVENOUS at 12:10

## 2019-06-16 RX ADMIN — SODIUM CHLORIDE, POTASSIUM CHLORIDE, SODIUM LACTATE AND CALCIUM CHLORIDE: 600; 310; 30; 20 INJECTION, SOLUTION INTRAVENOUS at 10:50

## 2019-06-16 RX ADMIN — HYDROMORPHONE HYDROCHLORIDE 0.2 MG: 1 INJECTION, SOLUTION INTRAMUSCULAR; INTRAVENOUS; SUBCUTANEOUS at 18:41

## 2019-06-16 RX ADMIN — PHENYLEPHRINE HYDROCHLORIDE 150 MCG: 10 INJECTION INTRAVENOUS at 10:28

## 2019-06-16 RX ADMIN — PHENYLEPHRINE HYDROCHLORIDE 150 MCG: 10 INJECTION INTRAVENOUS at 12:15

## 2019-06-16 ASSESSMENT — ACTIVITIES OF DAILY LIVING (ADL)
ADLS_ACUITY_SCORE: 15
ADLS_ACUITY_SCORE: 14

## 2019-06-16 ASSESSMENT — LIFESTYLE VARIABLES: TOBACCO_USE: 0

## 2019-06-16 ASSESSMENT — COPD QUESTIONNAIRES: COPD: 0

## 2019-06-16 NOTE — ANESTHESIA PROCEDURE NOTES
ARTERIAL LINE PROCEDURE NOTE:   Pre-Procedure  Performed by Michael Barriga MD  Location: OR      Pre-Anesthestic Checklist: patient identified, IV checked, risks and benefits discussed, informed consent, monitors and equipment checked, pre-op evaluation and at physician/surgeon's request    Timeout  Correct Patient: Yes   Correct Procedure: Yes   Correct Site: Yes   Correct Laterality: N/A   Correct Position: Yes   Site Marked: N/A   .   Procedure Documentation  Procedure: arterial line    Supine  Insertion Site:left, radial.Skin infiltrated with mL of 1% lidocaine. Injection technique: Seldinger Technique and ultrasound guided  .  .  Patient Prep;chlorhexidine gluconate and isopropyl alcohol, patient draped    Assessment/Narrative    Catheter: 20 gauge, 1.75 in/4.5 cm quick cath (integral wire)     Secured by other  Tegaderm dressing used.    Arterial waveform: Yes     Comments:  A sterile prep of chlorhexidine was used for preparation. A hat, sterile gloves, and mask were wore by the provider of care. No complications. Arterial line secured further with tape.

## 2019-06-16 NOTE — ANESTHESIA POSTPROCEDURE EVALUATION
Patient: Isael Garcia    Procedure(s):  LEFT NECK EXPLORATION    Diagnosis:LEFT NECK ABCESS  Diagnosis Additional Information: No value filed.    Anesthesia Type:  General, ETT, RSI    Note:  Anesthesia Post Evaluation    Patient location during evaluation: ICU  Patient participation: Unable to evaluate secondary to administered sedation  Level of consciousness: obtunded/minimal responses  Pain management: adequate  Airway patency: patent  Cardiovascular status: acceptable  Respiratory status: acceptable and intubated  Hydration status: acceptable  PONV: none     Anesthetic complications: None    Comments: The patient was transported to the ICU intubated, sedated, monitored, and hand ventilated. No issues noted during transfer. Report was given to the ICU staff.         Last vitals:  Vitals:    06/16/19 1330 06/16/19 1345 06/16/19 1358   BP:  166/82    Pulse:      Resp: 14 14 14   Temp:      SpO2: 100% 100% 100%         Electronically Signed By: Michael Barriga MD  June 16, 2019  1:59 PM

## 2019-06-16 NOTE — PROGRESS NOTES
RECEIVING UNIT ED HANDOFF REVIEW    ED Nurse Handoff Report was reviewed by: Tyrel Kent on Francoise 15, 2019 at 8:11 PM

## 2019-06-16 NOTE — PLAN OF CARE
Vital signs stable except temp max 101.5 and 1.5 L nasal cannula. Alert and oriented. Lung sounds diminished. Bowel sounds hypactive, flatus present. Left neck incision with edema that extends to neck and redness extending to mid chest. Mild serous drainage from incision. Patient denies any swallowing difficulties and shows no signs of stridor.  Pain controlled with tylenol/oxy. Up stand by assist. Tolerating NPO at midnight. CMS/neuros intact. See house doc/provider notification notes for more info.

## 2019-06-16 NOTE — PROGRESS NOTES
[facility]  Comprehensive Daily ICU Note        Isael Garcia MRN# [id number]   Age: 85 year old YOB: 1934     Date of Admission: 6/15/2019    Primary care provider: Madhu Velasquez     CODE STATUS: FULL      Problem List:   Surgical site infection  CKD  Postoperative ventilatory requirement         Treatment goals for next 24 hours:   Lung protective ventilation  Postoperative cares  Hope for pressure support in the morning    Maira Grimm MD        Subjective/ Last 24 hours:   Events: 84 yo man who underwent I an D of superficial abscess at site of recent carotid endarterectomy. Although infection was not as extensive as initially feared and surgery was uncomplicated, surgeons feel extubation today would be high risk given possibility of airway swelling and want to wait to reassess tomorrow before attempting extubation. Patient is sedated and looks comfortable.  PMH is significant for PVD, CAD, CVD, CKD.            Mechanical Ventilation/Vitalsigns/IsandOs:   Temp:  [96.3  F (35.7  C)-101.5  F (38.6  C)] 98  F (36.7  C)  Pulse:  [] 58  Heart Rate:  [57-68] 60  Resp:  [10-18] 13  BP: (108-170)/() 131/70  MAP:  [72 mmHg-98 mmHg] 80 mmHg  Arterial Line BP: (117-149)/(45-70) 130/49  SpO2:  [93 %-100 %] 100 %      Intake/Output Summary (Last 24 hours) at 6/16/2019 1612  Last data filed at 6/16/2019 1400  Gross per 24 hour   Intake 2190 ml   Output 995 ml   Net 1195 ml   Net for hospital stay: -100      Ventilation Mode: CMV/AC  (Continuous Mandatory Ventilation/ Assist Control)  Rate Set (breaths/minute): 14 breaths/min  Tidal Volume Set (mL): 500 mL  PEEP (cm H2O): 5 cmH2O  Oxygen Concentration (%): 50 %  Resp: 13      Day since 6/16    Peak airway pressure: low 20s         Physical Examination:     General: Stated age, intubated  HEENT: ET tube, large bandage on the left side of the neck  Lungs: LCTAB anteriorly  CVS: RRR, S1S2, murmur  Abdomen: +BS, soft and non  tender  Extremities/musculoskeletal: cool, no edema, +pulses  Neurology: sedated  Skin: nevi, OTW no abnormalities  Psychiatry: unable  Exam of Line sites:  Arterial line            Feeding/Glucose:   NPO    Blood glucose/Insulin requirement last 24 hours: none         Medications:       amLODIPine  5 mg Oral Daily     apixaban ANTICOAGULANT  2.5 mg Oral BID     aspirin  81 mg Oral Daily     atorvastatin  10 mg Oral Daily     ceFEPIme (MAXIPIME) IV  2 g Intravenous Q12H     metoprolol tartrate  12.5 mg Oral BID     sodium chloride (PF)  3 mL Intracatheter Q8H     vancomycin place daniels - receiving intermittent dosing  1 each Intravenous See Admin Instructions             Labs/Diagnostic studies:       Echo:  No large abnormalities    ROUTINE ICU LABS (Last four results)  CMP  Recent Labs   Lab 06/16/19  0409 06/15/19  1747    137   POTASSIUM 4.0 4.1   CHLORIDE 109 107   CO2 21 26   ANIONGAP 7 4   * 131*   BUN 28 35*   CR 1.58* 1.63*   GFRESTIMATED 39* 38*   GFRESTBLACK 46* 44*   KULDIP 8.2* 8.6     CBC  Recent Labs   Lab 06/16/19  0409 06/15/19  1747   WBC  --  12.6*   RBC  --  4.25*   HGB 13.3 13.8   HCT  --  40.3   MCV  --  95   MCH  --  32.5   MCHC  --  34.2   RDW  --  13.2   PLT  --  271     INRNo lab results found in last 7 days.  Arterial Blood GasNo lab results found in last 7 days.    Other Lab Data:  Gram stain: GPC  Lactic acid: normal    Cultures:  Recent Labs   Lab 06/16/19  1144 06/15/19  1747   CULT PENDING  PENDING  PENDING No growth after 17 hours  No growth after 17 hours     Blood culture:  Invalid input(s): BC   Urine culture:  No results for input(s): URC in the last 168 hours.          Imaging:     CXR:  pending    CT:  Soft tissue swelling in neck         Assessment and Plan:     Summary:  Isael Garcia is a 85 year old male admitted on 6/15/2019 for infection at site of recent carotid endarterectomy.  I have personally reviewed the daily labs, imaging studies, cultures  and discussed the case with referring physician and consulting physicians. My assessment and plan as follows:    Neurology and Psychiatry:  Looks calm on Propofol. Pain medication ordered.  - Continue until extubation.    Pulmonary:   Postoperative ventilatory requirement.  - Continue lung protective ventilation. Check ET tube placement w XRAY. Hope to extubate tomorrow.     Cardiovascular system:   Vasculopath but no acute issues.   - Place feeding tube to continue antihypertensives    Renal/Electrolytes:  CKD. Creatinine at baseline.   - Continue maintenance IVF until patient able to eat and drink. Monitor electrolytes.     ID:  Surgical site infection. So far GPC seen on gram stain. No signs of sepsis at this point so hope we have source control.   - Antibiotics and narrow when possible    GI/:  Place feeding tube    Nutrition:   Start nutrition tomorrow if unable to extubate    Musculoskeletal/Rheumatology:  No issues  - PT tomorrow    Endocrine:   No current issues    Heme/Onc:  No report of significant blood loss in surgery  - Check hemoglobin and INR  - Continue ASA    ICU prophylaxis:      DVT: therapeutic anticoagulation with apixaban     VAP: As above     Stress ulcer: Start tomorrow if not extubated     Restraints needed: y     Lines   Central Line/PICC - placed n needed: n   Arterial line - placed 6/16 needed: for today   Shepherd catheter.  Needed: y       Prognosis:    Good for leaving ICU      Family update: son bedside    Billing: total time spend providing critical care was 60 min, excluding procedure time.    Maira Grimm MD  [phone]

## 2019-06-16 NOTE — ANESTHESIA PREPROCEDURE EVALUATION
Anesthesia Pre-Procedure Evaluation    Patient: Isael Garcia   MRN: [id number] : 1934          Preoperative Diagnosis: LEFT NECK ABCESS    Procedure(s):  LEFT NECK EXPLORATION    Past Medical History:   Diagnosis Date     AAA (abdominal aortic aneurysm) (H) May 2016    infrarenal s/p graft placement at [facility]     Allergic rhinitis, cause unspecified      Atrial fibrillation (H)      CKD (chronic kidney disease) stage 3, GFR 30-59 ml/min (H) 2013     Lumbago 2000    s/p lumbar epidural injection     OA (osteoarthritis) 2015     PVD (peripheral vascular disease) (H) 3/7/2016     Shingles      left forehead     Sleep related leg cramps 2013     Unspecified cataract     bilateral     Unspecified essential hypertension      Vitamin D deficiency disease 2013     Past Surgical History:   Procedure Laterality Date     C NONSPECIFIC PROCEDURE      right inguinal hernia repair     C NONSPECIFIC PROCEDURE      TURP     C NONSPECIFIC PROCEDURE      tonsillectomy     C NONSPECIFIC PROCEDURE  2011    Right total knee arthroplasty     C NONSPECIFIC PROCEDURE   2009     left  cataract extraction/IOL placement     C NONSPECIFIC PROCEDURE  May 2016    Endovascular AAA stent graft placed at [facility]. [facility] suggests abx prophylaxis for life for procedures (dental/etc)     ENDARTERECTOMY CAROTID Left 2019    Procedure: LEFT CAROTID ENDARTERECTOMY WITH GREAT SAPHENOUS VEIN PATCH WITH EEG MONITORING;  Surgeon: Pedro Ahn MD;  Location:  OR     EXCISE MASS TRUNK N/A 2017    Procedure: EXCISE MASS TRUNK;  Surgeon: Ranjith Lozoya MD;  Location:  SD     EXCISE MASS UPPER EXTREMITY Left 2017    Procedure: EXCISE MASS UPPER EXTREMITY;  Surgeon: Ranjith Lozoya MD;  Location:  SD     HERNIORRHAPHY INGUINAL Left 2017    Procedure: HERNIORRHAPHY INGUINAL;  Surgeon: Ranjith Lozoya MD;  Location:  SD       Anesthesia  Evaluation     . Pt has had prior anesthetic. Type: General    No history of anesthetic complications          ROS/MED HX    ENT/Pulmonary:      (-) tobacco use, asthma, COPD and sleep apnea   Neurologic:     (+)TIA date: 2019 features: balance issues, resolved in 6 hours,     Cardiovascular: Comment: S/p AAA repair  Narrative  [id number]  [id number]  [id number]  [id number]^ALEXEY^TALIA^STEFAN        [facility]  Echocardiography Laboratory  [facility] [place] [address]      Name: JOHN MATA  MRN: [id number]  : 1934  Study Date: 2019 02:59 PM  Age: 85 yrs  Gender: Male  Patient Location: [facility]  Show more    Reason For Study: TIA  Ordering Physician: TALIA BLACKBURN  Referring Physician: NATIVIDAD DURANT  Performed By: Joaquin Pelletier RDCS    BSA: 1.9 m2  Height: 67 in  Weight: 165 lb  HR: 42  BP: 143/66 mmHg  _____________________________________________________________________________  __      Procedure  Complete Portable Bubble Echo Adult.  _____________________________________________________________________________  __      Interpretation Summary    The left ventricle is normal in size.  Left ventricular systolic function is normal.  The visual ejection fraction is estimated at 55-60%.  The aortic valve is trileaflet with aortic valve sclerosis.  There is mild (1+) aortic regurgitation.  A contrast injection (Bubble Study) was performed that was mildly positive for  flow across the interatrial septum.  Right to left atrial shunt, small  A patent foramen ovale is present.  Mild aortic root dilatation.  There is no comparison study available.  _____________________________________________________________________________  __      Left Ventricle  The left ventricle is normal in size. There is normal left ventricular wall  thickness. Left ventricular systolic function is normal. The visual ejection  fraction is estimated at 55-60%. Diastolic Doppler findings (E/E' ratio  and/or  other parameters) suggest left ventricular filling pressures are  indeterminate. Normal left ventricular wall motion.    Right Ventricle  The right ventricle is normal in structure, function and size.    Atria  Normal left atrial size. Right atrial size is normal. A contrast injection  (Bubble Study) was performed that was mildly positive for flow across the  interatrial septum. Right to left atrial shunt, small. A patent foramen ovale  is present.    Mitral Valve  There is mild mitral annular calcification. There is trace mitral  regurgitation.      Tricuspid Valve  The tricuspid valve is normal in structure and function. There is trace  tricuspid regurgitation. The right ventricular systolic pressure is  approximated at 18.8 mmHg plus the right atrial pressure. Right ventricle  systolic pressure estimate normal. IVC diameter <2.1 cm collapsing >50% with  sniff suggests a normal RA pressure of 3 mmHg.    Aortic Valve  The aortic valve is trileaflet with aortic valve sclerosis. There is mild (1+)  aortic regurgitation. No aortic stenosis is present.    Pulmonic Valve  The pulmonic valve is not well seen, but is grossly normal. There is trace  pulmonic valvular regurgitation.    Vessels  Mild aortic root dilatation. The ascending aorta is Mildly dilated.    Pericardium  There is no pericardial effusion.      Rhythm  The rhythm was sinus bradycardia.  _____________________________________________________________________________  __  MMode/2D Measurements & Calculations  IVSd: 1.1 cm    LVIDd: 3.7 cm  LVIDs: 2.3 cm  LVPWd: 1.4 cm  FS: 36.9 %  LV mass(C)d: 156.4 grams  LV mass(C)dI: 83.9 grams/m2  Ao root diam: 3.8 cm  LA dimension: 4.5 cm  asc Aorta Diam: 4.1 cm  LA/Ao: 1.2  LA Volume (BP): 58.0 ml  LA Volume Index (BP): 31.2 ml/m2  RWT: 0.76        Doppler Measurements & Calculations  MV E max price: 71.9 cm/sec  MV A max price: 62.0 cm/sec  MV E/A: 1.2  MV dec time: 0.27 sec          (+) Dyslipidemia,  "hypertension-Peripheral Vascular Disease (S/P AAA repair at [place])-- Other, Carotid Stenosis and Non Symptomatic, --. Taking blood thinners (off Eliquis X 5 days) : . . . :. dysrhythmias (intermittent; ) a-fib, . congenital heart disease PFO:, Previous cardiac testing Echodate:5/31/2019results:The left ventricle is normal in size.  Left ventricular systolic function is normal.  The visual ejection fraction is estimated at 55-60%.  The aortic valve is trileaflet with aortic valve sclerosis.  There is mild (1+) aortic regurgitation.  A contrast injection (Bubble Study) was performed that was mildly positive for  flow across the interatrial septum.  Right to left atrial shunt, small  A patent foramen ovale is present.  Mild aortic root dilatation.  There is no comparison study available.date: results:ECG reviewed date:5/2019 results:Sinus bradycardia. Inferior infarct date: results:         (-) CAD   METS/Exercise Tolerance: Comment: Plays \"pickle ball\" regularly    Hematologic:         Musculoskeletal:   (+) arthritis (osteo),  -       GI/Hepatic:  - neg GI/hepatic ROS      (-) GERD and liver disease   Renal/Genitourinary:     (+) chronic renal disease (Creat: 1.84 2/16/2017), type: CRI, Pt does not require dialysis, Pt has no history of transplant,       Endo:      (-) Type I DM and Type II DM   Psychiatric:         Infectious Disease:         Malignancy:         Other: Comment:  -- Lisinopril -- Cramps    --  Leg cramping  Social History    Tobacco Use      Smoking status: Never Smoker      Smokeless tobacco: Never Used    Alcohol use: No    Prior to Admission medications :  Medication acetaminophen (TYLENOL) 500 MG tablet, Sig Take 500 mg by mouth 2 times daily , Start Date 2/21/15, End Date , Taking? Yes, Authorizing Provider Madhu Velasquez MD    Medication amLODIPine (NORVASC) 5 MG tablet, Sig Take 1 tablet (5 mg) by mouth daily, Start Date 3/7/19, End Date , Taking? Yes, Authorizing Provider Madhu Velasquez, " MD    Medication amoxicillin (AMOXIL) 500 MG capsule, Sig 4 capsules (total 2 grams) orally  1 hour prior to dental procedures, Start Date 1/16/18, End Date , Taking? Yes, Authorizing Provider Madhu Velasquez MD    Medication apixaban ANTICOAGULANT (ELIQUIS) 2.5 MG tablet, Sig Take 2.5 mg by mouth 2 times daily, Start Date , End Date , Taking? Yes, Authorizing Provider Unknown, Entered By History    Medication aspirin (ASA) 81 MG EC tablet, Sig Take 1 tablet (81 mg) by mouth dailyPatient taking differently: Take 325 mg by mouth daily , Start Date 6/1/19, End Date , Taking? Yes, Authorizing Provider Pedro Ahn MD    Medication atorvastatin (LIPITOR) 10 MG tablet, Sig Take 1 tablet (10 mg) by mouth daily, Start Date 3/7/19, End Date , Taking? Yes, Authorizing Provider Madhu Velasquez MD    Medication Cholecalciferol (VITAMIN D) 2000 UNITS tablet, Sig Take 2,000 Units by mouth daily, Start Date 2/26/14, End Date , Taking? Yes, Authorizing Provider Madhu Velasquez MD    Medication metoprolol tartrate (LOPRESSOR) 25 MG tablet, Sig Take 0.5 tablets (12.5 mg) by mouth 2 times daily, Start Date 3/7/19, End Date , Taking? Yes, Authorizing Provider Madhu Velasquez MD    Medication Misc Natural Products (OSTEO BI-FLEX ADV TRIPLE ST) TABS, Sig Take 1 tablet by mouth daily, Start Date 2/20/15, End Date , Taking? Yes, Authorizing Provider Madhu Velasquez MD    Medication multivitamin, therapeutic with minerals (MULTI-VITAMIN) TABS, Sig Take 1 tablet by mouth three times a week , Start Date 5/18/16, End Date , Taking? Yes, Authorizing Provider Madhu Velasquez MD      Current Facility-Administered Medications Ordered in Epic:  acetaminophen (TYLENOL) tablet 650 mg, 650 mg, Oral, Q4H PRN, 650 mg at 06/16/19 0340  amLODIPine (NORVASC) tablet 5 mg, 5 mg, Oral, Daily  apixaban ANTICOAGULANT (ELIQUIS) tablet 2.5 mg, 2.5 mg, Oral, BID, 2.5 mg at 06/15/19 2159  aspirin EC tablet 81 mg, 81 mg, Oral, Daily  atorvastatin (LIPITOR) tablet 10  mg, 10 mg, Oral, Daily  ceFAZolin (ANCEF) 1 g vial to attach to  ml bag for ADULT or 50 ml bag for PEDS, 1 g, Intravenous, See Admin Instructions  ceFAZolin (ANCEF) intermittent infusion 2 g in 100 mL dextrose PRE-MIX, 2 g, Intravenous, Pre-Op/Pre-procedure x 1 dose  ceFEPIme (MAXIPIME) 2 g vial to attach to  ml bag for ADULTS or 50 ml bag for PEDS, 2 g, Intravenous, Q12H, Last Rate: 200 mL/hr at 06/16/19 0005, 2 g at 06/16/19 0005  dextrose 5% and 0.45% NaCl infusion, , Intravenous, Continuous, Last Rate: 50 mL/hr at 06/15/19 2238  hydrALAZINE (APRESOLINE) injection 10 mg, 10 mg, Intravenous, Q6H PRN  labetalol (NORMODYNE/TRANDATE) syringe 20 mg, 20 mg, Intravenous, Q4H PRN  metoprolol tartrate (LOPRESSOR) half-tab 12.5 mg, 12.5 mg, Oral, BID, 12.5 mg at 06/15/19 2150  naloxone (NARCAN) injection 0.1-0.4 mg, 0.1-0.4 mg, Intravenous, Q2 Min PRN  ondansetron (ZOFRAN-ODT) ODT tab 4 mg, 4 mg, Oral, Q6H PRN    Or  ondansetron (ZOFRAN) injection 4 mg, 4 mg, Intravenous, Q6H PRN  oxyCODONE (ROXICODONE) tablet 5 mg, 5 mg, Oral, Q3H PRN, 5 mg at 06/15/19 2354  vancomycin place daniels - receiving intermittent dosing, 1 each, Intravenous, See Admin Instructions    No current Epic-ordered outpatient medications on file.    dextrose 5% and 0.45% NaCl Last Rate: 50 mL/hr at 06/15/19 2238      Lab Test        06/16/19     06/15/19                       0409          1747          NA           137          137           POTASSIUM    4.0          4.1           CHLORIDE     109          107           CO2          21           26            ANIONGAP     7            4             GLC          143*         131*          BUN          28           35*           CR           1.58*        1.63*         KULDIP          8.2*         8.6           Lab Test        06/16/19     06/15/19     05/30/19                       0409          1747          1917          WBC           --          12.6*        5.8           HGB          13.3          13.8         13.9          PLT           --          271          190           Lab Test        06/16/19                       0409          ABO          A             RH           Pos           Lab Test        05/30/19 05/30/19                       2147          1438          TROPI        <0.015       <0.015        No results for input(s): PH, PCO2, PO2, HCO3 in the last 45564 hours.  No results for input(s): HCG in the last 46897 hours.  Recent Results (from the past 744 hour(s))  -CT Head w/o Contrast                                                                                                               Narrative    CT SCAN OF THE HEAD WITHOUT CONTRAST   5/30/2019 12:02 PM     HISTORY: Transient ischemic attack, right-sided weakness. Initial  exam. Code Stroke.    TECHNIQUE: Axial images of the head and coronal reformations without  IV contrast material. Radiation dose for this scan was reduced using  automated exposure control, adjustment of the mA and/or kV according  to patient size, or iterative reconstruction technique.    COMPARISON: None.    FINDINGS: Mild cerebral atrophy is present. The brain parenchyma,  brainstem, ventricular system, subarachnoid spaces, and vascular  structures are normal in appearance. Vascular calcifications are noted  at the skull base.                                                                                                                 Impression    IMPRESSION: Mild cerebral atrophy. No evidence for intracranial  hemorrhage or any acute process.    SADE JAIMES MD  -CTA Head Neck with Contrast                                                                                                               Narrative    CTA  HEAD AND NECK WITH CONTRAST 5/30/2019 12:12 PM     HISTORY: Transient ischemic attack. Right- sided weakness.    TECHNIQUE: Axial images were obtained through the head and neck with  intravenous contrast. 70 mL of Isovue-370 was given.  Multiplanar  reconstructions were performed. 3-D reconstructions off a remote  workstation for CT angiography were also acquired. Carotid stenoses  were evaluated by comparing the caliber of the proximal internal  carotid artery to the caliber of the distal internal carotid artery.  Radiation dose for this scan was reduced using automated exposure  control, adjustment of the mA and/or kV according to patient size, or  iterative reconstruction technique.    FINDINGS:    Brachiocephalic vessels: There is a ductus bump or shallow ulcer  crater off the thoracic arch along with some calcified plaque. The  proximal brachiocephalic vessels show some minimal calcified plaque  but no stenosis.    Right carotid system: There is moderate calcified and noncalcified  plaque at the carotid bifurcation resulting in approximately 70%  stenosis.    Left carotid system: There is extensive noncalcified and calcified  plaque in the proximal left internal carotid artery with an associated  ulcer crater. This is causing 80% stenosis.    Right vertebral artery: Normal.    Left vertebral artery: Normal.    [place]: Normal. Incidental note is made of direct origin of  the left posterior cerebral artery off the left internal carotid  artery.                                                                                                                 Impression    IMPRESSION:  1. Ulcerative plaque in the proximal left internal carotid artery with  80% stenosis.  2. Moderate atherosclerotic disease of the right carotid bifurcation  with 70% stenosis of the proximal internal carotid artery.  3. No evidence for any intracranial thromboembolism.    SADE JAIMES MD  -MRI Brain w & w/o contrast                                                                                                               Narrative    MRI OF THE BRAIN WITHOUT AND WITH CONTRAST 5/30/2019 5:00 PM     COMPARISON: Head CT same day.    HISTORY: Right-sided  weakness     TECHNIQUE: Axial diffusion-weighted with ADC map, axial T2-weighted  with fat saturation, axial T1-weighted, axial turboFLAIR and coronal  T1-weighted images of the brain were acquired without intravenous  contrast. Following intravenous administration of gadolinium (6mL  Gadavist), axial T1-weighted images of the brain were acquired.     FINDINGS: There are tiny recent cortical infarcts at the high lateral  aspect of the left parietal lobe (series 602, image 39) and in the  posterolateral aspect of the left occipital lobe (series 602, image  25). No other recent infarcts noted.    There is moderate diffuse cerebral volume loss. There are a few  scattered focal and minimal patchy periventricular areas of abnormal  T2 signal hyperintensity in the cerebral white matter bilaterally that  are consistent with sequela of chronic small vessel ischemic disease.  The ventricles and basal cisterns are within normal limits in  configuration given the degree of cerebral volume loss. There is no  midline shift. There are no extra-axial fluid collections. There is no  evidence for acute intracranial hemorrhage. There is no abnormal  contrast enhancement in the brain or its coverings.     There is no sinusitis or mastoiditis.                                                                                                                 Impression    IMPRESSION:   1. Two tiny recent cortical infarcts in the left parietal and left  occipital lobes. No other recent infarcts.  2. Diffuse cerebral volume loss and cerebral white matter changes  consistent with chronic small vessel ischemic disease.     BARRETT HEWITT MD  -CT Soft Tissue Neck w Contrast                                                                                                               Narrative    CT SCAN OF THE NECK WITH CONTRAST  6/15/2019 6:57 PM     HISTORY: Status post CEA, evaluate for possible abscess.    TECHNIQUE:  Axial images and coronal  reformations. Radiation dose for  this scan was reduced using automated exposure control, adjustment of  the mA and/or kV according to patient size, or iterative  reconstruction technique. 70 mL Isovue-370 IV.    COMPARISON: None.    FINDINGS: There is a small fluid collection just anterior to the left  sternocleidomastoid muscle. This fluid collection measures about 2 cm  in transverse dimension by 1.5 cm in AP dimension and approximately  3.4 cm in cephalocaudad dimension. The wall of this fluid collection  is irregular and does demonstrate contrast enhancement. This would be  consistent with an abscess. This extends from the level of the hyoid  bone inferiorly to just below the level of the cricoid. There is  induration of the subcutaneous fat extending from the level of the  mandible to the level of the clavicles. There is obliteration of the  fat planes in the left neck. The left internal carotid artery and left  carotid bifurcation is well seen. No pseudoaneurysm is identified. No  significant stenosis. Calcified atherosclerotic plaque is seen at the  right carotid bifurcation.                                                                                                                 Impression    IMPRESSION: Small fluid collection anterior to the left  sternocleidomastoid muscle compatible with a small abscess.    HOANG MENCHACA MD    RECENT LABS:                            Physical Exam  Normal systems: pulmonary    Airway   Mallampati: III  TM distance: >3 FB  Neck ROM: full    Dental   (+) partials and chipped  Comment: Several worn edges on teeth.    Cardiovascular   Rhythm and rate: regular and normal      Pulmonary             Lab Results   Component Value Date    WBC 12.6 (H) 06/15/2019    HGB 13.8 06/15/2019    HCT 40.3 06/15/2019     06/15/2019    CRP <5.0 05/10/2013    SED 8 05/10/2013     06/15/2019    POTASSIUM 4.1 06/15/2019    CHLORIDE 107 06/15/2019    CO2 26 06/15/2019    BUN 35  "(H) 06/15/2019    CR 1.63 (H) 06/15/2019     (H) 06/15/2019    KULDIP 8.6 06/15/2019    MAG 2.0 07/08/2009    ALBUMIN 3.5 05/30/2019    PROTTOTAL 6.8 05/30/2019    ALT 24 05/30/2019    AST 17 05/30/2019    ALKPHOS 55 05/30/2019    BILITOTAL 0.2 05/30/2019    PTT 76 (H) 05/31/2019    INR 1.21 (H) 05/30/2019    TSH 1.43 05/30/2019       Preop Vitals  BP Readings from Last 3 Encounters:   06/15/19 144/84   06/13/19 127/71   06/01/19 105/55    Pulse Readings from Last 3 Encounters:   06/15/19 103   06/13/19 63   06/01/19 61      Resp Readings from Last 3 Encounters:   06/15/19 16   06/01/19 16   09/10/18 15    SpO2 Readings from Last 3 Encounters:   06/15/19 96%   06/01/19 92%   09/10/18 98%      Temp Readings from Last 1 Encounters:   06/15/19 36.5  C (97.7  F) (Oral)    Ht Readings from Last 1 Encounters:   05/30/19 1.702 m (5' 7.01\")      Wt Readings from Last 1 Encounters:   05/30/19 73.3 kg (161 lb 9.6 oz)    Estimated body mass index is 25.3 kg/m  as calculated from the following:    Height as of 5/30/19: 1.702 m (5' 7.01\").    Weight as of 5/30/19: 73.3 kg (161 lb 9.6 oz).       Anesthesia Plan      History & Physical Review  History and physical reviewed and following examination; no interval change.    ASA Status:  3 .    NPO Status:  > 8 hours    Plan for General, ETT and RSI with Intravenous induction. Maintenance will be Balanced.    PONV prophylaxis:  Ondansetron (or other 5HT-3)  Additional equipment: Videolaryngoscope and 2nd IV Clear the air from IV lines (PFO)  Going to ICU intubated post neck surgery      Postoperative Care  Postoperative pain management:  IV analgesics.  Plan for postoperative opioid use.    Consents  Anesthetic plan, risks, benefits and alternatives discussed with:  Patient.  Use of blood products discussed: Yes.   Use of blood products discussed with Patient.  Consented to blood products.  .                 Michael Barriga MD  "

## 2019-06-16 NOTE — PROGRESS NOTES
[facility] brief note:    Was asked by nursing to evaluate pt due to increase in swelling of L endarterectomy surgical site.  Upon arrival, pt resting in bed, in no apparent distress, noted substantial increase in soft tissue edema now extending over trachea, up to jaw line and wraps posteriorly just behind ear.  No tongue, lip or facial swelling noted.  Outlined cellulitic erythema which spreads now on to upper chest, back of L ear and lateral aspect of posterior neck.  Trachea appears midline at this time.  Pt able to manage secretions, just notes painful swallowing.  No stridor or increased work of breathing noted.  While sleeping, pt's O2 sats 89-90% on RA; placed pt on 1.5 L O2 via NC, O2 sats now 94%.  Nursing updated Dr. Farias, vascular surgery, with change in swelling of soft tissue.  Dr. Farias requested for warm packs at this time and to notify him if swelling continues to progress or change in airway status.      Camron Desouza, APRN, CNP  [facility]    No charge.

## 2019-06-16 NOTE — PROGRESS NOTES
Dinora Dalton paged regarding clarification of eliquis and heparin. Writer instructed to give eliquis and hold heparin for tonight.

## 2019-06-16 NOTE — PHARMACY-VANCOMYCIN DOSING SERVICE
Pharmacy Vancomycin Initial Note  Date of Service Francoise 15, 2019  Patient's  1934  85 year old, male    Indication: Abscess    Current estimated CrCl = Estimated Creatinine Clearance: 34.4 mL/min (A) (based on SCr of 1.63 mg/dL (H)).    Creatinine for last 3 days  6/15/2019:  5:47 PM Creatinine 1.63 mg/dL    Recent Vancomycin Level(s) for last 3 days  No results found for requested labs within last 72 hours.      Vancomycin IV Administrations (past 72 hours)      No vancomycin orders with administrations in past 72 hours.                Nephrotoxins and other renal medications (From now, onward)    Start     Dose/Rate Route Frequency Ordered Stop    06/15/19 2215  vancomycin 1500 mg in 0.9% NaCl 250 ml intermittent infusion 1,500 mg      1,500 mg  over 90 Minutes Intravenous ONCE 06/15/19 2214      06/15/19 2214  vancomycin place daniels - receiving intermittent dosing      1 each Intravenous SEE ADMIN INSTRUCTIONS 06/15/19 2214            Contrast Orders - past 72 hours (72h ago, onward)    Start     Dose/Rate Route Frequency Ordered Stop    06/15/19 1825  iopamidol (ISOVUE-370) solution 70 mL      70 mL Intravenous ONCE 06/15/19 1824 06/15/19 1853                Plan:  1.  Intermittent dosing, Give 1500 mg IV x1.   2.  Goal Trough Level: 15-20 mg/L   3.  Pharmacy will check trough levels as appropriate in 1-3 Days.    4. Serum creatinine levels will be ordered daily for the first week of therapy and at least twice weekly for subsequent weeks.    5. [place] method utilized to dose vancomycin therapy: Method 1    Akil Aliheyder

## 2019-06-16 NOTE — PROGRESS NOTES
A/O x4. AVSS on 1.5L NC. L neck incision, edema, erythema, drainage. NPO. Pt prepped for surgery and sent with antibiotic.

## 2019-06-16 NOTE — ED NOTES
"[facility]  ED Nurse Handoff Report    ED Chief complaint: Post-op Problem      ED Diagnosis:   Final diagnoses:   Postoperative infection, unspecified type, initial encounter   Leukocytosis, unspecified type   Chronic renal impairment, unspecified CKD stage       Code Status: Full Code    Allergies:   Allergies   Allergen Reactions     Lisinopril Cramps     Leg cramping       Activity level - Baseline/Home:  Independent  Activity Level - Current:   Independent    Patient's Preferred language: ENGLISH   Needed?: No    Isolation: NO  Infection: Not Applicable  Bariatric?: No    Vital Signs:   Vitals:    06/15/19 1727 06/15/19 1800   BP: (!) 170/93 (!) 135/113   Pulse: 95    Resp: 16    Temp: 97.7  F (36.5  C)    TempSrc: Oral    SpO2: 96%        Cardiac Rhythm: ,        Pain level:      Is this patient confused?: No   Does this patient have a guardian?  No         If yes, is there guardianship documents in the Epic \"Code/ACP\" activity?  N/A         Guardian Notified?  N/A  [place] - Suicide Severity Rating Scale Completed?  Yes  If yes, what color did the patient score?  N/A    Patient Report: Initial Complaint: Post OP site infected  Focused Assessment: right side of his neck is warm, reddened sore, ENDART surgery two weeks ago, surgical site infection/abscess.  CT completed, 2 sets of bld cults, fluids for LA 1.7, Creat 1.6.  Pain management.  Tests Performed: CT  Abnormal Results:   Labs Ordered and Resulted from Time of ED Arrival Up to the Time of Departure from the ED   CBC WITH PLATELETS DIFFERENTIAL - Abnormal; Notable for the following components:       Result Value    WBC 12.6 (*)     RBC Count 4.25 (*)     Absolute Neutrophil 10.6 (*)     All other components within normal limits   BASIC METABOLIC PANEL - Abnormal; Notable for the following components:    Glucose 131 (*)     Urea Nitrogen 35 (*)     Creatinine 1.63 (*)     GFR Estimate 38 (*)     GFR Estimate If Black 44 (*)  "    All other components within normal limits   LACTIC ACID   IV ACCESS   ISTAT CREATININE NURSING POCT   BLOOD CULTURE   BLOOD CULTURE       Treatments provided: Fluids, pain management    Family Comments: Girl Friend and family at the bedside, GF went home for a little bit.    OBS brochure/video discussed/provided to patient/family: No              Name of person given brochure if not patient: NA              Relationship to patient: NA    ED Medications:   Medications   HYDROmorphone (PF) (DILAUDID) injection 0.3 mg (has no administration in time range)   0.9% sodium chloride BOLUS (1,000 mLs Intravenous New Bag 6/15/19 1816)   100mL saline flush (90 mLs Intravenous Given 6/15/19 1854)   iopamidol (ISOVUE-370) solution 70 mL (70 mLs Intravenous Given 6/15/19 1853)       Drips infusing?:  Yes    For the majority of the shift this patient was Green.   Interventions performed were NONE.    Severe Sepsis OR Septic Shock Diagnosis Present: No    To be done/followed up on inpatient unit:  Pain management     ED NURSE PHONE NUMBER: *[phone]

## 2019-06-16 NOTE — PROGRESS NOTES
[facility]    Hospitalist Progress Note    Assessment & Plan   Mr. Isael Garcia is an 85-year-old male with past medical history significant for left carotid artery stenosis, status post left endarterectomy on 05/31/2019, cerebrovascular accident of his left parietal and left occipital lobes in setting of carotid embolus despite chronic anticoagulation, abdominal aortic aneurysm, status post endovascular repair, paroxysmal atrial fibrillation, chronic kidney disease stage 3, peripheral vascular disease, hypertension, patent foramen ovale and right internal carotid artery stenosis who presented on 06/15/2019 with erythema and tenderness of the left endarterectomy site. The patient has been admitted for further evaluation and management.      1.  Cellulitis and abscess surrounding recent left endarterectomy surgical site.   -- The patient was presents from home with new onset of erythema, edema and tenderness at the postoperative surgical site.  The patient notes painful swallowing; however, confirms no difficulty swallowing and no airway swelling.  The patient reports no recent fevers or chills.   -- Vascular Surgery has initiated the patient on vancomycin, Zosyn and cefepime.   -- The patient to proceed with left neck exploration, 06/16/2019.   -- Defer management of above to primary service including anticoagulation and antibiotics.   -- Defer pain management to primary service; noted APAP and oxycodone available p.r.n.     2.  Recent left internal carotid artery stenosis with left carotid embolus, status post left endarterectomy.     3.  Nonobstructive right carotid artery stenosis.     4.  Ischemic cerebrovascular accident in the setting of above noted ring setting of above.   -- Tiny cortical infarcts of the left parietal and left occipital lobes were noted on 05/30/2019 in setting of acute right-sided weakness.  The patient found to have above.    -- The patient had been on chronic  anticoagulation for 2 years prior to event.   -- The patient had been resumed on Eliquis and aspirin 81 mg at the time of discharge.  Primary Service has resumed.   -- PTA atorvastatin continued.     5.  Peripheral vascular disease.   -- Abdominal aortic aneurysm, status post endovascular repair.     6.  Paroxysmal atrial fibrillation on chronic anticoagulation.   -- The patient reports on Eliquis for the past 2 years, defer management to primary service.   -- PTA metoprolol with hold parameters were resumed.     7.  Chronic kidney disease, stage 3.   -- Baseline creatinine 1.6-1.8; creatinine on admission 1.63.   -- Of note, the patient did receive contrast with CT scan on admission.   -- Avoid nephrotoxic agents as deemed appropriate.   -- Repeat BMP in a.m.     8.  Hypertension.   -- Continue PTA amlodipine and metoprolol with hold parameters.   -- P.r.n. hydralazine and labetalol available for systolic blood pressure greater than 180.     9.  Patent foramen ovale.      10.  Diet: Per primary service.     11.  Deep venous thrombosis prophylaxis:  Per primary service.      CODE STATUS:  FULL CODE, and this was discussed and confirmed with the patient with the daughter present at bedside.      DISPOSITION:  Per primary service pending left neck exploration, 06/16/2019.         Ester oRbins MD   Text Page (7am to 6pm)    Interval History   OR today. Pain controlled.     -Data reviewed today: I reviewed all new labs and imaging results over the last 24 hours. I personally reviewed no images or EKG's today.    Physical Exam   Temp: 98.3  F (36.8  C) Temp src: Oral BP: 123/71 Pulse: 80   Resp: 18 SpO2: 93 % O2 Device: Nasal cannula Oxygen Delivery: 1.5 LPM  There were no vitals filed for this visit.  Vital Signs with Ranges  Temp:  [97.7  F (36.5  C)-101.5  F (38.6  C)] 98.3  F (36.8  C)  Pulse:  [] 80  Resp:  [16-18] 18  BP: (108-170)/() 123/71  SpO2:  [93 %-96 %] 93 %  I/O last 3 completed shifts:  In: 540  [I.V.:540]  Out: 650 [Urine:650]    CONSTITUTIONAL:  The patient lying in bed, pleasant, awake, alert, cooperative, in no apparent distress, appears stated age.   HEENT:  Pupils equal, round and reactive to light.  Extraocular muscles intact.  Sclerae are clear.  Normocephalic, atraumatic.   NECK:  Supple.  Normal range of motion, no nuchal rigidity noted.  Healed left endarterectomy surgical site with noted surrounding erythema and edema.  Ice pack in place.   SKIN:  No open wounds or drainage noted.   LUNGS:  No increased work of breathing.  Clear to auscultation bilaterally.  No crackles or wheezing noted.   CARDIOVASCULAR:  Mildly tachycardic rate and normal rhythm.  Normal S1 and S2.  No murmur, rub or gallop noted.   ABDOMEN:  Normal bowel sounds, soft, nondistended, nontender.  No masses are palpated.  No guarding, rebound tenderness noted.   EXTREMITIES:  Moves all 4 extremities.  Dorsalis pedis and radial pulses palpable bilaterally.  Lower extremities with no edema.   NEUROLOGIC:  Awake, alert and oriented.  Cranial nerves II-XII are grossly intact.  Sensory is intact.  Speech fluent.   SKIN:  Warm and dry.  No lesions or rashes noted.  No erythema and edema noted surrounding the left endarterectomy postoperative site healed incision.   PSYCHIATRIC:  Mentation appears normal and affect normal.         Medications     dextrose 5% and 0.45% NaCl 50 mL/hr at 06/15/19 2238     lactated ringers         [Auto Hold] amLODIPine  5 mg Oral Daily     [Auto Hold] apixaban ANTICOAGULANT  2.5 mg Oral BID     [Auto Hold] aspirin  81 mg Oral Daily     [Auto Hold] atorvastatin  10 mg Oral Daily     [Auto Hold] ceFEPIme (MAXIPIME) IV  2 g Intravenous Q12H     [Auto Hold] metoprolol tartrate  12.5 mg Oral BID     [Auto Hold] vancomycin place daniels - receiving intermittent dosing  1 each Intravenous See Admin Instructions       Data   Recent Labs   Lab 06/16/19  0409 06/15/19  1747   WBC  --  12.6*   HGB 13.3 13.8   MCV  --   95   PLT  --  271    137   POTASSIUM 4.0 4.1   CHLORIDE 109 107   CO2 21 26   BUN 28 35*   CR 1.58* 1.63*   ANIONGAP 7 4   KULDIP 8.2* 8.6   * 131*       Imaging:   Recent Results (from the past 24 hour(s))   CT Soft Tissue Neck w Contrast    Narrative    CT SCAN OF THE NECK WITH CONTRAST  6/15/2019 6:57 PM     HISTORY: Status post CEA, evaluate for possible abscess.    TECHNIQUE:  Axial images and coronal reformations. Radiation dose for  this scan was reduced using automated exposure control, adjustment of  the mA and/or kV according to patient size, or iterative  reconstruction technique. 70 mL Isovue-370 IV.    COMPARISON: None.    FINDINGS: There is a small fluid collection just anterior to the left  sternocleidomastoid muscle. This fluid collection measures about 2 cm  in transverse dimension by 1.5 cm in AP dimension and approximately  3.4 cm in cephalocaudad dimension. The wall of this fluid collection  is irregular and does demonstrate contrast enhancement. This would be  consistent with an abscess. This extends from the level of the hyoid  bone inferiorly to just below the level of the cricoid. There is  induration of the subcutaneous fat extending from the level of the  mandible to the level of the clavicles. There is obliteration of the  fat planes in the left neck. The left internal carotid artery and left  carotid bifurcation is well seen. No pseudoaneurysm is identified. No  significant stenosis. Calcified atherosclerotic plaque is seen at the  right carotid bifurcation.      Impression    IMPRESSION: Small fluid collection anterior to the left  sternocleidomastoid muscle compatible with a small abscess.    HOANG MENCHACA MD

## 2019-06-16 NOTE — PHARMACY-ADMISSION MEDICATION HISTORY
Admission medication history interview status for the 6/15/2019  admission is complete. See EPIC admission navigator for prior to admission medications     Medication history source reliability:Moderate    Actions taken by pharmacist (provider contacted, etc):Verified patient's medication list based off of previous admission.      Additional medication history information not noted on PTA med list :  1) It appears that anticoag plan for patient was to take 81mg aspirin daily and resume 2.5mg twice daily of Eliquis eventually. Patient states that he had resumed Eliquis but had been taking aspirin 325mg daily instead as that is what he had at home.     Medication reconciliation/reorder completed by provider prior to medication history? Yes    Time spent in this activity: 20 minutes    Prior to Admission medications    Medication Sig Last Dose Taking? Auth Provider   acetaminophen (TYLENOL) 500 MG tablet Take 500 mg by mouth 2 times daily  6/15/2019 at am x 1 dose Yes Madhu Velasquez MD   amLODIPine (NORVASC) 5 MG tablet Take 1 tablet (5 mg) by mouth daily 6/15/2019 at am Yes Madhu Velasquez MD   amoxicillin (AMOXIL) 500 MG capsule 4 capsules (total 2 grams) orally  1 hour prior to dental procedures prn med Yes Madhu Velasquez MD   apixaban ANTICOAGULANT (ELIQUIS) 2.5 MG tablet Take 2.5 mg by mouth 2 times daily 6/15/2019 at am x 1 dose Yes Unknown, Entered By History   aspirin (ASA) 81 MG EC tablet Take 1 tablet (81 mg) by mouth daily  Patient taking differently: Take 325 mg by mouth daily  6/15/2019 at am Yes Pedro Ahn MD   atorvastatin (LIPITOR) 10 MG tablet Take 1 tablet (10 mg) by mouth daily 6/15/2019 at am Yes Madhu Velasquez MD   Cholecalciferol (VITAMIN D) 2000 UNITS tablet Take 2,000 Units by mouth daily 6/15/2019 at am Yes Madhu Velasquez MD   metoprolol tartrate (LOPRESSOR) 25 MG tablet Take 0.5 tablets (12.5 mg) by mouth 2 times daily 6/15/2019 at am x 1 dose Yes Madhu Velasquez MD   Misc Natural  Products (OSTEO BI-FLEX ADV TRIPLE ST) TABS Take 1 tablet by mouth daily 6/15/2019 at am Yes Madhu Velasquez MD   multivitamin, therapeutic with minerals (MULTI-VITAMIN) TABS Take 1 tablet by mouth three times a week  Past Week at Unknown time Yes Madhu Velasquez MD

## 2019-06-16 NOTE — PLAN OF CARE
Pt. Returned from OR 1255. VSS, mark positional ,  sedation vacation done pt followed commands MARRERO,  rt. Pupil < left.  Surgeon aware.  Cool, bare hugger on.  MOderate Urine output.  Family at bedside and updated.

## 2019-06-16 NOTE — CONSULTS
Consult Date:  06/15/2019      PRIMARY CARE PROVIDER:  Dk Velasquez MD       REQUESTING PHYSICIAN:  JASMIN OZUNA MD      REASON FOR CONSULTATION:  Medical co-management.      HISTORY OF PRESENT ILLNESS:  Mr. Isael Garcia is a pleasant 85-year-old male with past medical history significant for cerebrovascular accident, transient ischemic attack, left carotid artery embolism, status post left endarterectomy, abdominal aortic aneurysm, status post endovascular repair, atrial fibrillation on chronic anticoagulation for the past 2 years, chronic kidney disease stage 3, peripheral vascular disease, hypertension, patent foramen ovale and the right internal carotid artery stenosis who presents from home on 06/15/2019 with erythema and tenderness of recent left endarterectomy operative site.  On 05/30/2019, the patient initially presented with right-sided weakness and was found to have tiny cortical infarcts of the left parietal and left occipital lobes in setting of symptomatic left internal carotid artery stenosis.  On 05/31/2019 the patient had a left endarterectomy with great saphenous vein patching completed by Dr. Ahn with no complications noted at that time the patient was subsequently discharged on 06/01/2019.  The patient noted today onset of erythema and tenderness of left endarterectomy site and noted painful swallowing as well.  The patient reports no fevers or chills.  The patient presented to the Emergency Department and underwent imaging and CT soft tissue of the neck with contrast noted a small fluid collection anterior to the left sternocleidomastoid muscle, compatible with a small abscess.  The patient was seen by Dr. Jett in the Emergency Department and noted likely postoperative infection with cellulitic changes of surrounding skin and postoperative surgical incision.  At that time the patient was admitted to the hospital with plans to proceed with left neck exploration tomorrow, 06/16/2019.  The  patient was initiated on broad-spectrum antibiotics including cefepime, Zosyn and vancomycin.  The patient also received 1 liter normal saline fluid bolus and Dilaudid in the Emergency Department.  The patient's laboratory studies completed in the Emergency Department noted a creatinine of 1.63.  Lactic acid 1.7.  WBC 12.6.  Blood cultures x 2 were also completed.        Presently, the patient is seen in the surgical unit.  The patient is resting in bed with his daughter present at the bedside.  The patient notes no recent recurrent fevers, chills, nausea, vomiting, diarrhea, constipation, dysuria.  The patient notes no recent recurrent chest pain and shortness of breath.  The patient does note urinary frequency.      PAST MEDICAL HISTORY:   1.  Transient ischemic attack.   2.  Cerebrovascular accident -- 05/30/2019.  The patient noted acute onset of right-sided weakness and was found to have tiny cortical infarcts of the left parietal and left occipital lobes.  Of note, the patient had been on Eliquis for 2 years when left carotid embolus was noted.   3.  Abdominal aortic aneurysm, status post endovascular repair.   4.  Paroxysmal atrial fibrillation.   5.  Chronic kidney disease, stage 3.   6.  Peripheral vascular disease.   7.  Shingles.   8.  Hypertension.   9.  Patent foramen ovale.   10.  Right internal carotid artery stenosis.      PAST SURGICAL HISTORY:   1.  Left endarterectomy.   2.  Right inguinal hernia repair.   3.  TURP.   4.  Tonsillectomy.   5.  Right total knee arthroplasty.   6.  Bilateral cataracts.   7.  Endovascular AAA stent graft.      SOCIAL HISTORY:   1.  Tobacco:  None.    2.  Alcohol:  None.   3.  Illicit drugs:  None.   4.  Lives in a house with his significant other.      FAMILY HISTORY:  Father had prostate cancer, heart disease.      ALLERGIES:  LISINOPRIL.      MEDICATIONS:    Prior to Admission medications    Medication Sig Last Dose Taking? Auth Provider   acetaminophen (TYLENOL)  500 MG tablet Take 500 mg by mouth 2 times daily  6/15/2019 at am x 1 dose Yes Madhu Velasquez MD   amLODIPine (NORVASC) 5 MG tablet Take 1 tablet (5 mg) by mouth daily 6/15/2019 at am Yes Madhu Velasquez MD   amoxicillin (AMOXIL) 500 MG capsule 4 capsules (total 2 grams) orally  1 hour prior to dental procedures prn med Yes Madhu Velasquez MD   apixaban ANTICOAGULANT (ELIQUIS) 2.5 MG tablet Take 2.5 mg by mouth 2 times daily 6/15/2019 at am x 1 dose Yes Unknown, Entered By History   aspirin (ASA) 81 MG EC tablet Take 1 tablet (81 mg) by mouth daily  Patient taking differently: Take 325 mg by mouth daily  6/15/2019 at am Yes Pedro Ahn MD   atorvastatin (LIPITOR) 10 MG tablet Take 1 tablet (10 mg) by mouth daily 6/15/2019 at am Yes Madhu Velasquez MD   cephALEXin (KEFLEX) 500 MG capsule Take 1 capsule (500 mg) by mouth 2 times daily  Yes Pedro Ahn MD   Cholecalciferol (VITAMIN D) 2000 UNITS tablet Take 2,000 Units by mouth daily 6/15/2019 at am Yes Madhu Velasquez MD   metoprolol tartrate (LOPRESSOR) 25 MG tablet Take 0.5 tablets (12.5 mg) by mouth 2 times daily 6/15/2019 at am x 1 dose Yes Madhu Velasquez MD   Misc Natural Products (OSTEO BI-FLEX ADV TRIPLE ST) TABS Take 1 tablet by mouth daily 6/15/2019 at am Yes Madhu Velasquez MD   multivitamin, therapeutic with minerals (MULTI-VITAMIN) TABS Take 1 tablet by mouth three times a week  Past Week at Unknown time Yes Madhu Velasquez MD     REVIEW OF SYSTEMS:  A 10-point review of systems was completed.  All pertinent positives are noted in the HPI.        PHYSICAL EXAMINATION:   VITAL SIGNS:  Reviewed and are as follows:  Temperature 100.4, blood pressure 148/86, heart rate 106, respiratory rate 16, O2 sats 95% on room air.   CONSTITUTIONAL:  The patient lying in bed, pleasant, awake, alert, cooperative, in no apparent distress, appears stated age.   HEENT:  Pupils equal, round and reactive to light.  Extraocular muscles intact.  Sclerae are clear.   Normocephalic, atraumatic.   NECK:  Supple.  Normal range of motion, no nuchal rigidity noted.  Healed left endarterectomy surgical site with noted surrounding erythema and edema.  Ice pack in place.   SKIN:  No open wounds or drainage noted.   LUNGS:  No increased work of breathing.  Clear to auscultation bilaterally.  No crackles or wheezing noted.   CARDIOVASCULAR:  Mildly tachycardic rate and normal rhythm.  Normal S1 and S2.  No murmur, rub or gallop noted.   ABDOMEN:  Normal bowel sounds, soft, nondistended, nontender.  No masses are palpated.  No guarding, rebound tenderness noted.   EXTREMITIES:  Moves all 4 extremities.  Dorsalis pedis and radial pulses palpable bilaterally.  Lower extremities with no edema.   NEUROLOGIC:  Awake, alert and oriented.  Cranial nerves II-XII are grossly intact.  Sensory is intact.  Speech fluent.   SKIN:  Warm and dry.  No lesions or rashes noted.  No erythema and edema noted surrounding the left endarterectomy postoperative site healed incision.   PSYCHIATRIC:  Mentation appears normal and affect normal.      LABORATORY DATA:  Reviewed in Epic.      ASSESSMENT AND PLAN:  Mr. Isael Garcia is an 85-year-old male with past medical history significant for left carotid artery stenosis, status post left endarterectomy on 05/31/2019, cerebrovascular accident of his left parietal and left occipital lobes in setting of carotid embolus despite chronic anticoagulation, abdominal aortic aneurysm, status post endovascular repair, paroxysmal atrial fibrillation, chronic kidney disease stage 3, peripheral vascular disease, hypertension, patent foramen ovale and right internal carotid artery stenosis who presented on 06/15/2019 with erythema and tenderness of the left endarterectomy site.  The patient has been admitted for further evaluation and management.      Cellulitis and abscess surrounding recent left endarterectomy surgical site.   Recent left internal carotid artery stenosis with  left carotid embolus, status post left endarterectomy.   -- The patient presents from home with new onset of erythema, edema and tenderness at the postoperative surgical site.  The patient notes painful swallowing; however, confirms no difficulty swallowing and no airway swelling.  The patient reports no recent fevers or chills.   -- Vascular Surgery has initiated the patient on vancomycin, Zosyn and cefepime.   -- The patient to proceed with left neck exploration, 06/16/2019.   -- Defer management of above to primary service including anticoagulation and antibiotics.   -- The patient to be n.p.o. at midnight; will continue patient on D5 half normal saline at 50 mL per hour.   -- Defer pain management to Vascular Surgery primary service; noted APAP and oxycodone available p.r.n.     Recent left internal carotid artery stenosis with left carotid embolus, status post left endarterectomy.  Nonobstructive right carotid artery stenosis.   Recent ischemic cerebrovascular accident in the setting of above.   Peripheral vascular disease.   Abdominal aortic aneurysm, status post endovascular repair.   -- The patient found to have tiny cortical infarcts of the left parietal and left occipital lobes on 05/30/2019 with noted acute right-sided weakness.  Of note, the patient had been on chronic anticoagulation for 2 years prior to event.  The patient had been resumed on Eliquis and aspirin 81 mg at the time of discharge 6/1/19.    -- PTA atorvastatin continued in addition to Eliquis per Vascular Surgery.    Paroxysmal atrial fibrillation on chronic anticoagulation.   -- The patient reports on Eliquis for the past 2 years, has been resumed per Vascular Surgery primary service.   -- PTA metoprolol with hold parameters was resumed.     Chronic kidney disease, stage 3.   -- Baseline creatinine 1.6-1.8; creatinine on admission 1.63.   -- Of note, the patient did receive contrast with CT scan on admission.   -- Avoid nephrotoxic agents  as deemed appropriate.   -- Repeat BMP in a.m.     Hypertension.   -- Continue PTA amlodipine and metoprolol with hold parameters.   -- P.r.n. hydralazine and labetalol available for systolic blood pressure greater than 180.     History of patent foramen ovale.      Deep venous thrombosis prophylaxis.  -- Per primary service.      CODE STATUS:  FULL CODE, and this was discussed and confirmed with the patient with the daughter present at bedside.      DISPOSITION:  Per primary service pending left neck exploration, 2019.      This patient was discussed with Dr. Valdo Goddard the Hospitalist Service who agrees with the current plan as outlined above.         VALDO GODDARD MD       As dictated by KEVIN WAYNE NP            D: 2019   T: 2019   MT: JON      Name:     JOHN MATA   MRN:      [id number]        Account:       [id number]   :      1934           Consult Date:  06/15/2019      Document: [id number]       cc: JASMIN OZUNA MD

## 2019-06-16 NOTE — PROVIDER NOTIFICATION
Labcalled with posstitive cultures of the left neck surgical site, preliminaRY, GR STAIN G + cocci , many in clusterrs .  Dr singh notified.

## 2019-06-16 NOTE — BRIEF OP NOTE
[facility] Brief Operative Note    Pre-operative diagnosis: LEFT NECK ABCESS   Post-operative diagnosis As above, parotid gland injury   Procedure: Procedure(s):  LEFT NECK EXPLORATION   Surgeon(s): Surgeon(s) and Role:     * Cam Jett MD - Primary     * Aaron Farias MD - Resident - Assisting   Estimated blood loss: 25cc   Specimens: ID Type Source Tests Collected by Time Destination   1 :  Fluid Neck ANAEROBIC BACTERIAL CULTURE, FLUID CULTURE AEROBIC BACTERIAL, FUNGUS CULTURE, GRAM STAIN Cam Jett MD 6/16/2019 11:44 AM       Findings:   Vein patch well incorporated  Loosely closed with PDS and nylons  Drain left in place  Keep NPO and intubated  Reassess extubation and nutritional needs in AM 6/17  Followup cultures, on abx; ID consult for dapto approval

## 2019-06-16 NOTE — ANESTHESIA CARE TRANSFER NOTE
Patient: Isael Garcia    Procedure(s):  LEFT NECK EXPLORATION    Diagnosis: LEFT NECK ABCESS  Diagnosis Additional Information: No value filed.    Anesthesia Type:   General, ETT, RSI     Note:  Airway :ETT and Ventilator  Patient transferred to:ICU  Comments: Patient transferred care to RT and RN at bedside. Patient remains intubated and ventilated. Vitals stable, respiratory status stable. Full ICU monitors.      Vitals: (Last set prior to Anesthesia Care Transfer)    CRNA VITALS  6/16/2019 1211 - 6/16/2019 1311      6/16/2019             Resp Rate (set):  10                Electronically Signed By: MAGNUS Dennis CRNA  June 16, 2019  1:55 PM

## 2019-06-16 NOTE — OP NOTE
Pre operative diagnosis: Left neck abscess, status post left carotid endarterectomy.    Postoperative diagnosis: Same.    Procedure:  1.  Surgical exploration of left neck surgical site from previous carotid endarterectomy.  2.  Removal of all previously placed braided suture material.  3.  Washout of left neck superficial and deep soft tissue.    Surgeon: Cam Jett M.D.  Assistant: Aaron Farias M.D.    Anesthesia: General.  Complications: None.  Specimens: Purulent fluid from the left neck was sent for culture and sensitivity.  Estimated blood loss: About 25 mL.  Drains: #19 drain was placed in the wound bed.    Findings:  1.  Purulent fluid in the left surgical site superficial and deep tissues.  2.  Well incorporated arterial repair and vein patch.    Indication for procedure: This is an 85-year-old male who underwent left carotid endarterectomy with saphenous vein patch angioplasty for severe symptomatic disease.  Surgery was performed on 31 May 2019.  Up until yesterday he was doing quite well.  When he woke up in the morning yesterday he felt severe pain in his left neck.  He called us and I returned his call and he describes severe pain in his left neck.  I asked him to come to the emergency department.  Clinical exam was consistent with surgical site infection.  CT scan was consistent with a soft tissue abscess.  He is being brought to the operating room for definitive management.  All risks and benefits of the procedure were discussed in detail with him and his family.  Risks including but not limited to bleeding, infection, iatrogenic injury, need for further surgery and risk of anesthesia and stroke were discussed.  Patient and family wish was to proceed.    Procedure details: Patient was identified and then brought to the operating room and placed in supine position.  General anesthesia was induced.  There was some swelling in the airway but the intubation was easy according to the anesthesia  team.  He had already been on therapeutic doses of intravenous antibiotics.  The left side of the neck and chest and the left lower extremity were prepped in a sterile surgical field was created.  Preprocedure timeout was conducted.    The entire length of the previous incision in the left neck was opened with a #15 blade.  The wound was under tension and immediately there was outpouring of large amount of purulent fluid.  This was sent for culture and sensitivity.  We removed all the sutures from the platysma layer.  This was Vicryl suture and was completely explanted.  Deeper down also there was significant amount of purulent fluid which was then packed out.  Working our way up more cephalad we saw that the tail of the parotid gland had been divided and the original material was coming down from that.  We cut down to the carotid sheath and examined the endarterectomy site as well as the patch repair.  These were well incorporated and completely intact.  The wound was then washed out with serial dilutions of warm normal saline.  We used 3 L of saline.  All the effluent was clear.  Adequate hemostasis was confirmed.  A separate stab incision was made and a #19 drain was placed in the wound bed.  The open ends of the parotid gland were repaired with 3-0 PDS Monocryl suture in horizontal mattress fashion.  The platysma layer was approximated with monofilament 3-0 PDS in interrupted fashion.  Skin was loosely approximated with vertical mattress 3-0 nylon.  Drain was secured with nylon.    Counts of instruments, sponges and needles were noted to be correct.    As we had discussed preoperatively I am concerned about his airway swelling which could certainly worsen and therefore we have decided to keep him intubated.  Patient remained stable and was taken to the ICU in stable condition.  I went outside and spoke with the family and updated them on our progress.

## 2019-06-16 NOTE — PROGRESS NOTES
Dr. Farias paged regarding increased neck swelling. Writer instructed to apply warm compress and repage if continued swelling or airway distress.

## 2019-06-17 LAB
ANION GAP SERPL CALCULATED.3IONS-SCNC: 8 MMOL/L (ref 3–14)
BUN SERPL-MCNC: 22 MG/DL (ref 7–30)
CALCIUM SERPL-MCNC: 7.9 MG/DL (ref 8.5–10.1)
CHLORIDE SERPL-SCNC: 109 MMOL/L (ref 94–109)
CO2 SERPL-SCNC: 22 MMOL/L (ref 20–32)
CREAT SERPL-MCNC: 1.36 MG/DL (ref 0.66–1.25)
ERYTHROCYTE [DISTWIDTH] IN BLOOD BY AUTOMATED COUNT: 13.2 % (ref 10–15)
ERYTHROCYTE [DISTWIDTH] IN BLOOD BY AUTOMATED COUNT: 13.4 % (ref 10–15)
GFR SERPL CREATININE-BSD FRML MDRD: 47 ML/MIN/{1.73_M2}
GLUCOSE BLDC GLUCOMTR-MCNC: 105 MG/DL (ref 70–99)
GLUCOSE BLDC GLUCOMTR-MCNC: 136 MG/DL (ref 70–99)
GLUCOSE BLDC GLUCOMTR-MCNC: 176 MG/DL (ref 70–99)
GLUCOSE BLDC GLUCOMTR-MCNC: 69 MG/DL (ref 70–99)
GLUCOSE BLDC GLUCOMTR-MCNC: 84 MG/DL (ref 70–99)
GLUCOSE SERPL-MCNC: 97 MG/DL (ref 70–99)
HCT VFR BLD AUTO: 35 % (ref 40–53)
HCT VFR BLD AUTO: 35.3 % (ref 40–53)
HGB BLD-MCNC: 12 G/DL (ref 13.3–17.7)
HGB BLD-MCNC: 12.2 G/DL (ref 13.3–17.7)
INR PPP: 1.58 (ref 0.86–1.14)
INR PPP: 1.63 (ref 0.86–1.14)
LACTATE BLD-SCNC: 1.8 MMOL/L (ref 0.7–2)
MCH RBC QN AUTO: 32.7 PG (ref 26.5–33)
MCH RBC QN AUTO: 32.8 PG (ref 26.5–33)
MCHC RBC AUTO-ENTMCNC: 34.3 G/DL (ref 31.5–36.5)
MCHC RBC AUTO-ENTMCNC: 34.6 G/DL (ref 31.5–36.5)
MCV RBC AUTO: 95 FL (ref 78–100)
MCV RBC AUTO: 96 FL (ref 78–100)
PLATELET # BLD AUTO: 192 10E9/L (ref 150–450)
PLATELET # BLD AUTO: 213 10E9/L (ref 150–450)
POTASSIUM SERPL-SCNC: 4.1 MMOL/L (ref 3.4–5.3)
RBC # BLD AUTO: 3.66 10E12/L (ref 4.4–5.9)
RBC # BLD AUTO: 3.73 10E12/L (ref 4.4–5.9)
SODIUM SERPL-SCNC: 139 MMOL/L (ref 133–144)
VANCOMYCIN SERPL-MCNC: 8.6 MG/L
WBC # BLD AUTO: 13.7 10E9/L (ref 4–11)
WBC # BLD AUTO: 16.9 10E9/L (ref 4–11)

## 2019-06-17 PROCEDURE — 80202 ASSAY OF VANCOMYCIN: CPT | Performed by: SURGERY

## 2019-06-17 PROCEDURE — 40000008 ZZH STATISTIC AIRWAY CARE

## 2019-06-17 PROCEDURE — 80048 BASIC METABOLIC PNL TOTAL CA: CPT | Performed by: STUDENT IN AN ORGANIZED HEALTH CARE EDUCATION/TRAINING PROGRAM

## 2019-06-17 PROCEDURE — 25800030 ZZH RX IP 258 OP 636: Performed by: STUDENT IN AN ORGANIZED HEALTH CARE EDUCATION/TRAINING PROGRAM

## 2019-06-17 PROCEDURE — 25000132 ZZH RX MED GY IP 250 OP 250 PS 637: Performed by: SURGERY

## 2019-06-17 PROCEDURE — 94003 VENT MGMT INPAT SUBQ DAY: CPT

## 2019-06-17 PROCEDURE — 25800030 ZZH RX IP 258 OP 636: Performed by: SURGERY

## 2019-06-17 PROCEDURE — 85610 PROTHROMBIN TIME: CPT | Performed by: STUDENT IN AN ORGANIZED HEALTH CARE EDUCATION/TRAINING PROGRAM

## 2019-06-17 PROCEDURE — 36415 COLL VENOUS BLD VENIPUNCTURE: CPT | Performed by: INTERNAL MEDICINE

## 2019-06-17 PROCEDURE — 40000275 ZZH STATISTIC RCP TIME EA 10 MIN

## 2019-06-17 PROCEDURE — 25000128 H RX IP 250 OP 636: Performed by: SURGERY

## 2019-06-17 PROCEDURE — 25000128 H RX IP 250 OP 636: Performed by: STUDENT IN AN ORGANIZED HEALTH CARE EDUCATION/TRAINING PROGRAM

## 2019-06-17 PROCEDURE — 25800025 ZZH RX 258: Performed by: NURSE PRACTITIONER

## 2019-06-17 PROCEDURE — 25000128 H RX IP 250 OP 636: Performed by: INTERNAL MEDICINE

## 2019-06-17 PROCEDURE — 25000132 ZZH RX MED GY IP 250 OP 250 PS 637: Performed by: STUDENT IN AN ORGANIZED HEALTH CARE EDUCATION/TRAINING PROGRAM

## 2019-06-17 PROCEDURE — 12000000 ZZH R&B MED SURG/OB

## 2019-06-17 PROCEDURE — 00000146 ZZHCL STATISTIC GLUCOSE BY METER IP

## 2019-06-17 PROCEDURE — 99291 CRITICAL CARE FIRST HOUR: CPT | Performed by: INTERNAL MEDICINE

## 2019-06-17 PROCEDURE — 83605 ASSAY OF LACTIC ACID: CPT | Performed by: INTERNAL MEDICINE

## 2019-06-17 PROCEDURE — 99207 ZZC NON-BILLABLE SERV PER CHARTING: CPT | Performed by: INTERNAL MEDICINE

## 2019-06-17 PROCEDURE — 85027 COMPLETE CBC AUTOMATED: CPT | Performed by: STUDENT IN AN ORGANIZED HEALTH CARE EDUCATION/TRAINING PROGRAM

## 2019-06-17 RX ORDER — METHYLPREDNISOLONE SODIUM SUCCINATE 40 MG/ML
40 INJECTION, POWDER, LYOPHILIZED, FOR SOLUTION INTRAMUSCULAR; INTRAVENOUS ONCE
Status: COMPLETED | OUTPATIENT
Start: 2019-06-17 | End: 2019-06-17

## 2019-06-17 RX ADMIN — CEFEPIME 2 G: 2 INJECTION, POWDER, FOR SOLUTION INTRAVENOUS at 21:12

## 2019-06-17 RX ADMIN — ACETAMINOPHEN 650 MG: 325 TABLET, FILM COATED ORAL at 21:12

## 2019-06-17 RX ADMIN — CEFEPIME 2 G: 2 INJECTION, POWDER, FOR SOLUTION INTRAVENOUS at 10:17

## 2019-06-17 RX ADMIN — METOPROLOL TARTRATE 12.5 MG: 25 TABLET, FILM COATED ORAL at 21:09

## 2019-06-17 RX ADMIN — METOPROLOL TARTRATE 12.5 MG: 25 TABLET, FILM COATED ORAL at 08:05

## 2019-06-17 RX ADMIN — PROPOFOL 30 MCG/KG/MIN: 10 INJECTION, EMULSION INTRAVENOUS at 04:59

## 2019-06-17 RX ADMIN — HYDROMORPHONE HYDROCHLORIDE 0.2 MG: 1 INJECTION, SOLUTION INTRAMUSCULAR; INTRAVENOUS; SUBCUTANEOUS at 05:44

## 2019-06-17 RX ADMIN — VANCOMYCIN HYDROCHLORIDE 1500 MG: 5 INJECTION, POWDER, LYOPHILIZED, FOR SOLUTION INTRAVENOUS at 03:45

## 2019-06-17 RX ADMIN — METHYLPREDNISOLONE SODIUM SUCCINATE 40 MG: 40 INJECTION, POWDER, FOR SOLUTION INTRAMUSCULAR; INTRAVENOUS at 09:02

## 2019-06-17 RX ADMIN — AMLODIPINE BESYLATE 5 MG: 5 TABLET ORAL at 08:05

## 2019-06-17 RX ADMIN — HYDROMORPHONE HYDROCHLORIDE 0.2 MG: 1 INJECTION, SOLUTION INTRAMUSCULAR; INTRAVENOUS; SUBCUTANEOUS at 08:06

## 2019-06-17 RX ADMIN — Medication 1 LOZENGE: at 14:47

## 2019-06-17 RX ADMIN — HYDROMORPHONE HYDROCHLORIDE 0.2 MG: 1 INJECTION, SOLUTION INTRAMUSCULAR; INTRAVENOUS; SUBCUTANEOUS at 00:38

## 2019-06-17 RX ADMIN — APIXABAN 2.5 MG: 2.5 TABLET, FILM COATED ORAL at 08:05

## 2019-06-17 RX ADMIN — APIXABAN 2.5 MG: 2.5 TABLET, FILM COATED ORAL at 21:09

## 2019-06-17 RX ADMIN — SODIUM CHLORIDE, POTASSIUM CHLORIDE, SODIUM LACTATE AND CALCIUM CHLORIDE: 600; 310; 30; 20 INJECTION, SOLUTION INTRAVENOUS at 06:30

## 2019-06-17 RX ADMIN — ASPIRIN 81 MG: 81 TABLET, COATED ORAL at 08:05

## 2019-06-17 RX ADMIN — DEXTROSE AND SODIUM CHLORIDE: 5; 450 INJECTION, SOLUTION INTRAVENOUS at 08:27

## 2019-06-17 RX ADMIN — ATORVASTATIN CALCIUM 10 MG: 10 TABLET, FILM COATED ORAL at 08:05

## 2019-06-17 ASSESSMENT — ACTIVITIES OF DAILY LIVING (ADL)
ADLS_ACUITY_SCORE: 16
ADLS_ACUITY_SCORE: 16
ADLS_ACUITY_SCORE: 15
ADLS_ACUITY_SCORE: 16

## 2019-06-17 NOTE — PROGRESS NOTES
Patient currently on ventilator.  Will sign off at this time.  Please contact once you want us to resume care.     David Jara DO   Hospitalist

## 2019-06-17 NOTE — PLAN OF CARE
Left vented and sedated overnight incase of possible airway swelling s/p left neck abscess washout. MARRERO. SR 1AVB. VSS. Minimal vent support 21% PEEP of 5. On abx cefepime  and vanco. Family at bedside throughout the evening and updated with POC. Possible extubate this am per MD Ahn. Will monitor.

## 2019-06-17 NOTE — CONSULTS
Neck abscess following L carotid endarterectomy.  S/p I and D.  Cont Vanco and cefepime pending further cx results.

## 2019-06-17 NOTE — CONSULTS
Consult Date:  06/17/2019      INFECTIOUS DISEASE CONSULTATION      ATTENDING PHYSICIAN:  Pedro Ahn MD      REASON FOR CONSULTATION:  I was asked by Dr. Ahn to assess neck abscess.      IMPRESSION:   1.  An 85-year-old male status post cerebrovascular accident secondary to left carotid stenosis.   2.  Status post left carotid endarterectomy 2-1/2 weeks ago.   3.  Admitted on this occasion for postoperative left neck abscess.  Status post incision and drainage 06/16/2019 with finding of abscess.  Gram stain from the abscess material shows gram-positive cocci.  Culture is negative to date.  Blood cultures are negative.      RECOMMENDATIONS:   1.  Continue broad coverage with vancomycin and cefepime, pending final on neck culture.   2.  Can then tailor antibiotic therapy.      HISTORY OF PRESENT ILLNESS:  This is an 85-year-old male with a previous history of AAA, atrial fibrillation, hypertension and stroke.  He was found on studies to have a left carotid stenosis and therefore underwent a left carotid endarterectomy by Dr. Ahn 2-1/2 weeks ago.  Postoperatively, the patient began to note discomfort in the area of the incision with swelling and redness.  He was seen and admitted to the hospital.  CT scan showed fluid collection in the area of the surgery and yesterday, the patient was taken to the operating room where he underwent incision and drainage of a small left neck abscess.  Gram stain of the abscess material shows gram-positive cocci.  Cultures are negative to date.  The patient had a temperature up to 101.2 degrees and is afebrile at present.  He was intubated for protection of his airway postoperatively and just today, has been extubated.  He has no particular complaints at present.  He has been receiving vancomycin and cefepime.  Creatinine today is 1.36.      PAST MEDICAL HISTORY:   1.  CVA.   2.  Abdominal aortic aneurysm.   3.  Atrial fibrillation -- on anticoagulation.   4.  Hypertension.   5.   History of patent foramen ovale.      SOCIAL HISTORY:  Lives at home.      FAMILY HISTORY:  Noncontributory.      REVIEW OF SYSTEMS:  Negative, other than that described above.      ALLERGIES:  LISINOPRIL.      PHYSICAL EXAMINATION:   VITAL SIGNS:  Temperature 98.3, blood pressure 159/100, heart rate 84 and regular, respirations 22.   GENERAL:  Well developed, well nourished, awake and answers questions appropriately.  Does not look acutely ill.   SKIN:  No rashes or nodules.   HEENT:  Eyes:  No subconjunctival hemorrhages or scleral icterus.  Oropharynx without erythema or exudate.   Neck:  Left neck incision is dressed and was not able to be examined.   LUNGS:  Clear to auscultation anteriorly.  No use of accessory muscles of respiration.   COR:  S1, S2 normal.  No S3, S4 or murmur.   ABDOMEN:  Soft, nontender.  No mass or organomegaly.   EXTREMITIES:  No calf tenderness or pedal edema.      For further details of the patient's history, please refer to the chart.  Thank you very much for this consultation.         JARROD FELDMAN MD             D: 2019   T: 2019   MT: LACY      Name:     JOHN MATA   MRN:      [id number]        Account:       [id number]   :      1934           Consult Date:  2019      Document: [id number]       cc: Loe Ahn MD

## 2019-06-17 NOTE — PHARMACY-VANCOMYCIN DOSING SERVICE
Pharmacy Vancomycin Note  Date of Service 2019  Patient's  1934   85 year old, male    Indication: Abscess  Goal Trough Level: 15-20 mg/L  Day of Therapy: 3  Current Vancomycin regimen:  intermiitent    Current estimated CrCl = Estimated Creatinine Clearance: 35.4 mL/min (A) (based on SCr of 1.58 mg/dL (H)).    Creatinine for last 3 days  6/15/2019:  5:47 PM Creatinine 1.63 mg/dL  2019:  4:09 AM Creatinine 1.58 mg/dL    Recent Vancomycin Levels (past 3 days)  2019: 12:40 AM Vancomycin Level 8.6 mg/L    Vancomycin IV Administrations (past 72 hours)                   vancomycin 1500 mg in 0.9% NaCl 250 ml intermittent infusion 1,500 mg (mg) 1,500 mg Given 19 0341                Nephrotoxins and other renal medications (From now, onward)    Start     Dose/Rate Route Frequency Ordered Stop    19 0245  vancomycin 1500 mg in 0.9% NaCl 250 ml intermittent infusion 1,500 mg      1,500 mg  over 90 Minutes Intravenous ONCE 19 0242      06/15/19 2214  vancomycin place daniels - receiving intermittent dosing      1 each Intravenous SEE ADMIN INSTRUCTIONS 06/15/19 2214               Contrast Orders - past 72 hours (72h ago, onward)    Start     Dose/Rate Route Frequency Ordered Stop    06/15/19 1825  iopamidol (ISOVUE-370) solution 70 mL      70 mL Intravenous ONCE 06/15/19 1824 06/15/19 1853          Interpretation of levels and current regimen:  Trough level is  Supratherapeutic    Has serum creatinine changed > 50% in last 72 hours:     Urine output:  unable to determine    Renal Function: ESRD on Dialysis    Plan:  1.  Give 1500mg x1 now  2.  Pharmacy will check trough levels as appropriate in 1-3 Days.    3. Serum creatinine levels will be ordered daily for the first week of therapy and at least twice weekly for subsequent weeks.      Adalid Hall        .

## 2019-06-17 NOTE — PROGRESS NOTES
X cover 1825    Called for diet order  - I see that he had I&D neck, extubated this am and per nursing tolerated sips and ice chips  - reviewing the progress notes I don't see any reason for NPO    - will resume regular diet

## 2019-06-17 NOTE — PROGRESS NOTES
Critical Care Progress Note      06/17/2019    Name: Isael Garcia MRN#: [id number]   Age: 85 year old YOB: 1934     Hsptl Day# 2  ICU DAY #    MV DAY #             Problem List:   Active Problems:    Neck abscess    1. Neck abscess/soft tissue infection, and s/p I & D- gram + cocci noted in wounds but cultures have not (yet) grown out an organism. He continues on Cefepime and Vanco at present.   2. Left CEA within past several weeks.   3. History of CVA's due to prior left carotid stenosis   4. Acute respiratory failure and airway protection after I and D as above. I gave him 1 dose of solumedrol today on outside chance of tissue swelling, and he was successfully extubated after a breathing trial. If he continues to do well, he will advance diet, and can likely return to floor later today.   5. CKD- creatinine currently 1.36 and likely at baseline  6. History of AAA  7. History of afib on anticoagulation and his back on his apixaban  8. HTN- back on norvasc and metoprolol and will adjust as needed going forward.  9. History of patent foramen ovale   Overall acute and critical earlier and now slowly improving. I spent 40 minutes of critical care time with him.            Summary/Hospital Course:     Admitted for abscess/soft tissue infection at site or recent left CEA in neck, and s/p I & D yesterday. He was left intubated with concern of swelling near airway, but clinically this AM he was doing well and successfully extubated.       Assessment and plan :     Isael Garcia IS a 85 year old male admitted on 6/15/2019 for neck abscess as above .   I have personally reviewed the daily labs, imaging studies, cultures and discussed the case with referring physician and consulting physicians.     My assessment and plan by system for this patient is as follows:    Neurology/Psychiatry:   1. Just extubated, and likely has some minimal metabolic encephalopathy remaining from sepsis and  medications     Cardiovascular:   1.Hemodynamics - well compensated off support     Pulmonary/Ventilator Management:   1. Successfully extubated this morning and will focus on pulmonary hygiene and increasing mobility     GI and Nutrition :   1. Start PO's when able     Renal/Fluids/Electrolytes:   1. He appears to be at baseline creatinine at 1.36    Infectious Disease:   1. Cultures pending from left neck abscess, and will continue Cefepime and Vanco at present.     Endocrine:   1. Glucoses ok at present     Hematology/Oncology:   1. Compensated      IV/Access:   1. Venous access -   2. Arterial access -   3.  Plan  - central access required and necessary      ICU Prophylaxis:   1. DVT: Hep Subq/ LMWH/mechanical  2. VAP: HOB 30 degrees, chlorhexidine rinse  3. Stress Ulcer: PPI/H2 blocker  4. Restraints: Nonviolent soft two point restraints required and necessary for patient safety and continued cares and good effect as patient continues to pull at necessary lines, tubes despite education and distraction. Will readdress daily.   5. IV Access - central access required and necessary for continued patient cares  6. Feeding - PO's when able   7. Family Update: discussed with patient and son at bedside   8. Disposition - will likely be able to leave ICU later today if he is down to NC oxygen.         Key goals for next 24 hours:   1. Extubate (done)  2. PT and OT  3. Advance care as able                Interim History:              Key Medications:       amLODIPine  5 mg Oral Daily     apixaban ANTICOAGULANT  2.5 mg Oral BID     aspirin  81 mg Oral Daily     atorvastatin  10 mg Oral Daily     ceFEPIme (MAXIPIME) IV  2 g Intravenous Q12H     metoprolol tartrate  12.5 mg Oral BID     sodium chloride (PF)  3 mL Intracatheter Q8H     vancomycin place daniels - receiving intermittent dosing  1 each Intravenous See Admin Instructions       dextrose 5% and 0.45% NaCl 50 mL/hr at 06/17/19 0902     nitroPRUsside (NIPRIDE) IV  infusion ADULT/PEDS GREATER than or EQUAL to 45 kg std conc       propofol (DIPRIVAN) infusion Stopped (06/17/19 0902)     BETA BLOCKER NOT PRESCRIBED                 Physical Examination:   Temp:  [96.3  F (35.7  C)-100.1  F (37.8  C)] 98.3  F (36.8  C)  Pulse:  [57-84] 84  Heart Rate:  [57-84] 84  Resp:  [9-32] 22  BP: (109-170)/() 159/100  MAP:  [66 mmHg-120 mmHg] 104 mmHg  Arterial Line BP: (111-171)/(44-83) 146/72  FiO2 (%):  [21 %-50 %] 21 %  SpO2:  [87 %-100 %] 93 %    Intake/Output Summary (Last 24 hours) at 6/17/2019 1154  Last data filed at 6/17/2019 1000  Gross per 24 hour   Intake 3890.29 ml   Output 2325 ml   Net 1565.29 ml     Wt Readings from Last 4 Encounters:   06/17/19 75.9 kg (167 lb 5.3 oz)   05/30/19 73.3 kg (161 lb 9.6 oz)   09/10/18 75 kg (165 lb 4.8 oz)   09/05/17 76.2 kg (168 lb)     Arterial Line BP: (111-171)/(44-83) 146/72  MAP:  [66 mmHg-120 mmHg] 104 mmHg  BP - Mean:  [] 138  Ventilation Mode: CPAP/PS  (Continuous positive airway pressure with Pressure Support)  FiO2 (%): 21 %  Rate Set (breaths/minute): 14 breaths/min  Tidal Volume Set (mL): 500 mL  PEEP (cm H2O): 5 cmH2O  Pressure Support (cm H2O): 5 cmH2O  Oxygen Concentration (%): 21 %  Resp: 22    Recent Labs   Lab 06/16/19  1725   PH 7.37   PCO2 39   PO2 132*   HCO3 23   O2PER 50       GEN: no acute distress; comfortable on vent    HEENT: head ncat, sclera anicteric, OP patent, trachea midline; wound and dressing appear clean and without swelling    PULM: unlabored synchronous with vent, clear anteriorly    CV/COR: RRR S1S2 no gallop,  No rub, no murmur  ABD: soft nontender, hypoactive bowel sounds, no mass  EXT:  No significant edema   warm  NEURO: grossly intact; moving all extremities well to command when seen on vent earlier   SKIN: no obvious rash; no cyanosis or mottling   LINES: clean, dry intact         Data:   All data and imaging reviewed     ROUTINE ICU LABS (Last four results)  CMP  Recent Labs   Lab  06/17/19  0502 06/16/19  0409 06/15/19  1747    137 137   POTASSIUM 4.1 4.0 4.1   CHLORIDE 109 109 107   CO2 22 21 26   ANIONGAP 8 7 4   GLC 97 143* 131*   BUN 22 28 35*   CR 1.36* 1.58* 1.63*   GFRESTIMATED 47* 39* 38*   GFRESTBLACK 55* 46* 44*   KULDIP 7.9* 8.2* 8.6   MAG  --  2.1  --    PHOS  --  2.6  --      CBC  Recent Labs   Lab 06/17/19  0502 06/16/19  1725 06/16/19  0409 06/15/19  1747   WBC 13.7* 16.9*  --  12.6*   RBC 3.66* 3.73*  --  4.25*   HGB 12.0* 12.2* 13.3 13.8   HCT 35.0* 35.3*  --  40.3   MCV 96 95  --  95   MCH 32.8 32.7  --  32.5   MCHC 34.3 34.6  --  34.2   RDW 13.4 13.2  --  13.2    213  --  271     INR  Recent Labs   Lab 06/17/19  0502 06/16/19  1725   INR 1.63* 1.58*     Arterial Blood Gas  Recent Labs   Lab 06/16/19  1725   PH 7.37   PCO2 39   PO2 132*   HCO3 23   O2PER 50       All cultures:  Recent Labs   Lab 06/16/19  1144 06/15/19  1747   CULT Culture negative monitoring continues  PENDING  PENDING No growth after 2 days  No growth after 2 days     Recent Results (from the past 24 hour(s))   XR Chest Port 1 View    Narrative    XR CHEST PORT 1 VW  6/16/2019 6:30 PM     HISTORY:  check ET tube    COMPARISON: Film dated 7/14/2014    FINDINGS:  ET tube is in good position. The heart is normal in size.  No focal infiltrates are identified.      Impression    IMPRESSION: ET tube is in good position.    HOANG MENCHACA MD   XR Abdomen Port 1 View    Narrative    XR ABDOMEN PORT 1   6/16/2019 7:40 PM     HISTORY:  ogt placement confirmation    COMPARISON: None.    FINDINGS:  An enteric tube is present. The tip is in the region of the  gastric fundus.    MD Yovani FRANCO MD    Billing: This patient is critically ill: Yes. Total critical care time today 40 min.

## 2019-06-17 NOTE — PROGRESS NOTES
[facility] ICU RESPIRATORY NOTE    Date of Admission: 6/15/2019  Date of Intubation (most recent): 6/16/2019  Reason for Mechanical Ventilation: Neck surgery; airway protection  Number of Days on Mechanical Ventilation: 2  Met Criteria for Pressure Support Trial: No  Reason for No Pressure Support Trial: per MD    Significant Events Today: None overnight    ABG Results:   Recent Labs   Lab 06/16/19  1725   PH 7.37   PCO2 39   PO2 132*   HCO3 23   O2PER 50     Current Vent Settings: Ventilation Mode: CMV/AC  (Continuous Mandatory Ventilation/ Assist Control)  FiO2 (%): 21 %  Rate Set (breaths/minute): 14 breaths/min  Tidal Volume Set (mL): 500 mL  PEEP (cm H2O): 5 cmH2O  Oxygen Concentration (%): 21 %  Resp: 13    Plan: Continue patient on full ventilatory support and assess for weaning readiness daily.

## 2019-06-17 NOTE — PROGRESS NOTES
Pt is extubated to aerosol mask 28% 8LPM. Lung sounds clear throughout. No complications post extubation. RT will continue to monitor.  6/17/2019  Michelle Rich

## 2019-06-17 NOTE — PROGRESS NOTES
Vascular Surgery Progress Note:  POD#1 drainage left carotid neck abscess    S: In ICU intubated to protect airway overnight.    O:   Vitals:  BP  Min: 108/60  Max: 170/91  Temp  Av.4  F (36.9  C)  Min: 96.3  F (35.7  C)  Max: 100.1  F (37.8  C)  Pulse  Av  Min: 57  Max: 80  I/O last 3 completed shifts:  In: 2925.15 [I.V.:2925.15]  Out: 1515 [Urine:1495; Drains:20]    Physical Exam: Very comfortable.  Sedated.                             Mild swelling left neck.  Minimal Brice-Solano output since OR                             Chest=clear         K=4.1   SCr=1.36   Glu=97   Hgb=12.0   WBC=13.7      Assessment/Plan: Very stable.  Plan extubation this morning.  Transfer to floor following this.  On IV vancomycin pending cultures.      Appreciate Dr.Karimi guadalupe.  Unusual for late neck abscess to develop.  Saw patient on 2019 in the office were only mild induration was noted with no signs of infection.  Surgery was performed on 2019 following a left hemispheric stroke from the ulcerated high-grade left carotid stenosis which occurred while he was on chronic Eliquis.    Patient has had a prior EVAR repair at [facility] with no follow-up.  Also  suspect left leg PAD.  Will require eventual evaluation.    Wm. Jimy MD

## 2019-06-17 NOTE — PLAN OF CARE
A&o x4. Neuros intact, except left pupil 2mm, right pupil 3mm, left eyelid ptosis (baseline). Denies pain, except throat sore from ETT. Voice hoarse. Improvement w/ cepastat lozenge. Tolerating sips of water and ice chips. Large urine output per hollins. LS clear, on 3L/NC. Left neck dressing marked, no changes, HANY drain in place- minimal serosanguinous output. Afebrile, cultures pending. Hypoglycemic this AM, D5/0.45%NS started, BG now trending up- NP aware, continue IVF as ordered and monitor BG. Bedrest, PT/OT consulted. Pt's s/o and children updated at bedside, supportive. Report called to JENNIFER Mishra. Pt tx to st33.   Lilia Manuel RN

## 2019-06-17 NOTE — PROVIDER NOTIFICATION
Notified Jim Miranda, BELINDA- BG 69. IVF changed and will give 4oz apple juice through OG. Continue to monitor.  Lilia Manuel RN

## 2019-06-17 NOTE — PROGRESS NOTES
VASCULAR SURGERY    Extubated recently.  Breathing well with no airway problems.  Able to speak with no difficulty.  Wants to eat.  Daughter is here with him.    Very stable.  No breathing difficulties noted.    Updated family.  Awaiting cultures to decide on antibiotics.  We will leave drain in which has had very minimal output until least tomorrow.  Will transfer to floor.      Pedro Ahn MD

## 2019-06-17 NOTE — PROVIDER NOTIFICATION
Notified Jim Miranda NP- . Provider stated to continue D5/0.45% IVF, monitor BG. This was communicated w/ JENNIFER Mishra on [address] as pt will be transferring.   Lilia Manuel RN

## 2019-06-18 ENCOUNTER — APPOINTMENT (OUTPATIENT)
Dept: PHYSICAL THERAPY | Facility: CLINIC | Age: 84
DRG: 856 | End: 2019-06-18
Attending: INTERNAL MEDICINE
Payer: COMMERCIAL

## 2019-06-18 ENCOUNTER — APPOINTMENT (OUTPATIENT)
Dept: OCCUPATIONAL THERAPY | Facility: CLINIC | Age: 84
DRG: 856 | End: 2019-06-18
Attending: SURGERY
Payer: COMMERCIAL

## 2019-06-18 ENCOUNTER — DOCUMENTATION ONLY (OUTPATIENT)
Dept: OTHER | Facility: CLINIC | Age: 84
End: 2019-06-18

## 2019-06-18 LAB
GLUCOSE BLDC GLUCOMTR-MCNC: 123 MG/DL (ref 70–99)
GLUCOSE BLDC GLUCOMTR-MCNC: 188 MG/DL (ref 70–99)
GLUCOSE BLDC GLUCOMTR-MCNC: 280 MG/DL (ref 70–99)
PLATELET # BLD AUTO: 251 10E9/L (ref 150–450)
VANCOMYCIN SERPL-MCNC: 11 MG/L

## 2019-06-18 PROCEDURE — 97165 OT EVAL LOW COMPLEX 30 MIN: CPT | Mod: GO | Performed by: OCCUPATIONAL THERAPIST

## 2019-06-18 PROCEDURE — 97530 THERAPEUTIC ACTIVITIES: CPT | Mod: GO | Performed by: OCCUPATIONAL THERAPIST

## 2019-06-18 PROCEDURE — 99207 ZZC CDG-MDM COMPONENT: MEETS MODERATE - UP CODED: CPT | Performed by: INTERNAL MEDICINE

## 2019-06-18 PROCEDURE — 25000132 ZZH RX MED GY IP 250 OP 250 PS 637: Performed by: SURGERY

## 2019-06-18 PROCEDURE — 80202 ASSAY OF VANCOMYCIN: CPT | Performed by: SURGERY

## 2019-06-18 PROCEDURE — 25000128 H RX IP 250 OP 636: Performed by: SURGERY

## 2019-06-18 PROCEDURE — 97535 SELF CARE MNGMENT TRAINING: CPT | Mod: GO | Performed by: OCCUPATIONAL THERAPIST

## 2019-06-18 PROCEDURE — 85049 AUTOMATED PLATELET COUNT: CPT | Performed by: SURGERY

## 2019-06-18 PROCEDURE — 36415 COLL VENOUS BLD VENIPUNCTURE: CPT | Performed by: SURGERY

## 2019-06-18 PROCEDURE — 97161 PT EVAL LOW COMPLEX 20 MIN: CPT | Mod: GP | Performed by: PHYSICAL THERAPIST

## 2019-06-18 PROCEDURE — 00000146 ZZHCL STATISTIC GLUCOSE BY METER IP

## 2019-06-18 PROCEDURE — 12000000 ZZH R&B MED SURG/OB

## 2019-06-18 PROCEDURE — 25800030 ZZH RX IP 258 OP 636: Performed by: SURGERY

## 2019-06-18 PROCEDURE — 97116 GAIT TRAINING THERAPY: CPT | Mod: GP | Performed by: PHYSICAL THERAPIST

## 2019-06-18 PROCEDURE — 97112 NEUROMUSCULAR REEDUCATION: CPT | Mod: GP | Performed by: PHYSICAL THERAPIST

## 2019-06-18 PROCEDURE — 99232 SBSQ HOSP IP/OBS MODERATE 35: CPT | Performed by: INTERNAL MEDICINE

## 2019-06-18 RX ADMIN — AMLODIPINE BESYLATE 5 MG: 5 TABLET ORAL at 08:58

## 2019-06-18 RX ADMIN — ATORVASTATIN CALCIUM 10 MG: 10 TABLET, FILM COATED ORAL at 08:58

## 2019-06-18 RX ADMIN — METOPROLOL TARTRATE 12.5 MG: 25 TABLET, FILM COATED ORAL at 08:58

## 2019-06-18 RX ADMIN — METOPROLOL TARTRATE 12.5 MG: 25 TABLET, FILM COATED ORAL at 21:05

## 2019-06-18 RX ADMIN — APIXABAN 2.5 MG: 2.5 TABLET, FILM COATED ORAL at 08:58

## 2019-06-18 RX ADMIN — CEFEPIME 2 G: 2 INJECTION, POWDER, FOR SOLUTION INTRAVENOUS at 21:16

## 2019-06-18 RX ADMIN — CEFEPIME 2 G: 2 INJECTION, POWDER, FOR SOLUTION INTRAVENOUS at 09:09

## 2019-06-18 RX ADMIN — VANCOMYCIN HYDROCHLORIDE 1500 MG: 5 INJECTION, POWDER, LYOPHILIZED, FOR SOLUTION INTRAVENOUS at 10:42

## 2019-06-18 RX ADMIN — APIXABAN 2.5 MG: 2.5 TABLET, FILM COATED ORAL at 21:05

## 2019-06-18 RX ADMIN — ASPIRIN 81 MG: 81 TABLET, COATED ORAL at 08:58

## 2019-06-18 RX ADMIN — ACETAMINOPHEN 325 MG: 325 TABLET, FILM COATED ORAL at 21:23

## 2019-06-18 ASSESSMENT — ACTIVITIES OF DAILY LIVING (ADL)
SWALLOWING: 0-->SWALLOWS FOODS/LIQUIDS WITHOUT DIFFICULTY
RETIRED_EATING: 0-->INDEPENDENT
DRESS: 0-->INDEPENDENT
RETIRED_COMMUNICATION: 0-->UNDERSTANDS/COMMUNICATES WITHOUT DIFFICULTY
ADLS_ACUITY_SCORE: 17
BATHING: 1-->ASSISTIVE EQUIPMENT
ADLS_ACUITY_SCORE: 17
ADLS_ACUITY_SCORE: 17
AMBULATION: 0-->INDEPENDENT
COGNITION: 0 - NO COGNITION ISSUES REPORTED
TRANSFERRING: 0-->INDEPENDENT
ADLS_ACUITY_SCORE: 17
ADLS_ACUITY_SCORE: 16
ADLS_ACUITY_SCORE: 16
TOILETING: 1-->ASSISTIVE EQUIPMENT
FALL_HISTORY_WITHIN_LAST_SIX_MONTHS: NO

## 2019-06-18 NOTE — PROGRESS NOTES
06/18/19 1012   Signing Clinician's Name / Credentials   Signing clinician's name / credentials Darlin Galaviz PT   Kerr Balance Scale (ISAIAS PAULINO, ZAINAB S, DRU MALCOLM, STACEY B: MEASURING BALANCE IN THE ELDERLY: VALIDATION OF AN INSTRUMENT. CAN. J. PUB. HEALTH, JULY/AUGUST SUPPLEMENT 2:S7-11, 1992.)   Sit To Stand 4   Standing Unsupported 4   Sitting Unsupported 4   Stand to Sit 4   Transfers 4   Standing with Eyes Closed 4   Standing Unsupported, Feet Together 3   Reach Forward With Outstretched Arm 2   Retrieve Object From Floor 4   Turning to Look Behind 3   Turn 360 Degrees 3   Placing Alternate Foot on Stool (4-6 inches) 2   Unsupported Tandem Stand (Demonstrate to Subject) 2   One Leg Stand 1   Total Score (A score of 45 or less has been correlated with an increased risk of falls)   Total Score (out of 56) 44

## 2019-06-18 NOTE — PROGRESS NOTES
06/18/19 0749   Quick Adds   Type of Visit Initial Occupational Therapy Evaluation   Living Environment   Lives With alone;significant other  (SO lives with him most of the time.)   Living Arrangements house   Home Accessibility   (ramp, elevator to basement, doesnt' use upstairs much storag)   Transportation Anticipated car, drives self   Self-Care   Equipment Currently Used at Home grab bar, tub/shower   Activity/Exercise/Self-Care Comment higher toilet with sink/vanity next to toilet   Functional Level   Ambulation 0-->independent   Transferring 0-->independent   Toileting 1-->assistive equipment   Bathing 1-->assistive equipment   Dressing 0-->independent   Eating 0-->independent   Communication 0-->understands/communicates without difficulty   Swallowing 0-->swallows foods/liquids without difficulty   Cognition 0 - no cognition issues reported   Fall history within last six months no   Prior Functional Level Comment Pt I with ADL/IADL's, med mgmt,. laundry, cleaning, shopping, driivng   General Information   Onset of Illness/Injury or Date of Surgery - Date 06/15/19   Referring Physician Pedro Ahn MD   Patient/Family Goals Statement home   Additional Occupational Profile Info/Pertinent History of Current Problem   Mr. Isael Garcia is an 85-year-old male with past medical history significant for left carotid artery stenosis, status post left endarterectomy on 05/31/2019, cerebrovascular accident of his left parietal and left occipital lobes in setting of carotid embolus despite chronic anticoagulation, abdominal aortic aneurysm, status post endovascular repair, paroxysmal atrial fibrillation, chronic kidney disease stage 3, peripheral vascular disease, hypertension, patent foramen ovale and right internal carotid artery stenosis who presented on 06/15/2019 with erythema and tenderness of the left endarterectomy site.   Cellulitis and abscess surrounding recent left endarterectomy surgical  site.    Precautions/Limitations fall precautions   Cognitive Status Examination   Orientation orientation to person, place and time   Level of Consciousness alert   Follows Commands (Cognition) WNL   Cognitive Comment some impulsivity at times   Visual Perception   Visual Perception Wears glasses   Sensory Examination   Sensory Quick Adds No deficits were identified   Pain Assessment   Patient Currently in Pain Yes, see Vital Sign flowsheet  (2/10 neck )   Range of Motion (ROM)   ROM Comment B UE AROM WFL   Strength   Strength Comments B UE MMT NT fully appears WFL   Bed Mobility Skill: Scooting/Bridging   Level of Addison: Scooting/Bridging stand-by assist   Bed Mobility Skill: Supine to Sit   Level of Addison: Supine/Sit stand-by assist   Transfer Skill: Bed to Chair/Chair to Bed   Level of Addison: Bed to Chair contact guard   Transfer Skill: Sit to Stand   Level of Addison: Sit/Stand contact guard   Transfer Skill: Toilet Transfer   Level of Addison: Toilet stand-by assist   Assistive Device grab bars   Lower Body Dressing   Level of Addison: Dress Lower Body stand-by assist   Grooming   Level of Addison: Grooming contact guard   Eating/Self Feeding   Level of Addison: Eating independent   Instrumental Activities of Daily Living (IADL)   Previous Responsibilities driving;finances;medication management;meal prep;housekeeping;laundry;shopping;yardwork   Activities of Daily Living Analysis   Impairments Contributing to Impaired Activities of Daily Living balance impaired;strength decreased;pain;cognition impaired  (decreased activity tolerance)   General Therapy Interventions   Planned Therapy Interventions ADL retraining;transfer training;home program guidelines;progressive activity/exercise   Clinical Impression   Criteria for Skilled Therapeutic Interventions Met yes, treatment indicated   OT Diagnosis decreased ADL/IADLsa nd functional mobilit   Influenced by the  "following impairments impaired balance, activity tolerance, overall decreased strength   Assessment of Occupational Performance 1-3 Performance Deficits   Identified Performance Deficits impaired I with dressing, toilet and shower transfer, driving, etc   Clinical Decision Making (Complexity) Low complexity   Therapy Frequency Daily   Predicted Duration of Therapy Intervention (days/wks) 4 days   Anticipated Discharge Disposition Home with Assist  (SO assist with ADL/IADLsas needed)   Risks and Benefits of Treatment have been explained. Yes   Patient, Family & other staff in agreement with plan of care Yes   [facility] AM-PAC  \"6 Clicks\" Daily Activity Inpatient Short Form   1. Putting on and taking off regular lower body clothing? 3 - A Little   2. Bathing (including washing, rinsing, drying)? 3 - A Little   3. Toileting, which includes using toilet, bedpan or urinal? 3 - A Little   4. Putting on and taking off regular upper body clothing? 3 - A Little   5. Taking care of personal grooming such as brushing teeth? 3 - A Little   6. Eating meals? 4 - None   Daily Activity Raw Score (Score out of 24.Lower scores equate to lower levels of function) 19   Total Evaluation Time   Total Evaluation Time (Minutes) 10     "

## 2019-06-18 NOTE — PROGRESS NOTES
[facility]  Infectious Disease Progress Note          Assessment and Plan:   IMPRESSION:   1.  An 85-year-old male status post cerebrovascular accident secondary to left carotid stenosis.   2.  Status post left carotid endarterectomy 2-1/2 weeks ago.   3.  Admitted on this occasion for postoperative left neck abscess.  Status post incision and drainage 06/16/2019 with finding of abscess.  Gram stain from the abscess material shows gram-positive cocci.  Culture is now growing Staph aureus.  Blood cultures are negative.      RECOMMENDATIONS:   1.  Continue broad coverage with vancomycin and cefepime, pending sensitivities  2.  Can then tailor antibiotic therapy.             Interval History:   Feeling better.  No neck pain.  Swelling reduced.  Up walking.  Afebrile.  Neck cx as above.  Creat improved at 1.36.              Medications:       amLODIPine  5 mg Oral Daily     apixaban ANTICOAGULANT  2.5 mg Oral BID     aspirin  81 mg Oral Daily     atorvastatin  10 mg Oral Daily     ceFEPIme (MAXIPIME) IV  2 g Intravenous Q12H     metoprolol tartrate  12.5 mg Oral BID     sodium chloride (PF)  3 mL Intracatheter Q8H     vancomycin (VANCOCIN) IV  1,500 mg Intravenous Once     vancomycin place daniels - receiving intermittent dosing  1 each Intravenous See Admin Instructions                  Physical Exam:   Blood pressure 144/83, pulse 82, temperature 98.4  F (36.9  C), temperature source Oral, resp. rate 16, weight 75.9 kg (167 lb 5.3 oz), SpO2 98 %.  [unfilled]  Vital Signs with Ranges  Temp:  [97.6  F (36.4  C)-98.7  F (37.1  C)] 98.4  F (36.9  C)  Pulse:  [82-83] 82  Heart Rate:  [] 90  Resp:  [10-29] 16  BP: (111-144)/(61-83) 144/83  MAP:  [86 mmHg-99 mmHg] 86 mmHg  Arterial Line BP: (127-142)/(58-67) 127/58  SpO2:  [91 %-98 %] 98 %    Constitutional: Awake, alert, cooperative, no apparent distress   Lungs: Clear to auscultation bilaterally, no crackles or wheezing   Cardiovascular: Regular  rate and rhythm, normal S1 and S2, and no murmur noted   Abdomen: Normal bowel sounds, soft, non-distended, non-tender   Skin: No rashes, no cyanosis, no edema   Other: Neck wound dressed.          Data:   All microbiology laboratory data reviewed.  Recent Labs   Lab Test 06/18/19  0804 06/17/19  0502 06/16/19  1725 06/16/19  0409 06/15/19  1747   WBC  --  13.7* 16.9*  --  12.6*   HGB  --  12.0* 12.2* 13.3 13.8   HCT  --  35.0* 35.3*  --  40.3   MCV  --  96 95  --  95    192 213  --  271     Recent Labs   Lab Test 06/17/19  0502 06/16/19  0409 06/15/19  1747   CR 1.36* 1.58* 1.63*     Recent Labs   Lab Test 05/10/13  1053   SED 8

## 2019-06-18 NOTE — PROVIDER NOTIFICATION
Talked to hospitalist about blood in pt urine. Stated to monitor and notify if continues to occur. Stated it might be due to hollins catheter removal.

## 2019-06-18 NOTE — PROGRESS NOTES
Vascular Surgery Progress Note:  POD#2 drainage left neck at this    S: Very comfortable.    O:   Vitals:  BP  Min: 111/61  Max: 159/100  Temp  Av.1  F (36.7  C)  Min: 97.6  F (36.4  C)  Max: 98.7  F (37.1  C)  Pulse  Av.2  Min: 73  Max: 84  I/O last 3 completed shifts:  In: 1429.54 [P.O.:540; I.V.:699.54; NG/GT:190]  Out: 2393 [Urine:2375; Drains:18]    Physical Exam: Neurologically intact.                            Very minimal left neck drain which is removed                            Intact incision.  Left leg swelling as expected.    OR cultures growing staph aureus with sensitivities pending          Assessment/Plan: Doing well following delayed left neck abscess following emergent carotid endarterectomy.  Drain is removed.  On intravenous antibiotics pending final culture sensitivities.  Being followed by infectious disease.      Patient has a history of PAD to his left leg. Will plan FLY baseline testing while in hospital today.      Wm. Jimy MD

## 2019-06-18 NOTE — PLAN OF CARE
A&Ox4, VSS on RA. L neck incision covered with dressing, marked drainage - no change, edema present. CMS/neuros intact. LS clear. Voiding adequately, +BS, +flatus. PRN Tylenol for pain management. Denies N/V. Regular diet. Up ad joan.

## 2019-06-18 NOTE — PROGRESS NOTES
Cross cover  Nursing called due to elevated blood sugars, started on d51/2ns for hypoglycemia, he received iv steroids and is tolerating diet, will stop fluids and monitor blood sugars. His lactate wnl .

## 2019-06-18 NOTE — PLAN OF CARE
Discharge Planner PT   Patient plan for discharge:home  Current status: Evaluation completed and treatment initiated. Pt is a 85 year old male that is admitted for neck abscess after left CEA in May. Pt lives in a house with steps with SO most of time. At baseline, pt was independent with all ADLs, walks, bikes, does lawn, no falls.   Currently the pt admits that he is below baseline for mobility due to several days in bed. Pt ambulates 300-400ft with no device in kent with some occasional deviation and impulsivity but no LOB. Pt is independent with bed mobility.Tested Kerr 44/56 ( less than 45 is fall risk) Pt has equipment at home from spouse.  Barriers to return to prior living situation: steps, somedavidmes is alone  Recommendations for discharge: Home with assist of SO and family and use of device as needed for balance.  Rationale for recommendations: Pt just qualifies as fall risk in balance test and reports he feels more unsteady since illness.Pt would benefit from using a device for longer distances and having someone home to assist as needed initially.       Entered by: Darlin Galaviz 06/18/2019 10:27 AM

## 2019-06-18 NOTE — PROGRESS NOTES
[facility]    Medicine Progress Note - Hospitalist Service       Date of Admission:  6/15/2019  Assessment & Plan   Isael Garcia is an 85-year-old male with past medical history significant for left carotid artery stenosis, status post left endarterectomy on 05/31/2019, cerebrovascular accident of his left parietal and left occipital lobes in setting of carotid embolus despite chronic anticoagulation, abdominal aortic aneurysm, status post endovascular repair, paroxysmal atrial fibrillation, chronic kidney disease stage 3, peripheral vascular disease, hypertension, patent foramen ovale and right internal carotid artery stenosis who presented on 06/15/2019 with erythema and tenderness of the left endarterectomy site.     Cellulitis and abscess surrounding recent left endarterectomy surgical site  Patient presented from home with new onset of erythema, edema and tenderness at the postoperative surgical site.  The patient notes painful swallowing; however, confirms no difficulty swallowing and no airway swelling.  The patient reports no recent fevers or chills.   6/16:  Underwent surgical exploration of the surgical site and removal of previously placed sutures  - Vascular surgery and ID following.  Will defer further management to them      Paroxysmal atrial fibrillation on chronic anticoagulation  - The patient reports on Eliquis for the past 2 years, defer management to primary service  - PTA metoprolol with hold parameters were resumed     CKD Stage III   Baseline creatinine 1.6-1.8; creatinine on admission 1.63.   - Avoid nephrotoxic agents as deemed appropriate     Hypertension   - Continue PTA amlodipine and metoprolol with hold parameters  - P.r.n. hydralazine and labetalol available for systolic blood pressure greater than 180       Diet: Regular Diet Adult    DVT Prophylaxis: Defer to primary service  Shepherd Catheter: not present    Disposition Plan   Expected discharge: Defer to primary  service  Entered: David Jara DO 06/18/2019, 11:16 AM       The patient's care was discussed with the Patient.    David Jara DO  Hospitalist Service  [facility]    ______________________________________________________________________    Interval History   Patient seen and examined.  Resting comfortably.  No complaints at this time.  No fevers or chills.     Data reviewed today: I reviewed all medications, new labs and imaging results over the last 24 hours. I personally reviewed no images or EKG's today.    Physical Exam   Vital Signs: Temp: 98.4  F (36.9  C) Temp src: Oral BP: 144/83(post activity OT) Pulse: 82 Heart Rate: 90 Resp: 16 SpO2: 98 % O2 Device: None (Room air) Oxygen Delivery: 1.5 LPM  Weight: 167 lbs 5.27 oz  General Appearance: Resting comfortably.  NAD   Respiratory: Clear to auscultation.  No respiratory distress  Cardiovascular: RRR.  No murmurs  GI: Bowel sounds present.  Non-tender  Skin: Dressing in place to left neck.  No obvious rashes  Other: No edema.  No calf tenderness      Data   Recent Labs   Lab 06/18/19  0804 06/17/19  0502 06/16/19  1725 06/16/19  0409 06/15/19  1747   WBC  --  13.7* 16.9*  --  12.6*   HGB  --  12.0* 12.2* 13.3 13.8   MCV  --  96 95  --  95    192 213  --  271   INR  --  1.63* 1.58*  --   --    NA  --  139  --  137 137   POTASSIUM  --  4.1  --  4.0 4.1   CHLORIDE  --  109  --  109 107   CO2  --  22  --  21 26   BUN  --  22  --  28 35*   CR  --  1.36*  --  1.58* 1.63*   ANIONGAP  --  8  --  7 4   KULDIP  --  7.9*  --  8.2* 8.6   GLC  --  97  --  143* 131*     No results found for this or any previous visit (from the past 24 hour(s)).

## 2019-06-18 NOTE — PLAN OF CARE
Discharge Planner OT   Patient plan for discharge: home  Current status: Eval completed and treatment initiated. Pt lives in a house, his SO lives with him most of the time. Pt reports I with all ADL/IADL's and functional mobility, pt drives, manages own meds. Pt had stroke end of may s/p L CEA. Pt now readmitted due to neck abcess, cellulitis, s/p I and D and was intubated, pt now extubated and station 33. Currently pt supervision supine to sit, sit <> stand with SBA and ambulates to BR and back and in hallway with CGA for safety. Pt completed toilet transfer SBA/supervision with grab bar use. Stood at sink for oral facial hygiene with CGA/SBA for safety and completes LE ADL's with SBA. Pt alert and oriented x4, pt denies any memory changes and not noted decreased strength or coordination noted, a little  unsteady and overall weakness.   Barriers to return to prior living situation: unsteadiness initially and overall weakness.   Recommendations for discharge: home if SO/family can stay with pt  for awhile and A with ADL/IADL's as needed, laundry, cleaning, etc.   Rationale for recommendations: pt appears to be close to baseline, anticipate pt will be able to return home with family A with ADL/IADL's as needed to insure safety.        Entered by: Arabella Fitzgerald 06/18/2019 8:45 AM

## 2019-06-18 NOTE — PLAN OF CARE
Pt arrived to unit 1740. VSS on Ra. Sleepy. LS clear. IS instruction provided. Bowel sound+, hollins catheter in place. Per report pt has not been up yet, no activity order in place, plan to address in Am. Neck incision with marked drainage, no change. HANY to bulb suction ~8ml serosanguinous drainage. Old incision from previous surgery on R ankle area appears to be healing well, approximated, open to air.   Pt tolerated broth/toast.   D5 LR at 50ml/hr maintained as stated, BG at 2009 188, next shift to re-check.

## 2019-06-18 NOTE — PROGRESS NOTES
06/18/19 1007   Quick Adds   Type of Visit Initial PT Evaluation   Living Environment   Lives With alone;significant other   Living Arrangements house   Home Accessibility no concerns   Transportation Anticipated car, drives self   Living Environment Comment Has lots of equipment and ramp beccause spouse had mobility deficits   Self-Care   Usual Activity Tolerance good   Current Activity Tolerance moderate   Regular Exercise Yes   Activity/Exercise Type   (pt walks, bikes, mows lawn)   Equipment Currently Used at Home grab bar, tub/shower   Activity/Exercise/Self-Care Comment independent with mobility and ADLs   Functional Level Prior   Ambulation 0-->independent   Transferring 0-->independent   Toileting 1-->assistive equipment   Bathing 1-->assistive equipment   Communication 0-->understands/communicates without difficulty   Swallowing 0-->swallows foods/liquids without difficulty   Cognition 0 - no cognition issues reported   Fall history within last six months no   Prior Functional Level Comment Does all own chores at home   General Information   Onset of Illness/Injury or Date of Surgery - Date 06/15/19   Referring Physician Dr. Jett   Patient/Family Goals Statement To go home   Pertinent History of Current Problem (include personal factors and/or comorbidities that impact the POC) Pt is an 85 year old male admitted for abscess in neck after having left CEA in May. S/p exploratory surgery to neck    Precautions/Limitations fall precautions   Weight-Bearing Status - LLE full weight-bearing   Weight-Bearing Status - RLE full weight-bearing   Cognitive Status Examination   Orientation orientation to person, place and time   Level of Consciousness alert   Follows Commands and Answers Questions 100% of the time   Personal Safety and Judgment intact   Memory intact   Pain Assessment   Patient Currently in Pain No   Integumentary/Edema   Integumentary/Edema Comments bandage over left neck incision   Range of  "Motion (ROM)   ROM Comment WFL extremities, decreased cervical ROM consistent with surgery   Strength   Strength Comments WFL but generalized weakness from illness and hospitalization   Bed Mobility   Bed Mobility Comments independent   Transfer Skills   Transfer Comments independent   Gait   Gait Comments Pt ambulates without AD for 300-400ft with brisk pace, some impulsivity, no gross LOB but occasional mild deviation   Balance   Balance Comments Kerr balance 44/56    Sensory Examination   Sensory Perception no deficits were identified   Coordination   Coordination Comments very mild finger to nose deviation on left   General Therapy Interventions   Planned Therapy Interventions balance training;gait training;strengthening;progressive activity/exercise   Clinical Impression   Criteria for Skilled Therapeutic Intervention yes, treatment indicated   PT Diagnosis Decreased balance with gait   Influenced by the following impairments Decreased balance, decreased endurance, decreased strength   Functional limitations due to impairments Decreased stability with mobility compared to baseline   Clinical Presentation Stable/Uncomplicated   Clinical Presentation Rationale clinical judgment   Clinical Decision Making (Complexity) Low complexity   Therapy Frequency Daily   Predicted Duration of Therapy Intervention (days/wks) 1-2 days   Anticipated Discharge Disposition Home with Assist   Risk & Benefits of therapy have been explained Yes   Patient, Family & other staff in agreement with plan of care Yes   [facility] BATTERIES & BANDS TM \"6 Clicks\"   2016, Trustees of [facility], under license to [employer].  All rights reserved.   6 Clicks Short Forms Basic Mobility Inpatient Short Form   [facility] AM-PAC  \"6 Clicks\" V.2 Basic Mobility Inpatient Short Form   1. Turning from your back to your side while in a flat bed without using bedrails? 4 - None   2. Moving from lying on your back to sitting on the side of a " flat bed without using bedrails? 4 - None   3. Moving to and from a bed to a chair (including a wheelchair)? 4 - None   4. Standing up from a chair using your arms (e.g., wheelchair, or bedside chair)? 4 - None   5. To walk in hospital room? 4 - None   6. Climbing 3-5 steps with a railing? 4 - None   Basic Mobility Raw Score (Score out of 24.Lower scores equate to lower levels of function) 24   Total Evaluation Time   Total Evaluation Time (Minutes) 10

## 2019-06-18 NOTE — PLAN OF CARE
Dx:POD2 left neck exploration and abscess drainage Hx:Stroke, afib, AAA, CKD Vs: vitally stable on room air. HTN with activity. A/O:x4. Incisions:Neck incision covered with dressing CDI. HANY taken out this morning. CWMS:WDL. Pain level/controlled with:minimal pain controlled with cori tylenol. Up with:Ax1. Tolerating regular diet. No N/V. Passing gas. BM:No. Voiding adequately. Mod amount of blood first void but now tori colored urine. GI:Ax4. Resp:clear lung sounds. Plan: Will continue monitor. Possible home tomorrow on oral abx.

## 2019-06-18 NOTE — PHARMACY-VANCOMYCIN DOSING SERVICE
Pharmacy Vancomycin Note  Date of Service 2019  Patient's  1934   85 year old, male    Indication: Abscess  Goal Trough Level: 15-20 mg/L  Day of Therapy: 3  Current Vancomycin regimen: Intermittent dosing based on levels.    Current estimated CrCl = Estimated Creatinine Clearance: 42.6 mL/min (A) (based on SCr of 1.36 mg/dL (H)).    Creatinine for last 3 days  6/15/2019:  5:47 PM Creatinine 1.63 mg/dL  2019:  4:09 AM Creatinine 1.58 mg/dL  2019:  5:02 AM Creatinine 1.36 mg/dL    Recent Vancomycin Levels (past 3 days)  2019: 12:40 AM Vancomycin Level 8.6 mg/L  2019:  8:04 AM Vancomycin Level 11.0 mg/L    Vancomycin IV Administrations (past 72 hours)                   vancomycin 1500 mg in 0.9% NaCl 250 ml intermittent infusion 1,500 mg (mg) 1,500 mg Given 19 0345                Nephrotoxins and other renal medications (From now, onward)    Start     Dose/Rate Route Frequency Ordered Stop    19 1000  vancomycin 1500 mg in 0.9% NaCl 250 ml intermittent infusion 1,500 mg      1,500 mg  over 90 Minutes Intravenous ONCE 19 0940      06/15/19 2214  vancomycin place daniels - receiving intermittent dosing      1 each Intravenous SEE ADMIN INSTRUCTIONS 06/15/19 2214               Contrast Orders - past 72 hours (72h ago, onward)    Start     Dose/Rate Route Frequency Ordered Stop    06/15/19 1825  iopamidol (ISOVUE-370) solution 70 mL      70 mL Intravenous ONCE 06/15/19 1824 06/15/19 1853          Interpretation of levels and current regimen:  Trough level is  Subtherapeutic; however, not at steady-state yet.      Has serum creatinine changed > 50% in last 72 hours: No    Urine output:  good urine output    Renal Function: Improving    Plan:  1.  Give Vancomycin 1500 mg (~20 mg/kg) IV x 1 dose.  2.  Pharmacy will check trough levels as appropriate in ~ 24 hours..    3. Serum creatinine levels will be ordered daily for the first week of therapy and at least twice weekly  for subsequent weeks.      Kim Bailey, PharmD, BCPS        .

## 2019-06-18 NOTE — PLAN OF CARE
A/o. VSS on RA. Denies pain. Left neck incision with edema that extends to neck and redness extending to mid chest, drsg CDI. JPx1 intact, serosanguinous output. CMS/neuros intact. Bedrest for now. Shepherd output small @ 250, removed this AM @0630, now due to void. Regular diet. Will CTM.

## 2019-06-19 VITALS
HEART RATE: 82 BPM | TEMPERATURE: 97.3 F | WEIGHT: 167.33 LBS | RESPIRATION RATE: 16 BRPM | BODY MASS INDEX: 26.2 KG/M2 | SYSTOLIC BLOOD PRESSURE: 155 MMHG | OXYGEN SATURATION: 95 % | DIASTOLIC BLOOD PRESSURE: 99 MMHG

## 2019-06-19 LAB
BACTERIA SPEC CULT: ABNORMAL
CREAT SERPL-MCNC: 1.4 MG/DL (ref 0.66–1.25)
GFR SERPL CREATININE-BSD FRML MDRD: 45 ML/MIN/{1.73_M2}
Lab: ABNORMAL
SPECIMEN SOURCE: ABNORMAL

## 2019-06-19 PROCEDURE — 25000132 ZZH RX MED GY IP 250 OP 250 PS 637: Performed by: SURGERY

## 2019-06-19 PROCEDURE — 99207 ZZC CDG-MDM COMPONENT: MEETS MODERATE - UP CODED: CPT | Performed by: INTERNAL MEDICINE

## 2019-06-19 PROCEDURE — 82565 ASSAY OF CREATININE: CPT | Performed by: SURGERY

## 2019-06-19 PROCEDURE — 99232 SBSQ HOSP IP/OBS MODERATE 35: CPT | Performed by: INTERNAL MEDICINE

## 2019-06-19 PROCEDURE — 36415 COLL VENOUS BLD VENIPUNCTURE: CPT | Performed by: SURGERY

## 2019-06-19 RX ORDER — CEPHALEXIN 500 MG/1
500 CAPSULE ORAL 2 TIMES DAILY
Qty: 56 CAPSULE | Refills: 0 | Status: SHIPPED | OUTPATIENT
Start: 2019-06-19 | End: 2019-09-06

## 2019-06-19 RX ADMIN — ASPIRIN 81 MG: 81 TABLET, COATED ORAL at 09:55

## 2019-06-19 RX ADMIN — ACETAMINOPHEN 650 MG: 325 TABLET, FILM COATED ORAL at 00:37

## 2019-06-19 RX ADMIN — METOPROLOL TARTRATE 12.5 MG: 25 TABLET, FILM COATED ORAL at 09:55

## 2019-06-19 RX ADMIN — APIXABAN 2.5 MG: 2.5 TABLET, FILM COATED ORAL at 09:55

## 2019-06-19 RX ADMIN — AMLODIPINE BESYLATE 5 MG: 5 TABLET ORAL at 09:55

## 2019-06-19 RX ADMIN — Medication 1 LOZENGE: at 00:09

## 2019-06-19 RX ADMIN — ATORVASTATIN CALCIUM 10 MG: 10 TABLET, FILM COATED ORAL at 09:55

## 2019-06-19 ASSESSMENT — ACTIVITIES OF DAILY LIVING (ADL)
ADLS_ACUITY_SCORE: 17

## 2019-06-19 NOTE — PROGRESS NOTES
[facility]  Infectious Disease Progress Note          Assessment and Plan:   IMPRESSION:   1.  An 85-year-old male status post cerebrovascular accident secondary to left carotid stenosis.   2.  Status post left carotid endarterectomy 2-1/2 weeks ago.   3.  Admitted on this occasion for postoperative left neck abscess.  Status post incision and drainage 06/16/2019 with finding of abscess.  Gram stain from the abscess material shows gram-positive cocci.  Culture is now growing Staph aureus (MSSA).  Blood cultures are negative.      RECOMMENDATIONS:   1.  pt to be discharged today on PO ab.  Would give Keflex 500mg PO QID x two wks.            Interval History:   Feeling better.  No neck pain.  Swelling reduced.  Up walking.  Afebrile.  Neck cx as above.                Medications:       amLODIPine  5 mg Oral Daily     apixaban ANTICOAGULANT  2.5 mg Oral BID     aspirin  81 mg Oral Daily     atorvastatin  10 mg Oral Daily     ceFEPIme (MAXIPIME) IV  2 g Intravenous Q12H     metoprolol tartrate  12.5 mg Oral BID     sodium chloride (PF)  3 mL Intracatheter Q8H     vancomycin place daniels - receiving intermittent dosing  1 each Intravenous See Admin Instructions                  Physical Exam:   Blood pressure 132/70, pulse 82, temperature 97.3  F (36.3  C), temperature source Oral, resp. rate 16, weight 75.9 kg (167 lb 5.3 oz), SpO2 95 %.  [unfilled]  Vital Signs with Ranges  Temp:  [97.1  F (36.2  C)-98.1  F (36.7  C)] 97.3  F (36.3  C)  Heart Rate:  [74-78] 75  Resp:  [16] 16  BP: (111-132)/(68-81) 132/70  SpO2:  [94 %-95 %] 95 %    Constitutional: Awake, alert, cooperative, no apparent distress   Lungs: Clear to auscultation bilaterally, no crackles or wheezing   Cardiovascular: Regular rate and rhythm, normal S1 and S2, and no murmur noted   Abdomen: Normal bowel sounds, soft, non-distended, non-tender   Skin: No rashes, no cyanosis, no edema   Other: Neck wound without erythema or drainage.           Data:   All microbiology laboratory data reviewed.  Recent Labs   Lab Test 06/18/19  0804 06/17/19  0502 06/16/19  1725 06/16/19  0409 06/15/19  1747   WBC  --  13.7* 16.9*  --  12.6*   HGB  --  12.0* 12.2* 13.3 13.8   HCT  --  35.0* 35.3*  --  40.3   MCV  --  96 95  --  95    192 213  --  271     Recent Labs   Lab Test 06/17/19  0502 06/16/19  0409 06/15/19  1747   CR 1.36* 1.58* 1.63*     Recent Labs   Lab Test 05/10/13  1053   SED 8

## 2019-06-19 NOTE — DISCHARGE SUMMARY
Admit Date:     06/15/2019   Discharge Date:           HISTORY:  An 85-year-old patient, who had undergone emergent left carotid endarterectomy with greater saphenous vein patch angioplasty on 05/31/2019 following a stroke with good neurological recovery.  He did well following surgery with essentially normal neurological exam.  He is on chronic Eliquis anticoagulation that was restarted at the time of discharge along with a baby aspirin.  I saw him with his daughter in the office on  complaining of the typical mild neck pain following the carotid surgery, but no evidence of infection or other complications.  However, he noticed marked swelling and discomfort in his neck on 06/15/2019.  The patient came to the Emergency Department for evaluation.      HOSPITAL COURSE:  The patient was seen in the Emergency Department.  He had obvious swelling of the left neck.  CT scan was performed, which revealed a subcutaneous abscess.  This did not appear to involve the carotid artery, which itself appeared to be widely patent.  He was empirically started on intravenous antibiotics, including vancomycin.  The patient had no airway compromise or signs of systemic sepsis, though his white blood count was elevated at 13,700.  Glucoses were normal and lactate was negative.      The patient was taken to the operating room on 06/16/2019 for a neck exploration.  A large abscess cavity was drained located adjacent to the sternocleidomastoid muscle.  This did not involve the underlying carotid artery surgery, which is somewhat protected since there is an autogenous vein patch and no artificial patching on the artery.  The neck was lavaged and a drain was placed.  Because of the possibility of postoperative swelling from the abscess, the patient was left intubated in the Intensive Care Unit overnight.      He was able to be extubated on the first postoperative day.  The drain had very minimal drainage.  He was continued on intravenous  antibiotics and followed by Dr. John Sylvester of Infectious Disease.  Drain was removed on the second postoperative day.      Final cultures revealed Staph aureus with sensitivity to all antibiotics.      The patient was doing very well on 2019.  Very mild swelling was noted on the left neck, which was closed with nylon sutures.  No airway compromise or neurological problems.  Dr. Sylvester felt that he could be treated with cephalexin 500 mg q.i.d. for a 2-week course.      DISCHARGE MEDICATIONS:   1.  Cephalexin 500 mg p.o. q.i.d. x 2 weeks.   2.  Lipitor 10 mg daily.   3.  Eliquis 2.5 mg twice daily.   4.  Aspirin 81 mg daily.   5.  Norvasc 5 mg daily.   6.  Lopressor at 12.5 mg twice daily.   7.  Vitamin supplements.      FINAL DIAGNOSES:   1.  Left neck abscess following left carotid endarterectomy.   a.  Left neck exploration with drainage of abscess cavity.   b.  Staphylococcus aureus culture sensitive to all antibiotics.   2.  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   3.  History of chronic anticoagulation due to cardiac issues.   4.  History of left leg peripheral arterial disease.      PLAN:  The patient will be discharged to home on antibiotics.  He may wash his neck and resume normal activities.  He will follow up in the office in 2 weeks for suture removal.  Routine carotid duplex ultrasound surveillance will be performed.  We also plan to work up his left leg for the PAD.  FLY with exercise will be performed in the office.         ROBBIN ASHBY MD             D: 2019   T: 2019   MT: LACY      Name:     JOHN MATA   MRN:      [id number]        Account:        [id number]   :      1934           Admit Date:     06/15/2019                                  Discharge Date:       Document: [id number]       cc: Madhu Velasquez MD

## 2019-06-19 NOTE — PROGRESS NOTES
Vascular Surgery Progress Note    S: Very comfortable.  Dressed and sitting up wanting to go home this morning.    O:   Vitals:  BP  Min: 111/81  Max: 144/83  Temp  Av.8  F (36.6  C)  Min: 97.1  F (36.2  C)  Max: 98.4  F (36.9  C)  I/O last 3 completed shifts:  In: 250 [P.O.:250]  Out: 2700 [Urine:2700]    Physical Exam: Alert and appropriate.  Normal neurological exam.                            Dressing removed from left neck incision.                              Mild swelling as expected.  No erythema.  No drainage.                              Wound was sutured closed with nylon sutures.          OR cultures growing staph aureus sensitive to all antibiotics.      Assessment/Plan: Doing very well.  Incisional culture growing staph aureus as above.  Dr. Sylvester will see patient this morning and recommend oral antibiotics.  Will be able to go home today.  Instructed in postoperative cares.  Follow-up in office in 2 weeks.      Wm. Jimy MD

## 2019-06-19 NOTE — PLAN OF CARE
A&O x4. VSS on RA. CMS intact. Neuros intact. Lungs clear. BS+, BM+, flatus+. Neck incisions covered, dressing marked-no change, edema present. Tolerating regular diet. Denies N/V. Voiding adequately. Tylenol for pain. Up independently.

## 2019-06-19 NOTE — PLAN OF CARE
A&O. Able to communicate needs. VSS.    Pt dressed with shoes on ready to go home when writer arrived. Anxious to go.     AVS reviewed, medications given. 2 orders for Keflex, one was a handwritten RX by Dr. Sylvester, the next was entered by Dr. Ahn to have in the computer. Discrepancy noted on instructions, verbally verified with Dr. Ahn to follow Dr. Sylvester's orders of 500mg PO QID x2 weeks. This was communicated with discharge pharmacy. BID instructions remained on AVS, crossed off and noted with correct instructions. Pt verbalized understanding. Pt escorted by transport with all belongings, stated he wanted to wait for his ride near the door.

## 2019-06-19 NOTE — PLAN OF CARE
OT: Pt dressed and ready to discharge, nursing reports waiting for prescription. Pt reports dressed self independently.     Occupational Therapy Discharge Summary    Reason for therapy discharge:    Discharged to home.    Progress towards therapy goal(s). See goals on Care Plan in [facility] electronic health record for goal details.  Goals partially met.  Barriers to achieving goals:   discharge from facility.    Therapy recommendation(s):    No further therapy is recommended.home with family A as needed with ADL/IADL's ie shower transfer, heavier IADl's until OK per MD.

## 2019-06-20 ENCOUNTER — PATIENT OUTREACH (OUTPATIENT)
Dept: CARE COORDINATION | Facility: CLINIC | Age: 84
End: 2019-06-20

## 2019-06-20 ENCOUNTER — TELEPHONE (OUTPATIENT)
Dept: INTERNAL MEDICINE | Facility: CLINIC | Age: 84
End: 2019-06-20

## 2019-06-20 LAB
CREAT BLD-MCNC: 1.6 MG/DL (ref 0.66–1.25)
GFR SERPL CREATININE-BSD FRML MDRD: 41 ML/MIN/{1.73_M2}

## 2019-06-20 NOTE — LETTER
[place] CARE COORDINATION  [facility]  [address], [place] [address]    June 25, 2019    Isael Garcia  [address] [place] [id number]      Dear Isael,    I am a clinic care coordinator who works with Madhu Velasquez MD at [facility]. I wanted to thank you for spending the time to talk with me and provide you with my contact information so that you can call me with questions or concerns about your health care. Below is a description of clinic care coordination and how I can further assist you.     The clinic care coordinator is a registered nurse and/or  who understand the health care system. The goal of clinic care coordination is to help you manage your health and improve access to the [facility] system in the most efficient manner. The registered nurse can assist you in meeting your health care goals by providing education, coordinating services, and strengthening the communication among your providers. The  can assist you with financial, behavioral, psychosocial, chemical dependency, counseling, and/or psychiatric resources.    Please feel free to contact me at [phone] with any questions or concerns. We at [facility] are focused on providing you with the highest-quality healthcare experience possible and that all starts with you.     Sincerely,     Morales Mace RN  Clinic Care Coordinator  [facility]  Ph: [phone]      Enclosed: I have enclosed a copy of a 24 Hour Access Plan. This has helpful phone numbers for you to call when needed. Please keep this in an easy to access place to use as needed.

## 2019-06-20 NOTE — PROGRESS NOTES
Clinic Care Coordination Contact  [facility]/Voicemail    Referral Source: IP Report  Post-operative neck abscess    Clinical Data: Care Coordinator Outreach  Outreach attempted x 1.  Left message on voicemail with call back information and requested return call.  Plan: Care Coordinator will try to reach patient again in 1-2 business days.    Morales Mace, RN  Clinic Care Coordinator  [facility]  Ph: [phone]

## 2019-06-20 NOTE — LETTER
Health Care Home - Access Care Plan    About Me:    Patient Name:  Isael Garcia    YOB: 1934  Age:                      85 year old   Dylan MRN:     [id number] Telephone Information:   Home Phone [phone]   Mobile Not on file.       Address:  [address] [place] [address] Email address:  [email or web address]      Emergency Contact(s)   Name Relationship Lgl Grd Work Phone Home Phone Mobile Phone   1. ANAIS RIOS Daughter No  [phone]    2. ANAMARIA MARTINEZ Daughter No  [phone]   3. FRANCISCA MONROY Friend No  [phone]             Health Maintenance: Routine Health maintenance Reviewed: Due/Overdue   Health Maintenance Due   Topic Date Due     ZOSTER IMMUNIZATION (1 of 2) 04/25/1984     MEDICARE ANNUAL WELLNESS VISIT  02/08/2017     DTAP/TDAP/TD IMMUNIZATION (2 - Td) 07/31/2018     PHQ-2  01/01/2019     My Access Plan  Medical Emergency 911   Questions or concerns during clinic hours Primary Clinic Line, I will call the clinic directly: [facility] [phone]   24 Hour Appointment Line [phone] or  [phone]) (toll free)   24 Hour Nurse Line [phone] (toll free)   Questions or concerns outside clinic hours 24 Hour Appointment Line, I will call the after-hours on-call line:   [facility] [phone] or [phone] ([phone]) (toll-free)   Preferred Urgent Care [facility], [phone]   Preferred Hospital [facility]  [phone]   Preferred Pharmacy [facility] [employer] [address] [place], [place] - [address]     Behavioral Health Crisis Line The National Suicide Prevention Lifeline at 1-939.332.3174 or 911       My Care Team Members  Patient Care Team       Relationship Specialty Notifications Start Madhu Morales MD PCP - General   11/6/07     Phone: [phone] Fax: [phone]          [address] [place] [address]    Madhu Velasquez MD Assigned PCP   4/26/19     Phone: [phone] Fax: [phone] [address] [place] [address]           My Medical and Care Information  Problem List   Patient Active Problem List   Diagnosis     Essential hypertension     Allergic rhinitis     S/P TKR (total knee replacement)     Aftercare following joint replacement     CKD (chronic kidney disease) stage 3, GFR 30-59 ml/min (H)     Sleep related leg cramps     Vitamin D deficiency disease     Health Care Home     OA (osteoarthritis)     PVD (peripheral vascular disease) (H)     Atrial fibrillation (H)     TIA (transient ischemic attack)     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)     Stroke (H)     Neck abscess      Current Medications:  Current Outpatient Medications   Medication     acetaminophen (TYLENOL) 500 MG tablet     amLODIPine (NORVASC) 5 MG tablet     amoxicillin (AMOXIL) 500 MG capsule     apixaban ANTICOAGULANT (ELIQUIS) 2.5 MG tablet     aspirin (ASA) 81 MG EC tablet     atorvastatin (LIPITOR) 10 MG tablet     cephALEXin (KEFLEX) 500 MG capsule     Cholecalciferol (VITAMIN D) 2000 UNITS tablet     metoprolol tartrate (LOPRESSOR) 25 MG tablet     Misc Natural Products (OSTEO BI-FLEX ADV TRIPLE ST) TABS     multivitamin, therapeutic with minerals (MULTI-VITAMIN) TABS     No current facility-administered medications for this visit.

## 2019-06-20 NOTE — PLAN OF CARE
Physical Therapy Discharge Summary    Reason for therapy discharge:    Discharged home with assist of SO and family.   Progress towards therapy goal(s). See goals on Care Plan in [facility] electronic health record for goal details.  Goals not met.  Barriers to achieving goals:   discharge from facility.  pt only seen for initial PT evaluation and 1 treatment.     Therapy recommendation(s):    Pt was recommended to utilize a device for longer distances and having someone home to assist as needed initially due to Kerr score of 44/56 (less than 45 is fall risk).   **Pt not seen by discharging therapist on this date, note written based on previous treating therapist's notes and recommendations.

## 2019-06-21 LAB
BACTERIA SPEC CULT: NO GROWTH
BACTERIA SPEC CULT: NO GROWTH
Lab: NORMAL
Lab: NORMAL
SPECIMEN SOURCE: NORMAL
SPECIMEN SOURCE: NORMAL

## 2019-06-21 NOTE — TELEPHONE ENCOUNTER
"Hospital/TCU/ED for chronic condition Discharge Protocol    \"Hi, my name is Neelam Yancey, a registered nurse, and I am calling from [facility].  I am calling to follow up and see how things are going for you after your recent emergency visit/hospital/TCU stay.\"    Tell me how you are doing now that you are home?\" doing pretty good. Neck is a little tender if press on neck. Taking Tylenol as needed. No drainage noted from neck. Taking antibiotic without concerns.  No fever. Sleeping well. Activity up as tolerated home. Has help at home from family.        Discharge Instructions    \"Let's review your discharge instructions.  What is/are the follow-up recommendations?  Pt. Response: F/u with Dr. Ahn in 2 weeks for suture removal and US FLY to leg    \"Has an appointment with your primary care provider been scheduled?\"   No (schedule appointment)    \"When you see the provider, I would recommend that you bring your medications with you.\"    Medications    \"Tell me what changed about your medicines when you discharged?\"    Changes to chronic meds?    0-1    \"What questions do you have about your medications?\"    None     New diagnoses of heart failure, COPD, diabetes, or MI?    No              Medication reconciliation completed? Yes  Was MTM referral placed (*Make sure to put transitions as reason for referral)?   No    Call Summary    \"What questions or concerns do you have about your recent visit and your follow-up care?\"     none    \"If you have questions or things don't continue to improve, we encourage you contact us through the main clinic number (give number).  Even if the clinic is not open, triage nurses are available 24/7 to help you.     We would like you to know that our clinic has extended hours (provide information).  We also have urgent care (provide details on closest location and hours/contact info)\"      \"Thank you for your time and take care!\"    Neelam HARRELL RN, BSN, PHN             "

## 2019-06-23 LAB
BACTERIA SPEC CULT: NORMAL
Lab: NORMAL
SPECIMEN SOURCE: NORMAL

## 2019-06-25 ASSESSMENT — ACTIVITIES OF DAILY LIVING (ADL): DEPENDENT_IADLS:: INDEPENDENT

## 2019-06-25 NOTE — PROGRESS NOTES
Clinic Care Coordination Contact    Clinic Care Coordination Contact  OUTREACH    Referral Information:  Referral Source: IP Report  Primary Diagnosis: Other (neck abscess)  Chief Complaint   Patient presents with     Clinic Care Coordination - Post Hospital     post-operative neck abscess     Clinical Concerns:  CCC RN outreach to patient to follow up on his recent hospitalization for post-operative neck abscess.  Per chart review, the patient was contacted by PCP clinic triage RN on 6/21/19 and reported to be doing well at that time.    Today, the patient again reports to be doing well.  He had just finished mowing his lawn at the time of CCC RN call.  He reported his pain to be very minimal and his neck site is clean, dry, intact.  He denied fevers or other symptoms.  The patient confirmed his upcoming appointments and denied any transportation concerns.    The patient does live alone but states he is doing well and has people around him that can help if needed, though he wouldn't elaborate on details of who his support system is.    At this time, the patient denied need for additional assistance from Care Coordination but agreed to contact CCC RN with any questions or concerns.    Medication Management:  The patient denied questions or concerns regarding his medications.    Functional Status:  Independent    Living Situation:  Current living arrangement: I live in a private home  Type of residence: Private home - stairs    Diet/Exercise/Sleep:  Inadequate nutrition (GOAL):: No  Exercise: Yes  Inadequate activity/exercise (GOAL): No  Significant changes in sleep pattern (GOAL): No    Transportation:  Transportation concerns (GOAL): No  Transportation means: Regular car     Psychosocial:  Informal Support system: Friends, Family    Patient/Caregiver understanding: Yes   Future Appointments              [facility]    In 2 months [facility] Ul[facility]ound,  [facility]    In 2 months Pedro Ahn MD [facility] [place]    In 2 months Madhu Velasquez MD [facility]        Plan: The patient will continue monitoring his neck for any concerns for infection.  He will continue taking his medications daily as prescribed and will attend his upcoming appointments as scheduled.  The patient will contact CCC RN with any additional questions or concerns.  CCC RN will make no further scheduled patient outreaches at this time but will remain available should any patient needs arise.    Morales Mace RN  Clinic Care Coordinator  [facility]  Ph: [phone]

## 2019-07-01 ENCOUNTER — OFFICE VISIT (OUTPATIENT)
Dept: OTHER | Facility: CLINIC | Age: 84
End: 2019-07-01
Attending: SURGERY
Payer: COMMERCIAL

## 2019-07-01 ENCOUNTER — HOSPITAL ENCOUNTER (OUTPATIENT)
Dept: ULTRASOUND IMAGING | Facility: CLINIC | Age: 84
Discharge: HOME | End: 2019-07-01
Attending: SURGERY | Admitting: SURGERY
Payer: COMMERCIAL

## 2019-07-01 VITALS — SYSTOLIC BLOOD PRESSURE: 126 MMHG | DIASTOLIC BLOOD PRESSURE: 77 MMHG | HEART RATE: 60 BPM

## 2019-07-01 DIAGNOSIS — L02.11 ABSCESS OF NECK: Primary | ICD-10-CM

## 2019-07-01 DIAGNOSIS — I73.9 PAD (PERIPHERAL ARTERY DISEASE) (H): ICD-10-CM

## 2019-07-01 DIAGNOSIS — Z98.890 HISTORY OF LEFT-SIDED CAROTID ENDARTERECTOMY: ICD-10-CM

## 2019-07-01 PROCEDURE — G0463 HOSPITAL OUTPT CLINIC VISIT: HCPCS | Mod: 25

## 2019-07-01 PROCEDURE — 93924 LWR XTR VASC STDY BILAT: CPT

## 2019-07-01 PROCEDURE — 99024 POSTOP FOLLOW-UP VISIT: CPT | Mod: ZP | Performed by: SURGERY

## 2019-07-01 NOTE — LETTER
[facility] [address]  [place] [address]  Phone: [phone]  Fax: [phone]      2019       Re: Isael Garcia - 1934    Isael Garcia and his  daughter returned to see me in follow-up.  He had a symptomatic high-grade ulcerated left carotid stenosis and underwent emergent left CEA on 2019 with no complications and no recurrent neurological problems.  He looked fine on his follow-up but 2 days later developed marked swelling of his left neck.  He was found to have an abscess in the subcutaneous tissue and required an open I&D with my associate Dr. Jett on 6/15/2019.  Cultures grew out staph aureus sensitive all antibiotics.  He was discharged on cephalexin.  For a 2-week course which she is finishing in 2 days.  He is done very well since his discharge.  He has no problems swallowing.  Swelling is nearly completely resolved.     At the time of his initial consult on 2019 he reports that he had in his left buttock area associate with ambulation.  FLY in 2016 revealed PAD of the left leg.  Apparently at the [facility] he underwent an endovascular procedure for questionable AAA.  We noted decreased left pedal pulses at the time of his original surgery and planned a further work-up with an FLY with exercise which was performed today.     Exam: Alert and appropriate.              Blood pressure 126/77.  Pulse 60              Left neck incision is healing well.                    Skin sutures were all removed.     The eye with exercise was performed.  He had no discomfort on the treadmill except for some heaviness of his legs.  Resting FLY was 0.99 in the right remaining stable at 1.10 with exercise in the left 0.96 increasing to 1.09 with exercise.  All waveforms were triphasic bilaterally.     Impression: #1.  Doing well following left emergent CEA no recurrent neurological problems. Neck abscess with staph aureus of  uncertain etiology requiring operative drainage.  This is resolved very nicely he is finishing his antibiotics.  Repeat left carotid duplex ultrasound in 2 months.  He is on chronic Eliquis he will continue with this.                          #2.  By CTA is 70% stenosed the right carotid bifurcation.  This will be followed by duplex ultrasound in 9 months.                          #3.  No evidence of significant peripheral artery disease particular to the left leg. This certainly does not explain his left buttock discomfort which may be musculoskeletal or related to back issues he will discuss with his primary care physician.  Try to determine from the [facility] what sort of procedure was done to make sure we have appropriate follow-up.     This is been discussed with the patient and his daughter.     Pedro Ahn MD

## 2019-07-01 NOTE — NURSING NOTE
"Isael Garcia is a 85 year old male who presents for:  Chief Complaint   Patient presents with     RECHECK     stitch removal        Vitals:    Vitals:    07/01/19 1323   BP: 126/77   BP Location: Right arm   Patient Position: Chair   Cuff Size: Adult Regular   Pulse: 60       BMI:  Estimated body mass index is 26.2 kg/m  as calculated from the following:    Height as of 5/30/19: 5' 7.01\" (1.702 m).    Weight as of 6/17/19: 167 lb 5.3 oz (75.9 kg).    Pain Score:  Data Unavailable        Kim Velásquez MA    "

## 2019-07-01 NOTE — PROGRESS NOTES
[place] [facility]    Isael Garcia and his  daughter returned to see me in follow-up.  He had a symptomatic high-grade ulcerated left carotid stenosis and underwent emergent left CEA on 5/31/2019 with no complications and no recurrent neurological problems.  He looked fine on his follow-up but 2 days later developed marked swelling of his left neck.  He was found to have an abscess in the subcutaneous tissue and required an open I&D with my associate Dr. Jett on 6/15/2019.  Cultures grew out staph aureus sensitive all antibiotics.  He was discharged on cephalexin.  For a 2-week course which she is finishing in 2 days.  He is done very well since his discharge.  He has no problems swallowing.  Swelling is nearly completely resolved.    At the time of his initial consult on 5/30/2019 he reports that he had in his left buttock area associate with ambulation.  FLY in 2016 revealed PAD of the left leg.  Apparently at the [facility] he underwent an endovascular procedure for questionable AAA.  We noted decreased left pedal pulses at the time of his original surgery and planned a further work-up with an FLY with exercise which was performed today.    Exam: Alert and appropriate.              Blood pressure 126/77.  Pulse 60              Left neck incision is healing well.                    Skin sutures were all removed.    The eye with exercise was performed.  He had no discomfort on the treadmill except for some heaviness of his legs.  Resting FLY was 0.99 in the right remaining stable at 1.10 with exercise in the left 0.96 increasing to 1.09 with exercise.  All waveforms were triphasic bilaterally.    Impression: #1.  Doing well following left emergent CEA no recurrent neurological problems.  Neck abscess with staph aureus of uncertain etiology requiring operative drainage.  This is resolved very nicely he is finishing his antibiotics.  Repeat left carotid duplex ultrasound in 2 months.  He  is on chronic Eliquis he will continue with this.                          #2.  By CTA is 70% stenosed the right carotid bifurcation.  This will be followed by duplex ultrasound in 9 months.                          #3.  No evidence of significant peripheral artery disease particular to the left leg.  This certainly does not explain his left buttock discomfort which may be musculoskeletal or related to back issues he will discuss with his primary care physician.  Try to determine from the [facility] what sort of procedure was done to make sure we have appropriate follow-up.    This is been discussed with the patient and his daughter.    Robbin Ashby MD       Patient Care Team:  Madhu Velasquez MD as PCP - General  Madhu Velasquez MD as Assigned PCP  ROBBIN ASHBY

## 2019-07-15 LAB
FUNGUS SPEC CULT: NORMAL
Lab: NORMAL
SPECIMEN SOURCE: NORMAL

## 2019-08-23 DIAGNOSIS — I48.91 ATRIAL FIBRILLATION, UNSPECIFIED TYPE (H): ICD-10-CM

## 2019-08-23 DIAGNOSIS — E78.5 HYPERLIPIDEMIA LDL GOAL <70: ICD-10-CM

## 2019-08-23 DIAGNOSIS — I10 ESSENTIAL HYPERTENSION: ICD-10-CM

## 2019-08-24 NOTE — TELEPHONE ENCOUNTER
"Requested Prescriptions   Pending Prescriptions Disp Refills     metoprolol tartrate (LOPRESSOR) 25 MG tablet [Pharmacy Med Name: METOPROLOL TARTRATE TABS 25MG]  Last Written Prescription Date:  03/07/2019  Last Fill Quantity: 90,  # refills: 01   Last Office Visit: 9/10/2018   Future Office Visit:    Next 5 appointments (look out 90 days)    Sep 05, 2019  3:30 PM CDT  Return Visit with Pedro Ahn MD  [facility] [place] ([facility] [address]  [facility] [address]  [phone]   Sep 19, 2019 11:00 AM CDT  Office Visit with Madhu Velasquez MD  [facility] ([facility]) [address] [id number] [phone]          90 tablet 4     Sig: TAKE ONE-HALF (1/2) TABLET TWICE A DAY       Beta-Blockers Protocol Passed - 8/23/2019 10:14 AM        Passed - Blood pressure under 140/90 in past 12 months     BP Readings from Last 3 Encounters:   07/01/19 126/77   06/19/19 (!) 155/99   06/13/19 127/71                 Passed - Patient is age 6 or older        Passed - Recent (12 mo) or future (30 days) visit within the authorizing provider's specialty     Patient had office visit in the last 12 months or has a visit in the next 30 days with authorizing provider or within the authorizing provider's specialty.  See \"Patient Info\" tab in inbasket, or \"Choose Columns\" in Meds & Orders section of the refill encounter.              Passed - Medication is active on med list        atorvastatin (LIPITOR) 10 MG tablet [Pharmacy Med Name: ATORVASTATIN TABS 10MG]  Last Written Prescription Date:  03/07/2019  Last Fill Quantity: 90,  # refills: 01   Last Office Visit: 9/10/2018   Future Office Visit:    Next 5 appointments (look out 90 days)    Sep 05, 2019  3:30 PM CDT  Return Visit with Pedro Ahn MD  [facility] [place] ([facility]" "[facility]) [address]  [place] [address]  [phone]   Sep 19, 2019 11:00 AM CDT  Office Visit with Madhu Velasquez MD  [facility] ([facility]) [address] [address]  [phone]          90 tablet 4     Sig: TAKE 1 TABLET DAILY       Statins Protocol Passed - 8/23/2019 10:14 AM        Passed - LDL on file in past 12 months     Recent Labs   Lab Test 05/30/19  1438   LDL 50             Passed - No abnormal creatine kinase in past 12 months     Recent Labs   Lab Test 07/15/16  0917                   Passed - Recent (12 mo) or future (30 days) visit within the authorizing provider's specialty     Patient had office visit in the last 12 months or has a visit in the next 30 days with authorizing provider or within the authorizing provider's specialty.  See \"Patient Info\" tab in inbasket, or \"Choose Columns\" in Meds & Orders section of the refill encounter.              Passed - Medication is active on med list        Passed - Patient is age 18 or older        amLODIPine (NORVASC) 5 MG tablet [Pharmacy Med Name: AMLODIPINE BESYLATE TABS 5MG]  Last Written Prescription Date:  03/07/2019  Last Fill Quantity: 90,  # refills: 01   Last Office Visit: 9/10/2018   Future Office Visit:    Next 5 appointments (look out 90 days)    Sep 05, 2019  3:30 PM CDT  Return Visit with Pedro Ahn MD  [facility] ([facility]) [address]  [facility] [address]  [phone]   Sep 19, 2019 11:00 AM CDT  Office Visit with Madhu Velasquez MD  [facility] ([facility]) [address] [id number] [phone]          90 tablet 4     Sig: TAKE 1 TABLET DAILY       Calcium Channel Blockers Protocol  Failed - 8/23/2019 10:14 AM        Failed - Normal serum creatinine on file " "in past 12 months     Recent Labs   Lab Test 06/19/19  0825  06/15/19  1749   CR 1.40*   < >  --    CREAT  --   --  1.6*    < > = values in this interval not displayed.             Passed - Blood pressure under 140/90 in past 12 months     BP Readings from Last 3 Encounters:   07/01/19 126/77   06/19/19 (!) 155/99   06/13/19 127/71                 Passed - Recent (12 mo) or future (30 days) visit within the authorizing provider's specialty     Patient had office visit in the last 12 months or has a visit in the next 30 days with authorizing provider or within the authorizing provider's specialty.  See \"Patient Info\" tab in inbasket, or \"Choose Columns\" in Meds & Orders section of the refill encounter.              Passed - Medication is active on med list        Passed - Patient is age 18 or older          "

## 2019-08-26 RX ORDER — METOPROLOL TARTRATE 25 MG/1
TABLET, FILM COATED ORAL
Qty: 90 TABLET | Refills: 1 | Status: SHIPPED | OUTPATIENT
Start: 2019-08-26 | End: 2019-09-19

## 2019-08-26 RX ORDER — ATORVASTATIN CALCIUM 10 MG/1
TABLET, FILM COATED ORAL
Qty: 90 TABLET | Refills: 2 | Status: SHIPPED | OUTPATIENT
Start: 2019-08-26 | End: 2019-09-19

## 2019-08-26 NOTE — TELEPHONE ENCOUNTER
Amlodipine    Routing refill request to provider for review/approval because:  Labs out of range:  Elevated CR    Neelam HARRELL RN, BSN, PHN

## 2019-08-26 NOTE — TELEPHONE ENCOUNTER
Atorvastatin and Metoprolol     Prescription approved per [facility] Refill Protocol.    Neelam HARRELL, RN, BSN, PHN

## 2019-08-27 RX ORDER — AMLODIPINE BESYLATE 5 MG/1
TABLET ORAL
Qty: 90 TABLET | Refills: 0 | Status: SHIPPED | OUTPATIENT
Start: 2019-08-27 | End: 2019-09-19

## 2019-08-28 ENCOUNTER — TRANSFERRED RECORDS (OUTPATIENT)
Dept: HEALTH INFORMATION MANAGEMENT | Facility: CLINIC | Age: 84
End: 2019-08-28

## 2019-09-05 ENCOUNTER — OFFICE VISIT (OUTPATIENT)
Dept: OTHER | Facility: CLINIC | Age: 84
End: 2019-09-05
Attending: SURGERY
Payer: COMMERCIAL

## 2019-09-05 ENCOUNTER — HOSPITAL ENCOUNTER (OUTPATIENT)
Dept: ULTRASOUND IMAGING | Facility: CLINIC | Age: 84
Discharge: HOME | End: 2019-09-05
Attending: SURGERY | Admitting: SURGERY
Payer: COMMERCIAL

## 2019-09-05 VITALS
WEIGHT: 167 LBS | BODY MASS INDEX: 26.15 KG/M2 | DIASTOLIC BLOOD PRESSURE: 68 MMHG | HEART RATE: 62 BPM | RESPIRATION RATE: 14 BRPM | SYSTOLIC BLOOD PRESSURE: 119 MMHG

## 2019-09-05 DIAGNOSIS — I65.22 SYMPTOMATIC STENOSIS OF LEFT CAROTID ARTERY: ICD-10-CM

## 2019-09-05 DIAGNOSIS — I65.21 ASYMPTOMATIC STENOSIS OF RIGHT CAROTID ARTERY: ICD-10-CM

## 2019-09-05 DIAGNOSIS — Z95.828 HISTORY OF ENDOVASCULAR STENT GRAFT FOR ABDOMINAL AORTIC ANEURYSM (AAA): ICD-10-CM

## 2019-09-05 DIAGNOSIS — Z98.890 HISTORY OF LEFT-SIDED CAROTID ENDARTERECTOMY: Primary | ICD-10-CM

## 2019-09-05 PROCEDURE — 99213 OFFICE O/P EST LOW 20 MIN: CPT | Mod: 24 | Performed by: SURGERY

## 2019-09-05 PROCEDURE — 93882 EXTRACRANIAL UNI/LTD STUDY: CPT | Mod: LT

## 2019-09-05 PROCEDURE — G0463 HOSPITAL OUTPT CLINIC VISIT: HCPCS | Mod: 25

## 2019-09-05 NOTE — PROGRESS NOTES
"Isael Garcia is a 85 year old male who presents for:  Chief Complaint   Patient presents with     RECHECK     L carotid (11:15 [facility]; 11:45 [facility]) History of left carotid endarterectomy; 3 month follow up to 6-13-19 appointment with Dr. Ahn *elainew Resched from 09/12/19        Vitals:    Vitals:    09/05/19 1518   BP: 119/68   BP Location: Left arm   Patient Position: Chair   Cuff Size: Adult Regular   Pulse: 62   Resp: 14   Weight: 167 lb (75.8 kg)       BMI:  Estimated body mass index is 26.15 kg/m  as calculated from the following:    Height as of 5/30/19: 5' 7.01\" (1.702 m).    Weight as of this encounter: 167 lb (75.8 kg).    Pain Score:  Data Unavailable        Sandeep Joseph CMA    "
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
## 2019-09-06 ENCOUNTER — DOCUMENTATION ONLY (OUTPATIENT)
Dept: LAB | Facility: CLINIC | Age: 84
End: 2019-09-06

## 2019-09-06 DIAGNOSIS — D64.9 ANEMIA, UNSPECIFIED TYPE: ICD-10-CM

## 2019-09-06 DIAGNOSIS — N18.30 CKD (CHRONIC KIDNEY DISEASE) STAGE 3, GFR 30-59 ML/MIN (H): ICD-10-CM

## 2019-09-06 DIAGNOSIS — N18.30 CKD (CHRONIC KIDNEY DISEASE) STAGE 3, GFR 30-59 ML/MIN (H): Primary | ICD-10-CM

## 2019-09-06 LAB
ANION GAP SERPL CALCULATED.3IONS-SCNC: 3 MMOL/L (ref 3–14)
BUN SERPL-MCNC: 27 MG/DL (ref 7–30)
CALCIUM SERPL-MCNC: 9.1 MG/DL (ref 8.5–10.1)
CHLORIDE SERPL-SCNC: 111 MMOL/L (ref 94–109)
CO2 SERPL-SCNC: 26 MMOL/L (ref 20–32)
CREAT SERPL-MCNC: 1.68 MG/DL (ref 0.66–1.25)
ERYTHROCYTE [DISTWIDTH] IN BLOOD BY AUTOMATED COUNT: 13.5 % (ref 10–15)
GFR SERPL CREATININE-BSD FRML MDRD: 36 ML/MIN/{1.73_M2}
GLUCOSE SERPL-MCNC: 88 MG/DL (ref 70–99)
HCT VFR BLD AUTO: 42.5 % (ref 40–53)
HGB BLD-MCNC: 14.1 G/DL (ref 13.3–17.7)
IRON SATN MFR SERPL: 39 % (ref 15–46)
IRON SERPL-MCNC: 87 UG/DL (ref 35–180)
MCH RBC QN AUTO: 31.8 PG (ref 26.5–33)
MCHC RBC AUTO-ENTMCNC: 33.2 G/DL (ref 31.5–36.5)
MCV RBC AUTO: 96 FL (ref 78–100)
PLATELET # BLD AUTO: 210 10E9/L (ref 150–450)
POTASSIUM SERPL-SCNC: 4.5 MMOL/L (ref 3.4–5.3)
RBC # BLD AUTO: 4.44 10E12/L (ref 4.4–5.9)
RETICS # AUTO: 46.2 10E9/L (ref 25–95)
RETICS/RBC NFR AUTO: 1.1 % (ref 0.5–2)
SODIUM SERPL-SCNC: 140 MMOL/L (ref 133–144)
TIBC SERPL-MCNC: 223 UG/DL (ref 240–430)
VIT B12 SERPL-MCNC: 240 PG/ML (ref 193–986)
WBC # BLD AUTO: 6.2 10E9/L (ref 4–11)

## 2019-09-06 PROCEDURE — 83540 ASSAY OF IRON: CPT | Performed by: INTERNAL MEDICINE

## 2019-09-06 PROCEDURE — 85027 COMPLETE CBC AUTOMATED: CPT | Performed by: INTERNAL MEDICINE

## 2019-09-06 PROCEDURE — 83550 IRON BINDING TEST: CPT | Performed by: INTERNAL MEDICINE

## 2019-09-06 PROCEDURE — 80048 BASIC METABOLIC PNL TOTAL CA: CPT | Performed by: INTERNAL MEDICINE

## 2019-09-06 PROCEDURE — 36415 COLL VENOUS BLD VENIPUNCTURE: CPT | Performed by: INTERNAL MEDICINE

## 2019-09-06 PROCEDURE — 85045 AUTOMATED RETICULOCYTE COUNT: CPT | Performed by: INTERNAL MEDICINE

## 2019-09-06 PROCEDURE — 82607 VITAMIN B-12: CPT | Performed by: INTERNAL MEDICINE

## 2019-09-11 ENCOUNTER — TELEPHONE (OUTPATIENT)
Dept: MEDSURG UNIT | Facility: CLINIC | Age: 84
End: 2019-09-11

## 2019-09-19 ENCOUNTER — OFFICE VISIT (OUTPATIENT)
Dept: INTERNAL MEDICINE | Facility: CLINIC | Age: 84
End: 2019-09-19
Payer: COMMERCIAL

## 2019-09-19 VITALS
BODY MASS INDEX: 25.11 KG/M2 | HEART RATE: 59 BPM | OXYGEN SATURATION: 95 % | WEIGHT: 160 LBS | HEIGHT: 67 IN | RESPIRATION RATE: 16 BRPM | SYSTOLIC BLOOD PRESSURE: 122 MMHG | TEMPERATURE: 97.2 F | DIASTOLIC BLOOD PRESSURE: 72 MMHG

## 2019-09-19 DIAGNOSIS — N18.30 CKD (CHRONIC KIDNEY DISEASE) STAGE 3, GFR 30-59 ML/MIN (H): ICD-10-CM

## 2019-09-19 DIAGNOSIS — I73.9 PVD (PERIPHERAL VASCULAR DISEASE) (H): ICD-10-CM

## 2019-09-19 DIAGNOSIS — I65.21 CAROTID STENOSIS, RIGHT: ICD-10-CM

## 2019-09-19 DIAGNOSIS — Z00.00 MEDICARE ANNUAL WELLNESS VISIT, SUBSEQUENT: ICD-10-CM

## 2019-09-19 DIAGNOSIS — I48.91 ATRIAL FIBRILLATION, UNSPECIFIED TYPE (H): ICD-10-CM

## 2019-09-19 DIAGNOSIS — I10 ESSENTIAL HYPERTENSION: ICD-10-CM

## 2019-09-19 DIAGNOSIS — E78.5 HYPERLIPIDEMIA LDL GOAL <70: ICD-10-CM

## 2019-09-19 PROBLEM — L02.11 NECK ABSCESS: Status: RESOLVED | Noted: 2019-06-15 | Resolved: 2019-09-19

## 2019-09-19 PROCEDURE — 99207 C PAF COMPLETED  NO CHARGE: CPT | Mod: 25 | Performed by: INTERNAL MEDICINE

## 2019-09-19 PROCEDURE — 99214 OFFICE O/P EST MOD 30 MIN: CPT | Mod: 25 | Performed by: INTERNAL MEDICINE

## 2019-09-19 PROCEDURE — 99397 PER PM REEVAL EST PAT 65+ YR: CPT | Performed by: INTERNAL MEDICINE

## 2019-09-19 RX ORDER — AMLODIPINE BESYLATE 5 MG/1
5 TABLET ORAL DAILY
Qty: 90 TABLET | Refills: 3 | Status: SHIPPED | OUTPATIENT
Start: 2019-09-19 | End: 2020-07-27

## 2019-09-19 RX ORDER — METOPROLOL TARTRATE 25 MG/1
TABLET, FILM COATED ORAL
Qty: 90 TABLET | Refills: 3 | Status: SHIPPED | OUTPATIENT
Start: 2019-09-19 | End: 2020-07-27

## 2019-09-19 RX ORDER — ATORVASTATIN CALCIUM 10 MG/1
10 TABLET, FILM COATED ORAL DAILY
Qty: 90 TABLET | Refills: 3 | Status: SHIPPED | OUTPATIENT
Start: 2019-09-19 | End: 2020-07-27

## 2019-09-19 ASSESSMENT — MIFFLIN-ST. JEOR: SCORE: 1369.39

## 2019-09-19 ASSESSMENT — ACTIVITIES OF DAILY LIVING (ADL): CURRENT_FUNCTION: NO ASSISTANCE NEEDED

## 2019-09-19 NOTE — PROGRESS NOTES
"SUBJECTIVE:   Isael Garcia is a 85 year old male who presents for Preventive Visit.  Are you in the first 12 months of your Medicare coverage?  No    Healthy Habits:    In general, how would you rate your overall health?  Very good    Frequency of exercise:  6-7 days/week    Duration of exercise:  Greater than 60 minutes    Do you usually eat at least 4 servings of fruit and vegetables a day, include whole grains    & fiber and avoid regularly eating high fat or \"junk\" foods?  Yes    Taking medications regularly:  Yes    Barriers to taking medications:  None    Medication side effects:  Not applicable    Ability to successfully perform activities of daily living:  No assistance needed    Home Safety:  No safety concerns identified    Hearing Impairment:  No hearing concerns    In the past 6 months, have you been bothered by leaking of urine?  No    In general, how would you rate your overall mental or emotional health?  Very good      PHQ-2 Total Score:    Additional concerns today:  No    Do you feel safe in your environment? Yes    Do you have a Health Care Directive? Yes: Advance Directive has been received and scanned.      Fall risk  Fallen 2 or more times in the past year?: No  Any fall with injury in the past year?: No    Cognitive Screening   1) Repeat 3 items (Leader, Season, Table)    2) Clock draw: NORMAL  3) 3 item recall: Recalls 3 objects  Results: 3 items recalled: COGNITIVE IMPAIRMENT LESS LIKELY    Mini-CogTM Copyright BRETT Cabral. Licensed by the author for use in [facility]; reprinted with permission (patricia@.[facility]). All rights reserved.      Do you have sleep apnea, excessive snoring or daytime drowsiness?: no    Reviewed and updated as needed this visit by clinical staff  Tobacco  Allergies  Meds         Reviewed and updated as needed this visit by Provider        Social History     Tobacco Use     Smoking status: Never Smoker     Smokeless tobacco: Never Used   Substance " Use Topics     Alcohol use: No     If you drink alcohol do you typically have >3 drinks per day or >7 drinks per week? No    Alcohol Use 2/8/2016   Prescreen: >3 drinks/day or >7 drinks/week? The patient does not drink >3 drinks per day nor >7 drinks per week.       Lab Results   Component Value Date    GLC 88 09/06/2019     Lab Results   Component Value Date    A1C 5.6 05/30/2019     Lab Results   Component Value Date    CHOL 101 05/30/2019     Lab Results   Component Value Date    LDL 50 05/30/2019     Lab Results   Component Value Date    HDL 34 05/30/2019     Lab Results   Component Value Date    TRIG 84 05/30/2019     Lab Results   Component Value Date    CR 1.68 09/06/2019     Lab Results   Component Value Date    ALT 24 05/30/2019     Lab Results   Component Value Date    AST 17 05/30/2019     Lab Results   Component Value Date    MICROL <5 03/12/2019     Lab Results   Component Value Date    TSH 1.43 05/30/2019           Current providers sharing in care for this patient include:   Patient Care Team:  Madhu Velasquez MD as PCP - General  Madhu Velasquez MD as Assigned PCP    The following health maintenance items are reviewed in Epic and correct as of today:  Health Maintenance   Topic Date Due     ZOSTER IMMUNIZATION (1 of 2) 04/25/1984     MEDICARE ANNUAL WELLNESS VISIT  02/08/2017     DTAP/TDAP/TD IMMUNIZATION (2 - Td) 07/31/2018     PHQ-2  01/01/2019     FALL RISK ASSESSMENT  09/05/2020     ADVANCE CARE PLANNING  06/18/2024     PNEUMOCOCCAL IMMUNIZATION 65+ LOW/MEDIUM RISK  Completed     IPV IMMUNIZATION  Aged Out     MENINGITIS IMMUNIZATION  Aged Out     Labs reviewed in EPIC      Review of Systems  CONSTITUTIONAL: NEGATIVE for fever, chills.  Weight down 5 pounds in 1 year  INTEGUMENTARY/SKIN: NEGATIVE for worrisome rashes, moles or lesions except for sebaceous cyst left tricep area 13mm. No pain   EYES: NEGATIVE for vision changes or irritation. Has glasses. Last eye exam > 1 year ago  ENT/MOUTH: NEGATIVE  "for ear, mouth and throat problems  RESP: NEGATIVE for significant cough or SOB  BREAST: NEGATIVE for masses, tenderness or discharge  CV: NEGATIVE for chest pain, palpitations or peripheral edema. Hx parox A fib. Hx PAD. Recent clinic note from visit with Dr Ahn reviewed. Hx PAD. Denies claudication  pain sx in LEs  GI: NEGATIVE for nausea, abdominal pain, heartburn, or change in bowel habits  : NEGATIVE for frequency, dysuria, or hematuria  MUSCULOSKELETAL:  POSITIVE for some general arthralgias. Occ Tylenol  NEURO: NEGATIVE for weakness, dizziness or paresthesias  ENDOCRINE: NEGATIVE for temperature intolerance, skin/hair changes  HEME: NEGATIVE for bleeding problems  PSYCHIATRIC: NEGATIVE for changes in mood or affect    OBJECTIVE:   /72   Pulse 59   Temp 97.2  F (36.2  C) (Temporal)   Resp 16   Ht 1.702 m (5' 7\")   Wt 72.6 kg (160 lb)   SpO2 95%   BMI 25.06 kg/m   Estimated body mass index is 25.06 kg/m  as calculated from the following:    Height as of this encounter: 1.702 m (5' 7\").    Weight as of this encounter: 72.6 kg (160 lb).  Physical Exam  General appearance -   alert, no distress  Skin - No rashes or lesions. Nontender 133 mm sebaceous cyst left tricep area  Head - normocephalic, atraumatic  Eyes - MONAE, EOMI, fundi exam with nondilated pupils negative.  Ears - External ears normal. Canals clear. TM's normal.  Nose/Sinuses - Nares normal. Septum midline. Mucosa normal. No drainage or sinus tenderness.  Oropharynx - No erythema, no adenopathy, no exudates.  Neck - Supple without adenopathy or thyromegaly.  Left CEA scar. Mld right carotid bruit. Strong carotid pulse bilaterally  Lungs - Clear to auscultation without wheezes/rhonchi.  Heart - Regular rate and rhythm without murmurs, clicks, or gallops.  Nodes - No supraclavicular, axillary, or inguinal adenopathy palpable.  Abdomen - Abdomen soft, non-tender. BS normal. No masses or hepatosplenomegaly palpable. No " bruits.  Extremities -No cyanosis, clubbing or edema.    Musculoskeletal - Spine ROM normal. Muscular strength intact.   Peripheral pulses - radial=4/4, femoral=4/4, posterior tibial=2/4, dorsalis pedis=2/4, (R/L)  Neuro - Gait normal. Reflexes normal and symmetric. Sensation grossly WNL.  Genital - Normal-appearing male external genitalia. No scrotal masses or inguinal hernia palpable.   Rectal - Guaic negative stool. Normal tone. Prostate 1 plus in size to palpation. No rectal masses or prostate nodularity palpable        ASSESSMENT / PLAN:   1. Medicare annual wellness visit, subsequent   See below for HCM discussion. Otherwise UTD    2. PVD (peripheral vascular disease) (H)   No claudication sx now. Continue current medical management    3. Atrial fibrillation, unspecified type (H)  Paroxysmal. Currently in NSR. Continue meds  - apixaban ANTICOAGULANT (ELIQUIS) 2.5 MG tablet; Take 1 tablet (2.5 mg) by mouth 2 times daily  Dispense: 180 tablet; Refill: 3  - metoprolol tartrate (LOPRESSOR) 25 MG tablet; TAKE ONE-HALF (1/2) TABLET TWICE A DAY  Dispense: 90 tablet; Refill: 3  - OFFICE/OUTPT VISIT,EST,LEVL IV    4. CKD (chronic kidney disease) stage 3, GFR 30-59 ml/min (H)   Minimal worsening recently. Will recheck  GFR 3 mos. Recheck other labs May 2020  - Basic metabolic panel; Future  - Comprehensive metabolic panel; Future  - Albumin Random Urine Quantitative with Creat Ratio; Future  - Parathyroid Hormone Intact; Future  - Hemoglobin; Future  - OFFICE/OUTPT VISIT,EST,LEVL IV    5. Essential hypertension  Controlled. Continue current meds  - amLODIPine (NORVASC) 5 MG tablet; Take 1 tablet (5 mg) by mouth daily  Dispense: 90 tablet; Refill: 3  - Comprehensive metabolic panel; Future  - OFFICE/OUTPT VISIT,EST,LEVL IV    6. Hyperlipidemia LDL goal <70  Controlled. Continue current meds. Repeat labs May 2020  - atorvastatin (LIPITOR) 10 MG tablet; Take 1 tablet (10 mg) by mouth daily  Dispense: 90 tablet; Refill:  "3  - Comprehensive metabolic panel; Future  - Lipid panel reflex to direct LDL Fasting; Future  - OFFICE/OUTPT VISIT,RENNY HEARN IV    7. Carotid stenosis, right   No sx now. Not to point of requiring surgery per vascular surgery note. Future monitoring ordered. Will continue statin/BP therapy      End of Life Planning:  Patient currently has an advanced directive: Yes.  Practitioner is supportive of decision.    COUNSELING:  Reviewed preventive health counseling, as reflected in patient instructions    Estimated body mass index is 25.06 kg/m  as calculated from the following:    Height as of this encounter: 1.702 m (5' 7\").    Weight as of this encounter: 72.6 kg (160 lb).         reports that he has never smoked. He has never used smokeless tobacco.      Appropriate preventive services were discussed with this patient, including applicable screening as appropriate for cardiovascular disease, diabetes, osteopenia/osteoporosis, and glaucoma.  As appropriate for age/gender, discussed screening for colorectal cancer, prostate cancer, breast cancer, and cervical cancer. Checklist reviewing preventive services available has been given to the patient.    Reviewed patients plan of care and provided an AVS. The Basic Care Plan (routine screening as documented in Health Maintenance) for Isael meets the Care Plan requirement. This Care Plan has been established and reviewed with the Patient.    Counseling Resources:  ATP IV Guidelines  Pooled Cohorts Equation Calculator  Breast Cancer Risk Calculator  FRAX Risk Assessment  ICSI Preventive Guidelines  Dietary Guidelines for Americans, 2010  USDA's MyPlate  ASA Prophylaxis  Lung CA Screening      PLAN:  Continue current meds  Prescriptions refilled on file.    Call  [phone] or use [employer] to schedule a future lab appointment  non-fasting in 3 months (mid December).   Fasting labs May 2020  I would recommend you receive an influenza (flu) vaccine in the Fall  (October or " November) and also recommend a tetanus/whopping cough prevention vaccine (TDAP). Get in a pharmacy  Check with insurance or speak with your pharmacist re: Shingrix vaccine coverage for shingles prevention.  This is a 2 shot series done 2-6 months apart  Pt was informed regarding extra E&M billing for management of new or established medical issues not related to today's wellness visit      Madhu Velasquez MD  [facility]    Identified Health Risks:

## 2019-09-19 NOTE — PATIENT INSTRUCTIONS
Continue current meds  Prescriptions refilled on file.    Call  [phone] or use [employer] to schedule a future lab appointment  non-fasting in 3 months (mid December).   Fasting labs May 2020  I would recommend you receive an influenza (flu) vaccine in the Fall  (October or November) and also recommend a tetanus/whopping cough prevention vaccine (TDAP). Get in a pharmacy  Check with insurance or speak with your pharmacist re: Shingrix vaccine coverage for shingles prevention.  This is a 2 shot series done 2-6 months apart  Pt was informed regarding extra E&M billing for management of new or established medical issues not related to today's wellness visit

## 2019-09-29 ENCOUNTER — HEALTH MAINTENANCE LETTER (OUTPATIENT)
Age: 84
End: 2019-09-29

## 2020-01-10 ENCOUNTER — TELEPHONE (OUTPATIENT)
Dept: OTHER | Facility: CLINIC | Age: 85
End: 2020-01-10

## 2020-01-13 ENCOUNTER — TELEPHONE (OUTPATIENT)
Dept: OTHER | Facility: CLINIC | Age: 85
End: 2020-01-13

## 2020-01-13 NOTE — TELEPHONE ENCOUNTER
"Pt called, wants to speak with Dr. Jett, however this will be routed more appropriately to Manager Patti.    Pt is requesting to hear back today.     Pt reports end of May had left carotid endarterectomy 5/31/19 with Dr. Ahn and later came back with an infection, presented to ED and surgery by Dr. Jett on 6/16/19.     Pt is looking to be reimbursed for costs pertaining to hospitalization for infection.    Per 6/15/19 ED visit post op:  \"FINAL DIAGNOSES:   1.  Left neck abscess following left carotid endarterectomy.   a.  Left neck exploration with drainage of abscess cavity.   b.  Staphylococcus aureus culture sensitive to all antibiotics.   2.  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   3.  History of chronic anticoagulation due to cardiac issues.   4.  History of left leg peripheral arterial disease.\"     JAILENE CordovaN, RN  [facility]    "

## 2020-01-13 NOTE — TELEPHONE ENCOUNTER
[facility]    Who is the name of the provider?:  Maliha       What is the location you see this provider at?: [place]    Reason for call:  Carotid artery procedure.  Having problem - didn't want to give details.  Said he spoke with an RN on Friday, 1/10.    Can we leave a detailed message on this number?  YES

## 2020-01-14 NOTE — TELEPHONE ENCOUNTER
Dr. Jett did contact pt and reiterated that he was unable to tell the source of infection post surgery and is happy to answer any further questions he or his family may have.

## 2020-02-04 ENCOUNTER — TELEPHONE (OUTPATIENT)
Dept: INTERNAL MEDICINE | Facility: CLINIC | Age: 85
End: 2020-02-04

## 2020-02-04 NOTE — TELEPHONE ENCOUNTER
Reason for Call:  Other call back    Detailed comments: Patient was told before he can be seen at the Spine Center at the [facility] he needs to have an MRI done. He wants to know if Dr. Velasquez will order this for him? (he melgar not have appt made yet for [facility])  Please call him back    Phone Number Patient can be reached at: Home number on file [phone] (home)    Best Time: anytime    Can we leave a detailed message on this number? YES    Call taken on 2/4/2020 at 3:05 PM by Rachel Pratt

## 2020-02-05 NOTE — TELEPHONE ENCOUNTER
Per chart, pt has been followed by  Spine Center ([facility]) re: back issues per note Aug 2019 and underwent lumbar epidural injection at that time with plan to  Follow-up with them as needed. Pt was last seen by me 4 mos ago and stable at that time re: back issues.  Not aware of reason why pt now requesting to be evaluated and treated at [facility] rather than  Spine Center if having recurrent back issues. I will not order MRI LS spine empirically just because [facility] requesting without being involved otherwise with pt's back management. Also not known if pt has had  MRI done at [facility] before either. Would suggest pt either follow-up with [facility] or see me back in clinic to discuss further

## 2020-02-07 DIAGNOSIS — N18.30 CKD (CHRONIC KIDNEY DISEASE) STAGE 3, GFR 30-59 ML/MIN (H): ICD-10-CM

## 2020-02-07 LAB
ANION GAP SERPL CALCULATED.3IONS-SCNC: 5 MMOL/L (ref 3–14)
BUN SERPL-MCNC: 27 MG/DL (ref 7–30)
CALCIUM SERPL-MCNC: 8.9 MG/DL (ref 8.5–10.1)
CHLORIDE SERPL-SCNC: 111 MMOL/L (ref 94–109)
CO2 SERPL-SCNC: 25 MMOL/L (ref 20–32)
CREAT SERPL-MCNC: 1.8 MG/DL (ref 0.66–1.25)
GFR SERPL CREATININE-BSD FRML MDRD: 33 ML/MIN/{1.73_M2}
GLUCOSE SERPL-MCNC: 103 MG/DL (ref 70–99)
POTASSIUM SERPL-SCNC: 4.5 MMOL/L (ref 3.4–5.3)
SODIUM SERPL-SCNC: 141 MMOL/L (ref 133–144)

## 2020-02-07 PROCEDURE — 36415 COLL VENOUS BLD VENIPUNCTURE: CPT | Performed by: INTERNAL MEDICINE

## 2020-02-07 PROCEDURE — 80048 BASIC METABOLIC PNL TOTAL CA: CPT | Performed by: INTERNAL MEDICINE

## 2020-02-11 ENCOUNTER — OFFICE VISIT (OUTPATIENT)
Dept: INTERNAL MEDICINE | Facility: CLINIC | Age: 85
End: 2020-02-11
Payer: COMMERCIAL

## 2020-02-11 VITALS
BODY MASS INDEX: 26.37 KG/M2 | HEART RATE: 60 BPM | TEMPERATURE: 98.6 F | OXYGEN SATURATION: 98 % | WEIGHT: 168 LBS | RESPIRATION RATE: 16 BRPM | SYSTOLIC BLOOD PRESSURE: 122 MMHG | HEIGHT: 67 IN | DIASTOLIC BLOOD PRESSURE: 70 MMHG

## 2020-02-11 DIAGNOSIS — I73.9 PVD (PERIPHERAL VASCULAR DISEASE) (H): ICD-10-CM

## 2020-02-11 DIAGNOSIS — G89.29 CHRONIC BILATERAL LOW BACK PAIN, UNSPECIFIED WHETHER SCIATICA PRESENT: Primary | ICD-10-CM

## 2020-02-11 DIAGNOSIS — I48.91 ATRIAL FIBRILLATION, UNSPECIFIED TYPE (H): ICD-10-CM

## 2020-02-11 DIAGNOSIS — N18.30 CKD (CHRONIC KIDNEY DISEASE) STAGE 3, GFR 30-59 ML/MIN (H): ICD-10-CM

## 2020-02-11 DIAGNOSIS — M54.50 CHRONIC BILATERAL LOW BACK PAIN, UNSPECIFIED WHETHER SCIATICA PRESENT: Primary | ICD-10-CM

## 2020-02-11 PROCEDURE — 99214 OFFICE O/P EST MOD 30 MIN: CPT | Performed by: INTERNAL MEDICINE

## 2020-02-11 ASSESSMENT — MIFFLIN-ST. JEOR: SCORE: 1405.67

## 2020-02-11 NOTE — PATIENT INSTRUCTIONS
MRI Lumbar spine. [facility] will call to schedule. If don't hear from them ,then  call [phone]  Ask radiology to have copy of the MRI put on CD to bring to [place] or potentially be mailed to them  Speak with [facility] to find out  How they want to have the MRI sent to them and get address  Increase water intake  By 1-2 glasses per day   Stop Aspirin   Repeat nonfasting  Kidney lab in 1 month

## 2020-02-11 NOTE — PROGRESS NOTES
Subjective     Isael Garcia is a 85 year old male who presents to clinic today for the following health issues:    HPI   Back Pain       Duration: Patient has Hx back Pain        Specific cause: none    Description:   Location of pain: low back bilateral  Character of pain: dull ache and intermittent  Pain radiation:into hips  New numbness or weakness in legs, not attributed to pain:  Yes. When the pain comes     Intensity:  Currently 1-2/10 but at other times can be severe to a 8-9/10 in severity    History:   Pain interferes with job: YES, No, Not applicable  History of back problems: Lumbago  Any previous MRI or X-rays: Yes- at [place].  Date 2016  Sees a specialist for back pain:  Unknown  Therapies tried without relief: surgery-2017 approx.    Alleviating factors:     Improved by: sitting and standing still    Precipitating factors:  Worsened by: Walking, playing pickleball but still able to do      Pt's past medical history, family history, habits, medications and allergies were reviewed with the patient today.  See snap shot for  HCM status. Most recent lab results reviewed with pt. Problem list and histories reviewed & adjusted, as indicated.  Additional history as below:    Chronic back pain as above.  Patient previously had lumbar radiculopathy at the L5-S1 level but this has resolved following patient undergoing epidural steroid injection with [facility] [place] last August.  Patient states he is no longer followed by them.  With chronic back pain, he is planning on being evaluated further at the [facility] and states they require a follow-up updated MRI of the lumbar sacral spine before they will see him.  Patient denies any radiating pain into his lower extremities now.  Pain is in the bilateral back area with some movement down into the bilateral buttock areas.  Patient has previously been through physical therapy which he did not find helpful and declines referral back for  additional therapy at this time.   Minimal pain  In calf of LLE with playing a lot of pickleball. Doesn't have throughout LLE so not radicular.  NO sx currently.  History of endovascular AAA repair at the [facility] along with  bilateral infrainguinal PAD. Lats note from Sept 2019 from Dr Ahn again reviewed. Also has  70% stenosis right carotid but no sx so will have CUS repeated this Fall.   History of paroxysmal atrial fibrillation.  Patient denies recent palpitations.  Denies chest pain or shortness of breath.  Last imaging of back done 4 years ago    MRI LUMBAR SPINE WITHOUT CONTRAST   2/25/2016 10:51 AM      HISTORY: Bilateral leg pain.     TECHNIQUE: Multiplanar, multisequence MRI of the lumbar spine without  contrast.     COMPARISON: None.     FINDINGS: The report is dictated assuming five lumbar-type vertebral  bodies.  Sagittal images demonstrate normal vertebral body height.  Advanced degenerative endplate changes at the L2-L3, and L5-S1 levels.  Tip of the conus medullaris and cauda equina are unremarkable.     T12-L1:  No disc herniation or stenosis. Facet joints are  unremarkable.     L1-L2:  No disc herniation or stenosis. Facet joints are unremarkable.        L2-L3:  Advanced degenerative disc disease with broad-based disc bulge  and osteophytic ridging lateralizing to the left. Moderate-to-severe  left foraminal stenosis. Right neural foramen is patent. Moderate  central and left subarticular stenosis.     L3-L4:  Broad-based disc bulge and mild facet hypertrophy. Moderate  central stenosis. Right neural foramen is patent. There is a slightly  more focal right central disc protrusion that extends into the  subarticular recess with contact and possible mild compression of the  descending right L4 nerve root. Mild-to-moderate left foraminal  stenosis. Mild left subarticular stenosis.     L4-L5:  Right greater than left facet hypertrophy. Broad-based disc  bulge lateralizes to the right.  Moderate-to-severe right foraminal  stenosis. Mild left foraminal stenosis. Moderate central stenosis.  Severe right subarticular stenosis affecting the descending right L5  nerve root.     L5-S1:  Mild facet hypertrophy. Advanced degenerative disc disease  with mild disc bulge and osteophytic ridging. Moderate-to-severe  bilateral foraminal stenosis, slightly greater on the right. Right  greater than left subarticular stenosis with contact and possible mild  compression of the descending right S1 nerve root.     Paraspinous soft tissues:  Small infrarenal abdominal aortic aneurysm  measuring approximately 3.4 cm in medial to lateral dimension. Its AP  dimension is not completely included on the images.        IMPRESSION:    1. At L2-L3 there is moderate-to-severe left foraminal stenosis.  Moderate central and left subarticular stenosis affecting the  descending left L3 nerve root.  2. At L3-L4 there is moderate central stenosis. Right central focal  disc protrusion appears to contact and may mildly compress the  descending right L4 nerve root. Mild-to-moderate left foraminal  stenosis and left subarticular stenosis affecting the descending left  L4 nerve root.  3. At L4-L5 there is moderate-to-severe right and mild left foraminal  stenosis. Moderate central stenosis. Severe right subarticular  stenosis affecting the descending right L5 nerve root.  4. At L5-S1 there is moderate-to-severe bilateral foraminal stenosis,  slightly greater on the right. Right greater than left subarticular  stenosis with contact and possible compression of the descending right  S1 nerve root.  5. Small infrarenal abdominal aortic aneurysm only partially imaged  likely measures approximately 3.4 cm in diameter. Recommend followup  aortic ultrasound.     COSME WHITE MD       Component      Latest Ref Rng & Units 6/17/2019 9/6/2019 2/7/2020   Sodium      133 - 144 mmol/L 139 140 141   Potassium      3.4 - 5.3 mmol/L 4.1 4.5 4.5  "  Chloride      94 - 109 mmol/L 109 111 (H) 111 (H)   Carbon Dioxide      20 - 32 mmol/L 22 26 25   Anion Gap      3 - 14 mmol/L 8 3 5   Glucose      70 - 99 mg/dL 97 88 103 (H)   Urea Nitrogen      7 - 30 mg/dL 22 27 27   Creatinine      0.66 - 1.25 mg/dL 1.36 (H) 1.68 (H) 1.80 (H)   GFR Estimate      >60 mL/min/1.73:m2 47 (L) 36 (L) 33 (L)   GFR Estimate If Black      >60 mL/min/1.73:m2 55 (L) 42 (L) 39 (L)   Calcium      8.5 - 10.1 mg/dL 7.9 (L) 9.1 8.9       Additional ROS:   Constitutional, HEENT, Cardiovascular, Pulmonary, GI and , Neuro, MSK and Psych review of systems/symptoms are otherwise negative or unchanged from previous, except as noted above.      OBJECTIVE:  /70   Pulse 60   Temp 98.6  F (37  C) (Temporal)   Resp 16   Ht 1.702 m (5' 7\")   Wt 76.2 kg (168 lb)   SpO2 98%   BMI 26.31 kg/m     Estimated body mass index is 26.31 kg/m  as calculated from the following:    Height as of this encounter: 1.702 m (5' 7\").    Weight as of this encounter: 76.2 kg (168 lb).     Neck: no adenopathy. Thyroid normal to palpation. Faint right bruit put good palpable pule right. Left CEA scar and strong pulse without bruit  Pulm: Lungs clear to auscultation   CV: Regular rates and rhythm  GI: Soft, nontender, Normal active bowel sounds, No hepatosplenomegaly or masses palpable  Ext: Peripheral pulses intact. No edema.  Neuro: Normal strength and tone, sensory exam grossly normal  Back: Tenderness to palpation bilateral paralumbar area. Neg SLRT bilaterally.      Assessment/Plan: (See plan discussion below for further details)  1. Chronic bilateral low back pain, unspecified whether sciatica present    Pt not having radicular sx now after LESI last summer. Has significant stenosis in lumbar spine. Without current radicular sx and degree of degenerative changes in spine, likely to be medical management but pt declines referral to PT and unable to use NSAIDS with  AC use for PAF. Pt declines referral  " Either to physiatry at this time Will therefore update MRI  LS spine as required for him to be seen at [place] where he wishes to have evaluation (pt states does not require  Referral to be seen there otherwise with his insurance) and see what else they recommend. Prefer to avoid narcotic therapy  - MR Lumbar Spine w/o Contrast; Future    2. CKD (chronic kidney disease) stage 3, GFR 30-59 ml/min (H)   Worsening. Pt has been taking ASA 650mg daily OTC per his report. No other NSAIDS.  Instructed to stop ASA and repeat BMP 1 month  - Basic metabolic panel; Future    3. PVD (peripheral vascular disease) (H)  Minimal LLE distal sx with a lot of activity.  No neuro sx with right carotid 70% stenosis. Will f/u with Dr Ahn again later this  Fall in September    4. Atrial fibrillation, unspecified type (H)   Paroxysmal. Currently in NSR. On Eliquis    Plan discussion:   MRI Lumbar spine. K[facility] Schwartz will call to schedule. If don't hear from them ,then  call [phone]  Ask radiology to have copy of the MRI put on CD to bring to [place] or potentially be mailed to them  Speak with [facility] to find out  How they want to have the MRI sent to them and get address  Increase water intake  By 1-2 glasses per day   Stop Aspirin   Repeat nonfasting  Kidney lab in 1 month       Madhu Velasquez MD  Internal Medicine Department  [facility]    (Chart documentation was completed, in part, with [employer] voice-recognition software. Even though reviewed, some grammatical, spelling, and word errors may remain.)

## 2020-02-12 PROBLEM — G89.29 CHRONIC BILATERAL LOW BACK PAIN, UNSPECIFIED WHETHER SCIATICA PRESENT: Status: ACTIVE | Noted: 2020-02-12

## 2020-02-12 PROBLEM — M54.50 CHRONIC BILATERAL LOW BACK PAIN, UNSPECIFIED WHETHER SCIATICA PRESENT: Status: ACTIVE | Noted: 2020-02-12

## 2020-02-13 ENCOUNTER — HOSPITAL ENCOUNTER (OUTPATIENT)
Dept: MRI IMAGING | Facility: CLINIC | Age: 85
Discharge: HOME | End: 2020-02-13
Attending: INTERNAL MEDICINE | Admitting: INTERNAL MEDICINE
Payer: COMMERCIAL

## 2020-02-13 DIAGNOSIS — M54.50 CHRONIC BILATERAL LOW BACK PAIN, UNSPECIFIED WHETHER SCIATICA PRESENT: ICD-10-CM

## 2020-02-13 DIAGNOSIS — G89.29 CHRONIC BILATERAL LOW BACK PAIN, UNSPECIFIED WHETHER SCIATICA PRESENT: ICD-10-CM

## 2020-02-13 PROCEDURE — 72148 MRI LUMBAR SPINE W/O DYE: CPT

## 2020-03-30 ENCOUNTER — APPOINTMENT (OUTPATIENT)
Dept: CT IMAGING | Facility: CLINIC | Age: 85
DRG: 393 | End: 2020-03-30
Attending: EMERGENCY MEDICINE
Payer: COMMERCIAL

## 2020-03-30 ENCOUNTER — HOSPITAL ENCOUNTER (INPATIENT)
Facility: CLINIC | Age: 85
LOS: 7 days | Discharge: HOME | DRG: 393 | End: 2020-04-06
Attending: EMERGENCY MEDICINE | Admitting: INTERNAL MEDICINE
Payer: COMMERCIAL

## 2020-03-30 ENCOUNTER — NURSE TRIAGE (OUTPATIENT)
Dept: INTERNAL MEDICINE | Facility: CLINIC | Age: 85
End: 2020-03-30

## 2020-03-30 ENCOUNTER — APPOINTMENT (OUTPATIENT)
Dept: GENERAL RADIOLOGY | Facility: CLINIC | Age: 85
DRG: 393 | End: 2020-03-30
Attending: EMERGENCY MEDICINE
Payer: COMMERCIAL

## 2020-03-30 DIAGNOSIS — R10.84 ABDOMINAL PAIN, GENERALIZED: Primary | ICD-10-CM

## 2020-03-30 DIAGNOSIS — R50.9 FEVER IN ADULT: ICD-10-CM

## 2020-03-30 DIAGNOSIS — Z20.822 SUSPECTED COVID-19 VIRUS INFECTION: ICD-10-CM

## 2020-03-30 PROBLEM — R10.9 ACUTE ABDOMINAL PAIN: Status: ACTIVE | Noted: 2020-03-30

## 2020-03-30 LAB
ALBUMIN SERPL-MCNC: 3.7 G/DL (ref 3.4–5)
ALBUMIN UR-MCNC: NEGATIVE MG/DL
ALP SERPL-CCNC: 60 U/L (ref 40–150)
ALT SERPL W P-5'-P-CCNC: 35 U/L (ref 0–70)
ANION GAP SERPL CALCULATED.3IONS-SCNC: 5 MMOL/L (ref 3–14)
APPEARANCE UR: CLEAR
AST SERPL W P-5'-P-CCNC: 25 U/L (ref 0–45)
BASOPHILS # BLD AUTO: 0 10E9/L (ref 0–0.2)
BASOPHILS NFR BLD AUTO: 0.1 %
BILIRUB SERPL-MCNC: 0.5 MG/DL (ref 0.2–1.3)
BILIRUB UR QL STRIP: NEGATIVE
BUN SERPL-MCNC: 28 MG/DL (ref 7–30)
CALCIUM SERPL-MCNC: 9.5 MG/DL (ref 8.5–10.1)
CHLORIDE SERPL-SCNC: 108 MMOL/L (ref 94–109)
CO2 SERPL-SCNC: 24 MMOL/L (ref 20–32)
COLOR UR AUTO: YELLOW
CREAT BLD-MCNC: 1.7 MG/DL (ref 0.66–1.25)
CREAT SERPL-MCNC: 1.65 MG/DL (ref 0.66–1.25)
CRP SERPL-MCNC: <2.9 MG/L (ref 0–8)
D DIMER PPP FEU-MCNC: 1.2 UG/ML FEU (ref 0–0.5)
DIFFERENTIAL METHOD BLD: ABNORMAL
EOSINOPHIL # BLD AUTO: 0 10E9/L (ref 0–0.7)
EOSINOPHIL NFR BLD AUTO: 0.2 %
ERYTHROCYTE [DISTWIDTH] IN BLOOD BY AUTOMATED COUNT: 13.2 % (ref 10–15)
GFR SERPL CREATININE-BSD FRML MDRD: 37 ML/MIN/{1.73_M2}
GFR SERPL CREATININE-BSD FRML MDRD: 39 ML/MIN/{1.73_M2}
GLUCOSE SERPL-MCNC: 150 MG/DL (ref 70–99)
GLUCOSE UR STRIP-MCNC: NEGATIVE MG/DL
HCT VFR BLD AUTO: 45.7 % (ref 40–53)
HGB BLD-MCNC: 15.6 G/DL (ref 13.3–17.7)
HGB UR QL STRIP: NEGATIVE
IMM GRANULOCYTES # BLD: 0 10E9/L (ref 0–0.4)
IMM GRANULOCYTES NFR BLD: 0.2 %
INTERPRETATION ECG - MUSE: NORMAL
KETONES UR STRIP-MCNC: NEGATIVE MG/DL
LACTATE BLD-SCNC: 1.6 MMOL/L (ref 0.7–2)
LEUKOCYTE ESTERASE UR QL STRIP: NEGATIVE
LIPASE SERPL-CCNC: 75 U/L (ref 73–393)
LYMPHOCYTES # BLD AUTO: 0.7 10E9/L (ref 0.8–5.3)
LYMPHOCYTES NFR BLD AUTO: 4 %
MCH RBC QN AUTO: 32.6 PG (ref 26.5–33)
MCHC RBC AUTO-ENTMCNC: 34.1 G/DL (ref 31.5–36.5)
MCV RBC AUTO: 96 FL (ref 78–100)
MONOCYTES # BLD AUTO: 0.4 10E9/L (ref 0–1.3)
MONOCYTES NFR BLD AUTO: 2.3 %
NEUTROPHILS # BLD AUTO: 16.8 10E9/L (ref 1.6–8.3)
NEUTROPHILS NFR BLD AUTO: 93.2 %
NITRATE UR QL: NEGATIVE
NRBC # BLD AUTO: 0 10*3/UL
NRBC BLD AUTO-RTO: 0 /100
PH UR STRIP: 6 PH (ref 5–7)
PLATELET # BLD AUTO: 170 10E9/L (ref 150–450)
POTASSIUM SERPL-SCNC: 4.3 MMOL/L (ref 3.4–5.3)
PROCALCITONIN SERPL-MCNC: 0.11 NG/ML
PROT SERPL-MCNC: 7.5 G/DL (ref 6.8–8.8)
RBC # BLD AUTO: 4.78 10E12/L (ref 4.4–5.9)
RBC #/AREA URNS AUTO: 1 /HPF (ref 0–2)
SODIUM SERPL-SCNC: 137 MMOL/L (ref 133–144)
SOURCE: NORMAL
SP GR UR STRIP: 1.02 (ref 1–1.03)
TROPONIN I SERPL-MCNC: <0.015 UG/L (ref 0–0.04)
UROBILINOGEN UR STRIP-MCNC: NORMAL MG/DL (ref 0–2)
WBC # BLD AUTO: 18.1 10E9/L (ref 4–11)
WBC #/AREA URNS AUTO: 1 /HPF (ref 0–5)

## 2020-03-30 PROCEDURE — 87635 SARS-COV-2 COVID-19 AMP PRB: CPT | Performed by: EMERGENCY MEDICINE

## 2020-03-30 PROCEDURE — 74174 CTA ABD&PLVS W/CONTRAST: CPT

## 2020-03-30 PROCEDURE — 25800030 ZZH RX IP 258 OP 636: Performed by: EMERGENCY MEDICINE

## 2020-03-30 PROCEDURE — 87040 BLOOD CULTURE FOR BACTERIA: CPT | Performed by: EMERGENCY MEDICINE

## 2020-03-30 PROCEDURE — 85025 COMPLETE CBC W/AUTO DIFF WBC: CPT | Performed by: EMERGENCY MEDICINE

## 2020-03-30 PROCEDURE — 25000128 H RX IP 250 OP 636: Performed by: EMERGENCY MEDICINE

## 2020-03-30 PROCEDURE — 81001 URINALYSIS AUTO W/SCOPE: CPT | Performed by: EMERGENCY MEDICINE

## 2020-03-30 PROCEDURE — 25800030 ZZH RX IP 258 OP 636: Performed by: INTERNAL MEDICINE

## 2020-03-30 PROCEDURE — 82565 ASSAY OF CREATININE: CPT

## 2020-03-30 PROCEDURE — 96375 TX/PRO/DX INJ NEW DRUG ADDON: CPT

## 2020-03-30 PROCEDURE — 84484 ASSAY OF TROPONIN QUANT: CPT | Performed by: EMERGENCY MEDICINE

## 2020-03-30 PROCEDURE — 86140 C-REACTIVE PROTEIN: CPT | Performed by: EMERGENCY MEDICINE

## 2020-03-30 PROCEDURE — 12000000 ZZH R&B MED SURG/OB

## 2020-03-30 PROCEDURE — 99285 EMERGENCY DEPT VISIT HI MDM: CPT | Mod: 25

## 2020-03-30 PROCEDURE — 80053 COMPREHEN METABOLIC PANEL: CPT | Performed by: EMERGENCY MEDICINE

## 2020-03-30 PROCEDURE — 25000128 H RX IP 250 OP 636

## 2020-03-30 PROCEDURE — 25000132 ZZH RX MED GY IP 250 OP 250 PS 637: Performed by: EMERGENCY MEDICINE

## 2020-03-30 PROCEDURE — 83605 ASSAY OF LACTIC ACID: CPT | Performed by: EMERGENCY MEDICINE

## 2020-03-30 PROCEDURE — 84145 PROCALCITONIN (PCT): CPT | Performed by: EMERGENCY MEDICINE

## 2020-03-30 PROCEDURE — 99223 1ST HOSP IP/OBS HIGH 75: CPT | Mod: AI | Performed by: INTERNAL MEDICINE

## 2020-03-30 PROCEDURE — 71045 X-RAY EXAM CHEST 1 VIEW: CPT

## 2020-03-30 PROCEDURE — 25000128 H RX IP 250 OP 636: Performed by: INTERNAL MEDICINE

## 2020-03-30 PROCEDURE — 83690 ASSAY OF LIPASE: CPT | Performed by: EMERGENCY MEDICINE

## 2020-03-30 PROCEDURE — 96376 TX/PRO/DX INJ SAME DRUG ADON: CPT

## 2020-03-30 PROCEDURE — 25000125 ZZHC RX 250: Performed by: EMERGENCY MEDICINE

## 2020-03-30 PROCEDURE — 93005 ELECTROCARDIOGRAM TRACING: CPT

## 2020-03-30 PROCEDURE — 85379 FIBRIN DEGRADATION QUANT: CPT | Performed by: EMERGENCY MEDICINE

## 2020-03-30 PROCEDURE — 96374 THER/PROPH/DIAG INJ IV PUSH: CPT | Mod: 59

## 2020-03-30 RX ORDER — ONDANSETRON 2 MG/ML
4 INJECTION INTRAMUSCULAR; INTRAVENOUS EVERY 6 HOURS PRN
Status: DISCONTINUED | OUTPATIENT
Start: 2020-03-30 | End: 2020-03-30

## 2020-03-30 RX ORDER — HYDROCODONE BITARTRATE AND ACETAMINOPHEN 5; 325 MG/1; MG/1
1-2 TABLET ORAL EVERY 4 HOURS PRN
Status: DISCONTINUED | OUTPATIENT
Start: 2020-03-30 | End: 2020-04-06 | Stop reason: HOSPADM

## 2020-03-30 RX ORDER — ONDANSETRON 4 MG/1
4 TABLET, ORALLY DISINTEGRATING ORAL EVERY 6 HOURS PRN
Status: DISCONTINUED | OUTPATIENT
Start: 2020-03-30 | End: 2020-04-06 | Stop reason: HOSPADM

## 2020-03-30 RX ORDER — POTASSIUM CHLORIDE 1500 MG/1
20-40 TABLET, EXTENDED RELEASE ORAL
Status: DISCONTINUED | OUTPATIENT
Start: 2020-03-30 | End: 2020-04-06 | Stop reason: HOSPADM

## 2020-03-30 RX ORDER — LIDOCAINE 40 MG/G
CREAM TOPICAL
Status: DISCONTINUED | OUTPATIENT
Start: 2020-03-30 | End: 2020-04-06

## 2020-03-30 RX ORDER — ONDANSETRON 2 MG/ML
4 INJECTION INTRAMUSCULAR; INTRAVENOUS ONCE
Status: COMPLETED | OUTPATIENT
Start: 2020-03-30 | End: 2020-03-30

## 2020-03-30 RX ORDER — POTASSIUM CHLORIDE 29.8 MG/ML
20 INJECTION INTRAVENOUS
Status: DISCONTINUED | OUTPATIENT
Start: 2020-03-30 | End: 2020-04-06 | Stop reason: HOSPADM

## 2020-03-30 RX ORDER — POTASSIUM CHLORIDE 7.45 MG/ML
10 INJECTION INTRAVENOUS
Status: DISCONTINUED | OUTPATIENT
Start: 2020-03-30 | End: 2020-04-06 | Stop reason: HOSPADM

## 2020-03-30 RX ORDER — HYDROMORPHONE HYDROCHLORIDE 1 MG/ML
.3-.5 INJECTION, SOLUTION INTRAMUSCULAR; INTRAVENOUS; SUBCUTANEOUS
Status: DISCONTINUED | OUTPATIENT
Start: 2020-03-30 | End: 2020-04-06 | Stop reason: HOSPADM

## 2020-03-30 RX ORDER — POTASSIUM CHLORIDE 1.5 G/1.58G
20-40 POWDER, FOR SOLUTION ORAL
Status: DISCONTINUED | OUTPATIENT
Start: 2020-03-30 | End: 2020-04-06 | Stop reason: HOSPADM

## 2020-03-30 RX ORDER — PIPERACILLIN SODIUM, TAZOBACTAM SODIUM 3; .375 G/15ML; G/15ML
3.38 INJECTION, POWDER, LYOPHILIZED, FOR SOLUTION INTRAVENOUS ONCE
Status: COMPLETED | OUTPATIENT
Start: 2020-03-30 | End: 2020-03-30

## 2020-03-30 RX ORDER — NALOXONE HYDROCHLORIDE 0.4 MG/ML
.1-.4 INJECTION, SOLUTION INTRAMUSCULAR; INTRAVENOUS; SUBCUTANEOUS
Status: DISCONTINUED | OUTPATIENT
Start: 2020-03-30 | End: 2020-04-06 | Stop reason: HOSPADM

## 2020-03-30 RX ORDER — ACETAMINOPHEN 325 MG/1
650 TABLET ORAL EVERY 4 HOURS PRN
Status: DISCONTINUED | OUTPATIENT
Start: 2020-03-30 | End: 2020-04-06 | Stop reason: HOSPADM

## 2020-03-30 RX ORDER — PROCHLORPERAZINE MALEATE 5 MG
5 TABLET ORAL EVERY 6 HOURS PRN
Status: DISCONTINUED | OUTPATIENT
Start: 2020-03-30 | End: 2020-04-06 | Stop reason: HOSPADM

## 2020-03-30 RX ORDER — ONDANSETRON 4 MG/1
4 TABLET, ORALLY DISINTEGRATING ORAL EVERY 6 HOURS PRN
Status: DISCONTINUED | OUTPATIENT
Start: 2020-03-30 | End: 2020-03-30

## 2020-03-30 RX ORDER — ACETAMINOPHEN 325 MG/1
650 TABLET ORAL ONCE
Status: COMPLETED | OUTPATIENT
Start: 2020-03-30 | End: 2020-03-30

## 2020-03-30 RX ORDER — MAGNESIUM SULFATE HEPTAHYDRATE 40 MG/ML
4 INJECTION, SOLUTION INTRAVENOUS EVERY 4 HOURS PRN
Status: DISCONTINUED | OUTPATIENT
Start: 2020-03-30 | End: 2020-04-06 | Stop reason: HOSPADM

## 2020-03-30 RX ORDER — AMLODIPINE BESYLATE 5 MG/1
5 TABLET ORAL DAILY
Status: DISCONTINUED | OUTPATIENT
Start: 2020-03-30 | End: 2020-04-01

## 2020-03-30 RX ORDER — METOCLOPRAMIDE 5 MG/1
5 TABLET ORAL EVERY 6 HOURS PRN
Status: DISCONTINUED | OUTPATIENT
Start: 2020-03-30 | End: 2020-04-06 | Stop reason: HOSPADM

## 2020-03-30 RX ORDER — POTASSIUM CL/LIDO/0.9 % NACL 10MEQ/0.1L
10 INTRAVENOUS SOLUTION, PIGGYBACK (ML) INTRAVENOUS
Status: DISCONTINUED | OUTPATIENT
Start: 2020-03-30 | End: 2020-04-06 | Stop reason: HOSPADM

## 2020-03-30 RX ORDER — ONDANSETRON 2 MG/ML
4 INJECTION INTRAMUSCULAR; INTRAVENOUS EVERY 6 HOURS PRN
Status: DISCONTINUED | OUTPATIENT
Start: 2020-03-30 | End: 2020-04-06 | Stop reason: HOSPADM

## 2020-03-30 RX ORDER — IOPAMIDOL 755 MG/ML
80 INJECTION, SOLUTION INTRAVASCULAR ONCE
Status: COMPLETED | OUTPATIENT
Start: 2020-03-30 | End: 2020-03-30

## 2020-03-30 RX ORDER — BISACODYL 10 MG
10 SUPPOSITORY, RECTAL RECTAL DAILY PRN
Status: DISCONTINUED | OUTPATIENT
Start: 2020-03-30 | End: 2020-04-06 | Stop reason: HOSPADM

## 2020-03-30 RX ORDER — ONDANSETRON 2 MG/ML
INJECTION INTRAMUSCULAR; INTRAVENOUS
Status: COMPLETED
Start: 2020-03-30 | End: 2020-03-30

## 2020-03-30 RX ORDER — PROCHLORPERAZINE 25 MG
12.5 SUPPOSITORY, RECTAL RECTAL EVERY 12 HOURS PRN
Status: DISCONTINUED | OUTPATIENT
Start: 2020-03-30 | End: 2020-04-06 | Stop reason: HOSPADM

## 2020-03-30 RX ORDER — PIPERACILLIN SODIUM, TAZOBACTAM SODIUM 2; .25 G/10ML; G/10ML
2.25 INJECTION, POWDER, LYOPHILIZED, FOR SOLUTION INTRAVENOUS EVERY 6 HOURS
Status: DISCONTINUED | OUTPATIENT
Start: 2020-03-30 | End: 2020-04-03

## 2020-03-30 RX ORDER — HYDROMORPHONE HYDROCHLORIDE 1 MG/ML
0.5 INJECTION, SOLUTION INTRAMUSCULAR; INTRAVENOUS; SUBCUTANEOUS
Status: COMPLETED | OUTPATIENT
Start: 2020-03-30 | End: 2020-03-30

## 2020-03-30 RX ORDER — METOCLOPRAMIDE HYDROCHLORIDE 5 MG/ML
5 INJECTION INTRAMUSCULAR; INTRAVENOUS EVERY 6 HOURS PRN
Status: DISCONTINUED | OUTPATIENT
Start: 2020-03-30 | End: 2020-04-06 | Stop reason: HOSPADM

## 2020-03-30 RX ADMIN — HYDROMORPHONE HYDROCHLORIDE 0.5 MG: 1 INJECTION, SOLUTION INTRAMUSCULAR; INTRAVENOUS; SUBCUTANEOUS at 13:02

## 2020-03-30 RX ADMIN — DEXTROSE AND SODIUM CHLORIDE: 5; 900 INJECTION, SOLUTION INTRAVENOUS at 18:30

## 2020-03-30 RX ADMIN — IOPAMIDOL 80 ML: 755 INJECTION, SOLUTION INTRAVENOUS at 12:31

## 2020-03-30 RX ADMIN — ACETAMINOPHEN 650 MG: 325 TABLET, FILM COATED ORAL at 15:42

## 2020-03-30 RX ADMIN — HYDROMORPHONE HYDROCHLORIDE 0.5 MG: 1 INJECTION, SOLUTION INTRAMUSCULAR; INTRAVENOUS; SUBCUTANEOUS at 12:01

## 2020-03-30 RX ADMIN — PIPERACILLIN SODIUM AND TAZOBACTAM SODIUM 2.25 G: 2; .25 INJECTION, POWDER, LYOPHILIZED, FOR SOLUTION INTRAVENOUS at 22:45

## 2020-03-30 RX ADMIN — HYDROMORPHONE HYDROCHLORIDE 0.5 MG: 1 INJECTION, SOLUTION INTRAMUSCULAR; INTRAVENOUS; SUBCUTANEOUS at 20:13

## 2020-03-30 RX ADMIN — HYDROMORPHONE HYDROCHLORIDE 0.5 MG: 1 INJECTION, SOLUTION INTRAMUSCULAR; INTRAVENOUS; SUBCUTANEOUS at 14:29

## 2020-03-30 RX ADMIN — HYDROMORPHONE HYDROCHLORIDE 0.3 MG: 1 INJECTION, SOLUTION INTRAMUSCULAR; INTRAVENOUS; SUBCUTANEOUS at 18:30

## 2020-03-30 RX ADMIN — ONDANSETRON 4 MG: 2 INJECTION INTRAMUSCULAR; INTRAVENOUS at 17:23

## 2020-03-30 RX ADMIN — HYDROMORPHONE HYDROCHLORIDE 0.5 MG: 1 INJECTION, SOLUTION INTRAMUSCULAR; INTRAVENOUS; SUBCUTANEOUS at 22:39

## 2020-03-30 RX ADMIN — SODIUM CHLORIDE 80 ML: 9 INJECTION, SOLUTION INTRAVENOUS at 12:31

## 2020-03-30 RX ADMIN — PIPERACILLIN SODIUM AND TAZOBACTAM SODIUM 3.38 G: 3; .375 INJECTION, POWDER, LYOPHILIZED, FOR SOLUTION INTRAVENOUS at 16:19

## 2020-03-30 RX ADMIN — ONDANSETRON 4 MG: 2 INJECTION INTRAMUSCULAR; INTRAVENOUS at 20:13

## 2020-03-30 RX ADMIN — PROCHLORPERAZINE EDISYLATE 5 MG: 5 INJECTION INTRAMUSCULAR; INTRAVENOUS at 22:41

## 2020-03-30 RX ADMIN — SODIUM CHLORIDE 1000 ML: 9 INJECTION, SOLUTION INTRAVENOUS at 12:01

## 2020-03-30 RX ADMIN — ONDANSETRON 4 MG: 2 INJECTION INTRAMUSCULAR; INTRAVENOUS at 12:01

## 2020-03-30 RX ADMIN — SODIUM CHLORIDE 1000 ML: 9 INJECTION, SOLUTION INTRAVENOUS at 15:39

## 2020-03-30 ASSESSMENT — ENCOUNTER SYMPTOMS
COUGH: 0
ABDOMINAL PAIN: 1
VOMITING: 1
NAUSEA: 1
SHORTNESS OF BREATH: 0

## 2020-03-30 ASSESSMENT — ACTIVITIES OF DAILY LIVING (ADL): ADLS_ACUITY_SCORE: 17

## 2020-03-30 ASSESSMENT — MIFFLIN-ST. JEOR: SCORE: 1392.07

## 2020-03-30 NOTE — ED PROVIDER NOTES
"  History     Chief Complaint:  Abdominal Pain    HPI   Isael Garcia is a 85 year old male, anticoagulated on Eliquis with a history of stroke due carotid artery embolism, Atrial Fibrillation, AAA s/p endovascular repair with stenting, hypertension, and Chronic Kidney Disease stage III who presents with new onset generalized abdominal pain. Patient reports that he awoke with \"terrible\" abdominal pain this morning at 0700 -- the pain woke him up. He has had mucoid emesis alongside the abdominal pain. His pain is generalized and comes on in waves. Patient has had a left inguinal hernia repair, but otherwise has no previous abdominal surgeries. He denies chest pain and shortness of breath. Isael has not recently traveled outside the state or country. He has not eaten today yet.     Allergies:  Lisinopril      Medications:    Amlodipine   Eliquis   Atorvastatin   Metoprolol tartrate     Past Medical History:    AAA  Allergic rhinitis   Atrial Fibrillation   Chronic Kidney Disease stage III   Osteoarthritis  Lumbago   PVD   Sleep related leg cramps   Shingles   Hypertension   Vitamin D deficiency disease  Cataracts   Stroke due to embolism of carotid artery     Past Surgical History:    Right inguinal hernia repair  TURP   Tonsillectomy  Right total knee arthroplasty   Left cataract extraction/IOL placement  Endovascular AAA stent graft   Excise mass trunk  Endarterectomy carotid   Excise mass upper extremity  Explore neck   Herniorrhaphy inguinal     Family History:    Brother - prostate cancer     Social History:  The patient was alone.  Smoking Status: Never  Smokeless Tobacco: Never  Alcohol Use: No    Marital Status:        Review of Systems   Respiratory: Negative for cough and shortness of breath.    Cardiovascular: Negative for chest pain.   Gastrointestinal: Positive for abdominal pain, nausea and vomiting.   All other systems reviewed and are negative.    Physical Exam     Patient Vitals for " "the past 24 hrs:   BP Temp Temp src Pulse Heart Rate Resp SpO2 Height Weight   03/30/20 1830 -- -- -- -- -- 22 -- -- --   03/30/20 1742 129/81 98.1  F (36.7  C) Oral -- 103 -- 94 % -- --   03/30/20 1714 -- -- -- -- 111 22 -- -- --   03/30/20 1645 -- -- -- -- 106 23 -- -- --   03/30/20 1625 -- -- -- -- 114 16 96 % -- --   03/30/20 1607 -- -- -- -- 110 16 94 % -- --   03/30/20 1546 -- -- -- -- 99 22 95 % -- --   03/30/20 1523 -- -- -- -- 118 20 94 % -- --   03/30/20 1507 -- -- -- -- 117 -- 94 % -- --   03/30/20 1502 -- -- -- -- 116 -- 96 % -- --   03/30/20 1501 -- -- -- -- -- 10 -- -- --   03/30/20 1500 -- 100  F (37.8  C) Temporal -- 115 12 96 % -- --   03/30/20 1456 -- 99  F (37.2  C) Temporal -- -- -- -- -- --   03/30/20 1451 -- -- -- -- 124 19 97 % -- --   03/30/20 1446 -- 100.1  F (37.8  C) Temporal -- -- -- -- -- --   03/30/20 1436 -- -- -- -- 110 30 94 % -- --   03/30/20 1430 (!) 116/91 -- -- -- 112 25 94 % -- --   03/30/20 1427 (!) 116/91 -- -- -- -- -- -- -- --   03/30/20 1417 -- -- -- -- 111 18 94 % -- --   03/30/20 1414 -- -- -- -- 108 22 95 % -- --   03/30/20 1411 -- -- -- -- 110 -- 92 % -- --   03/30/20 1409 -- -- -- -- 105 23 94 % -- --   03/30/20 1408 -- -- -- -- 105 18 92 % -- --   03/30/20 1407 -- -- -- -- 106 19 93 % -- --   03/30/20 1403 -- -- -- -- 102 19 95 % -- --   03/30/20 1402 -- -- -- -- 98 18 96 % -- --   03/30/20 1349 -- -- -- -- 89 13 95 % -- --   03/30/20 1338 -- -- -- -- 99 16 -- -- --   03/30/20 1325 -- -- -- -- 91 12 -- -- --   03/30/20 1323 -- -- -- -- 98 11 -- -- --   03/30/20 1258 -- -- -- -- 88 19 -- -- --   03/30/20 1244 -- -- -- -- 85 14 -- -- --   03/30/20 1218 -- -- -- -- -- 14 99 % -- --   03/30/20 1216 -- -- -- -- -- -- 99 % -- --   03/30/20 1215 -- 97.5  F (36.4  C) Oral -- -- -- (!) 88 % -- --   03/30/20 1205 (!) 150/83 -- -- 85 83 14 96 % -- --   03/30/20 1201 -- -- -- -- -- 16 92 % -- --   03/30/20 1127 (!) 165/83 -- -- 82 -- 18 98 % 1.702 m (5' 7\") 74.8 kg (165 lb) "     Physical Exam  General: Alert, appears elderly, otherwise well-developed and well-nourished. Cooperative.     In moderate distress  HEENT:  Head:  Atraumatic  Ears:  External ears are normal  Mouth/Throat:  Oropharynx is without erythema or exudate and mucous membranes are dry.   Eyes:   Conjunctivae normal and EOM are normal. No scleral icterus.  CV:  Irregular rate, irregular rhythm, normal heart sounds and radial pulses are 2+ and symmetric.    Resp:  Breath sounds are clear bilaterally    Non-labored, no retractions or accessory muscle use  GI:  Abdomen is soft, no distension, diffuse abdominal tenderness. No rebound or guarding.  No CVA tenderness bilaterally  MS:  Normal range of motion. No edema.    Normal strength in all 4 extremities.     Back atraumatic.    No midline cervical, thoracic, or lumbar tenderness  Skin:  Warm and dry.  No rash or lesions noted.  Neuro: Alert. Normal strength.  GCS: 15  Psych:  Normal mood and affect.    Emergency Department Course     ECG:  Indication: Cardiac r/o  Completed at 1201.  Read at [id number].   Sinus rhythm with 1st degree AV block   Inferior infarct, age undetermined   Abnormal ECG   Rate 88 bpm. AK interval 250. QRS duration 80. QT/QTc 386/467. P-R-T axes 47 -18 12.    Imaging:  Radiology findings were communicated with the patient and Admitting MD who voiced understanding of the findings.    XR Chest Port 1 View   Final Result   IMPRESSION: Hazy opacity at the left lung base could be related to   atelectasis or pneumonia. There is mild scarring and/or atelectasis at   the right lung base medially, unchanged. No pneumothorax. Heart size   appears stable. Pulmonary vascularity is within normal limits.       ISABELLA SCHUSTER MD      CTA Abdomen Pelvis with Contrast   Final Result   IMPRESSION:   1. Aortobiiliac stent graft with clear type IA endoleak. Abdominal   aneurysmal sac is 3.2 cm AP x 2.8 cm transverse. Uncertain if this has   changed as no previous  radiographs available for comparison. No fat   stranding or inflammatory change surrounding the abdominal aorta.   2. Visceral arteries are patent, do not suspect mesenteric ischemia.   3. Moderate-to-severe stenosis in the proximal left renal artery,   incidentally noted.   4. No obvious dilated loops of bowel or source for abdominal pain.   Mild-to-moderate amount of stool noted throughout portions of the   colon.      JULIO SMITH MD          Laboratory:  Laboratory findings were communicated with the patient and Admitting MD who voiced understanding of the findings.    CBC: WBC 18.1 (H) o/w WNL. (HGB 15.6, )   CMP: Glucose 150 (H), Creatinine 1.65 (H), GFR Estimate 37 (L) o/w WNL      Troponin (Collected 1154): <0.015  Lipase: 75     Lactic Acid: 1.6   Creatinine POCT: Creatinine 1.7 (H), GFR Estimate 39 (L)       D-Dimer: pending    CRP inflammation: pending     Blood cultures x2: pending   COVID-19 Virus (Coronavirus), PCR NP Swab: pending      Interventions:  1201 NS Bolus 1,000mL IV    Dilaudid 0.5mg IV     Zofran 4mg IV   1429 Dilaudid 0.5mg IV    1539 NS Bolus 1,000mL IV   1542 Acetaminophen 650mg PO   Given  Zosyn 3.375g IV     Emergency Department Course:  Past medical records, nursing notes, and vitals reviewed.    1130 I performed an exam of the patient as documented above.     EKG obtained in the ED, see results above.   IV was inserted and blood was drawn for laboratory testing, results above.  The patient provided a urine sample here in the emergency department. This was sent for laboratory testing, findings above.  The patient's nose was swabbed and this sample was sent for COVID testing, findings above.   The patient was sent for a CT Abdomen Pelvis w Contrast, CXR while in the emergency department, results above.     1511 RN updated me that patient's fever upon recheck was 101F. Bed placed for admission.    Will screen for COVID. Patient placed on droplet precautions.    1512 I  rechecked the patient and discussed the results of his workup thus far.     1526 I spoke with Dr. Torres of the Hospitalist service regarding patient's presentation, findings, and plan of care.   Dr. Torres asked that I speak with Vascular regarding patient's CT results.     1535 I spoke with Dr. Jett of Vascular Surgery regarding patient's findings.     1548 I spoke again with Dr. Torres of the Hospitalist service regarding plan of care.     Findings and plan explained to the Patient who consents to admission. Discussed the patient with Dr. Torres, who will admit the patient to an inpatient medical bed for further monitoring, evaluation, and treatment.    I personally reviewed the laboratory and imaging results with the Patient and answered all related questions prior to admission.     Impression & Plan     Covid-19  Isael Garcia was evaluated during a global COVID-19 pandemic, which necessitated consideration that the patient might be at risk for infection with the SARS-CoV-2 virus that causes COVID-19.   Applicable protocols for evaluation were followed during the patient's care.    Medical Decision Making:  Patient is a 85-year-old male with a history of atrial fibrillation on chronic anticoagulation with Eliquis, hypertension, and CKD who presents with diffuse abdominal tenderness waking him up from sleep this morning.  Patient's work-up concerning for leukocytosis of 18.1.  He does have lymphopenia as well of 0.7.  Creatinine today 1.65 consistent with known CKD.  Initial imaging obtained was a CTA of the abdomen pelvis out of concern for potential mesenteric ischemia versus diverticulitis versus small bowel obstruction.  Reassuringly there is no evidence of mesenteric ischemia.  There was noted aortic iliac stent graft with a clear type 1A endoleak.  I spoke with Dr. Jett in regards to this and very low concern that this would be related to a potential infectious source.  Patient also has no  evidence of small bowel obstruction on CT imaging.  While patient was here we continued to provide pain control and during his several hours in the emergency department he developed a fever and tachycardia.  Certainly a possibility this may represent a novel coronavirus infection, COVID-19.  Patient was swabbed for this.  Blood cultures were obtained at this time as well and patient was given a dose of Zosyn while awaiting admission.  Patient's initial lactate 1.6 and lower concern for severe sepsis or septic shock.  I did add on a CRP and D-dimer for trending purposes upon admission due to unclear infectious source.  His chest x-ray was obtained showing opacity vs atelectasis at left lung base, although on CT, commented as atelectasis, lower concern for PNA.  I spoke with Dr. Torres of the hospitalist service who agreed to admission and further work-up for patient's suspected infection and abdominal pain of unclear etiology.      Diagnosis:    ICD-10-CM    1. Abdominal pain, generalized  R10.84 COVID-19 Virus (Coronavirus) by PCR Nasopharyngeal swab     Blood culture     Blood culture     CRP inflammation     CRP inflammation     D dimer quantitative     D dimer quantitative     Procalcitonin     Procalcitonin     CANCELED: CRP inflammation     CANCELED: D dimer quantitative     CANCELED: Procalcitonin   2. Fever in adult  R50.9    3. Suspected Covid-19 Virus Infection  R68.89        Disposition:  Admitted to an inpatient medical bed.    Scribe Disclosure:  Rin GRIFFIN, am serving as a scribe at 11:32 AM on 3/30/2020 to document services personally performed by Benji Vee MD based on my observations and the provider's statements to me. 3/30/2020    EMERGENCY DEPARTMENT       Benji Vee MD  03/30/20 1945

## 2020-03-30 NOTE — ED NOTES
Pt's family contacted ED for an update. RN at bedside with pt. Pt consented to give update to pt's granddaughter, Claudia.

## 2020-03-30 NOTE — ED NOTES
RN spoke with pt's Granddaughter Claudia at [phone]. Family's questions answered, family updated on plan of care and pt condition. Plan for RN to update family at time of disposition.

## 2020-03-30 NOTE — TELEPHONE ENCOUNTER
"Pt calling, Woke up this morning with terrible abd pain, and stomach pain, and vomiting, throwing up mucus. (because he hasn't eaten and there isn't anything else in his stomach).    Says it feels like the pain is: All over lower stomach, and pain Comes in waves. Pain rated 9/10.  And he is \"Hanging onto his stomach.\"  Not sure if he has a temp, but he Breaks out in sweats with vomiting.    Due to severe abd pain, along with vomiting, - advised pt go to ER to be evaluated.   Patient stated understanding, and agreed to plan of care.  Will go to [facility] ER.     Reason for Disposition    SEVERE abdominal pain (e.g., excruciating)    Additional Information    Negative: Passed out (i.e., fainted, collapsed and was not responding)    Negative: Shock suspected (e.g., cold/pale/clammy skin, too weak to stand, low BP, rapid pulse)    Negative: Sounds like a life-threatening emergency to the triager    Protocols used: ABDOMINAL PAIN - MALE-A-OH      "

## 2020-03-30 NOTE — PROGRESS NOTES
RECEIVING UNIT ED HANDOFF REVIEW    ED Nurse Handoff Report was reviewed by: Suzanna Tate RN on March 30, 2020 at 4:24 PM

## 2020-03-30 NOTE — H&P
[facility]    History and Physical  Hospitalist       Date of Admission:  3/30/2020    Assessment & Plan   Isael Garcia is a 85 year old male history of PVD, atrial fibrillation presents to the emergency department with the sudden onset of abdominal pain and fever.    Principal Problem:    Abdominal pain, generalized    covid-19 rule out   leukocytosis with a lymphopenia  Fever  Possible pneumonia   --- Patient presents with acute onset of abdominal pain, he did not have any fever at that time, on presentation he developed a fever, his white count is elevated at 18,000, he did not have any prior abdominal surgeries other than inguinal hernia repair, his abdomen appeared soft, generalized tenderness noted, labs did not indicate any abdominal pathology including his LFTs.  Lipase was 75 UA was normal, CT abdomen did not show any signs of appendicitis or cholecystitis, only significant finding was endoleak noted in the surgical site of the AAA repair, this appeared to be a chronic finding.  ---At this point procalcitonin levels are mildly elevated, not sure this is very secondary bacterial infection as well as viral infection, source of infection noted identifined, CoVID is a concern, PCR obtained,  --- His lactic acid levels are normal, will start on clear liquid diet for now, admit to observation with telemetry, will cycle troponins.  ---Continue IV hydration, will request surgery input to evaluate for any surgical courses of acute abdominal pain.  ---continue contact and droplet precautions.  The differential diagnosis could include appendicitis, acute cholecystitis, diverticulitis, acute pancreatitis, colitis and mesenteric ischemia, none of the labs or findings were pointing towards any of the above.  --- Chest x-ray does indicate a hazy opacity on the left lower lobe, this could cause abdominal pain, will continue Zosyn IV for now.    Essential hypertension  --- On multiple  medications including metoprolol and amlodipine, continue as  blood pressure allows.    PVD (peripheral vascular disease) (H)    Atrial fibrillation (H)  --- On beta-blockers, Eliquis for anticoagulation, continue both here, rate controlled.    History of abdominal aortic aneurysm (AAA) repair  --- CT showing type 1A endovascular leak, emergency room physician talked to vascular surgeon Dr. Jett , he did not think this is a new finding.    Acquired renal artery stenosis (H)    CKD (chronic kidney disease) stage 3, GFR 30-59 ml/min (H)  --- stenosis seen again in the CT done here, patient aware    DVT Prophylaxis:on eliquis   Code Status: Full Code    Disposition: Expected discharge in 2-3 days     Marilu Torres MD    Primary Care Physician   Madhu Velasquez    Chief Complaint   Abdominal pain 1 day     History is obtained from the patient    History of Present Illness   Isael Garcia is a 85 year old male with multiple medical problems including abdominal aortic aneurysm status post graft placement atrial fibrillation, CKD, PVD, leg cramps, hypertension presents to the emergency department with sudden onset of abdominal pain.  Patient was doing apparently well since today morning, he noticed a sudden abdominal pain and the pain was mostly generalized and dull, he felt so terrible and more occasionally cramps present he had one episode of emesis, mostly mucus and the pain was coming and going he had a left inguinal hernia repair but otherwise no other abdominal surgeries, he denied having any diarrhea or blood in his stools, he presented to the emergency department and CT abdomen was obtained which showed type a endoleak, ER physician contacted vascular surgery and the did not recommend any immediate intervention, currently his abdominal pain was not considered related to this endoleak.  Then the patient developed fever, with his abdominal pain and fever for which no particular cause was identified in the  CT abdomen covid 19 was suspected, patient was isolated and PCR sent.    ,.  His baseline creatinine is 1.4-1.6, creatinine was 1.7 on arrival today, procalcitonin was 0.11, troponin less than 0.15, lactic acid was 1.6, white count is 18.1 hemoglobin 15.6 hematocrit 45.7, he had a significant lymphopenia,, d-dimer is elevated at 1.2,Mesenteric ischemia is one  of the concerns but no ischemia identified in the CT.on hold.  1 dose was   Past Medical History    I have reviewed this patient's medical history and updated it with pertinent information if needed.   Past Medical History:   Diagnosis Date     AAA (abdominal aortic aneurysm) (H) May 2016    infrarenal s/p graft placement at [facility]     Allergic rhinitis, cause unspecified      Atrial fibrillation (H)      CKD (chronic kidney disease) stage 3, GFR 30-59 ml/min (H) 2/4/2013     Lumbago 2000    s/p lumbar epidural injection     OA (osteoarthritis) 2/21/2015     PVD (peripheral vascular disease) (H) 3/7/2016     Shingles 7/09     left forehead     Sleep related leg cramps 2/6/2013     Unspecified cataract     bilateral     Unspecified essential hypertension      Vitamin D deficiency disease 2/6/2013       Past Surgical History   I have reviewed this patient's surgical history and updated it with pertinent information if needed.  Past Surgical History:   Procedure Laterality Date     C NONSPECIFIC PROCEDURE  1960    right inguinal hernia repair     C NONSPECIFIC PROCEDURE  1990s    TURP     C NONSPECIFIC PROCEDURE      tonsillectomy     C NONSPECIFIC PROCEDURE  April 2011    Right total knee arthroplasty     C NONSPECIFIC PROCEDURE   approx 2009     left  cataract extraction/IOL placement     C NONSPECIFIC PROCEDURE  May 2016    Endovascular AAA stent graft placed at [facility]. [facility] suggests abx prophylaxis for life for procedures (dental/etc)     ENDARTERECTOMY CAROTID Left 5/31/2019    Procedure: LEFT CAROTID ENDARTERECTOMY WITH GREAT SAPHENOUS VEIN  PATCH WITH EEG MONITORING;  Surgeon: Pedro Ahn MD;  Location: [facility] OR     EXCISE MASS TRUNK N/A 2017    Procedure: EXCISE MASS TRUNK;  Surgeon: Ranjith Lozoya MD;  Location:  [facility]     EXCISE MASS UPPER EXTREMITY Left 2017    Procedure: EXCISE MASS UPPER EXTREMITY;  Surgeon: Ranjith Lozoya MD;  Location: [facility]     EXPLORE NECK Left 2019    Procedure: LEFT NECK EXPLORATION;  Surgeon: Cam Jett MD;  Location: [facility] OR     HERNIORRHAPHY INGUINAL Left 2017    Procedure: HERNIORRHAPHY INGUINAL;  Surgeon: Ranjith Lozoya MD;  Location:  [facility]       Prior to Admission Medications   Prior to Admission Medications   Prescriptions Last Dose Informant Patient Reported? Taking?   Cholecalciferol (VITAMIN D) 2000 UNITS tablet 3/29/2020 at am Self No Yes   Sig: Take 2,000 Units by mouth daily   Misc Natural Products (OSTEO BI-FLEX ADV TRIPLE ST) TABS 3/29/2020 at am Self Yes Yes   Sig: Take 1 tablet by mouth daily   acetaminophen (TYLENOL) 500 MG tablet 3/29/2020 at pm Self Yes Yes   Sig: Take 500 mg by mouth 2 times daily    amLODIPine (NORVASC) 5 MG tablet 3/29/2020 at Unknown time  No Yes   Sig: Take 1 tablet (5 mg) by mouth daily   amoxicillin (AMOXIL) 500 MG capsule  at prn Self No Yes   Si capsules (total 2 grams) orally  1 hour prior to dental procedures   apixaban ANTICOAGULANT (ELIQUIS) 5 MG tablet 3/29/2020 at pm  Yes Yes   Sig: Take 5 mg by mouth 2 times daily   atorvastatin (LIPITOR) 10 MG tablet 3/29/2020 at am  No Yes   Sig: Take 1 tablet (10 mg) by mouth daily   metoprolol tartrate (LOPRESSOR) 25 MG tablet 3/29/2020 at pm  No Yes   Sig: TAKE ONE-HALF (1/2) TABLET TWICE A DAY   multivitamin, therapeutic with minerals (MULTI-VITAMIN) TABS 3/29/2020 at am Self Yes Yes   Sig: Take 1 tablet by mouth daily       Facility-Administered Medications: None     Allergies   Allergies   Allergen Reactions     Lisinopril Cramps     Leg cramping       Social History   I have  reviewed this patient's social history and updated it with pertinent information if needed. Isael Garcia  reports that he has never smoked. He has never used smokeless tobacco. He reports that he does not drink alcohol or use drugs.    Family History   I have reviewed this patient's family history and updated it with pertinent information if needed.   Family History   Problem Relation Age of Onset     Prostate Cancer Brother        Review of Systems   The 10 point Review of Systems is negative other than noted in the HPI or here.     Physical Exam   Temp: 100  F (37.8  C) Temp src: Temporal BP: (!) 116/91 Pulse: 85 Heart Rate: 106 Resp: 23 SpO2: 96 % O2 Device: Nasal cannula Oxygen Delivery: 2 LPM  Vital Signs with Ranges  Temp:  [97.5  F (36.4  C)-100.1  F (37.8  C)] 100  F (37.8  C)  Pulse:  [82-85] 85  Heart Rate:  [] 106  Resp:  [10-30] 23  BP: (116-165)/(83-91) 116/91  SpO2:  [88 %-99 %] 96 %  165 lbs 0 oz    Constitutional: Awake, alert, diaphoretic  Eyes: Conjunctiva and pupils examined and normal.  HEENT: Moist mucous membranes, normal dentition.  Respiratory: Bilateral basilar crackles noted.  Cardiovascular: Regular rate and rhythm, normal S1 and S2, and no murmur noted.  GI: Abdomen is distended, bowel sounds present, tenderness noted generalized.  Lymph/Hematologic: No anterior cervical or supraclavicular adenopathy.  Skin: No rashes, no cyanosis, no edema.  Musculoskeletal: No joint swelling, erythema or tenderness.  Neurologic: Cranial nerves 2-12 intact, normal strength and sensation.  Psychiatric: Alert, oriented to person, place and time, appears diaphoretic    Data   Data reviewed today:   Recent Labs   Lab 03/30/20  1154   WBC 18.1*   HGB 15.6   MCV 96         POTASSIUM 4.3   CHLORIDE 108   CO2 24   BUN 28   CR 1.65*   ANIONGAP 5   KULDIP 9.5   *   ALBUMIN 3.7   PROTTOTAL 7.5   BILITOTAL 0.5   ALKPHOS 60   ALT 35   AST 25   LIPASE 75   TROPI <0.015        Imaging:  Recent Results (from the past 24 hour(s))   CTA Abdomen Pelvis with Contrast    Narrative    CTA ABDOMEN AND PELVIS WITH CONTRAST 3/30/2020 12:38 PM    HISTORY: 85-year-old patient with history of abdominal aortic aneurysm  and endovascular repair. Patient is with history of atrial  fibrillation and chronic Eliquis use. Concern for mesenteric ischemia.    COMPARISON: None.    TECHNIQUE: Multiplanar multiformatted CTA images obtained from the  lung bases through the abdomen and pelvis. This was performed after  uneventful administration of Isovue 370 intravenous contrast given for  a total of 80 mL. Radiation dose for this scan was reduced using  automated exposure control, adjustment of the mA and/or kV according  to patient size, or iterative reconstruction technique. Three-D  reformatted images created at a separate workstation.    FINDINGS: Minimal bibasilar atelectasis. Heart size is normal. The  liver, gallbladder, spleen, adrenal glands, and pancreas are  unremarkable. No intraperitoneal fluid or air. Both kidneys are  normally perfused. No hydronephrosis or nephrolithiasis. The bladder  is distended and unremarkable. Appendix is normal in size and  appearance. No dilated loops of bowel. Mild amount of stool noted  throughout the colon. Diffuse intervertebral disc space narrowing in  the mid to lower lumbar spine. No acute osseous abnormality. No  compression deformity.    The visible descending thoracic aorta is patent. Mild to moderate  extrinsic compression and stenosis at the proximal celiac axis, though  suspect this is with respiratory variation. Superior mesenteric artery  is patent. Both renal arteries are patent, though with moderate to  severe stenosis in the proximal left renal artery over a distance of  1.2 cm. Aortobiiliac stent graft is identified. Infrarenal abdominal  aorta is 3.2 cm AP x 2.8 cm transverse. Clearly, a type I endoleak is  identified with incomplete seal at the  proximal aspect. Uncertain of  aneurysmal size relative to previous exams as no previous exams  available. The origin of the inferior mesenteric artery is occluded,  though reconstitution of the inferior mesenteric artery via  collateralized vessels from the SMA, as is typical with aortic stent  grafts. Both internal iliac arteries are patent. Stent is noted in the  left external iliac artery, both of which are patent, native on the  right. Both common femoral arteries are patent. No obvious bowel wall  thickening.      Impression    IMPRESSION:  1. Aortobiiliac stent graft with clear type IA endoleak. Abdominal  aneurysmal sac is 3.2 cm AP x 2.8 cm transverse. Uncertain if this has  changed as no previous radiographs available for comparison. No fat  stranding or inflammatory change surrounding the abdominal aorta.  2. Visceral arteries are patent, do not suspect mesenteric ischemia.  3. Moderate-to-severe stenosis in the proximal left renal artery,  incidentally noted.  4. No obvious dilated loops of bowel or source for abdominal pain.  Mild-to-moderate amount of stool noted throughout portions of the  colon.    JULIO SMITH MD

## 2020-03-30 NOTE — ED NOTES
[facility]  ED Nurse Handoff Report    ED Chief complaint: Abdominal Pain      ED Diagnosis:   Final diagnoses:   None       Code Status: To be addressed by admitting MD.     Allergies:   Allergies   Allergen Reactions     Lisinopril Cramps     Leg cramping       Patient Story: Pt presents to ED for generalized abdominal pain and vomiting since this AM. Pt reports BM today.   Focused Assessment: Pt reports severe pain on arrival to ED. Dilaudid for pain control and zofran for nausea in ED. CT completed in ED. UA and labs sent in ED. Pt in NSR on continuous cardiac monitoring in ED. Pt on 2L due to hypoxia from pain medications with O2 saturations in the mid 90s. Pt using urinal in bed for bathroom. Pt has not ambulated since arriving to the ED.    Pt being admitted for pain control and further work up.     Treatments and/or interventions provided: pain and nausea control. IV fluids  Patient's response to treatments and/or interventions: Decreased pain     To be done/followed up on inpatient unit:  Monitor, inpatient orders, pain control     Does this patient have any cognitive concerns?: NA    Activity level - Baseline/Home:  Stand with Assist  Activity Level - Current:   Stand with Assist    Patient's Preferred language: English   Needed?: No    Isolation: None  Infection: Not Applicable  Bariatric?: No    Vital Signs:   Vitals:    03/30/20 1409 03/30/20 1411 03/30/20 1414 03/30/20 1417   BP:       Pulse:       Resp: 23  22 18   Temp:       TempSrc:       SpO2: 94% 92% 95% 94%   Weight:       Height:           Cardiac Rhythm:     Was the PSS-3 completed:   Yes  What interventions are required if any?               Family Comments: No family at bedside in ED.   OBS brochure/video discussed/provided to patient/family: Yes              Name of person given brochure if not patient: NA              Relationship to patient: NA    For the majority of the shift this patient's behavior was Green.    Behavioral interventions performed were Frequent rounding .    ED NURSE PHONE NUMBER: [id number]

## 2020-03-30 NOTE — PHARMACY-ADMISSION MEDICATION HISTORY
Pharmacy Medication History  Admission medication history interview status for the 3/30/2020  admission is complete. See EPIC admission navigator for prior to admission medications     Medication history sources: Patient and Surescripts  Medication history source reliability: Good  Adherence assessment: Good    Significant changes made to the medication list:  Changed Apixaban dose to 5mg BID      Additional medication history information:   none    Medication reconciliation completed by provider prior to medication history? No    Time spent in this activity: 20 min      Prior to Admission medications    Medication Sig Last Dose Taking? Auth Provider   acetaminophen (TYLENOL) 500 MG tablet Take 500 mg by mouth 2 times daily  3/29/2020 at pm Yes Madhu Velasquez MD   amLODIPine (NORVASC) 5 MG tablet Take 1 tablet (5 mg) by mouth daily 3/29/2020 at Unknown time Yes Madhu Velasquez MD   amoxicillin (AMOXIL) 500 MG capsule 4 capsules (total 2 grams) orally  1 hour prior to dental procedures  at prn Yes Madhu Velasquez MD   apixaban ANTICOAGULANT (ELIQUIS) 5 MG tablet Take 5 mg by mouth 2 times daily 3/29/2020 at pm Yes Unknown, Entered By History   atorvastatin (LIPITOR) 10 MG tablet Take 1 tablet (10 mg) by mouth daily 3/29/2020 at am Yes Madhu Velasquez MD   Cholecalciferol (VITAMIN D) 2000 UNITS tablet Take 2,000 Units by mouth daily 3/29/2020 at am Yes Madhu Velasquez MD   metoprolol tartrate (LOPRESSOR) 25 MG tablet TAKE ONE-HALF (1/2) TABLET TWICE A DAY 3/29/2020 at pm Yes Madhu Velasquez MD   Misc Natural Products (OSTEO BI-FLEX ADV TRIPLE ST) TABS Take 1 tablet by mouth daily 3/29/2020 at am Yes Madhu Velasquez MD   multivitamin, therapeutic with minerals (MULTI-VITAMIN) TABS Take 1 tablet by mouth daily  3/29/2020 at am Yes Madhu Velasquez MD

## 2020-03-31 ENCOUNTER — APPOINTMENT (OUTPATIENT)
Dept: GENERAL RADIOLOGY | Facility: CLINIC | Age: 85
DRG: 393 | End: 2020-03-31
Attending: INTERNAL MEDICINE
Payer: COMMERCIAL

## 2020-03-31 LAB
ALBUMIN SERPL-MCNC: 2.3 G/DL (ref 3.4–5)
ALP SERPL-CCNC: 45 U/L (ref 40–150)
ALT SERPL W P-5'-P-CCNC: 46 U/L (ref 0–70)
ANION GAP SERPL CALCULATED.3IONS-SCNC: 9 MMOL/L (ref 3–14)
AST SERPL W P-5'-P-CCNC: 41 U/L (ref 0–45)
BILIRUB SERPL-MCNC: 0.5 MG/DL (ref 0.2–1.3)
BUN SERPL-MCNC: 39 MG/DL (ref 7–30)
C DIFF TOX B STL QL: NEGATIVE
CALCIUM SERPL-MCNC: 8.5 MG/DL (ref 8.5–10.1)
CHLORIDE SERPL-SCNC: 116 MMOL/L (ref 94–109)
CO2 SERPL-SCNC: 15 MMOL/L (ref 20–32)
CREAT SERPL-MCNC: 2.55 MG/DL (ref 0.66–1.25)
ERYTHROCYTE [DISTWIDTH] IN BLOOD BY AUTOMATED COUNT: 14.2 % (ref 10–15)
GFR SERPL CREATININE-BSD FRML MDRD: 22 ML/MIN/{1.73_M2}
GLUCOSE SERPL-MCNC: 166 MG/DL (ref 70–99)
HCT VFR BLD AUTO: 49.7 % (ref 40–53)
HGB BLD-MCNC: 16.4 G/DL (ref 13.3–17.7)
LACTATE BLD-SCNC: 2.2 MMOL/L (ref 0.7–2)
LACTATE BLD-SCNC: 2.5 MMOL/L (ref 0.7–2)
MAGNESIUM SERPL-MCNC: 2.4 MG/DL (ref 1.6–2.3)
MCH RBC QN AUTO: 32.3 PG (ref 26.5–33)
MCHC RBC AUTO-ENTMCNC: 33 G/DL (ref 31.5–36.5)
MCV RBC AUTO: 98 FL (ref 78–100)
PLATELET # BLD AUTO: 155 10E9/L (ref 150–450)
POTASSIUM SERPL-SCNC: 5.2 MMOL/L (ref 3.4–5.3)
PROT SERPL-MCNC: 5.7 G/DL (ref 6.8–8.8)
RBC # BLD AUTO: 5.07 10E12/L (ref 4.4–5.9)
SARS-COV-2 RNA SPEC QL NAA+PROBE: NOT DETECTED
SODIUM SERPL-SCNC: 140 MMOL/L (ref 133–144)
SPECIMEN SOURCE: NORMAL
SPECIMEN SOURCE: NORMAL
WBC # BLD AUTO: 39.4 10E9/L (ref 4–11)

## 2020-03-31 PROCEDURE — 80053 COMPREHEN METABOLIC PANEL: CPT | Performed by: INTERNAL MEDICINE

## 2020-03-31 PROCEDURE — 36415 COLL VENOUS BLD VENIPUNCTURE: CPT | Performed by: INTERNAL MEDICINE

## 2020-03-31 PROCEDURE — 83605 ASSAY OF LACTIC ACID: CPT | Performed by: HOSPITALIST

## 2020-03-31 PROCEDURE — 25000128 H RX IP 250 OP 636: Performed by: INTERNAL MEDICINE

## 2020-03-31 PROCEDURE — 99233 SBSQ HOSP IP/OBS HIGH 50: CPT | Performed by: INTERNAL MEDICINE

## 2020-03-31 PROCEDURE — 36415 COLL VENOUS BLD VENIPUNCTURE: CPT | Performed by: HOSPITALIST

## 2020-03-31 PROCEDURE — 83735 ASSAY OF MAGNESIUM: CPT | Performed by: INTERNAL MEDICINE

## 2020-03-31 PROCEDURE — 83605 ASSAY OF LACTIC ACID: CPT | Performed by: INTERNAL MEDICINE

## 2020-03-31 PROCEDURE — 25800030 ZZH RX IP 258 OP 636: Performed by: INTERNAL MEDICINE

## 2020-03-31 PROCEDURE — 25800030 ZZH RX IP 258 OP 636: Performed by: HOSPITALIST

## 2020-03-31 PROCEDURE — 25000132 ZZH RX MED GY IP 250 OP 250 PS 637: Performed by: INTERNAL MEDICINE

## 2020-03-31 PROCEDURE — 12000000 ZZH R&B MED SURG/OB

## 2020-03-31 PROCEDURE — 71045 X-RAY EXAM CHEST 1 VIEW: CPT

## 2020-03-31 PROCEDURE — 85027 COMPLETE CBC AUTOMATED: CPT | Performed by: INTERNAL MEDICINE

## 2020-03-31 PROCEDURE — 87493 C DIFF AMPLIFIED PROBE: CPT | Performed by: HOSPITALIST

## 2020-03-31 PROCEDURE — 87506 IADNA-DNA/RNA PROBE TQ 6-11: CPT | Performed by: INTERNAL MEDICINE

## 2020-03-31 RX ORDER — ATORVASTATIN CALCIUM 10 MG/1
10 TABLET, FILM COATED ORAL DAILY
Status: DISCONTINUED | OUTPATIENT
Start: 2020-03-31 | End: 2020-04-06 | Stop reason: HOSPADM

## 2020-03-31 RX ADMIN — ATORVASTATIN CALCIUM 10 MG: 10 TABLET, FILM COATED ORAL at 15:00

## 2020-03-31 RX ADMIN — HYDROCODONE BITARTRATE AND ACETAMINOPHEN 1 TABLET: 5; 325 TABLET ORAL at 00:06

## 2020-03-31 RX ADMIN — AMLODIPINE BESYLATE 5 MG: 5 TABLET ORAL at 09:27

## 2020-03-31 RX ADMIN — METOPROLOL TARTRATE 12.5 MG: 25 TABLET, FILM COATED ORAL at 15:50

## 2020-03-31 RX ADMIN — PIPERACILLIN SODIUM AND TAZOBACTAM SODIUM 2.25 G: 2; .25 INJECTION, POWDER, LYOPHILIZED, FOR SOLUTION INTRAVENOUS at 04:37

## 2020-03-31 RX ADMIN — HYDROMORPHONE HYDROCHLORIDE 0.5 MG: 1 INJECTION, SOLUTION INTRAMUSCULAR; INTRAVENOUS; SUBCUTANEOUS at 01:19

## 2020-03-31 RX ADMIN — APIXABAN 2.5 MG: 2.5 TABLET, FILM COATED ORAL at 01:37

## 2020-03-31 RX ADMIN — PIPERACILLIN SODIUM AND TAZOBACTAM SODIUM 2.25 G: 2; .25 INJECTION, POWDER, LYOPHILIZED, FOR SOLUTION INTRAVENOUS at 21:11

## 2020-03-31 RX ADMIN — PIPERACILLIN SODIUM AND TAZOBACTAM SODIUM 2.25 G: 2; .25 INJECTION, POWDER, LYOPHILIZED, FOR SOLUTION INTRAVENOUS at 09:20

## 2020-03-31 RX ADMIN — PIPERACILLIN SODIUM AND TAZOBACTAM SODIUM 2.25 G: 2; .25 INJECTION, POWDER, LYOPHILIZED, FOR SOLUTION INTRAVENOUS at 15:49

## 2020-03-31 RX ADMIN — APIXABAN 2.5 MG: 2.5 TABLET, FILM COATED ORAL at 09:28

## 2020-03-31 RX ADMIN — APIXABAN 5 MG: 5 TABLET, FILM COATED ORAL at 15:50

## 2020-03-31 RX ADMIN — METOCLOPRAMIDE 5 MG: 5 INJECTION, SOLUTION INTRAMUSCULAR; INTRAVENOUS at 01:21

## 2020-03-31 RX ADMIN — SODIUM CHLORIDE 1000 ML: 9 INJECTION, SOLUTION INTRAVENOUS at 19:31

## 2020-03-31 RX ADMIN — AMLODIPINE BESYLATE 5 MG: 5 TABLET ORAL at 01:38

## 2020-03-31 RX ADMIN — ACETAMINOPHEN 650 MG: 325 TABLET, FILM COATED ORAL at 15:00

## 2020-03-31 RX ADMIN — DEXTROSE AND SODIUM CHLORIDE: 5; 900 INJECTION, SOLUTION INTRAVENOUS at 05:42

## 2020-03-31 ASSESSMENT — ACTIVITIES OF DAILY LIVING (ADL)
ADLS_ACUITY_SCORE: 15
TRANSFERRING: 0-->INDEPENDENT
TOILETING: 0-->INDEPENDENT
ADLS_ACUITY_SCORE: 15
WHICH_OF_THE_ABOVE_FUNCTIONAL_RISKS_HAD_A_RECENT_ONSET_OR_CHANGE?: AMBULATION;TRANSFERRING;TOILETING
ADLS_ACUITY_SCORE: 15
ADLS_ACUITY_SCORE: 18
ADLS_ACUITY_SCORE: 18
BATHING: 0-->INDEPENDENT
ADLS_ACUITY_SCORE: 18
AMBULATION: 0-->INDEPENDENT

## 2020-03-31 NOTE — PROGRESS NOTES
Paged for many watery foul smelling stools and abdominal pain; C difficile toxin assay ordered with enteric precautions.

## 2020-03-31 NOTE — PROGRESS NOTES
SW    I) Aware of the Care Transitions CC/SW order for needs at discharge, which includes home health care.    P) Due to COVID-19 rule out status, will defer at this time.

## 2020-03-31 NOTE — CONSULTS
Chart and ct reviewed  Patient not seen  No evidence for surgical process on imaging  No evidence for ischemia or inflammation, visceral vessels patent  Defer ongoing eval to hospitalist, COVID rule out  Call if needed

## 2020-03-31 NOTE — PLAN OF CARE
DATE & TIME: 03/30/20 [id number]    Cognitive Concerns/ Orientation : A/O x4, lethargic   BEHAVIOR & AGGRESSION TOOL COLOR: green  CIWA SCORE: n/a   ABNL VS/O2: AVSS on 5L of O2 NC  MOBILITY: SBA  PAIN MANAGMENT: c/o ab pain, IV dilaudid given x1  DIET: clear liquid diet  BOWEL/BLADDER: continent of bladder, voiding frequently using bedside urinal  ABNL LAB/BG: Creat 1.7, WBC 18.1, BC pending  DRAIN/DEVICES: D5 NS infusing 100ml/hr  TELEMETRY RHYTHM: ST w/1st degree AVB and occasional PVCs   SKIN: intact  TESTS/PROCEDURES: CT showing endovasular leak and acquired renal artery stenosis - surgery consult   D/C DAY/GOALS/PLACE: pending 2-3 days  OTHER IMPORTANT INFO: intermittent nausea, emesis x1. LS clear, diminished bases, BECERRA. +1 dorsalis pedis/tibial pulses.

## 2020-03-31 NOTE — PLAN OF CARE
DATE & TIME: 3/30 8265-0014   Cognitive Concerns/ Orientation : lethargic & Ox4   BEHAVIOR & AGGRESSION TOOL COLOR: Green  CIWA SCORE: NA   ABNL VS/O2: VSS on 4L/mild tachycardia  MOBILITY: A1, GB  PAIN MANAGMENT: Dilaudid & Norco  DIET: Clear liquids  BOWEL/BLADDER: Urinal/bedpan/incontinent of stool at times. Frequent loose stools - MD pagederek, cdiff sample collected  ABNL LAB/BG: Creat 1.7, WBC 18.1, BC pending  DRAIN/DEVICES: PIV infusing  TELEMETRY RHYTHM: SR  SKIN: bruised  TESTS/PROCEDURES: CT - endovascular leak & acquired renal artery stenosis  D/C DAY/GOALS/PLACE: Home in 1-2 days  OTHER IMPORTANT INFO: Nausea, decreased with Zofran, Compazine & Reglan

## 2020-03-31 NOTE — PROGRESS NOTES
"[facility]    Hospitalist Progress Note    Date of Service (when I saw the patient): 03/31/2020    Assessment & Plan   Isael Garcia is a 85 year old male history of PVD, atrial fibrillation presents to the emergency department with the sudden onset of abdominal pain and fever    Abdominal pain  COVID-19 rule out  Presented to ED with c/o abdominal pain. Awoke at 7 am with \"terrible\" abdominal pain. Generalized and comes in waves. Associated n/v. On presentation with fever at 100.1 and leukocytosis. No h/o abdominal surgeries. CT with endoleak from AAA repair site (thought to be chronic), no other acute findings. UA bland. CXR with hazy opacity at L lung base possibly 2/2 atelectasis or PNA  - surgery has consulted, no acute surgical needs  - clear liquid diet  - blood cultures pending  - IV fluids  - COVID-19 rule out  - contact and droplet precautions  - empiric IV zosyn  - c diff negative  - check FRANCISCO  - WBC 18.1-->39.4 3/31  - if pain persists and ongoing leukocytosis, may need repeat abd CT or further imaging (without contrast)    PHILLIP  CKD  Baseline creatinine in the mid-to-upper 1's range. 3/31 with creatinine to 2.55. Received contrast 3/30. Lowest recorded bp at 100 systolic otherwise normal to high. No other nephrotoxins. Possible dehydration vs contrast nephropathy  - avoid nephrotoxins  - IV fluids  - repeat labs in the am  - if rises further likely needs nephrology involvement    AAA s/p graft placement  PVD  In ED endovascular leak noted on CT. ED d/w Dr. 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  DVT Prophylaxis: on " Eliquis  Code Status: Full Code    Disposition: Expected discharge in 2-3 days pending COVID rule-out and determining and treating etiology of abdominal pain    Dk Wilson MD  [phone] (P)  Text Page    Interval History   Overnight events reviewed. Still with pain, notes in LLQ but on admission generalized. Had constipation but now diarrhea. Denies fevers but states had chills pta. No cp/sob.     -Data reviewed today: I reviewed all new labs and imaging results over the last 24 hours. I personally reviewed no images or EKG's today.    Physical Exam   Temp: 97.7  F (36.5  C) Temp src: Oral BP: 114/66 Pulse: 99 Heart Rate: 100 Resp: 18 SpO2: 95 % O2 Device: Nasal cannula Oxygen Delivery: 4 LPM  Vitals:    03/30/20 1127   Weight: 74.8 kg (165 lb)     Vital Signs with Ranges  Temp:  [97.5  F (36.4  C)-100.1  F (37.8  C)] 97.7  F (36.5  C)  Pulse:  [99] 99  Heart Rate:  [] 100  Resp:  [10-30] 18  BP: (100-147)/(60-91) 114/66  SpO2:  [92 %-97 %] 95 %  I/O last 3 completed shifts:  In: 1150 [P.O.:200; I.V.:950]  Out: 550 [Urine:475; Emesis/NG output:75]    Constitutional: Alert, oriented, no acute distress  Respiratory: Lungs clear to auscultation bilaterally, no wheezes, no crackles  Cardiovascular: Regular rate and rhythm, no murmurs  GI: Soft, tender to light touch in LLQ and moderate touch in RLQ/ flank  Skin/Integumen: No erythema, cyanosis or edema  Other:      Medications     dextrose 5% and 0.9% NaCl 100 mL/hr at 03/31/20 0584     - MEDICATION INSTRUCTIONS -         amLODIPine  5 mg Oral Daily     apixaban ANTICOAGULANT  2.5 mg Oral BID     piperacillin-tazobactam  2.25 g Intravenous Q6H     sodium chloride (PF)  3 mL Intracatheter Q8H       Data   Recent Labs   Lab 03/31/20  0745 03/30/20  1154   WBC 39.4* 18.1*   HGB 16.4 15.6   MCV 98 96    170    137   POTASSIUM 5.2 4.3   CHLORIDE 116* 108   CO2 15* 24   BUN 39* 28   CR 2.55* 1.65*   ANIONGAP 9 5   KULDIP 8.5 9.5   * 150*    ALBUMIN 2.3* 3.7   PROTTOTAL 5.7* 7.5   BILITOTAL 0.5 0.5   ALKPHOS 45 60   ALT 46 35   AST 41 25   LIPASE  --  75   TROPI  --  <0.015       Recent Results (from the past 24 hour(s))   CTA Abdomen Pelvis with Contrast    Narrative    CTA ABDOMEN AND PELVIS WITH CONTRAST 3/30/2020 12:38 PM    HISTORY: 85-year-old patient with history of abdominal aortic aneurysm  and endovascular repair. Patient is with history of atrial  fibrillation and chronic Eliquis use. Concern for mesenteric ischemia.    COMPARISON: None.    TECHNIQUE: Multiplanar multiformatted CTA images obtained from the  lung bases through the abdomen and pelvis. This was performed after  uneventful administration of Isovue 370 intravenous contrast given for  a total of 80 mL. Radiation dose for this scan was reduced using  automated exposure control, adjustment of the mA and/or kV according  to patient size, or iterative reconstruction technique. Three-D  reformatted images created at a separate workstation.    FINDINGS: Minimal bibasilar atelectasis. Heart size is normal. The  liver, gallbladder, spleen, adrenal glands, and pancreas are  unremarkable. No intraperitoneal fluid or air. Both kidneys are  normally perfused. No hydronephrosis or nephrolithiasis. The bladder  is distended and unremarkable. Appendix is normal in size and  appearance. No dilated loops of bowel. Mild amount of stool noted  throughout the colon. Diffuse intervertebral disc space narrowing in  the mid to lower lumbar spine. No acute osseous abnormality. No  compression deformity.    The visible descending thoracic aorta is patent. Mild to moderate  extrinsic compression and stenosis at the proximal celiac axis, though  suspect this is with respiratory variation. Superior mesenteric artery  is patent. Both renal arteries are patent, though with moderate to  severe stenosis in the proximal left renal artery over a distance of  1.2 cm. Aortobiiliac stent graft is identified.  Infrarenal abdominal  aorta is 3.2 cm AP x 2.8 cm transverse. Clearly, a type I endoleak is  identified with incomplete seal at the proximal aspect. Uncertain of  aneurysmal size relative to previous exams as no previous exams  available. The origin of the inferior mesenteric artery is occluded,  though reconstitution of the inferior mesenteric artery via  collateralized vessels from the SMA, as is typical with aortic stent  grafts. Both internal iliac arteries are patent. Stent is noted in the  left external iliac artery, both of which are patent, native on the  right. Both common femoral arteries are patent. No obvious bowel wall  thickening.      Impression    IMPRESSION:  1. Aortobiiliac stent graft with clear type IA endoleak. Abdominal  aneurysmal sac is 3.2 cm AP x 2.8 cm transverse. Uncertain if this has  changed as no previous radiographs available for comparison. No fat  stranding or inflammatory change surrounding the abdominal aorta.  2. Visceral arteries are patent, do not suspect mesenteric ischemia.  3. Moderate-to-severe stenosis in the proximal left renal artery,  incidentally noted.  4. No obvious dilated loops of bowel or source for abdominal pain.  Mild-to-moderate amount of stool noted throughout portions of the  colon.    JULIO SMITH MD   XR Chest Port 1 View    Narrative    CHEST ONE VIEW PORTABLE   3/30/2020 4:55 PM     HISTORY:  Fever.    COMPARISON: 6/16/2019.      Impression    IMPRESSION: Hazy opacity at the left lung base could be related to  atelectasis or pneumonia. There is mild scarring and/or atelectasis at  the right lung base medially, unchanged. No pneumothorax. Heart size  appears stable. Pulmonary vascularity is within normal limits.     ISABELLA SCHUSTER MD

## 2020-04-01 LAB
ANION GAP SERPL CALCULATED.3IONS-SCNC: 7 MMOL/L (ref 3–14)
BASOPHILS # BLD AUTO: 0 10E9/L (ref 0–0.2)
BASOPHILS NFR BLD AUTO: 0 %
BUN SERPL-MCNC: 40 MG/DL (ref 7–30)
C COLI+JEJUNI+LARI FUSA STL QL NAA+PROBE: NOT DETECTED
CALCIUM SERPL-MCNC: 8 MG/DL (ref 8.5–10.1)
CHLORIDE SERPL-SCNC: 115 MMOL/L (ref 94–109)
CO2 SERPL-SCNC: 18 MMOL/L (ref 20–32)
CREAT SERPL-MCNC: 2.87 MG/DL (ref 0.66–1.25)
CREAT UR-MCNC: 51 MG/DL
DIFFERENTIAL METHOD BLD: ABNORMAL
EC STX1 GENE STL QL NAA+PROBE: NOT DETECTED
EC STX2 GENE STL QL NAA+PROBE: NOT DETECTED
ENTERIC PATHOGEN COMMENT: NORMAL
EOSINOPHIL # BLD AUTO: 0 10E9/L (ref 0–0.7)
EOSINOPHIL NFR BLD AUTO: 0 %
ERYTHROCYTE [DISTWIDTH] IN BLOOD BY AUTOMATED COUNT: 14.3 % (ref 10–15)
FRACT EXCRET NA UR+SERPL-RTO: 3.4 %
GFR SERPL CREATININE-BSD FRML MDRD: 19 ML/MIN/{1.73_M2}
GLUCOSE SERPL-MCNC: 123 MG/DL (ref 70–99)
HCT VFR BLD AUTO: 40.7 % (ref 40–53)
HGB BLD-MCNC: 13.7 G/DL (ref 13.3–17.7)
IMM GRANULOCYTES # BLD: 0.1 10E9/L (ref 0–0.4)
IMM GRANULOCYTES NFR BLD: 0.6 %
LACTATE BLD-SCNC: 2.3 MMOL/L (ref 0.7–2)
LYMPHOCYTES # BLD AUTO: 0.8 10E9/L (ref 0.8–5.3)
LYMPHOCYTES NFR BLD AUTO: 3.6 %
MAGNESIUM SERPL-MCNC: 2.1 MG/DL (ref 1.6–2.3)
MCH RBC QN AUTO: 32.3 PG (ref 26.5–33)
MCHC RBC AUTO-ENTMCNC: 33.7 G/DL (ref 31.5–36.5)
MCV RBC AUTO: 96 FL (ref 78–100)
MONOCYTES # BLD AUTO: 1 10E9/L (ref 0–1.3)
MONOCYTES NFR BLD AUTO: 4.8 %
NEUTROPHILS # BLD AUTO: 19.8 10E9/L (ref 1.6–8.3)
NEUTROPHILS NFR BLD AUTO: 91 %
NOROV GI+II ORF1-ORF2 JNC STL QL NAA+PR: NOT DETECTED
NRBC # BLD AUTO: 0 10*3/UL
NRBC BLD AUTO-RTO: 0 /100
PLATELET # BLD AUTO: 132 10E9/L (ref 150–450)
POTASSIUM SERPL-SCNC: 4.1 MMOL/L (ref 3.4–5.3)
RBC # BLD AUTO: 4.24 10E12/L (ref 4.4–5.9)
RVA NSP5 STL QL NAA+PROBE: NOT DETECTED
SALMONELLA SP RPOD STL QL NAA+PROBE: NOT DETECTED
SHIGELLA SP+EIEC IPAH STL QL NAA+PROBE: NOT DETECTED
SODIUM SERPL-SCNC: 140 MMOL/L (ref 133–144)
SODIUM UR-SCNC: 85 MMOL/L
V CHOL+PARA RFBL+TRKH+TNAA STL QL NAA+PR: NOT DETECTED
WBC # BLD AUTO: 21.7 10E9/L (ref 4–11)
Y ENTERO RECN STL QL NAA+PROBE: NOT DETECTED

## 2020-04-01 PROCEDURE — 25800030 ZZH RX IP 258 OP 636: Performed by: INTERNAL MEDICINE

## 2020-04-01 PROCEDURE — 36415 COLL VENOUS BLD VENIPUNCTURE: CPT | Performed by: INTERNAL MEDICINE

## 2020-04-01 PROCEDURE — 83605 ASSAY OF LACTIC ACID: CPT | Performed by: HOSPITALIST

## 2020-04-01 PROCEDURE — 83735 ASSAY OF MAGNESIUM: CPT | Performed by: INTERNAL MEDICINE

## 2020-04-01 PROCEDURE — 87635 SARS-COV-2 COVID-19 AMP PRB: CPT | Performed by: HOSPITALIST

## 2020-04-01 PROCEDURE — 82570 ASSAY OF URINE CREATININE: CPT | Performed by: INTERNAL MEDICINE

## 2020-04-01 PROCEDURE — 25000132 ZZH RX MED GY IP 250 OP 250 PS 637: Performed by: HOSPITALIST

## 2020-04-01 PROCEDURE — 85025 COMPLETE CBC W/AUTO DIFF WBC: CPT | Performed by: INTERNAL MEDICINE

## 2020-04-01 PROCEDURE — 25000128 H RX IP 250 OP 636: Performed by: INTERNAL MEDICINE

## 2020-04-01 PROCEDURE — 84300 ASSAY OF URINE SODIUM: CPT | Performed by: INTERNAL MEDICINE

## 2020-04-01 PROCEDURE — 25000132 ZZH RX MED GY IP 250 OP 250 PS 637: Performed by: INTERNAL MEDICINE

## 2020-04-01 PROCEDURE — 99233 SBSQ HOSP IP/OBS HIGH 50: CPT | Performed by: HOSPITALIST

## 2020-04-01 PROCEDURE — 12000000 ZZH R&B MED SURG/OB

## 2020-04-01 PROCEDURE — 80048 BASIC METABOLIC PNL TOTAL CA: CPT | Performed by: INTERNAL MEDICINE

## 2020-04-01 PROCEDURE — 25800029 ZZH RX IP 258 OP 250: Performed by: INTERNAL MEDICINE

## 2020-04-01 PROCEDURE — 25000125 ZZHC RX 250: Performed by: INTERNAL MEDICINE

## 2020-04-01 RX ORDER — HYDRALAZINE HYDROCHLORIDE 20 MG/ML
10 INJECTION INTRAMUSCULAR; INTRAVENOUS EVERY 4 HOURS PRN
Status: DISCONTINUED | OUTPATIENT
Start: 2020-04-01 | End: 2020-04-06 | Stop reason: HOSPADM

## 2020-04-01 RX ORDER — AMLODIPINE BESYLATE 2.5 MG/1
2.5 TABLET ORAL DAILY
Status: DISCONTINUED | OUTPATIENT
Start: 2020-04-01 | End: 2020-04-03

## 2020-04-01 RX ORDER — HYDRALAZINE HYDROCHLORIDE 25 MG/1
25 TABLET, FILM COATED ORAL 4 TIMES DAILY PRN
Status: DISCONTINUED | OUTPATIENT
Start: 2020-04-01 | End: 2020-04-06 | Stop reason: HOSPADM

## 2020-04-01 RX ADMIN — SODIUM BICARBONATE: 84 INJECTION, SOLUTION INTRAVENOUS at 16:31

## 2020-04-01 RX ADMIN — PIPERACILLIN SODIUM AND TAZOBACTAM SODIUM 2.25 G: 2; .25 INJECTION, POWDER, LYOPHILIZED, FOR SOLUTION INTRAVENOUS at 16:31

## 2020-04-01 RX ADMIN — APIXABAN 2.5 MG: 2.5 TABLET, FILM COATED ORAL at 21:16

## 2020-04-01 RX ADMIN — PIPERACILLIN SODIUM AND TAZOBACTAM SODIUM 2.25 G: 2; .25 INJECTION, POWDER, LYOPHILIZED, FOR SOLUTION INTRAVENOUS at 10:34

## 2020-04-01 RX ADMIN — METOPROLOL TARTRATE 12.5 MG: 25 TABLET, FILM COATED ORAL at 21:16

## 2020-04-01 RX ADMIN — APIXABAN 2.5 MG: 2.5 TABLET, FILM COATED ORAL at 10:24

## 2020-04-01 RX ADMIN — ACETAMINOPHEN 650 MG: 325 TABLET, FILM COATED ORAL at 13:13

## 2020-04-01 RX ADMIN — PIPERACILLIN SODIUM AND TAZOBACTAM SODIUM 2.25 G: 2; .25 INJECTION, POWDER, LYOPHILIZED, FOR SOLUTION INTRAVENOUS at 04:55

## 2020-04-01 RX ADMIN — DEXTROSE AND SODIUM CHLORIDE: 5; 900 INJECTION, SOLUTION INTRAVENOUS at 04:58

## 2020-04-01 RX ADMIN — AMLODIPINE BESYLATE 2.5 MG: 2.5 TABLET ORAL at 13:13

## 2020-04-01 RX ADMIN — PIPERACILLIN SODIUM AND TAZOBACTAM SODIUM 2.25 G: 2; .25 INJECTION, POWDER, LYOPHILIZED, FOR SOLUTION INTRAVENOUS at 21:15

## 2020-04-01 RX ADMIN — ONDANSETRON 4 MG: 2 INJECTION INTRAMUSCULAR; INTRAVENOUS at 14:15

## 2020-04-01 RX ADMIN — METOPROLOL TARTRATE 12.5 MG: 25 TABLET, FILM COATED ORAL at 10:26

## 2020-04-01 RX ADMIN — ATORVASTATIN CALCIUM 10 MG: 10 TABLET, FILM COATED ORAL at 10:26

## 2020-04-01 ASSESSMENT — ACTIVITIES OF DAILY LIVING (ADL)
ADLS_ACUITY_SCORE: 15

## 2020-04-01 NOTE — PROVIDER NOTIFICATION
MD Notification    Notified Person: MD    Notified Person Name:Leeanna    Notification Date/Time:4/1 1410    Notification Interaction:text    Purpose of Notification:Change in telemetry    Orders Received:MD will review med and add paramters    Comments:Pt sitting up in bed trying to eat lunch, states food makes him nauseaus. Tele - SB with 1st degree AV block with frequent sinus pauses . Pt spontaneously returned to SR with 1st degree AV block which was new today.

## 2020-04-01 NOTE — PROGRESS NOTES
Pharmacy called to clarify medication administration orders. Eliquis is to be given 2.5mg BID. Metoprolol 12.5mg BID.

## 2020-04-01 NOTE — PROGRESS NOTES
MD Notification    Notified Person: MD    Notified Person Name: Dr. Landon    Notification Date/Time: 3/31/2020 5736    Notification Interaction: phone page    Purpose of Notification: lactic 2.5    Orders Received: no interventions at this time. Lactic to be repeated in am.     Comments:

## 2020-04-01 NOTE — CONSULTS
"[facility]    Nephrology Consultation     Date of Admission:  3/30/2020    Assessment & Plan     Isael Garcia is a 85 year old male who was admitted on 3/30/2020.     1) Acute Abdominal Pain:  With leukocytosis, diarrhea and negative cultures.  Cause unclear.  Ischemic bowel ?  This can be seen even with \"normal\" CT.     2) PHILLIP:  FENA is 3.4%.  This appears to be ATN.  It may have more than one cause.  The intra abdominal process may have contributed.  The IV contrast load likely added to this.      3) Metabolic acidosis:  Mild - bicarb 16.      4) Lactic acidosis - mild    5) Thrombocytopenia - Plts 132.  Will follow.  If continues to fall then work up for TTP/HUS may be warranted.      Plan:    Modify IV fluid to provide some bicarbonate.    Follow exam and chemistries.        Adalid Lopez MD  [facility] - Nephrology  [phone]    Reason for Consult     I was asked to see the patient for PHILLIP.     Primary Care Physician     Madhu Velasquez    Chief Complaint     Can I go home ?    History is obtained from the patient and chart review.      History of Present Illness     Isael Garcia is a 85 year old male who presents with PHILLIP.    He was admitted via ED on 3/30/20 after presenting with severe abdominal pain.    The pain began in the morning of day of admission.  Sudden onset. Generalized.  Some vomiting.  Fever to 100.    He underwent CT scan with contrast in ED showing endoleak in old AAA stent graft.  This was though to be an incidental finding. No obstruction seen.    PVR was only 6 mL.      His WBC went up to 39.4.  He has had many liquid stools since admission and today he feels much better and wants to go home.      His baseline creatinine is ~1.8 as of 2/7/20.      Admission cr was 1.65.  It has increased to 2.87.  Urine output is marginal at ~600 mL per day + some uncharted.      C diff neg  Stool panel neg for infectious diarrhea.    Covid -19 ruled out " x 1 but he is to have another swab.    Blood cx negative x 2 days.      Past Medical History   I have reviewed this patient's medical history and updated it with pertinent information if needed.   Past Medical History:   Diagnosis Date     AAA (abdominal aortic aneurysm) (H) May 2016    infrarenal s/p graft placement at [facility]     Allergic rhinitis, cause unspecified      Atrial fibrillation (H)      CKD (chronic kidney disease) stage 3, GFR 30-59 ml/min (H) 2/4/2013     Lumbago 2000    s/p lumbar epidural injection     OA (osteoarthritis) 2/21/2015     PVD (peripheral vascular disease) (H) 3/7/2016     Shingles 7/09     left forehead     Sleep related leg cramps 2/6/2013     Unspecified cataract     bilateral     Unspecified essential hypertension      Vitamin D deficiency disease 2/6/2013       Past Surgical History   I have reviewed this patient's surgical history and updated it with pertinent information if needed.  Past Surgical History:   Procedure Laterality Date     C NONSPECIFIC PROCEDURE  1960    right inguinal hernia repair     C NONSPECIFIC PROCEDURE  1990s    TURP     C NONSPECIFIC PROCEDURE      tonsillectomy     C NONSPECIFIC PROCEDURE  April 2011    Right total knee arthroplasty     C NONSPECIFIC PROCEDURE   approx 2009     left  cataract extraction/IOL placement     C NONSPECIFIC PROCEDURE  May 2016    Endovascular AAA stent graft placed at [facility]. [facility] suggests abx prophylaxis for life for procedures (dental/etc)     ENDARTERECTOMY CAROTID Left 5/31/2019    Procedure: LEFT CAROTID ENDARTERECTOMY WITH GREAT SAPHENOUS VEIN PATCH WITH EEG MONITORING;  Surgeon: Pedro Ahn MD;  Location:  OR     EXCISE MASS TRUNK N/A 2/28/2017    Procedure: EXCISE MASS TRUNK;  Surgeon: Ranjith Lozoya MD;  Location:  SD     EXCISE MASS UPPER EXTREMITY Left 2/28/2017    Procedure: EXCISE MASS UPPER EXTREMITY;  Surgeon: Ranjith Lozoya MD;  Location:  SD     EXPLORE NECK Left  2019    Procedure: LEFT NECK EXPLORATION;  Surgeon: Cam Jett MD;  Location:  OR     HERNIORRHAPHY INGUINAL Left 2017    Procedure: HERNIORRHAPHY INGUINAL;  Surgeon: Ranjith Lozoya MD;  Location:  SD       Prior to Admission Medications   Prior to Admission Medications   Prescriptions Last Dose Informant Patient Reported? Taking?   Cholecalciferol (VITAMIN D) 2000 UNITS tablet 3/29/2020 at am Self No Yes   Sig: Take 2,000 Units by mouth daily   Misc Natural Products (OSTEO BI-FLEX ADV TRIPLE ST) TABS 3/29/2020 at am Self Yes Yes   Sig: Take 1 tablet by mouth daily   acetaminophen (TYLENOL) 500 MG tablet 3/29/2020 at pm Self Yes Yes   Sig: Take 500 mg by mouth 2 times daily    amLODIPine (NORVASC) 5 MG tablet 3/29/2020 at Unknown time  No Yes   Sig: Take 1 tablet (5 mg) by mouth daily   amoxicillin (AMOXIL) 500 MG capsule  at prn Self No Yes   Si capsules (total 2 grams) orally  1 hour prior to dental procedures   apixaban ANTICOAGULANT (ELIQUIS) 5 MG tablet 3/29/2020 at pm  Yes Yes   Sig: Take 5 mg by mouth 2 times daily   atorvastatin (LIPITOR) 10 MG tablet 3/29/2020 at am  No Yes   Sig: Take 1 tablet (10 mg) by mouth daily   metoprolol tartrate (LOPRESSOR) 25 MG tablet 3/29/2020 at pm  No Yes   Sig: TAKE ONE-HALF (1/2) TABLET TWICE A DAY   multivitamin, therapeutic with minerals (MULTI-VITAMIN) TABS 3/29/2020 at am Self Yes Yes   Sig: Take 1 tablet by mouth daily       Facility-Administered Medications: None     Allergies   Allergies   Allergen Reactions     Lisinopril Cramps     Leg cramping       Social History   I have reviewed this patient's social history and updated it with pertinent information if needed. Isael Garcia  reports that he has never smoked. He has never used smokeless tobacco. He reports that he does not drink alcohol or use drugs.    Family History   I have reviewed this patient's family history and updated it with pertinent information if needed.    Family History   Problem Relation Age of Onset     Prostate Cancer Brother        Review of Systems   The 10 point Review of Systems is negative other than noted in the HPI.     Physical Exam   Temp: 98.3  F (36.8  C) Temp src: Oral BP: (!) 159/86 Pulse: 81 Heart Rate: 76 Resp: 20 SpO2: 92 % O2 Device: None (Room air) Oxygen Delivery: 2 LPM  Vital Signs with Ranges  Temp:  [97.8  F (36.6  C)-98.3  F (36.8  C)] 98.3  F (36.8  C)  Pulse:  [81-94] 81  Heart Rate:  [76-87] 76  Resp:  [18-20] 20  BP: (120-159)/(64-92) 159/86  SpO2:  [92 %-95 %] 92 %  165 lbs 0 oz    GENERAL: healthy, alert, NAD  HEENT:  Normocephalic. No gross abnormalities.  Pupils equal.  MMM.  Dentition is ok.  CV: RRR, no murmurs, no clicks, gallops, or rubs, no edema, no carotid bruits  RESP: Clear bilaterally with good efforts  GI: Abdomen slightly distended, BS normal. No tenderness or guarding.    MUSCULOSKELETAL: extremities nl - no gross deformities noted  SKIN: no suspicious lesions or rashes, dry to touch  NEURO:  Strength normal and symmetric.   PSYCH: mood good, affect appropriate  LYMPH: No palpable ant/post cervical and supraclavicular adenopathy    Data   BMP  Recent Labs   Lab 04/01/20  0918 03/31/20  0745 03/30/20  1154    140 137   POTASSIUM 4.1 5.2 4.3   CHLORIDE 115* 116* 108   KULDIP 8.0* 8.5 9.5   CO2 18* 15* 24   BUN 40* 39* 28   CR 2.87* 2.55* 1.65*   * 166* 150*     Phos@[id number](phos:4)  CBC)  Recent Labs   Lab 04/01/20  0918 03/31/20  0745 03/30/20  1154   WBC 21.7* 39.4* 18.1*   HGB 13.7 16.4 15.6   HCT 40.7 49.7 45.7   MCV 96 98 96   * 155 170     Recent Labs   Lab 03/31/20  0745   AST 41   ALT 46   ALKPHOS 45   BILITOTAL 0.5     No lab results found in last 7 days.  No results found for: D2VIT, D3VIT, DTOT  Recent Labs   Lab 04/01/20  0918   HGB 13.7   HCT 40.7   MCV 96     No results for input(s): PTHI in the last 168 hours.

## 2020-04-01 NOTE — PLAN OF CARE
DATE & TIME: 04/01/2020 7124-3284    Cognitive Concerns/ Orientation : A&O x4  BEHAVIOR & AGGRESSION TOOL COLOR: green  CIWA SCORE: NA     ABNL VS/O2: VSS on 2L O2 via NC  MOBILITY: x1/GB  PAIN MANAGMENT: Denied pain this shift   DIET: regular  BOWEL/BLADDER: continent of bladder, voiding frequently using bedside urinal; loose stool x2 during shift  ABNL LAB/BG: Creat 2.55, WBC 39.4, lactic 2.5L after 1L bolus given. MD paged- will repeat lactic in am. Pending C. diff results, stool sample sent.     DRAIN/DEVICES: D5 NS infusing @100ml/hr  TELEMETRY RHYTHM: NSR  SKIN: intact  TESTS/PROCEDURES: Chest xray done  D/C DAY/GOALS/PLACE: pending COVID test results  OTHER IMPORTANT INFO: SW following. Droplet/contact precautions maintained.

## 2020-04-01 NOTE — PROGRESS NOTES
"[facility]    Hospitalist Progress Note    Date of Service (when I saw the patient): 04/01/2020    Assessment & Plan   Isael Garcia is a 85 year old male history of PVD, atrial fibrillation presents to the emergency department with the sudden onset of abdominal pain and fever    Abdominal pain  COVID-19 rule out  Presented to ED with c/o abdominal pain. Awoke at 7 am with \"terrible\" abdominal pain. Generalized and comes in waves. Associated n/v. On presentation with fever at 100.1 and leukocytosis. No h/o abdominal surgeries. CT with endoleak from AAA repair site (thought to be chronic), no other acute findings. UA bland. CXR with hazy opacity at L lung base possibly 2/2 atelectasis or PNA  - surgery has consulted, no acute surgical needs  - clear liquid diet, appetite remains poor, continue IVF D5/normal saline at 100 cc/hour.  - blood cultures NTD  - COVID-19 negative, initial hospitalist identifies patient at high suspicion, therefore repeat nasopharyngeal swab for culture testing.  Continue contact/droplet precautions.  - empiric IV zosyn.  Nursing reports no sputum/secretions, if this changes, obtain sputum and attempts to narrow antibiotic.  New findings on the left, and little history to suggest aspiration pneumonia.  - c diff negative  - check FRANCISCO  - WBC 18.1-->39.4 3/31, today down to 21.7, close monitor, repeat in the a.m., sooner if needed.  - if pain persists and ongoing leukocytosis, may need repeat abd CT or further imaging (without contrast)    PHILLIP  CKD  Baseline creatinine in the mid-to-upper 1's range. 3/31 with creatinine to 2.55. Received contrast 3/30. Lowest recorded bp at 100 systolic otherwise normal to high. No other nephrotoxins. Possible dehydration vs contrast nephropathy  - avoid nephrotoxins including any further contrast.  -P.o. intake remains poor, continue IVF D5/normal saline at 100 cc/hour.  -CR today further increased to 2.87.  Check PVRs to rule out " obstructive component, may need renal ultrasound.  -Nephrology consult.  -Close monitor, recheck BMP in a.m.      AAA s/p graft placement  PVD  In ED endovascular leak noted on CT. ED d/w Dr. Jett with vascular surgery and felt to not be new finding  -Monitor peripheral pulses.    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    FEN (fluids, electrolytes and nutrition): clear liquid diet; IVF.  Discussed with nursing.  DVT Prophylaxis: on Eliquis  Code Status: Full Code    Disposition: Expected discharge in 2-3 days pending repeat COVID rule-out and determining and treating etiology of abdominal pain/leukocytosis/acute renal injury.    Yoshi Clay MD  [phone] (P)  Text Page    Interval History   States no abdominal pain unless he presses hard.  Some gas discomfort.  Only 3 BM/24 hours, loose.  Appetite remains poor, continues on IVF.  No cough, she reports no sputum/secretions, afebrile.  Now on room air.     -Data reviewed today: I reviewed all new labs and imaging results over the last 24 hours.    Physical Exam   Temp: 98.3  F (36.8  C) Temp src: Oral BP: (!) 135/92 Pulse: 92 Heart Rate: 76 Resp: 20 SpO2: 93 % O2 Device: None (Room air) Oxygen Delivery: 2 LPM  Vitals:    03/30/20 1127   Weight: 74.8 kg (165 lb)     Vital Signs with Ranges  Temp:  [97.8  F (36.6  C)-98.3  F (36.8  C)] 98.3  F (36.8  C)  Pulse:  [92-94] 92  Heart Rate:  [76-87] 76  Resp:  [18-20] 20  BP: (126-135)/(68-92) 135/92  SpO2:  [92 %-95 %] 93 %  I/O last 3 completed shifts:  In: 220 [P.O.:220]  Out: 1075 [Urine:1075]    Constitutional: Awake, calm, appears tired, no acute  distress.  Respiratory: Bilaterally, no rales or wheeze.  Cardiovascular: Regular rate and rhythm, no murmur appreciated.  GI: Soft, nontender, no rebound, guarding or other peritoneal signs.   Skin/Integumen: No erythema, cyanosis or edema      Medications     dextrose 5% and 0.9% NaCl 100 mL/hr at 04/01/20 0458     - MEDICATION INSTRUCTIONS -         amLODIPine  2.5 mg Oral Daily     apixaban ANTICOAGULANT  2.5 mg Oral BID     atorvastatin  10 mg Oral Daily     metoprolol tartrate  12.5 mg Oral BID     piperacillin-tazobactam  2.25 g Intravenous Q6H     sodium chloride (PF)  3 mL Intracatheter Q8H       Data   Recent Labs   Lab 04/01/20  0918 03/31/20  0745 03/30/20  1154   WBC 21.7* 39.4* 18.1*   HGB 13.7 16.4 15.6   MCV 96 98 96   * 155 170    140 137   POTASSIUM 4.1 5.2 4.3   CHLORIDE 115* 116* 108   CO2 18* 15* 24   BUN 40* 39* 28   CR 2.87* 2.55* 1.65*   ANIONGAP 7 9 5   KULDIP 8.0* 8.5 9.5   * 166* 150*   ALBUMIN  --  2.3* 3.7   PROTTOTAL  --  5.7* 7.5   BILITOTAL  --  0.5 0.5   ALKPHOS  --  45 60   ALT  --  46 35   AST  --  41 25   LIPASE  --   --  75   TROPI  --   --  <0.015       Recent Results (from the past 24 hour(s))   XR Chest Port 1 View    Narrative    EXAM: XR CHEST PORT 1 VW  LOCATION: [facility]  DATE/TIME: 3/31/2020 10:00 PM    INDICATION: Shortness of breath.  COMPARISON: None.    FINDINGS: Upright portable chest. The heart size is normal. The right hemidiaphragm is elevated. There are mild bibasilar infiltrates. Lungs otherwise clear. No pneumothorax.      Impression    IMPRESSION: Mild bibasilar atelectasis or scarring.     Discussed with Nursing and Care Coordinator/SW today; also discussion with daughter Sindi updating patient's condition, evaluation and surveillance.

## 2020-04-01 NOTE — PROVIDER NOTIFICATION
MD Notification    Notified Person: MD    Notified Person Name:Danna    Notification Date/Time:4/1 1650    Notification Interaction:text    Purpose of Notification:Pt having new rectal bleeding. Pt deniew hemorrhoids or hx of rectal bleeding    Orders Received:continue to observe, call if increased bleeding or abd pain.    Comments:

## 2020-04-01 NOTE — PROGRESS NOTES
DATE & TIME: 03/31/20 4828-0448                          Cognitive Concerns/ Orientation : A&O x4, lethargic   BEHAVIOR & AGGRESSION TOOL COLOR: green  CIWA SCORE: NA     ABNL VS/O2: VSS on 2L O2 NC  MOBILITY: SBA  PAIN MANAGMENT: c/o LLQ abd pain- denies medication intervention  DIET: regular- only chicken noodle soup/fluids  BOWEL/BLADDER: continent of bladder, voiding frequently using bedside urinal; loose stool x1 during shift  ABNL LAB/BG: Creat 2.55, WBC 39.4, lactic 2.5L after 1L bolus given. MD paged- will repeat lactic in am.   DRAIN/DEVICES: D5 NS infusing @100ml/hr  TELEMETRY RHYTHM: NSR  SKIN: intact  TESTS/PROCEDURES: port. Chest xray ordered  D/C DAY/GOALS/PLACE: pending COVID test results  OTHER IMPORTANT INFO: SW following. Droplet/contact precautions maintained. Stool sample still needed.

## 2020-04-01 NOTE — PLAN OF CARE
DATE & TIME: 04/01/2020 5266-4913    Cognitive Concerns/ Orientation : A&O x4  BEHAVIOR & AGGRESSION TOOL COLOR: green  CIWA SCORE: NA     ABNL VS/O2: VSS o2 93% RA  MOBILITY:  assistant standby GB when walk hallway   PAIN MANAGMENT: Denied pain this shift   DIET: regular  BOWEL/BLADDER: continent of B/B, voiding in the bathroom  ABNL LAB/BG: Creat 2.87, WBC 21.7, lactic 2.5L after 1L bolus given. MD paged- will repeat lactic in am.  C. diff negative DRAIN/DEVICES: D5 NS infusing @100ml/hr  TELEMETRY RHYTHM: NSR  SKIN: intact  TESTS/PROCEDURES: Chest xray done  D/C DAY/GOALS/PLACE: nose swab done , result pending COVID and improvement of creatinine   OTHER IMPORTANT INFO: x1 loose stool

## 2020-04-01 NOTE — PROGRESS NOTES
MD Notification    Notified Person: MD    Notified Person Name: Dr. Wellermarcellofeliberto    Notification Date/Time: 3/31/2020 1920    Notification Interaction: paged    Purpose of Notification: Lactic 2.2.     Orders Received: 1L bolus over 2 hours.     Comments: no other interventions at this time.

## 2020-04-01 NOTE — PROGRESS NOTES
Sepsis Evaluation Progress Note    I was called to see Isael Garcia due to abnormal vital signs triggering the Sepsis SIRS screening alert. He is known to have an infection.     Physical Exam   Vital Signs:  Temp: 97.8  F (36.6  C) Temp src: Oral BP: 129/70 Pulse: 94 Heart Rate: 87 Resp: 18 SpO2: 94 % O2 Device: None (Room air) Oxygen Delivery: 2 LPM    Lab:  Lactic Acid   Date Value Ref Range Status   03/31/2020 2.2 (H) 0.7 - 2.0 mmol/L Final     Lactate for Sepsis Protocol   Date Value Ref Range Status   06/17/2019 1.8 0.7 - 2.0 mmol/L Final       The patient is at baseline mental status.         Assessment & Plan   Isael Garcia meets SIRS criteria AND has a lactate >2 or other evidence of acute organ damage.  These vital signs, lab and physical exam findings are consistent with SEVERE SEPSIS.    Sepsis Time-Zero (time severe sepsis diagnosis confirmed):  03/31/20 as this was the time when Lactate resulted, and the level was > 2.0     Anti-infectives (From now, onward)    Start     Dose/Rate Route Frequency Ordered Stop    03/30/20 2200  piperacillin-tazobactam (ZOSYN) 2.25 g vial to attach to  ml bag      2.25 g  over 1 Hours Intravenous EVERY 6 HOURS 03/30/20 1740          Current antibiotic coverage is appropriate for source of infection.    3 Hour Severe Sepsis Bundle Completion:  1. Initial Lactic Acid Result:   Recent Labs   Lab Test 03/31/20  1642 03/30/20  1155 06/15/19  1747   LACT 2.2* 1.6 1.7     2. Blood Cultures before Antibiotics: Yes  3. Broad Spectrum Antibiotics Administered: on zosyn  4. Fluids: 1 litre NS fluid bolus ordered      Lab: Repeat lactic acid ordered for 2 hours from now.    Disposition: The patient will remain on the current unit. We will continue to monitor this patient closely..  Clint Salazar MD    Sepsis Criteria   Sepsis: 2+ SIRS criteria due to infection  Severe Sepsis: Sepsis AND 1+ new sign of acute organ dysfunction (Note: lactate >2 is  organ dysfunction)  Septic Shock: Sepsis AND hypotension despite volume resuscitation with 30 ml/kg crystalloid

## 2020-04-02 ENCOUNTER — APPOINTMENT (OUTPATIENT)
Dept: CT IMAGING | Facility: CLINIC | Age: 85
DRG: 393 | End: 2020-04-02
Attending: HOSPITALIST
Payer: COMMERCIAL

## 2020-04-02 LAB
ANION GAP SERPL CALCULATED.3IONS-SCNC: 6 MMOL/L (ref 3–14)
BASOPHILS # BLD AUTO: 0 10E9/L (ref 0–0.2)
BASOPHILS NFR BLD AUTO: 0.1 %
BUN SERPL-MCNC: 29 MG/DL (ref 7–30)
CALCIUM SERPL-MCNC: 7.8 MG/DL (ref 8.5–10.1)
CHLORIDE SERPL-SCNC: 113 MMOL/L (ref 94–109)
CO2 SERPL-SCNC: 20 MMOL/L (ref 20–32)
CREAT SERPL-MCNC: 2.26 MG/DL (ref 0.66–1.25)
DIFFERENTIAL METHOD BLD: ABNORMAL
EOSINOPHIL # BLD AUTO: 0.1 10E9/L (ref 0–0.7)
EOSINOPHIL NFR BLD AUTO: 0.4 %
ERYTHROCYTE [DISTWIDTH] IN BLOOD BY AUTOMATED COUNT: 13.8 % (ref 10–15)
GFR SERPL CREATININE-BSD FRML MDRD: 25 ML/MIN/{1.73_M2}
GLUCOSE SERPL-MCNC: 82 MG/DL (ref 70–99)
HCT VFR BLD AUTO: 36.1 % (ref 40–53)
HGB BLD-MCNC: 12.6 G/DL (ref 13.3–17.7)
IMM GRANULOCYTES # BLD: 0 10E9/L (ref 0–0.4)
IMM GRANULOCYTES NFR BLD: 0.2 %
LYMPHOCYTES # BLD AUTO: 0.8 10E9/L (ref 0.8–5.3)
LYMPHOCYTES NFR BLD AUTO: 5.9 %
MCH RBC QN AUTO: 32.6 PG (ref 26.5–33)
MCHC RBC AUTO-ENTMCNC: 34.9 G/DL (ref 31.5–36.5)
MCV RBC AUTO: 94 FL (ref 78–100)
MONOCYTES # BLD AUTO: 1 10E9/L (ref 0–1.3)
MONOCYTES NFR BLD AUTO: 7.3 %
NEUTROPHILS # BLD AUTO: 11.4 10E9/L (ref 1.6–8.3)
NEUTROPHILS NFR BLD AUTO: 86.1 %
NRBC # BLD AUTO: 0 10*3/UL
NRBC BLD AUTO-RTO: 0 /100
PHOSPHATE SERPL-MCNC: 2.3 MG/DL (ref 2.5–4.5)
PLATELET # BLD AUTO: 136 10E9/L (ref 150–450)
POTASSIUM SERPL-SCNC: 3.7 MMOL/L (ref 3.4–5.3)
RBC # BLD AUTO: 3.86 10E12/L (ref 4.4–5.9)
SARS-COV-2 RNA SPEC QL NAA+PROBE: NOT DETECTED
SODIUM SERPL-SCNC: 139 MMOL/L (ref 133–144)
SPECIMEN SOURCE: NORMAL
WBC # BLD AUTO: 13.2 10E9/L (ref 4–11)

## 2020-04-02 PROCEDURE — 25000125 ZZHC RX 250: Performed by: INTERNAL MEDICINE

## 2020-04-02 PROCEDURE — 25000128 H RX IP 250 OP 636: Performed by: INTERNAL MEDICINE

## 2020-04-02 PROCEDURE — 87506 IADNA-DNA/RNA PROBE TQ 6-11: CPT | Performed by: INTERNAL MEDICINE

## 2020-04-02 PROCEDURE — 25800025 ZZH RX 258: Performed by: HOSPITALIST

## 2020-04-02 PROCEDURE — 12000000 ZZH R&B MED SURG/OB

## 2020-04-02 PROCEDURE — 25000132 ZZH RX MED GY IP 250 OP 250 PS 637: Performed by: HOSPITALIST

## 2020-04-02 PROCEDURE — 74176 CT ABD & PELVIS W/O CONTRAST: CPT

## 2020-04-02 PROCEDURE — 25800029 ZZH RX IP 258 OP 250: Performed by: INTERNAL MEDICINE

## 2020-04-02 PROCEDURE — 84100 ASSAY OF PHOSPHORUS: CPT | Performed by: HOSPITALIST

## 2020-04-02 PROCEDURE — 87040 BLOOD CULTURE FOR BACTERIA: CPT | Performed by: HOSPITALIST

## 2020-04-02 PROCEDURE — 80048 BASIC METABOLIC PNL TOTAL CA: CPT | Performed by: HOSPITALIST

## 2020-04-02 PROCEDURE — 36415 COLL VENOUS BLD VENIPUNCTURE: CPT | Performed by: HOSPITALIST

## 2020-04-02 PROCEDURE — 25000132 ZZH RX MED GY IP 250 OP 250 PS 637: Performed by: INTERNAL MEDICINE

## 2020-04-02 PROCEDURE — 25000125 ZZHC RX 250: Performed by: HOSPITALIST

## 2020-04-02 PROCEDURE — 85025 COMPLETE CBC W/AUTO DIFF WBC: CPT | Performed by: HOSPITALIST

## 2020-04-02 PROCEDURE — 99233 SBSQ HOSP IP/OBS HIGH 50: CPT | Performed by: HOSPITALIST

## 2020-04-02 PROCEDURE — 25000128 H RX IP 250 OP 636: Performed by: HOSPITALIST

## 2020-04-02 RX ADMIN — PIPERACILLIN SODIUM AND TAZOBACTAM SODIUM 2.25 G: 2; .25 INJECTION, POWDER, LYOPHILIZED, FOR SOLUTION INTRAVENOUS at 09:36

## 2020-04-02 RX ADMIN — PROCHLORPERAZINE EDISYLATE 5 MG: 5 INJECTION INTRAMUSCULAR; INTRAVENOUS at 17:25

## 2020-04-02 RX ADMIN — METOPROLOL TARTRATE 12.5 MG: 25 TABLET, FILM COATED ORAL at 09:26

## 2020-04-02 RX ADMIN — METRONIDAZOLE 500 MG: 500 INJECTION, SOLUTION INTRAVENOUS at 18:45

## 2020-04-02 RX ADMIN — METOPROLOL TARTRATE 12.5 MG: 25 TABLET, FILM COATED ORAL at 22:03

## 2020-04-02 RX ADMIN — ATORVASTATIN CALCIUM 10 MG: 10 TABLET, FILM COATED ORAL at 09:26

## 2020-04-02 RX ADMIN — SODIUM BICARBONATE: 84 INJECTION, SOLUTION INTRAVENOUS at 05:33

## 2020-04-02 RX ADMIN — APIXABAN 2.5 MG: 2.5 TABLET, FILM COATED ORAL at 22:03

## 2020-04-02 RX ADMIN — PIPERACILLIN SODIUM AND TAZOBACTAM SODIUM 2.25 G: 2; .25 INJECTION, POWDER, LYOPHILIZED, FOR SOLUTION INTRAVENOUS at 03:13

## 2020-04-02 RX ADMIN — PIPERACILLIN SODIUM AND TAZOBACTAM SODIUM 2.25 G: 2; .25 INJECTION, POWDER, LYOPHILIZED, FOR SOLUTION INTRAVENOUS at 17:09

## 2020-04-02 RX ADMIN — ACETAMINOPHEN 650 MG: 325 TABLET, FILM COATED ORAL at 09:25

## 2020-04-02 RX ADMIN — AMLODIPINE BESYLATE 2.5 MG: 2.5 TABLET ORAL at 09:26

## 2020-04-02 RX ADMIN — ACETAMINOPHEN 650 MG: 325 TABLET, FILM COATED ORAL at 22:05

## 2020-04-02 RX ADMIN — SODIUM BICARBONATE: 84 INJECTION, SOLUTION INTRAVENOUS at 19:50

## 2020-04-02 RX ADMIN — APIXABAN 2.5 MG: 2.5 TABLET, FILM COATED ORAL at 09:26

## 2020-04-02 RX ADMIN — PIPERACILLIN SODIUM AND TAZOBACTAM SODIUM 2.25 G: 2; .25 INJECTION, POWDER, LYOPHILIZED, FOR SOLUTION INTRAVENOUS at 22:00

## 2020-04-02 ASSESSMENT — ACTIVITIES OF DAILY LIVING (ADL)
ADLS_ACUITY_SCORE: 15

## 2020-04-02 NOTE — PLAN OF CARE
DATE & TIME: 04/02/2020 6231-6082  Cognitive Concerns/ Orientation : A&Ox4  BEHAVIOR & AGGRESSION TOOL COLOR: Green  CIWA SCORE: NA     ABNL VS/O2: VSS on RA  MOBILITY: SBA. Impulsive.   PAIN MANAGMENT: tylenol given x1 for comfort   DIET: Regular  BOWEL/BLADDER: Continent, up to BR. One small farooq colored stool and one bright red stool   ABNL LAB/BG: Creat 2.26, WBC 13.2, phos 2.3 . MD notified, redraw order AM.  C. diff negative   DRAIN/DEVICES: Fluids + sodium bicarb infusing @75ml/hr  TELEMETRY RHYTHM: SR with 1st degree AVB  SKIN: Intact   TESTS/PROCEDURES: Abdominal CT completed  D/C DAY/GOALS/PLACE: Pending COVID results ( 1st one came back negative, but at high risk so re-swabbed yesterday)  OTHER IMPORTANT INFO: SW following. Daughter updated via phone, nurse to follow

## 2020-04-02 NOTE — PLAN OF CARE
DATE & TIME: 04/01/2020 2314-9530  Cognitive Concerns/ Orientation : A&Ox4  BEHAVIOR & AGGRESSION TOOL COLOR: Green  CIWA SCORE: NA     ABNL VS/O2: VSS on RA  MOBILITY: SBA. Impulsive.   PAIN MANAGMENT: Heat pack for intermittent abdominal pain, declined other interventions.   DIET: Regular  BOWEL/BLADDER: Continent, up to BR. One small farooq colored stool with red streaks this shift.   ABNL LAB/BG: Creat 2.87, WBC 21.7, lactic 2.5L after 1L bolus given. MD paged- will repeat lactic this am. C. diff negative DRAIN/DEVICES: Fluids + sodium bicarb infusing @100ml/hr  TELEMETRY RHYTHM: SR with 1st degree AVB  SKIN: Intact   TESTS/PROCEDURES: CXR completed  D/C DAY/GOALS/PLACE: Pending COVID results (came back negative, but at high risk so re-swabbed yesterday)  OTHER IMPORTANT INFO: SW following.    Neuro Event

## 2020-04-02 NOTE — PROGRESS NOTES
Sepsis Evaluation Progress Note    I was called re: Isael Garcia due to abnormal vital signs triggering the Sepsis SIRS screening alert. He is known to have an infection.  Lactic acid ws from yesterday morning at 2.3; no notification at that time; that level was in followup of lactate firing at 2.5 at 22:00 the evening prior.  As above known infection on zosyn.     Physical Exam   Vital Signs:  Temp: 97.8  F (36.6  C) Temp src: Oral BP: (!) 144/87 Pulse: 80 Heart Rate: 87 Resp: 18 SpO2: 90 % O2 Device: Nasal cannula Oxygen Delivery: 1 LPM    Lab:  Lactic Acid   Date Value Ref Range Status   04/01/2020 2.3 (H) 0.7 - 2.0 mmol/L Final     Lactate for Sepsis Protocol   Date Value Ref Range Status   06/17/2019 1.8 0.7 - 2.0 mmol/L Final       The patient is at baseline mental status.     The rest of their physical exam was not acute or changed findings.     Assessment & Plan   NO EVIDENCE OF SEPSIS at this time.  Vital sign, physical exam, and lab findings are likely due to known pnx; on zosyn.    Disposition: The patient will remain on the current unit. We will continue to monitor this patient closely..  Yoshi Clay MD    Sepsis Criteria   Sepsis: 2+ SIRS criteria due to infection  Severe Sepsis: Sepsis AND 1+ new sign of acute organ dysfunction (Note: lactate >2 is organ dysfunction)  Septic Shock: Sepsis AND hypotension despite volume resuscitation with 30 ml/kg crystalloid

## 2020-04-02 NOTE — PLAN OF CARE
DATE & TIME: 04/02/2020 9692-2163  Cognitive Concerns/ Orientation : A&Ox4  BEHAVIOR & AGGRESSION TOOL COLOR: Green  CIWA SCORE: NA     ABNL VS/O2: VSS on RA  MOBILITY: SBA. Impulsive.   PAIN MANAGMENT: Tylenol given x1    DIET: Regular  BOWEL/BLADDER: Continent, up to BR. One small farooq colored stool   ABNL LAB/BG: Creat 2.26, WBC 13.2, lactic 2.3L . MD paged- will repeat lactic this am. C. diff negative

## 2020-04-02 NOTE — PROGRESS NOTES
"[facility]    Hospitalist Progress Note    Date of Service (when I saw the patient): 04/02/2020    Assessment & Plan   Isael Garcia is a 85 year old male history of PVD, atrial fibrillation presents to the emergency department with the sudden onset of abdominal pain and fever    ADDENDUM  Nursing reports trevor hematochezia this afternoon.  Abdominal CT sites inflammatory changes primarily descending colon with probable colonic wall thickening.  Study was limited without contrast due to PHILLIP   -Patient currently on Zosyn, over the last 24 hours his lactic acid fired.  will add metronidazole, consult GI.  BC x 2 2' lactic acid x 2 although continues afebrile, WBC downtrending.   close monitor.  -Changed to clear liquid diet  -Continue IVF, add dextrose  -Close monitor temperature profile, CBC, abdominal pain.    Abdominal pain  COVID-19 rule out  Presented to ED with c/o abdominal pain. Awoke at 7 am with \"terrible\" abdominal pain. Generalized and comes in waves. Associated n/v. On presentation with fever at 100.1 and leukocytosis. No h/o abdominal surgeries. CT with endoleak from AAA repair site (thought to be chronic), no other acute findings. UA bland. CXR with hazy opacity at L lung base possibly 2/2 atelectasis or PNA  - surgery has consulted, no acute surgical needs  - clear liquid diet, appetite remains poor, continue IVF  Now NS with dotty per Nephrology. .  - blood cultures NTD  - COVID-19 negative, initial hospitalist identifies patient at high suspicion, therefore repeat nasopharyngeal swab for culture testing 4/1/2020.  Continue contact/droplet precautions.  - empiric IV zosyn.  Nursing reports no sputum/secretions, if this changes, obtain sputum and attempts to narrow antibiotic.  New findings on the left, and little history to suggest aspiration pneumonia.  - c diff negative  - check FRANCISCO  - WBC 18.1-->39. with serial decline, 20 1K, today 13 K.   -Question of BRBPR per " Nursing, see hx.  This morning hemoglobin stable, if downtrending hemoglobin, recurrent BRBPR or increased abdominal pain warrants abdominal imaging to rule out colitis/diverticulitis.  Without contrast in view of PHILLIP, see below.  -Close monitor.    PHILLIP  CKD  Baseline creatinine in the mid-to-upper 1's range. 3/31 with creatinine to 2.55. Received contrast 3/30. Lowest recorded bp at 100 systolic otherwise normal to high. No other nephrotoxins. Possible dehydration vs contrast nephropathy  - avoid nephrotoxins including any further contrast.  -P.o. intake remains poor, continue IVF  -CR 4/1/2020 further increased to 2.87.   PVRs to rule out obstructive component negative..  -Nephrology consult.  Appreciate input. Please see note; felt ATN with metabolic acidosis added bicarb to IVF, close monitor.   -Close monitor, recheck BMP/phosphorous  in a.m.      AAA s/p graft placement  PVD  In ED endovascular leak noted on CT. ED d/w Dr. Jett with vascular surgery and felt to not be new finding  -Monitor peripheral pulses.    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    FEN (fluids, electrolytes and nutrition): clear liquid diet; IVF.  Discussed with nursing.  DVT Prophylaxis: on Eliquis  Code Status: Full Code    Disposition: Expected discharge in 2-3 days pending repeat COVID rule-out and determining and treating etiology of abdominal pain/leukocytosis/acute renal injury.    Yoshi Caly MD  [phone] (P)  Text Page    Interval History   Nursing report late yesterday afternoon concerns of stools with BRB; no trevor hematochezia, melana;  Later Nursing report 'farooq'  "stools.  Patient reports no abdominal pain except for when he \"presses\".  No nausea, emesis, no diarrhea.  Tries to eat, poor appetite.  Remains afebrile.  No history of colitis, diverticulosis/diverticulitis.    -Data reviewed today: I reviewed all new labs and imaging results over the last 24 hours.    Physical Exam   Temp: 97.8  F (36.6  C) Temp src: Oral BP: (!) 144/87 Pulse: 80 Heart Rate: 87 Resp: 18 SpO2: 90 % O2 Device: Nasal cannula Oxygen Delivery: 1 LPM  Vitals:    03/30/20 1127   Weight: 74.8 kg (165 lb)     Vital Signs with Ranges  Temp:  [97.5  F (36.4  C)-97.8  F (36.6  C)] 97.8  F (36.6  C)  Pulse:  [80-89] 80  Heart Rate:  [87] 87  Resp:  [18-20] 18  BP: (133-159)/(62-87) 144/87  SpO2:  [90 %-93 %] 90 %  I/O last 3 completed shifts:  In: 2563 [P.O.:1030; I.V.:1533]  Out: 915 [Urine:915]    Constitutional: Awake, calm;  no acute distress.  Respiratory: Bilaterally, no rales or wheeze.  Cardiovascular: Regular rate and rhythm, no murmur appreciated.  GI: Soft, nontender, no rebound, guarding or other peritoneal signs.   Skin/Integumen: No erythema, cyanosis or edema      Medications     IV infusion builder WITH additives 100 mL/hr at 04/02/20 0533     - MEDICATION INSTRUCTIONS -         amLODIPine  2.5 mg Oral Daily     apixaban ANTICOAGULANT  2.5 mg Oral BID     atorvastatin  10 mg Oral Daily     metoprolol tartrate  12.5 mg Oral BID     piperacillin-tazobactam  2.25 g Intravenous Q6H     sodium chloride (PF)  3 mL Intracatheter Q8H       Data   Recent Labs   Lab 04/02/20  0749 04/01/20  0918 03/31/20  0745 03/30/20  1154   WBC 13.2* 21.7* 39.4* 18.1*   HGB 12.6* 13.7 16.4 15.6   MCV 94 96 98 96   * 132* 155 170    140 140 137   POTASSIUM 3.7 4.1 5.2 4.3   CHLORIDE 113* 115* 116* 108   CO2 20 18* 15* 24   BUN 29 40* 39* 28   CR 2.26* 2.87* 2.55* 1.65*   ANIONGAP 6 7 9 5   KULDIP 7.8* 8.0* 8.5 9.5   GLC 82 123* 166* 150*   ALBUMIN  --   --  2.3* 3.7   PROTTOTAL  --   --  5.7* 7.5   BILITOTAL "  --   --  0.5 0.5   ALKPHOS  --   --  45 60   ALT  --   --  46 35   AST  --   --  41 25   LIPASE  --   --   --  75   TROPI  --   --   --  <0.015       No results found for this or any previous visit (from the past 24 hour(s)).    Discussed with Nursing and Care Coordinator/SW today

## 2020-04-02 NOTE — PROVIDER NOTIFICATION
MD Notification    Notified Person: MD    Notified Person Name: Danna    Notification Date/Time: 4/2/2020  0900    Notification Interaction: Verbal    Purpose of Notification: Phos 2.3    Orders Received: MD to consult with nephrology, likely due to IVF.    Comments:

## 2020-04-02 NOTE — PROGRESS NOTES
Hematochezia last night.  Some persistent abdominal pain.  He is in CT presently for a repeat CT without contrast.    Cr is improved - down to 2.26.  Bicarb is improved - up to 20.    Repeat Covid -19 test still in process.    Plan to continue current IV fluid.      I called his daughter.      Adalid Lopez MD

## 2020-04-03 LAB
ALBUMIN SERPL-MCNC: 2.1 G/DL (ref 3.4–5)
ANION GAP SERPL CALCULATED.3IONS-SCNC: 5 MMOL/L (ref 3–14)
BASOPHILS # BLD AUTO: 0 10E9/L (ref 0–0.2)
BASOPHILS NFR BLD AUTO: 0.1 %
BUN SERPL-MCNC: 23 MG/DL (ref 7–30)
C COLI+JEJUNI+LARI FUSA STL QL NAA+PROBE: NOT DETECTED
CALCIUM SERPL-MCNC: 7.8 MG/DL (ref 8.5–10.1)
CHLORIDE SERPL-SCNC: 111 MMOL/L (ref 94–109)
CO2 SERPL-SCNC: 24 MMOL/L (ref 20–32)
CREAT SERPL-MCNC: 1.82 MG/DL (ref 0.66–1.25)
DIFFERENTIAL METHOD BLD: ABNORMAL
EC STX1 GENE STL QL NAA+PROBE: NOT DETECTED
EC STX2 GENE STL QL NAA+PROBE: NOT DETECTED
ENTERIC PATHOGEN COMMENT: NORMAL
EOSINOPHIL # BLD AUTO: 0 10E9/L (ref 0–0.7)
EOSINOPHIL NFR BLD AUTO: 0.5 %
ERYTHROCYTE [DISTWIDTH] IN BLOOD BY AUTOMATED COUNT: 13.6 % (ref 10–15)
GFR SERPL CREATININE-BSD FRML MDRD: 33 ML/MIN/{1.73_M2}
GLUCOSE SERPL-MCNC: 116 MG/DL (ref 70–99)
HCT VFR BLD AUTO: 37.9 % (ref 40–53)
HGB BLD-MCNC: 13.1 G/DL (ref 13.3–17.7)
IMM GRANULOCYTES # BLD: 0 10E9/L (ref 0–0.4)
IMM GRANULOCYTES NFR BLD: 0.1 %
LYMPHOCYTES # BLD AUTO: 0.8 10E9/L (ref 0.8–5.3)
LYMPHOCYTES NFR BLD AUTO: 11 %
MCH RBC QN AUTO: 32.3 PG (ref 26.5–33)
MCHC RBC AUTO-ENTMCNC: 34.6 G/DL (ref 31.5–36.5)
MCV RBC AUTO: 93 FL (ref 78–100)
MONOCYTES # BLD AUTO: 0.8 10E9/L (ref 0–1.3)
MONOCYTES NFR BLD AUTO: 10.3 %
NEUTROPHILS # BLD AUTO: 5.9 10E9/L (ref 1.6–8.3)
NEUTROPHILS NFR BLD AUTO: 78 %
NOROV GI+II ORF1-ORF2 JNC STL QL NAA+PR: NOT DETECTED
NRBC # BLD AUTO: 0 10*3/UL
NRBC BLD AUTO-RTO: 0 /100
PHOSPHATE SERPL-MCNC: 2.2 MG/DL (ref 2.5–4.5)
PLATELET # BLD AUTO: 167 10E9/L (ref 150–450)
POTASSIUM SERPL-SCNC: 3.1 MMOL/L (ref 3.4–5.3)
POTASSIUM SERPL-SCNC: 3.6 MMOL/L (ref 3.4–5.3)
RBC # BLD AUTO: 4.06 10E12/L (ref 4.4–5.9)
RVA NSP5 STL QL NAA+PROBE: NOT DETECTED
SALMONELLA SP RPOD STL QL NAA+PROBE: NOT DETECTED
SHIGELLA SP+EIEC IPAH STL QL NAA+PROBE: NOT DETECTED
SODIUM SERPL-SCNC: 140 MMOL/L (ref 133–144)
V CHOL+PARA RFBL+TRKH+TNAA STL QL NAA+PR: NOT DETECTED
WBC # BLD AUTO: 7.6 10E9/L (ref 4–11)
Y ENTERO RECN STL QL NAA+PROBE: NOT DETECTED

## 2020-04-03 PROCEDURE — 25000128 H RX IP 250 OP 636: Performed by: INTERNAL MEDICINE

## 2020-04-03 PROCEDURE — 25000125 ZZHC RX 250: Performed by: HOSPITALIST

## 2020-04-03 PROCEDURE — 21000001 ZZH R&B HEART CARE

## 2020-04-03 PROCEDURE — 80069 RENAL FUNCTION PANEL: CPT | Performed by: INTERNAL MEDICINE

## 2020-04-03 PROCEDURE — 84132 ASSAY OF SERUM POTASSIUM: CPT | Performed by: HOSPITALIST

## 2020-04-03 PROCEDURE — 25800030 ZZH RX IP 258 OP 636: Performed by: INTERNAL MEDICINE

## 2020-04-03 PROCEDURE — 36415 COLL VENOUS BLD VENIPUNCTURE: CPT | Performed by: HOSPITALIST

## 2020-04-03 PROCEDURE — 25800030 ZZH RX IP 258 OP 636: Performed by: HOSPITALIST

## 2020-04-03 PROCEDURE — 99233 SBSQ HOSP IP/OBS HIGH 50: CPT | Performed by: HOSPITALIST

## 2020-04-03 PROCEDURE — 36415 COLL VENOUS BLD VENIPUNCTURE: CPT | Performed by: INTERNAL MEDICINE

## 2020-04-03 PROCEDURE — 25000132 ZZH RX MED GY IP 250 OP 250 PS 637: Performed by: HOSPITALIST

## 2020-04-03 PROCEDURE — 25000132 ZZH RX MED GY IP 250 OP 250 PS 637: Performed by: INTERNAL MEDICINE

## 2020-04-03 PROCEDURE — 25000128 H RX IP 250 OP 636: Performed by: HOSPITALIST

## 2020-04-03 PROCEDURE — 85025 COMPLETE CBC W/AUTO DIFF WBC: CPT | Performed by: INTERNAL MEDICINE

## 2020-04-03 RX ORDER — METOPROLOL TARTRATE 25 MG/1
25 TABLET, FILM COATED ORAL 2 TIMES DAILY
Status: DISCONTINUED | OUTPATIENT
Start: 2020-04-03 | End: 2020-04-04

## 2020-04-03 RX ADMIN — POTASSIUM PHOSPHATE, MONOBASIC AND POTASSIUM PHOSPHATE, DIBASIC 15 MMOL: 224; 236 INJECTION, SOLUTION INTRAVENOUS at 17:41

## 2020-04-03 RX ADMIN — PIPERACILLIN SODIUM AND TAZOBACTAM SODIUM 2.25 G: 2; .25 INJECTION, POWDER, LYOPHILIZED, FOR SOLUTION INTRAVENOUS at 03:54

## 2020-04-03 RX ADMIN — POTASSIUM CHLORIDE: 149 INJECTION, SOLUTION, CONCENTRATE INTRAVENOUS at 17:40

## 2020-04-03 RX ADMIN — METRONIDAZOLE 500 MG: 500 INJECTION, SOLUTION INTRAVENOUS at 09:05

## 2020-04-03 RX ADMIN — APIXABAN 2.5 MG: 2.5 TABLET, FILM COATED ORAL at 22:05

## 2020-04-03 RX ADMIN — Medication 10 MEQ: at 14:12

## 2020-04-03 RX ADMIN — METRONIDAZOLE 500 MG: 500 INJECTION, SOLUTION INTRAVENOUS at 16:26

## 2020-04-03 RX ADMIN — Medication 10 MEQ: at 15:39

## 2020-04-03 RX ADMIN — PIPERACILLIN SODIUM AND TAZOBACTAM SODIUM 2.25 G: 2; .25 INJECTION, POWDER, LYOPHILIZED, FOR SOLUTION INTRAVENOUS at 10:11

## 2020-04-03 RX ADMIN — ONDANSETRON 4 MG: 4 TABLET, ORALLY DISINTEGRATING ORAL at 07:35

## 2020-04-03 RX ADMIN — Medication 10 MEQ: at 13:08

## 2020-04-03 RX ADMIN — Medication 10 MEQ: at 11:58

## 2020-04-03 RX ADMIN — METOPROLOL TARTRATE 25 MG: 25 TABLET ORAL at 22:04

## 2020-04-03 RX ADMIN — METRONIDAZOLE 500 MG: 500 INJECTION, SOLUTION INTRAVENOUS at 00:33

## 2020-04-03 RX ADMIN — Medication 10 MEQ: at 12:00

## 2020-04-03 ASSESSMENT — ACTIVITIES OF DAILY LIVING (ADL)
ADLS_ACUITY_SCORE: 15

## 2020-04-03 NOTE — PLAN OF CARE
DATE & TIME: 04/02/2020 3094-9330  Cognitive Concerns/ Orientation : A&Ox4  BEHAVIOR & AGGRESSION TOOL COLOR: Green  CIWA SCORE: NA     ABNL VS/O2: VSS on RA  MOBILITY: SBA. Impulsive.   PAIN MANAGMENT: tylenol given x1 for abd pain  DIET: Clear  BOWEL/BLADDER: Continent, up to BR. Urgency. Bright red loose stools. MD aware. Stool sample sent.   [facility] LAB/BG: Creat 2.26, WBC 13.2, phos 2.3 . MD notified, redraw order AM.    DRAIN/DEVICES: D5 1/2 NS + sodium bicarb running @75ml/hr.   TELEMETRY RHYTHM:   SKIN: Intact  TESTS/PROCEDURES: Abdominal CT completed today.   D/C DAY/GOALS/PLACE: Pending COVID results ( 1st one came back negative, but at high risk so re-swabbed yesterday)  OTHER IMPORTANT INFO: SW following. GI consulted. Daughter updated via phone. Contact and droplet precautions maintained.

## 2020-04-03 NOTE — CONSULTS
[facility]  Gastroenterology Consultation    Isael Garcia  [address]  [facility] [address]  85 year old male    Admission Date/Time: 3/30/2020  Primary Care Provider: Madhu Velasquez    We were asked to see the patient in consultation by Dr. Clay for evaluation of colitis.        HPI:  Isael Garcia is a 85 year old male who has a past medical history of peripheral vascular disease, atrial fibrillation who presented to the ED 3/30 with sudden onset of abdominal pain and fever.  The patient reported a dull, generalized abdominal pain.  He had associated vomiting without hematemesis.  He didn't have any diarrhea or evidence of blood in his stools.    Upon presentation to the ED, a CT A/P showed an aortobiliac stent graft with an endoleak.  No fat stranding or inflammatory change noted around the abdominal aorta.  Visceral arteries were noted to be patent.  Moderate to severe stenosis of the proximal left renal artery was incidentally noted.  No obvious dilated loops of bowels.  Mild to moderate amount of stool throughout the colon.  The patient was evaluated by vascular surgery and this was not felt to be a new finding.    On admission, lactic acid was 1.6.  WBC 18.1.  Hemoglobin 15.6.  Due to his symptom of fever without definite cause, the patient was isolated and tested for COVID-19.  This was negative x 2.    The patient developed trevor hematochezia yesterday afternoon.  A CT A/P revealed inflammatory changes around the descending colon with probable colonic wall thickening in the region.  No contrast was given making it difficult to evaluate.  Patient was on Zosyn and metronidazole was added.    Stool testing was negative for infection.  Blood work yesterday showed a WBC of 13.2, hemoglobin 12.6.        The patient continued to have loose red and brown stools overnight.    The patient denies any chronic GI symptoms.  He has never had a  colonoscopy.        ROS: A comprehensive ten point review of systems was negative aside from those in mentioned in the HPI.      MEDICATIONS: No current facility-administered medications on file prior to encounter.   acetaminophen (TYLENOL) 500 MG tablet, Take 500 mg by mouth 2 times daily   amLODIPine (NORVASC) 5 MG tablet, Take 1 tablet (5 mg) by mouth daily  amoxicillin (AMOXIL) 500 MG capsule, 4 capsules (total 2 grams) orally  1 hour prior to dental procedures  apixaban ANTICOAGULANT (ELIQUIS) 5 MG tablet, Take 5 mg by mouth 2 times daily  atorvastatin (LIPITOR) 10 MG tablet, Take 1 tablet (10 mg) by mouth daily  Cholecalciferol (VITAMIN D) 2000 UNITS tablet, Take 2,000 Units by mouth daily  metoprolol tartrate (LOPRESSOR) 25 MG tablet, TAKE ONE-HALF (1/2) TABLET TWICE A DAY  Misc Natural Products (OSTEO BI-FLEX ADV TRIPLE ST) TABS, Take 1 tablet by mouth daily  multivitamin, therapeutic with minerals (MULTI-VITAMIN) TABS, Take 1 tablet by mouth daily         ALLERGIES:   Allergies   Allergen Reactions     Lisinopril Cramps     Leg cramping       Past Medical History:   Diagnosis Date     AAA (abdominal aortic aneurysm) (H) May 2016    infrarenal s/p graft placement at [facility]     Allergic rhinitis, cause unspecified      Atrial fibrillation (H)      CKD (chronic kidney disease) stage 3, GFR 30-59 ml/min (H) 2/4/2013     Lumbago 2000    s/p lumbar epidural injection     OA (osteoarthritis) 2/21/2015     PVD (peripheral vascular disease) (H) 3/7/2016     Shingles 7/09     left forehead     Sleep related leg cramps 2/6/2013     Unspecified cataract     bilateral     Unspecified essential hypertension      Vitamin D deficiency disease 2/6/2013       Past Surgical History:   Procedure Laterality Date     C NONSPECIFIC PROCEDURE  1960    right inguinal hernia repair     C NONSPECIFIC PROCEDURE  1990s    TURP     C NONSPECIFIC PROCEDURE      tonsillectomy     C NONSPECIFIC PROCEDURE  April 2011    Right total knee  "arthroplasty     C NONSPECIFIC PROCEDURE   approx 2009     left  cataract extraction/IOL placement     C NONSPECIFIC PROCEDURE  May 2016    Endovascular AAA stent graft placed at [facility]. [facility] clnic suggests abx prophylaxis for life for procedures (dental/etc)     ENDARTERECTOMY CAROTID Left 5/31/2019    Procedure: LEFT CAROTID ENDARTERECTOMY WITH GREAT SAPHENOUS VEIN PATCH WITH EEG MONITORING;  Surgeon: Pedro Ahn MD;  Location:  OR     EXCISE MASS TRUNK N/A 2/28/2017    Procedure: EXCISE MASS TRUNK;  Surgeon: Ranjith Lozoya MD;  Location:  SD     EXCISE MASS UPPER EXTREMITY Left 2/28/2017    Procedure: EXCISE MASS UPPER EXTREMITY;  Surgeon: Ranjith Lozoya MD;  Location:  SD     EXPLORE NECK Left 6/16/2019    Procedure: LEFT NECK EXPLORATION;  Surgeon: Cam Jett MD;  Location:  OR     HERNIORRHAPHY INGUINAL Left 2/28/2017    Procedure: HERNIORRHAPHY INGUINAL;  Surgeon: Ranjith Lozoya MD;  Location:  SD         SOCIAL HISTORY:  Social History     Tobacco Use     Smoking status: Never Smoker     Smokeless tobacco: Never Used   Substance Use Topics     Alcohol use: No     Drug use: No       FAMILY HISTORY:  Family History   Problem Relation Age of Onset     Prostate Cancer Brother        PHYSICAL EXAM:   BP (!) 147/95   Pulse 93   Temp 97.9  F (36.6  C) (Axillary)   Resp 18   Ht 1.702 m (5' 7\")   Wt 74.8 kg (165 lb)   SpO2 94%   BMI 25.84 kg/m      Constitutional: NAD, comfortable  Cardiovascular: RRR, normal S1 and S2, no r/c/g/m  Respiratory: CTAB  Psychiatric: mentation appears normal and affect normal  Head: Normocephalic. Atraumatic.    Neck: Neck supple. No adenopathy. Thyroid symmetric, normal size, trachea midline  Eyes:  PERRL, no icterus  ENT: Hearing adequate, pharynx normal without erythema or exudate  Abdomen:   Auscultation: + BS  Appearance: non-distended  Palpation: mild left sided tenderness, no rebound or guarding  NEURO: grossly " negative  SKIN: no suspicious lesions or rashes  LYMPH:   anterior cervical: no adenopathy  posterior cervical: no adenopathy  supraclavicular: no adenopathy          ADDITIONAL COMMENTS:   I reviewed the patient's new clinical lab test results.   Recent Labs   Lab Test 04/02/20  0749 04/01/20  0918 03/31/20  0745  06/17/19  0502 06/16/19  1725  05/30/19  1917   WBC 13.2* 21.7* 39.4*   < > 13.7* 16.9*   < > 5.8   HGB 12.6* 13.7 16.4   < > 12.0* 12.2*   < > 13.9   MCV 94 96 98   < > 96 95   < > 95   * 132* 155   < > 192 213   < > 190   INR  --   --   --   --  1.63* 1.58*  --  1.21*    < > = values in this interval not displayed.     Recent Labs   Lab Test 04/02/20  0749 04/01/20  0918 03/31/20  0745    140 140   POTASSIUM 3.7 4.1 5.2   CHLORIDE 113* 115* 116*   CO2 20 18* 15*   BUN 29 40* 39*   CR 2.26* 2.87* 2.55*   ANIONGAP 6 7 9   KULDIP 7.8* 8.0* 8.5   GLC 82 123* 166*     Recent Labs   Lab Test 03/31/20  0745 03/30/20  1322 03/30/20  1154 05/30/19  1438  07/15/16  0917 07/14/14  1110   ALBUMIN 2.3*  --  3.7 3.5   < > 3.6  --    BILITOTAL 0.5  --  0.5 0.2   < > 0.3  --    DBIL  --   --   --  <0.1  --  0.1  --    ALT 46  --  35 24   < > 31  --    AST 41  --  25 17   < > 20  --    ALKPHOS 45  --  60 55   < > 50  --    PROTEIN  --  Negative  --   --   --   --  Negative   LIPASE  --   --  75  --   --   --   --     < > = values in this interval not displayed.             .    CONSULTATION ASSESSMENT AND PLAN:      84 yo male with history of PVD and atrial fibrillation who presented 3/30 with sudden onset of abdominal pain and fever.  Initial CTA showed endoleak from AAA repair site thought to be chronic.  The patient was suspected to have COVID-19, which has been negative x 2.      He developed hematochezia yesterday.  Repeat CT done without contrast showed inflammatory changes in the descending colon with probable colonic wall thickening.  Patient on Zosyn and metronidazole.  Stool was negative for  infection including C. Diff.      Differential diagnosis includes colitis secondary to infectious process vs inflammatory vs ischemic.      -  Clear liquid diet.  -  Continue with antibiotics.  -  Patient will need a colonoscopy, likely in 6-8 weeks unless no improvement in symptoms.      I discussed the patient's findings and plan with Dr. Asif.          Alma Camara, PAC  [facility]  Office:  [phone] call if needed after 5PM  Cell:  [phone], not available after 5PM at this number

## 2020-04-03 NOTE — PROGRESS NOTES
Cr now down to 1.82.  This is approaching his baseline.  Bicarb up to normal.    Colitis seen on CT - GI evaluating.      I will change his IV fluid to NS + KCL.    I will sign off.    Call us if we are needed.    Adalid Lopez MD

## 2020-04-03 NOTE — PLAN OF CARE
DATE & TIME: 04/02/2020 2957-1626  Cognitive Concerns/ Orientation : Alert & Oriented x 4.   BEHAVIOR & AGGRESSION TOOL COLOR: Green  CIWA SCORE: NA     ABNL VS/O2:afebrile, RA   MOBILITY:Ind to [facility]SC   PAIN MANAGMENT: abd cramping  DIET: Clear liquids, not tolerating, ongoing nausea, zofran helpful  BOWEL/BLADDER: Continent, up to bedside commode. Urgency. Bright red loose/watery stools. MD aware. Stool sample negative for enteric panel+cdiff   ABNL LAB/BG: Creat improving, WBC normal today, replacing K and phos  DRAIN/DEVICES: IVF per Renal. IV Flagyl  TELEMETRY RHYTHM: A-fib CVR  SKIN: Intact  TESTS/PROCEDURES:COVID-19 testing x 2 negative-iso discontinued, pt tx to [facility] at 1800  D/C DAY/GOALS/PLACE: Pending  OTHER IMPORTANT INFO: GI assessed and continued with current abx and diet. SW following. GI consulted. Contact and droplet precautions maintained. Daughter updated

## 2020-04-03 NOTE — PLAN OF CARE
DATE & TIME: 04/02/2020 Night  Cognitive Concerns/ Orientation : Alert & Oriented x 4. Impulsive and does not use call light prior to exiting bed  BEHAVIOR & AGGRESSION TOOL COLOR: Green  CIWA SCORE: NA     ABNL VS/O2: Temperature 99.0, oxygen saturation 91% on room air  MOBILITY: SBA. Impulsive.   PAIN MANAGMENT: Heat packs provided for abdominal pain with minimal effect  DIET: Clear liquids  BOWEL/BLADDER: Continent, up to bedside commode. Urgency. Bright red loose stools. MD aware. Stool sample negative for enteric panel   ABNL LAB/BG: Creat 2.26, WBC 13.2, phos 2.3 . MD notified, redraw order AM.    DRAIN/DEVICES: D5 1/2 NS + sodium bicarb running @ 75 mL/hr. IV Flagyl, Zosyn  TELEMETRY RHYTHM: A-fib with PVCs  SKIN: Intact  TESTS/PROCEDURES: Abdominal CT indicates colitis/inflammation. COVID-19 testing x 2 negative  D/C DAY/GOALS/PLACE: Pending  OTHER IMPORTANT INFO: SW following. GI consulted. Contact and droplet precautions maintained.     ADDENDUM: Pt up to commode at shift change. Loose red/brown stools x 3 this shift. Dry heaving on commode. A.M. nurse will give medication.

## 2020-04-04 LAB
ANION GAP SERPL CALCULATED.3IONS-SCNC: 6 MMOL/L (ref 3–14)
BASOPHILS # BLD AUTO: 0 10E9/L (ref 0–0.2)
BASOPHILS NFR BLD AUTO: 0.3 %
BUN SERPL-MCNC: 22 MG/DL (ref 7–30)
CALCIUM SERPL-MCNC: 7.7 MG/DL (ref 8.5–10.1)
CHLORIDE SERPL-SCNC: 113 MMOL/L (ref 94–109)
CO2 SERPL-SCNC: 22 MMOL/L (ref 20–32)
CREAT SERPL-MCNC: 1.71 MG/DL (ref 0.66–1.25)
DIFFERENTIAL METHOD BLD: ABNORMAL
EOSINOPHIL # BLD AUTO: 0.1 10E9/L (ref 0–0.7)
EOSINOPHIL NFR BLD AUTO: 1.8 %
ERYTHROCYTE [DISTWIDTH] IN BLOOD BY AUTOMATED COUNT: 13.6 % (ref 10–15)
GFR SERPL CREATININE-BSD FRML MDRD: 35 ML/MIN/{1.73_M2}
GLUCOSE SERPL-MCNC: 89 MG/DL (ref 70–99)
HCT VFR BLD AUTO: 36.1 % (ref 40–53)
HGB BLD-MCNC: 12.5 G/DL (ref 13.3–17.7)
IMM GRANULOCYTES # BLD: 0 10E9/L (ref 0–0.4)
IMM GRANULOCYTES NFR BLD: 0.3 %
LYMPHOCYTES # BLD AUTO: 1.1 10E9/L (ref 0.8–5.3)
LYMPHOCYTES NFR BLD AUTO: 16.8 %
MAGNESIUM SERPL-MCNC: 1.9 MG/DL (ref 1.6–2.3)
MCH RBC QN AUTO: 32.5 PG (ref 26.5–33)
MCHC RBC AUTO-ENTMCNC: 34.6 G/DL (ref 31.5–36.5)
MCV RBC AUTO: 94 FL (ref 78–100)
MONOCYTES # BLD AUTO: 0.8 10E9/L (ref 0–1.3)
MONOCYTES NFR BLD AUTO: 11.9 %
NEUTROPHILS # BLD AUTO: 4.7 10E9/L (ref 1.6–8.3)
NEUTROPHILS NFR BLD AUTO: 68.9 %
NRBC # BLD AUTO: 0 10*3/UL
NRBC BLD AUTO-RTO: 0 /100
PHOSPHATE SERPL-MCNC: 2.6 MG/DL (ref 2.5–4.5)
PLATELET # BLD AUTO: 173 10E9/L (ref 150–450)
POTASSIUM SERPL-SCNC: 3.5 MMOL/L (ref 3.4–5.3)
RBC # BLD AUTO: 3.85 10E12/L (ref 4.4–5.9)
SODIUM SERPL-SCNC: 141 MMOL/L (ref 133–144)
WBC # BLD AUTO: 6.7 10E9/L (ref 4–11)

## 2020-04-04 PROCEDURE — 84100 ASSAY OF PHOSPHORUS: CPT | Performed by: HOSPITALIST

## 2020-04-04 PROCEDURE — 25000132 ZZH RX MED GY IP 250 OP 250 PS 637: Performed by: HOSPITALIST

## 2020-04-04 PROCEDURE — 25000128 H RX IP 250 OP 636: Performed by: HOSPITALIST

## 2020-04-04 PROCEDURE — 25000132 ZZH RX MED GY IP 250 OP 250 PS 637: Performed by: INTERNAL MEDICINE

## 2020-04-04 PROCEDURE — 36415 COLL VENOUS BLD VENIPUNCTURE: CPT | Performed by: HOSPITALIST

## 2020-04-04 PROCEDURE — 25000128 H RX IP 250 OP 636: Performed by: INTERNAL MEDICINE

## 2020-04-04 PROCEDURE — 80048 BASIC METABOLIC PNL TOTAL CA: CPT | Performed by: HOSPITALIST

## 2020-04-04 PROCEDURE — 85025 COMPLETE CBC W/AUTO DIFF WBC: CPT | Performed by: HOSPITALIST

## 2020-04-04 PROCEDURE — 99233 SBSQ HOSP IP/OBS HIGH 50: CPT | Performed by: INTERNAL MEDICINE

## 2020-04-04 PROCEDURE — 21000001 ZZH R&B HEART CARE

## 2020-04-04 PROCEDURE — 83735 ASSAY OF MAGNESIUM: CPT | Performed by: HOSPITALIST

## 2020-04-04 RX ORDER — AMLODIPINE BESYLATE 5 MG/1
5 TABLET ORAL DAILY
Status: DISCONTINUED | OUTPATIENT
Start: 2020-04-04 | End: 2020-04-06 | Stop reason: HOSPADM

## 2020-04-04 RX ADMIN — METOPROLOL TARTRATE 12.5 MG: 25 TABLET, FILM COATED ORAL at 20:26

## 2020-04-04 RX ADMIN — METRONIDAZOLE 500 MG: 500 INJECTION, SOLUTION INTRAVENOUS at 11:46

## 2020-04-04 RX ADMIN — ACETAMINOPHEN 650 MG: 325 TABLET, FILM COATED ORAL at 17:19

## 2020-04-04 RX ADMIN — AMLODIPINE BESYLATE 5 MG: 5 TABLET ORAL at 16:52

## 2020-04-04 RX ADMIN — APIXABAN 2.5 MG: 2.5 TABLET, FILM COATED ORAL at 12:52

## 2020-04-04 RX ADMIN — METOPROLOL TARTRATE 25 MG: 25 TABLET ORAL at 11:27

## 2020-04-04 RX ADMIN — ATORVASTATIN CALCIUM 10 MG: 10 TABLET, FILM COATED ORAL at 11:26

## 2020-04-04 RX ADMIN — METRONIDAZOLE 500 MG: 500 INJECTION, SOLUTION INTRAVENOUS at 00:45

## 2020-04-04 RX ADMIN — ONDANSETRON 4 MG: 4 TABLET, ORALLY DISINTEGRATING ORAL at 20:25

## 2020-04-04 RX ADMIN — APIXABAN 2.5 MG: 2.5 TABLET, FILM COATED ORAL at 20:26

## 2020-04-04 ASSESSMENT — ACTIVITIES OF DAILY LIVING (ADL)
ADLS_ACUITY_SCORE: 15
ADLS_ACUITY_SCORE: 16

## 2020-04-04 ASSESSMENT — MIFFLIN-ST. JEOR: SCORE: 1378.46

## 2020-04-04 NOTE — PROVIDER NOTIFICATION
MD Notification    Notified Person: MD    Notified Person Name: Dr Torres    Notification Date/Time: 4/4/20 1240pm    Notification Interaction:talked to MD    Purpose of Notification: Make aware that pt is still having blood In stool, okay to give Eliquis?    Orders Received: ok to give eliquis, continue to monitor and document color and amount of stool. MD to call daughter    Comments:daughter is wanting a call from MD, questions about Fladyl.

## 2020-04-04 NOTE — PROGRESS NOTES
"[facility]    Hospitalist Progress Note    Date of Service (when I saw the patient): 04/04/2020    Assessment & Plan   Isael Garcia is a 85 year old male history of PVD, atrial fibrillation presents to the emergency department with the sudden onset of abdominal pain and fever    Abdominal pain  COVID-19 rule out  Presented to ED with c/o abdominal pain. Awoke at 7 am with \"terrible\" abdominal pain. Generalized and comes in waves. Associated n/v. On presentation with fever at 100.1 and leukocytosis. No h/o abdominal surgeries. CT with endoleak from AAA repair site (thought to be chronic), no other acute findings. UA bland. CXR with hazy opacity at L lung base possibly 2/2 atelectasis or PNA  - surgery has consulted, no acute surgical needs  - clear liquid diet, appetite remains poor, continue IVF  Now NS with bicard per Nephrology. .  - blood cultures NTD  - COVID-19 negative, initial hospitalist identifies patient at high suspicion, therefore repeat nasopharyngeal swab for culture testing 4/1/2020 returned NEGative.  .  - empiric IV zosyn with Lactate firing 3/30/20.  - c diff negative  - check FRANCISCO  - WBC 18.1-->39. with serial decline, 20 1K, today normal  At 7.6.   -trevor hematochezia 4/2/20  Abdominal CT cites inflammatory changes primarily descending colon with colonic wall thickening.  Study was limited without contrast due to PHILLIP . Added metronidazole to empiric Zosyn started 3/30/20. Changed to clear liquid diet.  IVF continued, added dextrose.  -4/3/2020 marked clinical improvement, WBC now normal.  Discussed with GI, Dr. Asif who felt presentation consistent with nonocclusive ischemic colitis, however felt that continuation of antibiotic reasonable, narrow to metronidazole alone. Duration per clinical improvement,may discontinue as soon as 2-3 days.   Advance diet as tolerated.  Follow-up GI with colonoscopy at 1-3 months.  -updated Daughter, Sindi by phone.  She wanted to " ask if we could stop the metronidazole, we had a discussion about continuing that for now as I do not think his loss of appetite is related to metronidazole rather than to the colitis.      PHILLIP  CKD  Baseline creatinine in the mid-to-upper 1's range. 3/31 with creatinine to 2.55. Received contrast 3/30. Lowest recorded bp at 100 systolic otherwise normal to high. No other nephrotoxins. Possible dehydration vs contrast nephropathy  - avoid nephrotoxins including any further contrast.  -CR 4/1/2020 further increased to 2.87.   PVRs to rule out obstructive component negative..  -Nephrology consult.  Appreciate input. Please see note; felt ATN with metabolic acidosis added bicarb to IVF.   -4/3/2020 CR down to 1.82, DC bicarb and IVF.  Nephrology signed off.  Creatinine is improved to  1.71, improving.    AAA s/p graft placement  PVD  In ED endovascular leak noted on CT. ED d/w Dr. Jett with vascular surgery and felt to not be new finding  -Monitor peripheral pulses.    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    FEN (fluids, electrolytes and nutrition): clear liquid diet; IVF.  Discussed with nursing.  DVT Prophylaxis: on Eliquis  Code Status: Full Code    Disposition: Expected discharge possibly tomorrow depending on his hemoglobin, hematochezia.  Patient really wants to go home tomorrow if possible.  Total time spend 35 min >50% spend on coordination of care including time spent on discussion about his progress with the patient as well as his family member includes his daughter.  Nurse updated.    Marilu Torres MD      Interval History   Patient was sleeping today morning, he felt but since overnight, abdominal pain is resolved, he is  attempting to try full liquid diet today, still has few bowel movements with blood in stool, abdominal pain has significantly improved.    -Data reviewed today: I reviewed all new labs and imaging results over the last 24 hours.    Physical Exam   Temp: 98.5  F (36.9  C) Temp src: Oral BP: 125/72   Heart Rate: 85 Resp: 18 SpO2: 91 % O2 Device: None (Room air)    Vitals:    03/30/20 1127 04/04/20 0500   Weight: 74.8 kg (165 lb) 73.5 kg (162 lb)     Vital Signs with Ranges  Temp:  [97.8  F (36.6  C)-99.2  F (37.3  C)] 98.5  F (36.9  C)  Heart Rate:  [] 85  Resp:  [18] 18  BP: (125-168)/(72-94) 125/72  SpO2:  [91 %-94 %] 91 %  I/O last 3 completed shifts:  In: 1300 [P.O.:100; I.V.:1200]  Out: -     Constitutional:  More alert, talkative;  no acute distress.  Respiratory: Bilaterally, no rales or wheeze.  Cardiovascular: Regular rate and rhythm, no murmur appreciated.  GI: Soft, nontender, no rebound, guarding or other peritoneal signs.   Skin/Integumen: No erythema, cyanosis or edema      Medications     - MEDICATION INSTRUCTIONS -       IV infusion builder WITH additives 75 mL/hr at 04/03/20 1740       apixaban ANTICOAGULANT  2.5 mg Oral BID     atorvastatin  10 mg Oral Daily     metoprolol tartrate  25 mg Oral BID     metroNIDAZOLE  500 mg Intravenous Q8H     sodium chloride (PF)  3 mL Intracatheter Q8H       Data   Recent Labs   Lab 04/04/20  0509 04/03/20  1851 04/03/20  0901 04/02/20  0749  03/31/20  0745 03/30/20  1154   WBC 6.7  --  7.6 13.2*   < > 39.4* 18.1*   HGB 12.5*  --  13.1* 12.6*   < > 16.4 15.6   MCV 94  --  93 94   < > 98 96     --  167 136*   < > 155 170     --  140 139   < > 140 137   POTASSIUM 3.5 3.6 3.1* 3.7   < > 5.2 4.3   CHLORIDE 113*  --  111* 113*   < > 116* 108   CO2 22  --  24 20   < > 15* 24   BUN 22  --  23 29   < > 39* 28   CR 1.71*  --  1.82* 2.26*   < > 2.55* 1.65*   ANIONGAP 6  --  5 6   < > 9 5   KULDIP 7.7*  --  7.8* 7.8*   < > 8.5 9.5   GLC 89  --  116* 82   < >  166* 150*   ALBUMIN  --   --  2.1*  --   --  2.3* 3.7   PROTTOTAL  --   --   --   --   --  5.7* 7.5   BILITOTAL  --   --   --   --   --  0.5 0.5   ALKPHOS  --   --   --   --   --  45 60   ALT  --   --   --   --   --  46 35   AST  --   --   --   --   --  41 25   LIPASE  --   --   --   --   --   --  75   TROPI  --   --   --   --   --   --  <0.015    < > = values in this interval not displayed.       No results found for this or any previous visit (from the past 24 hour(s)).    Discussed with Nursing; Daughter Sindi and GIDr Asif.

## 2020-04-04 NOTE — PROGRESS NOTES
"[facility] - GASTROENTEROLOGY PROGRESS NOTE     IMPRESSION:  1. Probable ischemic colitis - pain and bleeding have resolved. Advance diet slowly. Will need outpatient colonoscopy to make sure changes on CT are resolved.    RECOMMENDATIONS:  1. Full liquid diet, ADAT  2. We will see patient tomorrow AM  3. If tolerating regular/low fiber diet, pain-free and no GI bleeding, can consider discharge.    Benji Zurita  [facility] - [facility]  Cell [phone]  After 5 PM, please call [phone]  ________________________________________________________________________      SUBJECTIVE:  Patient feels much better, no pain and no bleeding       OBJECTIVE:  /72 (BP Location: Left arm)   Pulse 93   Temp 98.5  F (36.9  C)   Resp 18   Ht 1.702 m (5' 7\")   Wt 73.5 kg (162 lb)   SpO2 91%   BMI 25.37 kg/m    Temp (24hrs), Av.4  F (36.9  C), Min:97.8  F (36.6  C), Max:99.2  F (37.3  C)    Patient Vitals for the past 72 hrs:   Weight   20 0500 73.5 kg (162 lb)        PHYSICAL EXAM  GEN: Alert, oriented x3, NAD.    Remainder of exam deferred as patient is pain-free.    Additional Data:  I have reviewed the patient's new clinical lab results:     Recent Labs   Lab Test 20  0509 20  0901 20  0749  19  0502 19  1725  19  1917   WBC 6.7 7.6 13.2*   < > 13.7* 16.9*   < > 5.8   HGB 12.5* 13.1* 12.6*   < > 12.0* 12.2*   < > 13.9   MCV 94 93 94   < > 96 95   < > 95    167 136*   < > 192 213   < > 190   INR  --   --   --   --  1.63* 1.58*  --  1.21*    < > = values in this interval not displayed.     Recent Labs   Lab Test 20  0509 20  1851 20  0901 20  0749   POTASSIUM 3.5 3.6 3.1* 3.7   CHLORIDE 113*  --  111* 113*   CO2 22  --  24 20   BUN 22  --  23 29   ANIONGAP 6  --  5 6     Recent Labs   Lab Test 20  0901 20  0745 20  1322 20  1154 19  1438  14  1110   ALBUMIN 2.1* 2.3*  --  3.7 3.5   < >  --    BILITOTAL  --  0.5  " --  0.5 0.2   < >  --    ALT  --  46  --  35 24   < >  --    AST  --  41  --  25 17   < >  --    PROTEIN  --   --  Negative  --   --   --  Negative   LIPASE  --   --   --  75  --   --   --     < > = values in this interval not displayed.

## 2020-04-04 NOTE — PROGRESS NOTES
[facility] note    Received call from RN. Given ongoing nausea, will stop Flagyl and ischemic colitis symptoms improved, along with normal WBC, ok to stop the antibiotics.    Some ongoing rectal bleeding, discussed with RN. Will continue to monitor. This is not unexpected with the anticoagulation after ischemic colitis.     Call GI if significant increase in bleeding.     Benji Zurita MD   [facility] - [facility]  Cell [phone]  After 5 PM, please call [phone]

## 2020-04-04 NOTE — PLAN OF CARE
Patient alert, steady to get up ind to BSC. Ambulated at bedside. Pt states he is feeling much better, denies nausea, has small BM, less loose. Denies pain. VSS. IV fld continue, poor po intake.Tele afib. IV flagyl POC reviewed with pt

## 2020-04-04 NOTE — PLAN OF CARE
Neuro A&O x4  Temp: 99.2  F (37.3  C) Temp src: Oral BP: (!) 156/91 Pulse: 93 Heart Rate: 88 Resp: 18 SpO2: 94 % O2 Device: None (Room air)    Tele/Cardiac A-Fib CVR  Respiratory RA, diminished  Activity IND in room, SBA in [place]  Pain Abdominal discomfort  Drips NS with K20 at 75  Drains/Tubes PIV  Skin bruised  CI/ Nausea, multiple loose stools  Aggression color green  Plan abdominal   Miscellaneous

## 2020-04-04 NOTE — PROVIDER NOTIFICATION
MD Notification    Notified Person: MD    Notified Person Name: Dr Zurita (GI)    Notification Date/Time:4/4/20 1215pm    Notification Interaction :talked with MD    Purpose of Notification: to notify MD that pt is sill having blood in stool. RN can see mostly red mixed with brown loose stool    Orders Received: Continue to monitor and document amount/color in note also not just flow sheet.     Comments:  Will notify Dr Torres

## 2020-04-04 NOTE — PLAN OF CARE
Patient is alert and oriented times 4, forgetful.  cms is Intact. No co pain. Patient is up with SBA.  pt voiding per urinal. Had 3 loose/watery/mucousy  bloody stools today. About 50-75cc each time. Not trevor red but is darker red with brown. MD aware. Denies pain but has nausea intermittently, declined zofran. Had broth for lunch and dinner. Fladyl stopped today. Hgb 12.5. Will continue to monitor.

## 2020-04-05 LAB
ANION GAP SERPL CALCULATED.3IONS-SCNC: 6 MMOL/L (ref 3–14)
BACTERIA SPEC CULT: NO GROWTH
BACTERIA SPEC CULT: NO GROWTH
BUN SERPL-MCNC: 22 MG/DL (ref 7–30)
CALCIUM SERPL-MCNC: 7.5 MG/DL (ref 8.5–10.1)
CHLORIDE SERPL-SCNC: 117 MMOL/L (ref 94–109)
CO2 SERPL-SCNC: 21 MMOL/L (ref 20–32)
CREAT SERPL-MCNC: 1.67 MG/DL (ref 0.66–1.25)
ERYTHROCYTE [DISTWIDTH] IN BLOOD BY AUTOMATED COUNT: 13.8 % (ref 10–15)
GFR SERPL CREATININE-BSD FRML MDRD: 37 ML/MIN/{1.73_M2}
GLUCOSE SERPL-MCNC: 92 MG/DL (ref 70–99)
HCT VFR BLD AUTO: 36.3 % (ref 40–53)
HGB BLD-MCNC: 12.2 G/DL (ref 13.3–17.7)
MAGNESIUM SERPL-MCNC: 1.8 MG/DL (ref 1.6–2.3)
MCH RBC QN AUTO: 31.9 PG (ref 26.5–33)
MCHC RBC AUTO-ENTMCNC: 33.6 G/DL (ref 31.5–36.5)
MCV RBC AUTO: 95 FL (ref 78–100)
PHOSPHATE SERPL-MCNC: 2.8 MG/DL (ref 2.5–4.5)
PLATELET # BLD AUTO: 203 10E9/L (ref 150–450)
POTASSIUM SERPL-SCNC: 3.6 MMOL/L (ref 3.4–5.3)
RBC # BLD AUTO: 3.82 10E12/L (ref 4.4–5.9)
SODIUM SERPL-SCNC: 144 MMOL/L (ref 133–144)
SPECIMEN SOURCE: NORMAL
SPECIMEN SOURCE: NORMAL
WBC # BLD AUTO: 7.8 10E9/L (ref 4–11)

## 2020-04-05 PROCEDURE — 25000128 H RX IP 250 OP 636: Performed by: INTERNAL MEDICINE

## 2020-04-05 PROCEDURE — 25000132 ZZH RX MED GY IP 250 OP 250 PS 637: Performed by: INTERNAL MEDICINE

## 2020-04-05 PROCEDURE — 85027 COMPLETE CBC AUTOMATED: CPT | Performed by: INTERNAL MEDICINE

## 2020-04-05 PROCEDURE — 21000001 ZZH R&B HEART CARE

## 2020-04-05 PROCEDURE — 84100 ASSAY OF PHOSPHORUS: CPT | Performed by: INTERNAL MEDICINE

## 2020-04-05 PROCEDURE — 83735 ASSAY OF MAGNESIUM: CPT | Performed by: INTERNAL MEDICINE

## 2020-04-05 PROCEDURE — 36415 COLL VENOUS BLD VENIPUNCTURE: CPT | Performed by: INTERNAL MEDICINE

## 2020-04-05 PROCEDURE — 80048 BASIC METABOLIC PNL TOTAL CA: CPT | Performed by: INTERNAL MEDICINE

## 2020-04-05 PROCEDURE — 99233 SBSQ HOSP IP/OBS HIGH 50: CPT | Performed by: INTERNAL MEDICINE

## 2020-04-05 PROCEDURE — 99207 ZZC MOONLIGHTING INDICATOR: CPT | Performed by: INTERNAL MEDICINE

## 2020-04-05 RX ORDER — SODIUM CHLORIDE AND POTASSIUM CHLORIDE 150; 900 MG/100ML; MG/100ML
INJECTION, SOLUTION INTRAVENOUS CONTINUOUS
Status: DISCONTINUED | OUTPATIENT
Start: 2020-04-05 | End: 2020-04-05

## 2020-04-05 RX ADMIN — ATORVASTATIN CALCIUM 10 MG: 10 TABLET, FILM COATED ORAL at 08:51

## 2020-04-05 RX ADMIN — ACETAMINOPHEN 650 MG: 325 TABLET, FILM COATED ORAL at 16:16

## 2020-04-05 RX ADMIN — ONDANSETRON 4 MG: 4 TABLET, ORALLY DISINTEGRATING ORAL at 08:52

## 2020-04-05 RX ADMIN — APIXABAN 2.5 MG: 2.5 TABLET, FILM COATED ORAL at 08:52

## 2020-04-05 RX ADMIN — AMLODIPINE BESYLATE 5 MG: 5 TABLET ORAL at 08:52

## 2020-04-05 RX ADMIN — METOPROLOL TARTRATE 12.5 MG: 25 TABLET, FILM COATED ORAL at 08:52

## 2020-04-05 RX ADMIN — METOPROLOL TARTRATE 12.5 MG: 25 TABLET, FILM COATED ORAL at 20:20

## 2020-04-05 RX ADMIN — POTASSIUM CHLORIDE AND SODIUM CHLORIDE: 900; 150 INJECTION, SOLUTION INTRAVENOUS at 02:00

## 2020-04-05 RX ADMIN — ACETAMINOPHEN 650 MG: 325 TABLET, FILM COATED ORAL at 22:28

## 2020-04-05 ASSESSMENT — ACTIVITIES OF DAILY LIVING (ADL)
ADLS_ACUITY_SCORE: 15

## 2020-04-05 ASSESSMENT — MIFFLIN-ST. JEOR: SCORE: 1385.71

## 2020-04-05 NOTE — PLAN OF CARE
Patient is alert and oriented times 4, forgetful at times.  cms is intact. taking Tylenol for generalized pain. Patient is up with SBA.  pt voiding per Urinal, 2 loose farooq color BM's today, still some abdominal discomfort and nausea, reluctant to use Zofran but seemed to help some today. Had a cup of applesauce. Drinking adequate water. NPO at midnight for possible EGD, eliquis has now been stopped.

## 2020-04-05 NOTE — PROGRESS NOTES
[facility] Progress Note  -01    Talked with pt on the phone, pt said that he is about to listen to the service from his [employer]. No need from [facility] at this time, we will remain available for any future needs.       Paige Nam  Chaplain Resident

## 2020-04-05 NOTE — PROGRESS NOTES
[facility] note     Tried to see patient, he was not in his room. Discussed with RN and his daughter. Still some blood in stool. Discussed with Dr. Robins, we will hold the Eliquis for now. Some intermittent upper abdominal pain. Will still advance diet, but will keep NPO at MN, in case EGD needed.    Patient's daughter is interested in the follow-up Colonoscopy in 6 weeks, given he has 2 grandchildren with IBD. This can be arranged on discharge.    Benji Zurita MD   [facility] - [facility]  Cell [phone]  After 5 PM, please call [phone]

## 2020-04-05 NOTE — PROGRESS NOTES
"[facility]    Hospitalist Progress Note    Date of Service (when I saw the patient): 04/05/2020    Assessment & Plan   Isael Garcia is a 85 year old male history of PVD, atrial fibrillation presents to the emergency department with the sudden onset of abdominal pain and fever    Abdominal pain  COVID-19 rule out  Presented to ED with c/o abdominal pain. Awoke at 7 am with \"terrible\" abdominal pain. Generalized and comes in waves. Associated n/v. On presentation with fever at 100.1 and leukocytosis. No h/o abdominal surgeries. CT with endoleak from AAA repair site (thought to be chronic), no other acute findings. UA bland. CXR with hazy opacity at L lung base possibly 2/2 atelectasis or PNA  - surgery has consulted, no acute surgical needs  - blood cultures NTD  - COVID-19 negative, initial hospitalist identifies patient at high suspicion, therefore repeat nasopharyngeal swab for culture testing 4/1/2020 returned negative.  - empiric IV zosyn with Lactate firing 3/30/20. Off antibiotics now.   - c diff negative  - WBC 18.1-->39. with serial decline, 20 1K, today normal at 7.8.   -trevor hematochezia 4/2/20  Abdominal CT cites inflammatory changes primarily descending colon with colonic wall thickening.  Study was limited without contrast due to PHILLIP . Added metronidazole to empiric Zosyn started 3/30/20. Changed to clear liquid diet.  IVF continued, added dextrose.  -4/3/2020 marked clinical improvement, WBC now normal.  Discussed with GI, Dr. Asif who felt presentation consistent with nonocclusive ischemic colitis, however felt that discontinuation of antibiotic reasonable. Advance diet as tolerated.  Follow-up GI with colonoscopy at 1-3 months. Still very poor appetite.       PHILLIP  CKD  Baseline creatinine in the mid-to-upper 1's range. 3/31 with creatinine to 2.55. Received contrast 3/30. Lowest recorded bp at 100 systolic otherwise normal to high. No other nephrotoxins. Possible " dehydration vs contrast nephropathy  - avoid nephrotoxins including any further contrast.  -CR 4/1/2020 further increased to 2.87.   PVRs to rule out obstructive component negative. Nephrology consult.  Appreciate input. Please see note; felt ATN with metabolic acidosis added bicarb to IVF.   -4/3/2020 CR down to 1.82, DC bicarb and IVF.  Nephrology signed off.  Creatinine is improved to 1.67, improving.    AAA s/p graft placement  PVD  In ED endovascular leak noted on CT. ED d/w Dr. Jett with vascular surgery and felt to not be new finding  -Monitor peripheral pulses.    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    FEN (fluids, electrolytes and nutrition): Full liquid for now  Discussed with nursing.  DVT Prophylaxis: on Eliquis  Code Status: Full Code    Disposition: 1-2 days.    Total time spend 35 min >50% spend on coordination of care including time spent on discussion about his progress with the patient as well as his family member includes his daughter.  Nurse updated.    Ester Robins MD      Interval History   On full liquid diet. Patient seen and examined.  No acute events over night.  No fevers or chills. No chest pain or SOB. Answered patient questions.    -Data reviewed today: I reviewed all new labs and imaging results over the last 24 hours.    Physical Exam   Temp: 98.9  F (37.2  C) Temp src: Oral BP: 118/65   Heart Rate: 93 Resp: 16 SpO2: 95 % O2 Device: None (Room air)    Vitals:    03/30/20 1127 04/04/20 0500 04/05/20 0229   Weight: 74.8 kg (165 lb) 73.5 kg (162 lb) 74.2 kg (163 lb 9.6 oz)     Vital Signs with Ranges  Temp:  [97.9  F (36.6  C)-98.9  F (37.2  C)] 98.9  F (37.2  C)  Heart Rate:  [81-93] 93  Resp:  [16-18]  16  BP: (100-132)/(65-77) 118/65  SpO2:  [93 %-97 %] 95 %  I/O last 3 completed shifts:  In: 2020 [P.O.:520; I.V.:1500]  Out: 920 [Urine:795; Stool:125]    Constitutional:  More alert, talkative;  no acute distress.  Respiratory: Bilaterally, no rales or wheeze.  Cardiovascular: Regular rate and rhythm, no murmur appreciated.  GI: Soft, nontender, no rebound, guarding or other peritoneal signs.   Skin/Integumen: No erythema, cyanosis or edema      Medications     0.9% sodium chloride + KCl 20 mEq/L 75 mL/hr at 04/05/20 0855     - MEDICATION INSTRUCTIONS -         amLODIPine  5 mg Oral Daily     apixaban ANTICOAGULANT  2.5 mg Oral BID     atorvastatin  10 mg Oral Daily     metoprolol tartrate  12.5 mg Oral BID     sodium chloride (PF)  3 mL Intracatheter Q8H       Data   Recent Labs   Lab 04/05/20  0600 04/04/20  0509 04/03/20  1851 04/03/20  0901  03/31/20  0745 03/30/20  1154   WBC 7.8 6.7  --  7.6   < > 39.4* 18.1*   HGB 12.2* 12.5*  --  13.1*   < > 16.4 15.6   MCV 95 94  --  93   < > 98 96    173  --  167   < > 155 170    141  --  140   < > 140 137   POTASSIUM 3.6 3.5 3.6 3.1*   < > 5.2 4.3   CHLORIDE 117* 113*  --  111*   < > 116* 108   CO2 21 22  --  24   < > 15* 24   BUN 22 22  --  23   < > 39* 28   CR 1.67* 1.71*  --  1.82*   < > 2.55* 1.65*   ANIONGAP 6 6  --  5   < > 9 5   KULDIP 7.5* 7.7*  --  7.8*   < > 8.5 9.5   GLC 92 89  --  116*   < > 166* 150*   ALBUMIN  --   --   --  2.1*  --  2.3* 3.7   PROTTOTAL  --   --   --   --   --  5.7* 7.5   BILITOTAL  --   --   --   --   --  0.5 0.5   ALKPHOS  --   --   --   --   --  45 60   ALT  --   --   --   --   --  46 35   AST  --   --   --   --   --  41 25   LIPASE  --   --   --   --   --   --  75   TROPI  --   --   --   --   --   --  <0.015    < > = values in this interval not displayed.       No results found for this or any previous visit (from the past 24 hour(s)).    Discussed with Nursing; Daughter Sindi and GI, Dr Asif.

## 2020-04-05 NOTE — PLAN OF CARE
Patient alert, steady with amb at beside/commode. Pt continues to have poor appetite, mild nausea. ODT zofran given with mild improvement. 2  medium loose stool maroon/rust color. IV fld NS +20 kcl continues. VSS afib controlled rate. Plan to discharge home when  bloody stools improve, appetite improved. POC reviewed with patient.

## 2020-04-06 ENCOUNTER — RESULTS ONLY (OUTPATIENT)
Dept: ENDOSCOPY | Facility: CLINIC | Age: 85
End: 2020-04-06

## 2020-04-06 VITALS
TEMPERATURE: 98.2 F | SYSTOLIC BLOOD PRESSURE: 121 MMHG | RESPIRATION RATE: 14 BRPM | WEIGHT: 164.2 LBS | HEIGHT: 67 IN | OXYGEN SATURATION: 94 % | BODY MASS INDEX: 25.77 KG/M2 | HEART RATE: 83 BPM | DIASTOLIC BLOOD PRESSURE: 64 MMHG

## 2020-04-06 LAB
ANION GAP SERPL CALCULATED.3IONS-SCNC: 6 MMOL/L (ref 3–14)
BUN SERPL-MCNC: 19 MG/DL (ref 7–30)
CALCIUM SERPL-MCNC: 7.4 MG/DL (ref 8.5–10.1)
CHLORIDE SERPL-SCNC: 114 MMOL/L (ref 94–109)
CO2 SERPL-SCNC: 22 MMOL/L (ref 20–32)
CREAT SERPL-MCNC: 1.68 MG/DL (ref 0.66–1.25)
ERYTHROCYTE [DISTWIDTH] IN BLOOD BY AUTOMATED COUNT: 13.7 % (ref 10–15)
FLEXIBLE SIGMOIDOSCOPY: NORMAL
GFR SERPL CREATININE-BSD FRML MDRD: 36 ML/MIN/{1.73_M2}
GLUCOSE SERPL-MCNC: 83 MG/DL (ref 70–99)
HCT VFR BLD AUTO: 34.4 % (ref 40–53)
HGB BLD-MCNC: 11.7 G/DL (ref 13.3–17.7)
MCH RBC QN AUTO: 32.2 PG (ref 26.5–33)
MCHC RBC AUTO-ENTMCNC: 34 G/DL (ref 31.5–36.5)
MCV RBC AUTO: 95 FL (ref 78–100)
PLATELET # BLD AUTO: 233 10E9/L (ref 150–450)
POTASSIUM SERPL-SCNC: 3.4 MMOL/L (ref 3.4–5.3)
RBC # BLD AUTO: 3.63 10E12/L (ref 4.4–5.9)
SODIUM SERPL-SCNC: 142 MMOL/L (ref 133–144)
UPPER GI ENDOSCOPY: NORMAL
WBC # BLD AUTO: 6.6 10E9/L (ref 4–11)

## 2020-04-06 PROCEDURE — 88305 TISSUE EXAM BY PATHOLOGIST: CPT | Mod: 26,59 | Performed by: INTERNAL MEDICINE

## 2020-04-06 PROCEDURE — 99239 HOSP IP/OBS DSCHRG MGMT >30: CPT | Performed by: INTERNAL MEDICINE

## 2020-04-06 PROCEDURE — G0500 MOD SEDAT ENDO SERVICE >5YRS: HCPCS | Performed by: INTERNAL MEDICINE

## 2020-04-06 PROCEDURE — 40000894 ZZH STATISTIC OT IP EVAL DEFER

## 2020-04-06 PROCEDURE — 85027 COMPLETE CBC AUTOMATED: CPT | Performed by: INTERNAL MEDICINE

## 2020-04-06 PROCEDURE — 88305 TISSUE EXAM BY PATHOLOGIST: CPT | Performed by: INTERNAL MEDICINE

## 2020-04-06 PROCEDURE — 45331 SIGMOIDOSCOPY AND BIOPSY: CPT | Performed by: INTERNAL MEDICINE

## 2020-04-06 PROCEDURE — 25000128 H RX IP 250 OP 636: Performed by: INTERNAL MEDICINE

## 2020-04-06 PROCEDURE — 99207 ZZC MOONLIGHTING INDICATOR: CPT | Performed by: INTERNAL MEDICINE

## 2020-04-06 PROCEDURE — 36415 COLL VENOUS BLD VENIPUNCTURE: CPT | Performed by: INTERNAL MEDICINE

## 2020-04-06 PROCEDURE — 80048 BASIC METABOLIC PNL TOTAL CA: CPT | Performed by: INTERNAL MEDICINE

## 2020-04-06 PROCEDURE — 25000125 ZZHC RX 250: Performed by: INTERNAL MEDICINE

## 2020-04-06 PROCEDURE — 88342 IMHCHEM/IMCYTCHM 1ST ANTB: CPT | Performed by: INTERNAL MEDICINE

## 2020-04-06 PROCEDURE — 25000132 ZZH RX MED GY IP 250 OP 250 PS 637: Performed by: INTERNAL MEDICINE

## 2020-04-06 PROCEDURE — 88342 IMHCHEM/IMCYTCHM 1ST ANTB: CPT | Mod: 26 | Performed by: INTERNAL MEDICINE

## 2020-04-06 PROCEDURE — 43239 EGD BIOPSY SINGLE/MULTIPLE: CPT | Performed by: INTERNAL MEDICINE

## 2020-04-06 RX ORDER — FENTANYL CITRATE 50 UG/ML
100 INJECTION, SOLUTION INTRAMUSCULAR; INTRAVENOUS EVERY 5 MIN PRN
Status: DISCONTINUED | OUTPATIENT
Start: 2020-04-06 | End: 2020-04-06 | Stop reason: HOSPADM

## 2020-04-06 RX ORDER — PANTOPRAZOLE SODIUM 40 MG/1
40 TABLET, DELAYED RELEASE ORAL 2 TIMES DAILY
Qty: 60 TABLET | Refills: 0 | Status: SHIPPED | OUTPATIENT
Start: 2020-04-06 | End: 2020-04-11

## 2020-04-06 RX ORDER — NALOXONE HYDROCHLORIDE 0.4 MG/ML
.1-.4 INJECTION, SOLUTION INTRAMUSCULAR; INTRAVENOUS; SUBCUTANEOUS
Status: DISCONTINUED | OUTPATIENT
Start: 2020-04-06 | End: 2020-04-06

## 2020-04-06 RX ORDER — FLUMAZENIL 0.1 MG/ML
0.2 INJECTION, SOLUTION INTRAVENOUS
Status: DISCONTINUED | OUTPATIENT
Start: 2020-04-06 | End: 2020-04-06 | Stop reason: HOSPADM

## 2020-04-06 RX ORDER — LIDOCAINE 40 MG/G
CREAM TOPICAL
Status: DISCONTINUED | OUTPATIENT
Start: 2020-04-06 | End: 2020-04-06

## 2020-04-06 RX ORDER — LIDOCAINE 40 MG/G
CREAM TOPICAL
Status: DISCONTINUED | OUTPATIENT
Start: 2020-04-06 | End: 2020-04-06 | Stop reason: HOSPADM

## 2020-04-06 RX ORDER — NALOXONE HYDROCHLORIDE 0.4 MG/ML
.1-.4 INJECTION, SOLUTION INTRAMUSCULAR; INTRAVENOUS; SUBCUTANEOUS
Status: DISCONTINUED | OUTPATIENT
Start: 2020-04-06 | End: 2020-04-06 | Stop reason: HOSPADM

## 2020-04-06 RX ORDER — FENTANYL CITRATE 50 UG/ML
INJECTION, SOLUTION INTRAMUSCULAR; INTRAVENOUS PRN
Status: DISCONTINUED | OUTPATIENT
Start: 2020-04-06 | End: 2020-04-06 | Stop reason: HOSPADM

## 2020-04-06 RX ORDER — FENTANYL CITRATE 50 UG/ML
100 INJECTION, SOLUTION INTRAMUSCULAR; INTRAVENOUS
Status: DISCONTINUED | OUTPATIENT
Start: 2020-04-06 | End: 2020-04-06 | Stop reason: HOSPADM

## 2020-04-06 RX ORDER — PANTOPRAZOLE SODIUM 40 MG/1
40 TABLET, DELAYED RELEASE ORAL
Status: DISCONTINUED | OUTPATIENT
Start: 2020-04-07 | End: 2020-04-06 | Stop reason: HOSPADM

## 2020-04-06 RX ADMIN — ATORVASTATIN CALCIUM 10 MG: 10 TABLET, FILM COATED ORAL at 08:37

## 2020-04-06 RX ADMIN — SODIUM PHOSPHATE, DIBASIC AND SODIUM PHOSPHATE, MONOBASIC 1 ENEMA: 7; 19 ENEMA RECTAL at 09:44

## 2020-04-06 RX ADMIN — AMLODIPINE BESYLATE 5 MG: 5 TABLET ORAL at 08:37

## 2020-04-06 RX ADMIN — METOPROLOL TARTRATE 12.5 MG: 25 TABLET, FILM COATED ORAL at 08:37

## 2020-04-06 ASSESSMENT — ACTIVITIES OF DAILY LIVING (ADL)
ADLS_ACUITY_SCORE: 15

## 2020-04-06 ASSESSMENT — MIFFLIN-ST. JEOR: SCORE: 1388.44

## 2020-04-06 NOTE — PROGRESS NOTES
"[facility]    Hospitalist Progress Note    Date of Service (when I saw the patient): 04/06/2020    Assessment & Plan   Isael Garcia is a 85 year old male history of PVD, atrial fibrillation presents to the emergency department with the sudden onset of abdominal pain and fever    Abdominal pain  COVID-19 rule out  Presented to ED with c/o abdominal pain. Awoke at 7 am with \"terrible\" abdominal pain. Generalized and comes in waves. Associated n/v. On presentation with fever at 100.1 and leukocytosis. No h/o abdominal surgeries. CT with endoleak from AAA repair site (thought to be chronic), no other acute findings. UA bland. CXR with hazy opacity at L lung base possibly 2/2 atelectasis or PNA  - surgery has consulted, no acute surgical needs  - blood cultures NTD  - COVID-19 negative, initial hospitalist identifies patient at high suspicion, therefore repeat nasopharyngeal swab for culture testing 4/1/2020 returned negative.  - empiric IV zosyn with Lactate firing 3/30/20. Off antibiotics now.   - c diff negative  - WBC 18.1-->39. with serial decline, 20 1K, today normal at 7.8.   -trevor hematochezia 4/2/20  Abdominal CT cites inflammatory changes primarily descending colon with colonic wall thickening.  Study was limited without contrast due to PHILLIP . Added metronidazole to empiric Zosyn started 3/30/20. Changed to clear liquid diet.  IVF continued, added dextrose.  -4/3/2020 marked clinical improvement, WBC now normal.  Discussed with GI, Dr. Asif who felt presentation consistent with nonocclusive ischemic colitis, however felt that discontinuation of antibiotic reasonable. Advance diet as tolerated.  Follow-up GI with colonoscopy at 1-3 months. Still very poor appetite. Possible EGD today.     PHILLIP  CKD  Baseline creatinine in the mid-to-upper 1's range. 3/31 with creatinine to 2.55. Received contrast 3/30. Lowest recorded bp at 100 systolic otherwise normal to high. No other " nephrotoxins. Possible dehydration vs contrast nephropathy  - avoid nephrotoxins including any further contrast.  -CR 4/1/2020 further increased to 2.87.   PVRs to rule out obstructive component negative. Nephrology consult.  Appreciate input. Please see note; felt ATN with metabolic acidosis added bicarb to IVF.   -4/3/2020 CR down to 1.82, DC bicarb and IVF.  Nephrology signed off.  Creatinine is improved to 1.67, improving.    AAA s/p graft placement  PVD  In ED endovascular leak noted on CT. ED d/w Dr. Jett with vascular surgery and felt to not be new finding  -Monitor peripheral pulses.    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    FEN (fluids, electrolytes and nutrition): Full liquid for now  Discussed with nursing.  DVT Prophylaxis: on Eliquis  Code Status: Full Code    Disposition: today or tomorrow, depending on GI's decision on EGD.     Total time spend 35 min >50% spend on coordination of care including time spent on discussion about his progress with the patient as well as his family member includes his daughter.  Nurse updated.    Ester Robins MD      Interval History   On full liquid diet. Possible EGD today. Patient seen and examined.  No acute events over night.  No fevers or chills. No chest pain or SOB. Answered patient questions.    -Data reviewed today: I reviewed all new labs and imaging results over the last 24 hours.    Physical Exam   Temp: 98.2  F (36.8  C) Temp src: Oral BP: 138/79   Heart Rate: 91 Resp: 16 SpO2: 94 % O2 Device: None (Room air)    Vitals:    04/04/20 0500 04/05/20 0229 04/06/20 0551   Weight: 73.5 kg (162 lb) 74.2 kg (163 lb 9.6 oz) 74.5 kg (164 lb 3.2 oz)     Vital Signs with Ranges  Temp:   [98.2  F (36.8  C)-98.9  F (37.2  C)] 98.2  F (36.8  C)  Heart Rate:  [] 91  Resp:  [16-18] 16  BP: (115-140)/(74-84) 138/79  SpO2:  [92 %-96 %] 94 %  I/O last 3 completed shifts:  In: 770 [P.O.:540; I.V.:230]  Out: 830 [Urine:730; Stool:100]    Constitutional:  More alert, talkative;  no acute distress.  Respiratory: Bilaterally, no rales or wheeze.  Cardiovascular: Regular rate and rhythm, no murmur appreciated.  GI: Soft, nontender, no rebound, guarding or other peritoneal signs.   Skin/Integumen: No erythema, cyanosis or edema      Medications     - MEDICATION INSTRUCTIONS -       - MEDICATION INSTRUCTIONS -       - MEDICATION INSTRUCTIONS -         amLODIPine  5 mg Oral Daily     atorvastatin  10 mg Oral Daily     fentaNYL  100 mcg Intravenous Once within 24 hrs     metoprolol tartrate  12.5 mg Oral BID     midazolam  2 mg Intravenous Once within 24 hrs     sodium chloride (PF)  3 mL Intracatheter Q8H     sodium chloride (PF)  3 mL Intracatheter Q8H     sodium phosphate  1 enema Rectal Once       Data   Recent Labs   Lab 04/06/20  0535 04/05/20  0600 04/04/20  0509  04/03/20  0901  03/31/20  0745 03/30/20  1154   WBC 6.6 7.8 6.7  --  7.6   < > 39.4* 18.1*   HGB 11.7* 12.2* 12.5*  --  13.1*   < > 16.4 15.6   MCV 95 95 94  --  93   < > 98 96    203 173  --  167   < > 155 170    144 141  --  140   < > 140 137   POTASSIUM 3.4 3.6 3.5   < > 3.1*   < > 5.2 4.3   CHLORIDE 114* 117* 113*  --  111*   < > 116* 108   CO2 22 21 22  --  24   < > 15* 24   BUN 19 22 22  --  23   < > 39* 28   CR 1.68* 1.67* 1.71*  --  1.82*   < > 2.55* 1.65*   ANIONGAP 6 6 6  --  5   < > 9 5   KULDIP 7.4* 7.5* 7.7*  --  7.8*   < > 8.5 9.5   GLC 83 92 89  --  116*   < > 166* 150*   ALBUMIN  --   --   --   --  2.1*  --  2.3* 3.7   PROTTOTAL  --   --   --   --   --   --  5.7* 7.5   BILITOTAL  --   --   --   --   --   --  0.5 0.5   ALKPHOS  --   --   --   --   --   --  45 60   ALT  --   --   --   --   --   --  46 35   AST  --   --    --   --   --   --  41 25   LIPASE  --   --   --   --   --   --   --  75   TROPI  --   --   --   --   --   --   --  <0.015    < > = values in this interval not displayed.       No results found for this or any previous visit (from the past 24 hour(s)).    Discussed with Nursing; Daughter Sindi and GIDr Asif.

## 2020-04-06 NOTE — PROGRESS NOTES
CLINICAL NUTRITION SERVICES  -  ASSESSMENT NOTE    Future/Additional Recommendations:   - Add back supplements w/ diet advancement  - RD available for education needs as ordered per MD, if needed prior to discharge.    Malnutrition:   % Weight Loss:  None noted  % Intake:  </= 50% for >/= 5 days (severe malnutrition)  Subcutaneous Fat Loss:  Unable to assess  Muscle Loss:  Unable to assess  Fluid Retention:  None noted    Malnutrition Diagnosis: Unable to determine due to lack of NFPE     REASON FOR ASSESSMENT  Isael Garcia is a 85 year old male seen by Registered Dietitian for LOS    NUTRITION HISTORY  - Information obtained from EMR.   - Patient presented to ED 7 days ago with abdominal pain. Endorsed associated N/V.  - Per records, symptoms began the same day of admission.      - PMH: hx stroke, Afib, AAA, hypertension, CKD 3.   - Home meds show pt takes a multivitamin w/ minerals at baseline.       CURRENT NUTRITION ORDERS  Diet Order:     NPO for EGD    4/5 - Low Fiber  4/4 - Regular --> FLD later in day  4/2-4/3 - CLD  3/31-4/1 - Regular   (patient has only been on a diet >CLD for ~4 days of admission)    Current Intake/Tolerance:  Bites - 25% documented over admission  In the past 5 days pt has ordered only 4 solid meals (all quite small).   Overall likely meeting <25-50% estimated needs on average x7 days.   Noted increased nausea w/ PO    NUTRITION FOCUSED PHYSICAL ASSESSMENT FOR DIAGNOSING MALNUTRITION)  No:  Unable - Current departmental policy during COVID-19 Pandemic         Observed:    Unable    Obtained from Chart/Interdisciplinary Team:  Possible EGD today  Jasson hematochezia on 4/2  Abdominal CT cites inflammatory changes primarily descending colon with colonic wall thickening  GI --> findings consistent with nonocclusive ischemic colitis    Trace edema (does not meet maln criteria)  Loose, red streaked stools 4/6  Alfonzo - Nutrition 2; Total 20    ANTHROPOMETRICS  Height: 5'  "7\"  Weight: 73.5 kg (162 lb) - lowest of admission  Body mass index is 25.4 kg/m .  Weight Status:  Overweight BMI 25-29.9  IBW: 67.3 kg  % IBW: 109%  Weight History: up to a 6# weight loss in the past 2 months (3.6%) - not significant.   Wt Readings from Last 10 Encounters:   04/06/20 74.5 kg (164 lb 3.2 oz)   02/11/20 76.2 kg (168 lb)   09/19/19 72.6 kg (160 lb)   09/05/19 75.8 kg (167 lb)   06/17/19 75.9 kg (167 lb 5.3 oz)   05/30/19 73.3 kg (161 lb 9.6 oz)   09/10/18 75 kg (165 lb 4.8 oz)   09/05/17 76.2 kg (168 lb)   04/27/17 74.4 kg (164 lb)   03/29/17 76.2 kg (168 lb)     LABS  Labs reviewed    MEDICATIONS  Medications reviewed  Enema given this morning    ASSESSED NUTRITION NEEDS PER APPROVED PRACTICE GUIDELINES:  Dosing Weight 73.5 kg - lowest of admission  Estimated Energy Needs: 1000-5298 kcals (25-30 Kcal/Kg)  Justification: maintenance  Estimated Protein Needs:  grams protein (1.2-1.5 g pro/Kg)  Justification: maintenance and preservation of lean body mass  Estimated Fluid Needs: >1 mL/kcal  Justification: maintenance    MALNUTRITION:  % Weight Loss:  None noted  % Intake:  </= 50% for >/= 5 days (severe malnutrition)  Subcutaneous Fat Loss:  Unable to assess  Muscle Loss:  Unable to assess  Fluid Retention:  None noted    Malnutrition Diagnosis: Unable to determine due to lack of NFPE    NUTRITION DIAGNOSIS:  Inadequate oral intake related to decreased PO tolerance with nausea and blood in stool as evidenced by frequent interruptions to diet order, intakes bites-25% for meals ordered ~1x daily on average, meeting <25-50% estimated needs x7 days.       NUTRITION INTERVENTIONS  Recommendations / Nutrition Prescription  Diet per GI    Supplements once appropriate       Implementation  Nutrition education: Per Provider order if indicated   Medical Food Supplement: see above      Nutrition Goals  Intake of at least 50% for 2 meals/day over review period.       MONITORING AND EVALUATION:  Progress " towards goals will be monitored and evaluated per protocol and Practice Guidelines    Alanna Proctor RD, LD  [facility] [place], [address], [facility]   Pager: [phone]  Weekend Pager: [phone]

## 2020-04-06 NOTE — PLAN OF CARE
VSS. Tele afib with CVR. A&O x4, forgetful. Denies pain or nausea overnight. Slept between cares. Small amount of mucous, red stool this AM. NPO since midnight with plan for EGD today. Up independently in room to commode.

## 2020-04-06 NOTE — PLAN OF CARE
Discharge Planner OT   Patient plan for discharge: Home per pt   Current status: IP OT orders received, chart reviewed, and attempted evaluation. Pt admitted with sudden onset of abdominal pain and fever.  Educated on the role of IP OT and the implications on I/ADLs. Pt reported no concerns ADLs or mobility/stairs. Patient requesting to cancel orders for IP OT and IP PT. Will cancel/complete IP OT orders/evaluation per patient request.  Barriers to return to prior living situation: Defer to medical team   Recommendations for discharge: Defer to medical team   Rationale for recommendations: Will cancel/complete IP OT orders/evaluation per patient request.       Entered by: Phi Natarajan 04/06/2020 8:31 AM

## 2020-04-06 NOTE — PLAN OF CARE
VSS. Monitor remains Atrial fib with CVR. Pt. Denies pain. Pt. Discharged per order to home with friend. Discharge instructions reviewed with pt. Discharge medications sent with pt.

## 2020-04-06 NOTE — DISCHARGE SUMMARY
[facility]    Discharge Summary  Hospitalist    Date of Admission:  3/30/2020  Date of Discharge:  4/6/2020  Discharging Provider: Ester Robins MD    Discharge Diagnoses   Abdominal pain  COVID-19 rule out    History of Present Illness   Isael Garcia is a 85 year old male with multiple medical problems including abdominal aortic aneurysm status post graft placement atrial fibrillation, CKD, PVD, leg cramps, hypertension presents to the emergency department with sudden onset of abdominal pain.  Patient was doing apparently well since today morning, he noticed a sudden abdominal pain and the pain was mostly generalized and dull, he felt so terrible and more occasionally cramps present he had one episode of emesis, mostly mucus and the pain was coming and going he had a left inguinal hernia repair but otherwise no other abdominal surgeries, he denied having any diarrhea or blood in his stools, he presented to the emergency department and CT abdomen was obtained which showed type a endoleak, ER physician contacted vascular surgery and the did not recommend any immediate intervention, currently his abdominal pain was not considered related to this endoleak.  Then the patient developed fever, with his abdominal pain and fever for which no particular cause was identified in the CT abdomen covid 19 was suspected, patient was isolated and PCR sent.     ,.  His baseline creatinine is 1.4-1.6, creatinine was 1.7 on arrival today, procalcitonin was 0.11, troponin less than 0.15, lactic acid was 1.6, white count is 18.1 hemoglobin 15.6 hematocrit 45.7, he had a significant lymphopenia,, d-dimer is elevated at 1.2,Mesenteric ischemia is one  of the concerns but no ischemia identified in the CT.on hold.  1 dose was     Hospital Course   Isael Garcia was admitted on 3/30/2020.  The following problems were addressed during his hospitalization:    Principal Problem:    Abdominal pain,  "generalized  Active Problems:    Essential hypertension    CKD (chronic kidney disease) stage 3, GFR 30-59 ml/min (H)    PVD (peripheral vascular disease) (H)    Atrial fibrillation (H)    History of abdominal aortic aneurysm (AAA) repair    Acquired renal artery stenosis (H)    Acute abdominal pain    Isael Garcia is a 85 year old male history of PVD, atrial fibrillation presents to the emergency department with the sudden onset of abdominal pain and fever     Abdominal pain  COVID-19 rule out  Presented to ED with c/o abdominal pain. Awoke at 7 am with \"terrible\" abdominal pain. Generalized and comes in waves. Associated n/v. On presentation with fever at 100.1 and leukocytosis. No h/o abdominal surgeries. CT with endoleak from AAA repair site (thought to be chronic), no other acute findings. UA bland. CXR with hazy opacity at L lung base possibly 2/2 atelectasis or PNA  - surgery has consulted, no acute surgical needs  - blood cultures NTD  - COVID-19 negative, initial hospitalist identifies patient at high suspicion, therefore repeat nasopharyngeal swab for culture testing 4/1/2020 returned negative.  - empiric IV zosyn with Lactate firing 3/30/20. Off antibiotics now.   - c diff negative  - WBC 18.1-->39. with serial decline, 20 1K, today normal at 7.8.   -trevor hematochezia 4/2/20  Abdominal CT cites inflammatory changes primarily descending colon with colonic wall thickening.  Study was limited without contrast due to PHILLIP . Added metronidazole to empiric Zosyn started 3/30/20. Changed to clear liquid diet.  IVF continued, added dextrose.  -4/3/2020 marked clinical improvement, WBC now normal.  Discussed with GI, Dr. Asif who felt presentation consistent with nonocclusive ischemic colitis, however felt that discontinuation of antibiotic reasonable. Advance diet as tolerated.  Follow-up GI with colonoscopy at 1-3 months. Still very poor appetite. EGD today with non bleeding ulcers, see report. " Needs PPI BID, ordered.     PHILLIP  CKD  Baseline creatinine in the mid-to-upper 1's range. 3/31 with creatinine to 2.55. Received contrast 3/30. Lowest recorded bp at 100 systolic otherwise normal to high. No other nephrotoxins. Possible dehydration vs contrast nephropathy  -avoid nephrotoxins including any further contrast.  -CR 4/1/2020 further increased to 2.87.   PVRs to rule out obstructive component negative. Nephrology consult.  Appreciate input. Please see note; felt ATN with metabolic acidosis added bicarb to IVF.   -4/3/2020 CR down to 1.82, DC bicarb and IVF.  Nephrology signed off.  Creatinine is improved to 1.67, improving.     AAA s/p graft placement  PVD  In ED endovascular leak noted on CT. ED d/w Dr. 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 Changed to 2.5 mg BID on discharge.  -His blood pressures have been on the lower side, will continue his home medications.    # Discharge Pain Plan:   - Patient currently has NO PAIN and is not being prescribed pain medications on discharge.    Ester Robins MD, MD    Significant Results and Procedures   None    Pending Results   These results will be followed up by PCP  Unresulted Labs Ordered in the Past 30 Days of this Admission     Date and Time Order Name Status Description    4/2/2020 1628 Blood culture Preliminary     4/2/2020 1628 Blood culture Preliminary           Code Status   Full Code       Primary Care Physician   Madhu Velasquez    Physical Exam   Temp: 98.2  F (36.8  C) Temp src: Oral BP: 121/64 Pulse: 83 Heart Rate: 83 Resp: 14 SpO2: 94 % O2 Device: None (Room air)    Vitals:    04/04/20 0500 04/05/20 0229 04/06/20 0551   Weight: 73.5 kg (162 lb) 74.2 kg (163 lb 9.6 oz) 74.5 kg (164 lb 3.2 oz)      Vital Signs with Ranges  Temp:  [98.2  F (36.8  C)-98.9  F (37.2  C)] 98.2  F (36.8  C)  Pulse:  [75-83] 83  Heart Rate:  [] 83  Resp:  [9-23] 14  BP: (113-143)/(64-98) 121/64  SpO2:  [89 %-96 %] 94 %  I/O last 3 completed shifts:  In: 770 [P.O.:540; I.V.:230]  Out: 830 [Urine:730; Stool:100]    Constitutional:  More alert, talkative;  no acute distress.  Respiratory:     Bilaterally, no rales or wheeze.  Cardiovascular: Regular rate and rhythm, no murmur appreciated.  GI: Soft, nontender, no rebound, guarding or other peritoneal signs.   Skin/Integumen: No erythema, cyanosis or edema    Discharge Disposition   Discharged to home  Condition at discharge: Good    Consultations This Hospital Stay   SURGERY GENERAL IP CONSULT  CARE TRANSITION RN/SW IP CONSULT  NEPHROLOGY IP CONSULT  GASTROENTEROLOGY IP CONSULT  PHYSICAL THERAPY ADULT IP CONSULT  OCCUPATIONAL THERAPY ADULT IP CONSULT  SOCIAL WORK IP CONSULT    Time Spent on this Encounter   I, Ester Robins MD, personally saw the patient today and spent greater than 30 minutes discharging this patient.    Discharge Orders      Reason for your hospital stay    GI bleed     Follow-up and recommended labs and tests     Follow up with primary care provider, Madhu Velasquez, within 7 days for hospital follow- up.  No follow up labs or test are needed.     Activity    Your activity upon discharge: activity as tolerated     Diet    Follow this diet upon discharge: Orders Placed This Encounter      Low Saturated Fat Na <2400 mg     Discharge Medications   Current Discharge Medication List      START taking these medications    Details   pantoprazole (PROTONIX) 40 MG EC tablet Take 1 tablet (40 mg) by mouth 2 times daily  Qty: 60 tablet, Refills: 0    Associated Diagnoses: Abdominal pain, generalized         CONTINUE these medications which have CHANGED    Details   apixaban ANTICOAGULANT (ELIQUIS) 2.5 MG tablet Take 1 tablet (2.5 mg) by mouth 2 times daily  Qty: 60 tablet,  Refills: 0    Associated Diagnoses: Abdominal pain, generalized         CONTINUE these medications which have NOT CHANGED    Details   acetaminophen (TYLENOL) 500 MG tablet Take 500 mg by mouth 2 times daily       amLODIPine (NORVASC) 5 MG tablet Take 1 tablet (5 mg) by mouth daily  Qty: 90 tablet, Refills: 3    Comments: Profile only. Pt doesn't require refill at this time  Associated Diagnoses: Essential hypertension      atorvastatin (LIPITOR) 10 MG tablet Take 1 tablet (10 mg) by mouth daily  Qty: 90 tablet, Refills: 3    Comments: Profile only. Pt doesn't require refill at this time  Associated Diagnoses: Hyperlipidemia LDL goal <70      Cholecalciferol (VITAMIN D) 2000 UNITS tablet Take 2,000 Units by mouth daily  Qty: 100 tablet, Refills: 3    Associated Diagnoses: Vitamin D deficiency      metoprolol tartrate (LOPRESSOR) 25 MG tablet TAKE ONE-HALF (1/2) TABLET TWICE A DAY  Qty: 90 tablet, Refills: 3    Comments: Profile only. Pt doesn't require refill at this time  Associated Diagnoses: Atrial fibrillation, unspecified type (H)      Misc Natural Products (OSTEO BI-FLEX ADV TRIPLE ST) TABS Take 1 tablet by mouth daily      multivitamin, therapeutic with minerals (MULTI-VITAMIN) TABS Take 1 tablet by mouth daily          STOP taking these medications       amoxicillin (AMOXIL) 500 MG capsule Comments:   Reason for Stopping:             Allergies   Allergies   Allergen Reactions     Lisinopril Cramps     Leg cramping     Data   Results for orders placed or performed during the hospital encounter of 03/30/20   CTA Abdomen Pelvis with Contrast    Narrative    CTA ABDOMEN AND PELVIS WITH CONTRAST 3/30/2020 12:38 PM    HISTORY: 85-year-old patient with history of abdominal aortic aneurysm  and endovascular repair. Patient is with history of atrial  fibrillation and chronic Eliquis use. Concern for mesenteric ischemia.    COMPARISON: None.    TECHNIQUE: Multiplanar multiformatted CTA images obtained from the  lung  bases through the abdomen and pelvis. This was performed after  uneventful administration of Isovue 370 intravenous contrast given for  a total of 80 mL. Radiation dose for this scan was reduced using  automated exposure control, adjustment of the mA and/or kV according  to patient size, or iterative reconstruction technique. Three-D  reformatted images created at a separate workstation.    FINDINGS: Minimal bibasilar atelectasis. Heart size is normal. The  liver, gallbladder, spleen, adrenal glands, and pancreas are  unremarkable. No intraperitoneal fluid or air. Both kidneys are  normally perfused. No hydronephrosis or nephrolithiasis. The bladder  is distended and unremarkable. Appendix is normal in size and  appearance. No dilated loops of bowel. Mild amount of stool noted  throughout the colon. Diffuse intervertebral disc space narrowing in  the mid to lower lumbar spine. No acute osseous abnormality. No  compression deformity.    The visible descending thoracic aorta is patent. Mild to moderate  extrinsic compression and stenosis at the proximal celiac axis, though  suspect this is with respiratory variation. Superior mesenteric artery  is patent. Both renal arteries are patent, though with moderate to  severe stenosis in the proximal left renal artery over a distance of  1.2 cm. Aortobiiliac stent graft is identified. Infrarenal abdominal  aorta is 3.2 cm AP x 2.8 cm transverse. Clearly, a type I endoleak is  identified with incomplete seal at the proximal aspect. Uncertain of  aneurysmal size relative to previous exams as no previous exams  available. The origin of the inferior mesenteric artery is occluded,  though reconstitution of the inferior mesenteric artery via  collateralized vessels from the SMA, as is typical with aortic stent  grafts. Both internal iliac arteries are patent. Stent is noted in the  left external iliac artery, both of which are patent, native on the  right. Both common femoral  arteries are patent. No obvious bowel wall  thickening.      Impression    IMPRESSION:  1. Aortobiiliac stent graft with clear type IA endoleak. Abdominal  aneurysmal sac is 3.2 cm AP x 2.8 cm transverse. Uncertain if this has  changed as no previous radiographs available for comparison. No fat  stranding or inflammatory change surrounding the abdominal aorta.  2. Visceral arteries are patent, do not suspect mesenteric ischemia.  3. Moderate-to-severe stenosis in the proximal left renal artery,  incidentally noted.  4. No obvious dilated loops of bowel or source for abdominal pain.  Mild-to-moderate amount of stool noted throughout portions of the  colon.    JULIO SMITH MD   XR Chest Port 1 View    Narrative    CHEST ONE VIEW PORTABLE   3/30/2020 4:55 PM     HISTORY:  Fever.    COMPARISON: 6/16/2019.      Impression    IMPRESSION: Hazy opacity at the left lung base could be related to  atelectasis or pneumonia. There is mild scarring and/or atelectasis at  the right lung base medially, unchanged. No pneumothorax. Heart size  appears stable. Pulmonary vascularity is within normal limits.     ISABELLA SCHUSTER MD   XR Chest Port 1 View    Narrative    EXAM: XR CHEST PORT 1 VW  LOCATION: [facility]  DATE/TIME: 3/31/2020 10:00 PM    INDICATION: Shortness of breath.  COMPARISON: None.    FINDINGS: Upright portable chest. The heart size is normal. The right hemidiaphragm is elevated. There are mild bibasilar infiltrates. Lungs otherwise clear. No pneumothorax.      Impression    IMPRESSION: Mild bibasilar atelectasis or scarring.   CT Abdomen Pelvis w/o Contrast    Narrative    CT ABDOMEN AND PELVIS WITHOUT CONTRAST  4/2/2020 2:43 PM    HISTORY:  Abdominal pain, hematochezia.    TECHNIQUE: Scans obtained from the diaphragm through the pelvis  without oral or IV contrast. Radiation dose for this scan was reduced  using automated exposure control, adjustment of the mA and/or kV  according to patient size,  or iterative reconstruction technique.    COMPARISON:  CT abdomen and pelvis dated 3/30/2020.    FINDINGS: There are new small bilateral pleural fluid collections with  adjacent compressive atelectasis versus infiltrates in the bilateral  lung bases. Calcified granuloma in the left lung base is again noted.  Coronary artery and aortic calcifications. Subcarinal calcified lymph  nodes are also noted. Visualized portions of the lung bases and  mediastinal contents are otherwise unremarkable. No aggressive osseous  lesions or acute osseous fractures are identified. Degenerative  changes are noted in the spine and hips.    Multiple calcifications are again seen in the spleen consistent with  chronic granulomatous disease. The liver, gallbladder, pancreas,  spleen, bilateral adrenal glands and bilateral kidneys are otherwise  normal in appearance for a noncontrast CT. No hydronephrosis,  nephrolithiasis, hydroureter, or ureteral calculus is identified.  Urinary bladder is partially distended but otherwise unremarkable.  Prostate gland is mildly irregular but otherwise unremarkable.    No adenopathy or free air is seen in the peritoneal cavity.  Intraluminal abdominal aortic stent graft is again seen. There is  ectasia of the infrarenal abdominal aorta measuring up to 2.8 cm in  greatest cross-sectional dimension. This was also previously seen.  Small amount of free fluid in the pelvic recesses is noted and appears  new since the prior study.    There appears to be mild stranding around the descending colon from  the splenic flexure distally to the junction with the sigmoid colon.  There is also mild stranding around the proximal sigmoid colon. There  appears to be fluid filling of the colon in these regions. Wall  thickening is difficult to evaluate without oral or IV contrast. No  definite diverticula are seen in this region. This is most consistent  with colitis of inflammatory, ischemic, or infectious etiology.  The  colon is otherwise of normal caliber. No other abnormal wall  thickening of the colon is seen. Appendix is not well seen. No  pericecal inflammatory change to suggest acute appendicitis. Small  bowel is grossly of normal caliber. The stomach is mostly decompressed  around a small amount of fluid and ingested material but is otherwise  unremarkable.      Impression    IMPRESSION:  1. Inflammatory changes mostly seen around the descending colon with  probable colonic wall thickening in this region. The wall is difficult  to evaluate without oral or IV contrast. Differential diagnosis  includes colitis of ischemic, inflammatory or infectious etiology.  This corresponds with patient's history of hematochezia. The  inflammatory changes in this region were not definitely seen on the  prior study.  2. Endoluminal stent graft of the abdominal aorta is again noted. Mild  ectasia of the infrarenal abdominal aorta is again seen.  3. Small amount of free fluid in the pelvis is new and could be  reactive to the colonic process.  4. New small bilateral pleural fluid collections with adjacent  compressive atelectasis or infiltrates in the bilateral dependent  lungs.  5. Evidence of chronic granulomatous disease of the chest and spleen  are again seen.    VIVIENNE MEDEL MD

## 2020-04-06 NOTE — PLAN OF CARE
Discharge Planner PT   Patient plan for discharge: Home per patient  Current status: IP PT orders received, chart reviewed and evaluation attempted. Pt reports no mobility concerns at this time and patient is requesting to cancel PT orders. Will complete orders per patient request.   Barriers to return to prior living situation: Defer to medical team  Recommendations for discharge: Defer to medical team  Rationale for recommendations: Will cancel/complete IP PT orders/evaluation per patient request.       Entered by: Rachel Hoyt 04/06/2020 8:43 AM

## 2020-04-06 NOTE — PLAN OF CARE
Neuro A&O x4  Temp: 98.4  F (36.9  C) Temp src: Oral BP: 132/84   Heart Rate: 103 Resp: 18 SpO2: 96 % O2 Device: None (Room air)    Tele/Cardiac A-Fib CVR/RVR with activity  Respiratory RA  Activity Independent  Pain abdominal cramps - Tylenol given  Drips SL  Drains/Tubes PIV  Skin bruised  CI/ voiding and multiple loose stools with streaks of blood  Aggression color green  Plan EGD in AM - keep NPO  Miscellaneous

## 2020-04-07 ENCOUNTER — TELEPHONE (OUTPATIENT)
Dept: INTERNAL MEDICINE | Facility: CLINIC | Age: 85
End: 2020-04-07

## 2020-04-07 NOTE — TELEPHONE ENCOUNTER
Follow-up and recommended labs and tests    Follow up with primary care provider, Madhu Velasquez, within 7 days for hospital follow- up.  No follow up labs or test are needed.      Your activity upon discharge: activity as tolerated      Follow this diet upon discharge: Orders Placed This Encounter      Low Saturated Fat Na <2400 mg        ED / Discharge Outreach Protocol    Patient Contact    Attempt # 1    Was call answered?  No.  Phone Line Busy. Will try again later.    Neelam DENTN, RN, PHN

## 2020-04-08 LAB
BACTERIA SPEC CULT: NO GROWTH
BACTERIA SPEC CULT: NO GROWTH
COPATH REPORT: NORMAL
Lab: NORMAL
Lab: NORMAL
SPECIMEN SOURCE: NORMAL
SPECIMEN SOURCE: NORMAL

## 2020-04-08 NOTE — TELEPHONE ENCOUNTER
ED / Discharge Outreach Protocol    Patient Contact    Attempt # 2    Was call answered?  No.  Unable to leave message.    Neelam DENTN, RN, PHN

## 2020-04-09 NOTE — TELEPHONE ENCOUNTER
ED / Discharge Outreach Protocol    Patient Contact    Attempt # 3    Was call answered?  No.  Unable to leave message.    Neelam DENTN, RN, PHN

## 2020-04-10 ENCOUNTER — VIRTUAL VISIT (OUTPATIENT)
Dept: INTERNAL MEDICINE | Facility: CLINIC | Age: 85
End: 2020-04-10
Payer: COMMERCIAL

## 2020-04-10 DIAGNOSIS — I48.91 ATRIAL FIBRILLATION, UNSPECIFIED TYPE (H): ICD-10-CM

## 2020-04-10 DIAGNOSIS — K29.00 ACUTE GASTRITIS WITHOUT HEMORRHAGE, UNSPECIFIED GASTRITIS TYPE: ICD-10-CM

## 2020-04-10 DIAGNOSIS — N18.30 CKD (CHRONIC KIDNEY DISEASE) STAGE 3, GFR 30-59 ML/MIN (H): ICD-10-CM

## 2020-04-10 DIAGNOSIS — K55.9 ISCHEMIC COLITIS (H): Primary | ICD-10-CM

## 2020-04-10 DIAGNOSIS — D64.9 ANEMIA, UNSPECIFIED TYPE: ICD-10-CM

## 2020-04-10 PROCEDURE — 99214 OFFICE O/P EST MOD 30 MIN: CPT | Mod: TEL | Performed by: INTERNAL MEDICINE

## 2020-04-10 NOTE — PATIENT INSTRUCTIONS
"Continue current medications except change Pantoprazole to 40mg tab, 1 tab daily for total 2 months   Call our appointment desk at [phone] or use [employer] to schedule a lab appointment again fasting  in  Mid June  For fasting labs, please refrain from eating for 8 hours or more.  Be sure to  drink water and take your  medications the day of the test.   Will need a colonoscopy in early/mid June if elective procedures being done. [facility] will call you in early June after speaking with MAXIMILIANO PEREYRA to see if they are doing non-emergennt colonoscopies at that time or if needs to be delayed further due to coronavirus precautions.  Maintain good hydration so that urine generally looks fairly clear  May add some Metamucil 1tbsp with glass water or 2 wafers daily as needed if stools loose to act as natural \"thickener\"  Call clinic if recurrent abdominal pain issues  "

## 2020-04-10 NOTE — PROGRESS NOTES
"Subjective     Isael Garcia is a 85 year old male who is being evaluated via a billable telephone visit.      The patient has been notified of following:     \"This telephone visit will be conducted via a call between you and your physician/provider. We have found that certain health care needs can be provided without the need for a physical exam.  This service lets us provide the care you need with a short phone conversation.  If a prescription is necessary we can send it directly to your pharmacy.  If lab work is needed we can place an order for that and you can then stop by our lab to have the test done at a later time.    Telephone visits are billed at different rates depending on your insurance coverage. During this emergency period, for some insurers they may be billed the same as an in-person visit.  Please reach out to your insurance provider with any questions.    If during the course of the call the physician/provider feels a telephone visit is not appropriate, you will not be charged for this service.\"    Patient has given verbal consent for Telephone visit?  Yes    How would you like to obtain your AVS? Mail a copy    Isael Garcia complains of   Chief Complaint   Patient presents with     Hospital F/U     Discussion     Patrient wishes to discuss the following medications Eliquis and Pantoprazole.        ALLERGIES  Lisinopril    Hospital Follow-up Visit:    Hospital/Nursing Home/IP Rehab Facility: [facility]  Date of Admission: 03/30/2020  Date of Discharge: 04/06/2020  Reason(s) for Admission: Abdominal Pain            Problems taking medications regularly:  None       Medication changes since discharge: None       Problems adhering to non-medication therapy:  None    Summary of hospitalization:  [facility] discharge summary reviewed  Diagnostic Tests/Treatments reviewed.  Follow up needed:  Future colonoscopy  Other Healthcare Providers Involved in " Patient s Care:          GI, Vascular surgery  Update since discharge: improved.     Post Discharge Medication Reconciliation: discharge medications reconciled and changed, per note/orders (see AVS).  Plan of care communicated with patient and caregiver (Martine)     Coding guidelines for this visit:  Type of Medical   Decision Making Face-to-Face Visit       within 7 Days of discharge Face-to-Face Visit        within 14 days of discharge   Moderate Complexity [id number]   High Complexity [id number]          Discharge summary reviewed. Part of the summary as below:      Patient    Due to the current impact of the Covid19 virus and recommendations by [facility] administration, CDC, etc to limit  clinic visits and pt exposure risk, was recommend pt proceed with   virtual phone visit to address acute/stable  medical needs with plan to defer other non-acute health maintenance issues/exam to a future date when less self-isolation is required.      [facility]     Discharge Summary  Hospitalist     Date of Admission:  3/30/2020  Date of Discharge:  4/6/2020  Discharging Provider: Ester Robins MD        Discharge Diagnoses     Abdominal pain  COVID-19 rule out        History of Present Illness     Isael Garcia is a 85 year old male with multiple medical problems including abdominal aortic aneurysm status post graft placement atrial fibrillation, CKD, PVD, leg cramps, hypertension presents to the emergency department with sudden onset of abdominal pain.  Patient was doing apparently well since today morning, he noticed a sudden abdominal pain and the pain was mostly generalized and dull, he felt so terrible and more occasionally cramps present he had one episode of emesis, mostly mucus and the pain was coming and going he had a left inguinal hernia repair but otherwise no other abdominal surgeries, he denied having any diarrhea or blood in his stools, he presented to the emergency department and CT  "abdomen was obtained which showed type a endoleak, ER physician contacted vascular surgery and the did not recommend any immediate intervention, currently his abdominal pain was not considered related to this endoleak.  Then the patient developed fever, with his abdominal pain and fever for which no particular cause was identified in the CT abdomen covid 19 was suspected, patient was isolated and PCR sent.     ,.  His baseline creatinine is 1.4-1.6, creatinine was 1.7 on arrival today, procalcitonin was 0.11, troponin less than 0.15, lactic acid was 1.6, white count is 18.1 hemoglobin 15.6 hematocrit 45.7, he had a significant lymphopenia,, d-dimer is elevated at 1.2,Mesenteric ischemia is one  of the concerns but no ischemia identified in the CT.on hold.  1 dose was         Hospital Course     Isael Garcia was admitted on 3/30/2020.  The following problems were addressed during his hospitalization:     Principal Problem:    Abdominal pain, generalized  Active Problems:    Essential hypertension    CKD (chronic kidney disease) stage 3, GFR 30-59 ml/min (H)    PVD (peripheral vascular disease) (H)    Atrial fibrillation (H)    History of abdominal aortic aneurysm (AAA) repair    Acquired renal artery stenosis (H)    Acute abdominal pain    Isael Garcia is a 85 year old male history of PVD, atrial fibrillation presents to the emergency department with the sudden onset of abdominal pain and fever     Abdominal pain  COVID-19 rule out  Presented to ED with c/o abdominal pain. Awoke at 7 am with \"terrible\" abdominal pain. Generalized and comes in waves. Associated n/v. On presentation with fever at 100.1 and leukocytosis. No h/o abdominal surgeries. CT with endoleak from AAA repair site (thought to be chronic), no other acute findings. UA bland. CXR with hazy opacity at L lung base possibly 2/2 atelectasis or PNA  - surgery has consulted, no acute surgical needs  - blood cultures NTD  - COVID-19 " negative, initial hospitalist identifies patient at high suspicion, therefore repeat nasopharyngeal swab for culture testing 4/1/2020 returned negative.  - empiric IV zosyn with Lactate firing 3/30/20. Off antibiotics now.   - c diff negative  - WBC 18.1-->39. with serial decline, 20 1K, today normal at 7.8.   -trevor hematochezia 4/2/20  Abdominal CT cites inflammatory changes primarily descending colon with colonic wall thickening.  Study was limited without contrast due to PHILLIP . Added metronidazole to empiric Zosyn started 3/30/20. Changed to clear liquid diet.  IVF continued, added dextrose.  -4/3/2020 marked clinical improvement, WBC now normal.  Discussed with GI, Dr. Asif who felt presentation consistent with nonocclusive ischemic colitis, however felt that discontinuation of antibiotic reasonable. Advance diet as tolerated.  Follow-up GI with colonoscopy at 1-3 months. Still very poor appetite. EGD today with non bleeding ulcers, see report. Needs PPI BID, ordered.     PHILLIP  CKD  Baseline creatinine in the mid-to-upper 1's range. 3/31 with creatinine to 2.55. Received contrast 3/30. Lowest recorded bp at 100 systolic otherwise normal to high. No other nephrotoxins. Possible dehydration vs contrast nephropathy  -avoid nephrotoxins including any further contrast.  -CR 4/1/2020 further increased to 2.87.   PVRs to rule out obstructive component negative. Nephrology consult.  Appreciate input. Please see note; felt ATN with metabolic acidosis added bicarb to IVF.   -4/3/2020 CR down to 1.82, DC bicarb and IVF.  Nephrology signed off.  Creatinine is improved to 1.67, improving.     AAA s/p graft placement  PVD  In ED endovascular leak noted on CT. ED d/w Dr. 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 Changed to 2.5 mg BID on discharge.  -His blood pressures have been on the lower side, will continue his home medications.     # Discharge Pain Plan:   - Patient currently has NO PAIN and is not being prescribed pain medications on discharge.     Ester Robins MD, MD        Significant Results and Procedures     None        Pending Results     These results will be followed up by PCP          Unresulted Labs Ordered in the Past 30 Days of this Admission      Date and Time Order Name Status Description     4/2/2020 1628 Blood culture Preliminary       4/2/2020 1628 Blood culture Preliminary                 Code Status      Full Code          Primary Care Physician      Madhu Velasquez        Physical Exam     Temp: 98.2  F (36.8  C) Temp src: Oral BP: 121/64 Pulse: 83 Heart Rate: 83 Resp: 14 SpO2: 94 % O2 Device: None (Room air)          Vitals:     04/04/20 0500 04/05/20 0229 04/06/20 0551   Weight: 73.5 kg (162 lb) 74.2 kg (163 lb 9.6 oz) 74.5 kg (164 lb 3.2 oz)     Vital Signs with Ranges  Temp:  [98.2  F (36.8  C)-98.9  F (37.2  C)] 98.2  F (36.8  C)  Pulse:  [75-83] 83  Heart Rate:  [] 83  Resp:  [9-23] 14  BP: (113-143)/(64-98) 121/64  SpO2:  [89 %-96 %] 94 %  I/O last 3 completed shifts:  In: 770 [P.O.:540; I.V.:230]  Out: 830 [Urine:730; Stool:100]     Constitutional:  More alert, talkative;  no acute distress.  Respiratory:     Bilaterally, no rales or wheeze.  Cardiovascular: Regular rate and rhythm, no murmur appreciated.  GI: Soft, nontender, no rebound, guarding or other peritoneal signs.   Skin/Integumen: No erythema, cyanosis or edema        Discharge Disposition      Discharged to home  Condition at discharge: Good        Consultations This Hospital Stay      SURGERY GENERAL IP CONSULT  CARE TRANSITION RN/SW IP CONSULT  NEPHROLOGY IP CONSULT  GASTROENTEROLOGY IP CONSULT  PHYSICAL THERAPY ADULT IP CONSULT  OCCUPATIONAL THERAPY ADULT IP CONSULT  SOCIAL  WORK IP CONSULT        Time Spent on this Encounter      IEster MD, personally saw the patient today and spent greater than 30 minutes discharging this patient.        Discharge Orders            Reason for your hospital stay     GI bleed          Follow-up and recommended labs and tests      Follow up with primary care provider, Madhu Velasquez, within 7 days for hospital follow- up.  No follow up labs or test are needed.          Activity     Your activity upon discharge: activity as tolerated          Diet     Follow this diet upon discharge: Orders Placed This Encounter      Low Saturated Fat Na <2400 mg        Discharge Medications           Current Discharge Medication List             START taking these medications     Details   pantoprazole (PROTONIX) 40 MG EC tablet Take 1 tablet (40 mg) by mouth 2 times daily  Qty: 60 tablet, Refills: 0     Associated Diagnoses: Abdominal pain, generalized                 CONTINUE these medications which have CHANGED     Details   apixaban ANTICOAGULANT (ELIQUIS) 2.5 MG tablet Take 1 tablet (2.5 mg) by mouth 2 times daily  Qty: 60 tablet, Refills: 0     Associated Diagnoses: Abdominal pain, generalized                 CONTINUE these medications which have NOT CHANGED     Details   acetaminophen (TYLENOL) 500 MG tablet Take 500 mg by mouth 2 times daily        amLODIPine (NORVASC) 5 MG tablet Take 1 tablet (5 mg) by mouth daily  Qty: 90 tablet, Refills: 3     Comments: Profile only. Pt doesn't require refill at this time  Associated Diagnoses: Essential hypertension       atorvastatin (LIPITOR) 10 MG tablet Take 1 tablet (10 mg) by mouth daily  Qty: 90 tablet, Refills: 3     Comments: Profile only. Pt doesn't require refill at this time  Associated Diagnoses: Hyperlipidemia LDL goal <70       Cholecalciferol (VITAMIN D) 2000 UNITS tablet Take 2,000 Units by mouth daily  Qty: 100 tablet, Refills: 3     Associated Diagnoses: Vitamin D deficiency       metoprolol tartrate  (LOPRESSOR) 25 MG tablet TAKE ONE-HALF (1/2) TABLET TWICE A DAY  Qty: 90 tablet, Refills: 3     Comments: Profile only. Pt doesn't require refill at this time  Associated Diagnoses: Atrial fibrillation, unspecified type (H)       Misc Natural Products (OSTEO BI-FLEX ADV TRIPLE ST) TABS Take 1 tablet by mouth daily       multivitamin, therapeutic with minerals (MULTI-VITAMIN) TABS Take 1 tablet by mouth daily                 STOP taking these medications         amoxicillin (AMOXIL) 500 MG capsule Comments:   Reason for Stopping:              I have reviewed the nursing note as documented above.   See below for other information/data  and my personal notes capturing the substance of my conversation with the patient.       HPI:   Feeling much better. Denies abdominal pain, fever or chills. Stools still slightly loose but improving and total of 2 in past 24 hrs. Passing gas also.  Appetite improved.  Denies chest pain or shortness of breath.  Discharge summary shows patient to take pantoprazole 1 tablet twice a day for 30 days.  However, review of patient's endoscopy report shows that he is to take 1 tablet daily for 60 days.  Following flexible sigmoidoscopy, it was recommended patient have a repeat colonoscopy in 6 to 8 weeks to follow-up on ischemic colitis changes.  History of CKD.  Renal function was near baseline at the time of discharge.  Mild anemia consistent with the brief hematochezia present from ischemic colitis.  Patient denies seeing any blood in the stools now.  Last hemoglobin was 11.7     Additional ROS:   Constitutional, HEENT, Cardiovascular, Pulmonary, GI and , Neuro, MSK and Psych review of systems/symptoms are otherwise negative or unchanged from previous, except as noted above.      ASSESSMENT:   1. Ischemic colitis (H)  Symptomatically resolved.  Denies any current abdominal pain and denies seeing blood in the stools.  Will need follow-up colonoscopy in 6 to 8 weeks per discharge summary.   Given coronavirus limitations, will plan for this in approximately mid June.  Metamucil as needed to thicken stool. C diff studies negative    2. Acute gastritis without hemorrhage, unspecified gastritis type  Denies any current epigastric pain.  H. pylori biopsy of the stomach was negative.  Per recommendations from endoscopy, patient to take pantoprazole 40 mg daily for 2 months rather than twice daily as written on discharge instructions    3. CKD (chronic kidney disease) stage 3, GFR 30-59 ml/min (H)  Near baseline following hydration.  Patient eating and drinking well now.  Recheck lab in early/mid June as previously ordered    4. Atrial fibrillation, unspecified type (H)  Patient has been restarted on Eliquis with history of A. fib.  Denies seeing blood in his stools.  Denies sense of palpitations    5. Anemia, unspecified type  Mild and related to previous hematochezia.  That has resolved.  Will recheck hemoglobin again in early June with other previously ordered labs      PLAN:  Continue current medications except change Pantoprazole to 40mg tab, 1 tab daily for total 2 months   Call our appointment desk at [phone] or use [employer] to schedule a lab appointment again fasting  in  Mid June  For fasting labs, please refrain from eating for 8 hours or more.  Be sure to  drink water and take your  medications the day of the test.   Will need a colonoscopy in early/mid June if elective procedures being done. [facility] will call you in early June after speaking with [facility] to see if they are doing non-emergennt colonoscopies at that time or if needs to be delayed further due to coronavirus precautions (staff message placed in patient's chart to release in early June minding physician to contact patient and place colonoscopy referral.  Maintain good hydration so that urine generally looks fairly clear  May add some Metamucil 1tbsp with glass water or 2 wafers daily as needed if stools loose to act as natural  "\"thickener\"  Call clinic if recurrent abdominal pain issues    Phone call contact time  Call Started at 3:24 pm  Call Ended at 3:51pm  Total minutes: 27 min. Thjs included going through pt's entgre med list drug by drug to be sure meds were being taken  Correctly include Pantoprazole dose change    (Chart documentation was completed, in part, with [employer] voice-recognition software. Even though reviewed, some grammatical, spelling, and word errors may remain.)    Madhu Velasquez MD  Internal Medicine Department  [facility]      "

## 2020-04-11 PROBLEM — K55.9 ISCHEMIC COLITIS (H): Status: ACTIVE | Noted: 2020-04-11

## 2020-04-11 PROBLEM — R10.84 ABDOMINAL PAIN, GENERALIZED: Status: RESOLVED | Noted: 2020-03-30 | Resolved: 2020-04-11

## 2020-04-11 PROBLEM — D64.9 ANEMIA, UNSPECIFIED TYPE: Status: ACTIVE | Noted: 2020-04-11

## 2020-04-11 PROBLEM — R10.9 ACUTE ABDOMINAL PAIN: Status: RESOLVED | Noted: 2020-03-30 | Resolved: 2020-04-11

## 2020-04-11 RX ORDER — PANTOPRAZOLE SODIUM 40 MG/1
40 TABLET, DELAYED RELEASE ORAL DAILY
Qty: 60 TABLET | Refills: 0
Start: 2020-04-11 | End: 2020-07-27

## 2020-04-13 ENCOUNTER — HOSPITAL ENCOUNTER (OUTPATIENT)
Facility: CLINIC | Age: 85
Setting detail: OBSERVATION
Discharge: HOME | End: 2020-04-14
Attending: PHYSICIAN ASSISTANT | Admitting: INTERNAL MEDICINE
Payer: COMMERCIAL

## 2020-04-13 ENCOUNTER — NURSE TRIAGE (OUTPATIENT)
Dept: INTERNAL MEDICINE | Facility: CLINIC | Age: 85
End: 2020-04-13

## 2020-04-13 ENCOUNTER — APPOINTMENT (OUTPATIENT)
Dept: CT IMAGING | Facility: CLINIC | Age: 85
End: 2020-04-13
Attending: PHYSICIAN ASSISTANT
Payer: COMMERCIAL

## 2020-04-13 DIAGNOSIS — R10.84 ABDOMINAL PAIN, GENERALIZED: ICD-10-CM

## 2020-04-13 PROBLEM — R10.9 ABDOMINAL PAIN: Status: ACTIVE | Noted: 2020-04-13

## 2020-04-13 LAB
ANION GAP SERPL CALCULATED.3IONS-SCNC: 6 MMOL/L (ref 3–14)
BASOPHILS # BLD AUTO: 0 10E9/L (ref 0–0.2)
BASOPHILS NFR BLD AUTO: 0.4 %
BUN SERPL-MCNC: 13 MG/DL (ref 7–30)
CALCIUM SERPL-MCNC: 8.6 MG/DL (ref 8.5–10.1)
CHLORIDE SERPL-SCNC: 110 MMOL/L (ref 94–109)
CO2 SERPL-SCNC: 24 MMOL/L (ref 20–32)
CREAT SERPL-MCNC: 1.56 MG/DL (ref 0.66–1.25)
DIFFERENTIAL METHOD BLD: ABNORMAL
EOSINOPHIL # BLD AUTO: 0.1 10E9/L (ref 0–0.7)
EOSINOPHIL NFR BLD AUTO: 1.6 %
ERYTHROCYTE [DISTWIDTH] IN BLOOD BY AUTOMATED COUNT: 14.1 % (ref 10–15)
GFR SERPL CREATININE-BSD FRML MDRD: 40 ML/MIN/{1.73_M2}
GLUCOSE SERPL-MCNC: 96 MG/DL (ref 70–99)
HCT VFR BLD AUTO: 39.5 % (ref 40–53)
HGB BLD-MCNC: 13 G/DL (ref 13.3–17.7)
IMM GRANULOCYTES # BLD: 0 10E9/L (ref 0–0.4)
IMM GRANULOCYTES NFR BLD: 0.3 %
LACTATE BLD-SCNC: 0.9 MMOL/L (ref 0.7–2)
LACTATE BLD-SCNC: 2.1 MMOL/L (ref 0.7–2)
LIPASE SERPL-CCNC: 68 U/L (ref 73–393)
LYMPHOCYTES # BLD AUTO: 1 10E9/L (ref 0.8–5.3)
LYMPHOCYTES NFR BLD AUTO: 15 %
MCH RBC QN AUTO: 32 PG (ref 26.5–33)
MCHC RBC AUTO-ENTMCNC: 32.9 G/DL (ref 31.5–36.5)
MCV RBC AUTO: 97 FL (ref 78–100)
MONOCYTES # BLD AUTO: 0.6 10E9/L (ref 0–1.3)
MONOCYTES NFR BLD AUTO: 9 %
NEUTROPHILS # BLD AUTO: 5 10E9/L (ref 1.6–8.3)
NEUTROPHILS NFR BLD AUTO: 73.7 %
NRBC # BLD AUTO: 0 10*3/UL
NRBC BLD AUTO-RTO: 0 /100
PLATELET # BLD AUTO: 356 10E9/L (ref 150–450)
POTASSIUM SERPL-SCNC: 3.7 MMOL/L (ref 3.4–5.3)
RBC # BLD AUTO: 4.06 10E12/L (ref 4.4–5.9)
SARS-COV-2 RNA SPEC QL NAA+PROBE: NORMAL
SODIUM SERPL-SCNC: 140 MMOL/L (ref 133–144)
SPECIMEN SOURCE: NORMAL
TROPONIN I SERPL-MCNC: <0.015 UG/L (ref 0–0.04)
WBC # BLD AUTO: 6.8 10E9/L (ref 4–11)

## 2020-04-13 PROCEDURE — 83605 ASSAY OF LACTIC ACID: CPT | Performed by: EMERGENCY MEDICINE

## 2020-04-13 PROCEDURE — 87635 SARS-COV-2 COVID-19 AMP PRB: CPT | Performed by: PHYSICIAN ASSISTANT

## 2020-04-13 PROCEDURE — 74177 CT ABD & PELVIS W/CONTRAST: CPT

## 2020-04-13 PROCEDURE — 25000128 H RX IP 250 OP 636: Performed by: PHYSICIAN ASSISTANT

## 2020-04-13 PROCEDURE — 83690 ASSAY OF LIPASE: CPT | Performed by: EMERGENCY MEDICINE

## 2020-04-13 PROCEDURE — 25800030 ZZH RX IP 258 OP 636: Performed by: PHYSICIAN ASSISTANT

## 2020-04-13 PROCEDURE — 25000125 ZZHC RX 250: Performed by: PHYSICIAN ASSISTANT

## 2020-04-13 PROCEDURE — 83605 ASSAY OF LACTIC ACID: CPT | Mod: 91 | Performed by: PHYSICIAN ASSISTANT

## 2020-04-13 PROCEDURE — G0378 HOSPITAL OBSERVATION PER HR: HCPCS

## 2020-04-13 PROCEDURE — 96360 HYDRATION IV INFUSION INIT: CPT | Mod: 59

## 2020-04-13 PROCEDURE — 99220 ZZC INITIAL OBSERVATION CARE,LEVL III: CPT | Performed by: PHYSICIAN ASSISTANT

## 2020-04-13 PROCEDURE — 25000132 ZZH RX MED GY IP 250 OP 250 PS 637: Performed by: PHYSICIAN ASSISTANT

## 2020-04-13 PROCEDURE — 84484 ASSAY OF TROPONIN QUANT: CPT | Performed by: EMERGENCY MEDICINE

## 2020-04-13 PROCEDURE — 85025 COMPLETE CBC W/AUTO DIFF WBC: CPT | Performed by: EMERGENCY MEDICINE

## 2020-04-13 PROCEDURE — 36415 COLL VENOUS BLD VENIPUNCTURE: CPT | Performed by: PHYSICIAN ASSISTANT

## 2020-04-13 PROCEDURE — 80048 BASIC METABOLIC PNL TOTAL CA: CPT | Performed by: EMERGENCY MEDICINE

## 2020-04-13 PROCEDURE — 99285 EMERGENCY DEPT VISIT HI MDM: CPT | Mod: 25

## 2020-04-13 RX ORDER — LIDOCAINE 40 MG/G
CREAM TOPICAL
Status: DISCONTINUED | OUTPATIENT
Start: 2020-04-13 | End: 2020-04-14 | Stop reason: HOSPADM

## 2020-04-13 RX ORDER — ONDANSETRON 2 MG/ML
4 INJECTION INTRAMUSCULAR; INTRAVENOUS EVERY 6 HOURS PRN
Status: DISCONTINUED | OUTPATIENT
Start: 2020-04-13 | End: 2020-04-14 | Stop reason: HOSPADM

## 2020-04-13 RX ORDER — ATORVASTATIN CALCIUM 10 MG/1
10 TABLET, FILM COATED ORAL EVERY EVENING
Status: DISCONTINUED | OUTPATIENT
Start: 2020-04-13 | End: 2020-04-14 | Stop reason: HOSPADM

## 2020-04-13 RX ORDER — NALOXONE HYDROCHLORIDE 0.4 MG/ML
.1-.4 INJECTION, SOLUTION INTRAMUSCULAR; INTRAVENOUS; SUBCUTANEOUS
Status: DISCONTINUED | OUTPATIENT
Start: 2020-04-13 | End: 2020-04-14 | Stop reason: HOSPADM

## 2020-04-13 RX ORDER — ONDANSETRON 4 MG/1
4 TABLET, ORALLY DISINTEGRATING ORAL EVERY 6 HOURS PRN
Status: DISCONTINUED | OUTPATIENT
Start: 2020-04-13 | End: 2020-04-14 | Stop reason: HOSPADM

## 2020-04-13 RX ORDER — SODIUM CHLORIDE 9 MG/ML
INJECTION, SOLUTION INTRAVENOUS CONTINUOUS
Status: ACTIVE | OUTPATIENT
Start: 2020-04-13 | End: 2020-04-14

## 2020-04-13 RX ORDER — ACETAMINOPHEN 325 MG/1
650 TABLET ORAL EVERY 4 HOURS PRN
Status: DISCONTINUED | OUTPATIENT
Start: 2020-04-13 | End: 2020-04-14 | Stop reason: HOSPADM

## 2020-04-13 RX ORDER — IOPAMIDOL 755 MG/ML
500 INJECTION, SOLUTION INTRAVASCULAR ONCE
Status: COMPLETED | OUTPATIENT
Start: 2020-04-13 | End: 2020-04-13

## 2020-04-13 RX ORDER — PANTOPRAZOLE SODIUM 40 MG/1
40 TABLET, DELAYED RELEASE ORAL
Status: DISCONTINUED | OUTPATIENT
Start: 2020-04-14 | End: 2020-04-14 | Stop reason: HOSPADM

## 2020-04-13 RX ORDER — OXYCODONE HYDROCHLORIDE 5 MG/1
5-10 TABLET ORAL
Status: DISCONTINUED | OUTPATIENT
Start: 2020-04-13 | End: 2020-04-14 | Stop reason: HOSPADM

## 2020-04-13 RX ORDER — AMLODIPINE BESYLATE 5 MG/1
5 TABLET ORAL DAILY
Status: DISCONTINUED | OUTPATIENT
Start: 2020-04-14 | End: 2020-04-14 | Stop reason: HOSPADM

## 2020-04-13 RX ADMIN — SODIUM CHLORIDE 61 ML: 9 INJECTION, SOLUTION INTRAVENOUS at 15:33

## 2020-04-13 RX ADMIN — SODIUM CHLORIDE 1000 ML: 9 INJECTION, SOLUTION INTRAVENOUS at 13:43

## 2020-04-13 RX ADMIN — METOPROLOL TARTRATE 12.5 MG: 25 TABLET ORAL at 21:27

## 2020-04-13 RX ADMIN — ATORVASTATIN CALCIUM 10 MG: 10 TABLET, FILM COATED ORAL at 21:27

## 2020-04-13 RX ADMIN — IOPAMIDOL 83 ML: 755 INJECTION, SOLUTION INTRAVENOUS at 15:33

## 2020-04-13 RX ADMIN — SODIUM CHLORIDE: 9 INJECTION, SOLUTION INTRAVENOUS at 21:26

## 2020-04-13 RX ADMIN — APIXABAN 2.5 MG: 2.5 TABLET, FILM COATED ORAL at 21:27

## 2020-04-13 ASSESSMENT — ENCOUNTER SYMPTOMS
MYALGIAS: 1
ABDOMINAL PAIN: 1
SHORTNESS OF BREATH: 0
FATIGUE: 1
BLOOD IN STOOL: 1
VOMITING: 1
DIARRHEA: 1
FEVER: 0

## 2020-04-13 ASSESSMENT — MIFFLIN-ST. JEOR
SCORE: 1336.27
SCORE: 1392.07

## 2020-04-13 NOTE — ED PROVIDER NOTES
Emergency Department Attending Supervision Note  4/13/2020  4:40 PM      I evaluated this patient in conjunction with Samuel Barnhart PA-C.    Briefly, the patient presented with abdominal pain.  PMH significant for AAA s/p graft, previously identified endoleak, atrial fibrillation, CKD, HTN, PVD, recent hospitalization at [facility] with diagnosis of non-occlusive ischemic colitis, who presents to ER with recurrent abdominal pain.  Pt reports awakening around 10 am this morning, and shortly thereafter developed severe, centrally located yet diffuse abd pain.  Described pain as similar to that he had at [facility], though much more severe.  He had one episode of emesis, without blood.  Has had loose stools containing BRBPR over past 1 week, though feels this is letting up.  No fevers, SOB, nor CP.  During previous hospital stay, pt developed fever, tachycardia, and was tested for COVID-19 negative x 2.  Seen by vascular surgery at prior hospitalization, who did not feel endoleak was acute or required surgical intervention      On my exam:  Well appearing elderly male resting comfortably on gurney  Even, non-labored respirations  RRR  Abd soft, NT, ND, no palpable pulsatile mass  MARRERO  No gross deformities    Results:  Labs Ordered and Resulted from Time of ED Arrival Up to the Time of Departure from the ED   CBC WITH PLATELETS DIFFERENTIAL - Abnormal; Notable for the following components:       Result Value    RBC Count 4.06 (*)     Hemoglobin 13.0 (*)     Hematocrit 39.5 (*)     All other components within normal limits   BASIC METABOLIC PANEL - Abnormal; Notable for the following components:    Chloride 110 (*)     Creatinine 1.56 (*)     GFR Estimate 40 (*)     GFR Estimate If Black 46 (*)     All other components within normal limits   LACTIC ACID WHOLE BLOOD - Abnormal; Notable for the following components:    Lactic Acid 2.1 (*)     All other components within normal limits   LIPASE - Abnormal; Notable for the  following components:    Lipase 68 (*)     All other components within normal limits   TROPONIN I   LACTIC ACID WHOLE BLOOD     CT Abdomen Pelvis w Contrast   Final Result   IMPRESSION: No acute intra-abdominal abnormality demonstrated. See   above.      SHANAE BROWN MD        ED course:  Patient presented to me at 4:40 pm.  I saw and evaluated the patient    My impression is abdominal pain, concerning for recurrent non-occlusive ischemic colitis.  Patient noted pain to be more severe than that prompting his previous hospital stay.  Repeat CT today without evidence of obstruction, or perforation, though when PA discussed with radiology, felt endoleak was present.  Vascular surgery did not again feel surgical intervention indicated.  Patient does acknowledge ongoing bright red blood noted with in his stool, though feel this to be improving.  In light of diarrhea, after my evaluation he was placed on droplet/contact precautions in light of current COVID-19 pandemic.  Swab obtained and sent.  Initial lactate elevated at 2.1, which normalized at 0.9 following 1 L IV fluids.  We will plan hospitalization for close monitoring and serial abdominal exams.    Diagnosis    ICD-10-CM    1. Abdominal pain, generalized  R10.84 COVID-19 Virus (Coronavirus) by PCR Nasopharyngeal     SARS-CoV-2 COVID-19 Virus (Coronavirus) RT-PCR     SARS-CoV-2 COVID-19 Virus (Coronavirus) RT-PCR Nasopharyngeal         Samuel Barnhart PA-C Roach, Francis Hutchison MD  04/14/20 0003

## 2020-04-13 NOTE — ED PROVIDER NOTES
"  History     Chief Complaint: Abdominal pain    HPI   Isael Garcia is an anticoagulated (eliquis 2.5 mg BID) 85 year old male with a history of abdominal aortic aneurysms s/p graft, stroke, atrial fibrillation, chronic kidney disease, peripheral vascular disease, ischemic colitis, and hypertension who presents with abdominal pain. He was seen at [facility] emergency department on 03/30 and admitted to [facility] that day after a CT abdomen showed type a endoleak. He was discharged on 04/06 with a diagnosis of ischemic colitis and a prescription for protonix 40 mg BID. Since his hospitalization, he has been somewhat uncomfortable and fatigued but otherwise improved. Two hours prior to arrival, he noticed central and left central abdominal pain, and his abdomen was tender to palpation. His pain lasted until about half an hour ago, when he vomited without blood with several episodes of dry heaving. Prior to vomiting, his pain was \"nearly identical\" to his pain prior to his hospitalization on 03/30. His pain was worsened by sitting down. He endorses hot flashes during his episodes of emesis. He denies bowel movements today, hx of gastritis, fever, chest pain, and shortness of breath.     Allergies:  Lisinopril    Medications:    Amlodipine  Eliquis  Atorvastatin  Lopressor  Pantoprazole    Past Medical History:    Abdominal aortic aneurysm  Atrial fibrillation  Chronic kidney disease stage 3  Peripheral vascular disease  Osteoarthritis  Hypertension  Shingles  Cataracts  Chronic bilateral low back pain   Anemia    Past Surgical History:    Right inguinal hernia repair  Transurethral resection of the prostate  Tonsillectomy  Left cataract extraction  Endovascular AAA stent graft  Carotid endarectomy  Inguinal herniorrhaphy    Family History:    Prostate cancer (brother)    Social History:  Smoking status: Never  Alcohol use: No  Drug use: No  PCP: Madhu Velasquez  Marital Status:   " "[2]    Review of Systems   Constitutional: Positive for fatigue. Negative for fever.   Respiratory: Negative for shortness of breath.    Cardiovascular: Negative for chest pain.   Gastrointestinal: Positive for abdominal pain, blood in stool, diarrhea and vomiting.   Musculoskeletal: Positive for myalgias.   All other systems reviewed and are negative.      Physical Exam     Patient Vitals for the past 24 hrs:   BP Temp Pulse Resp SpO2 Height Weight   04/13/20 2000 (!) 157/94 -- 86 -- 97 % -- --   04/13/20 1945 (!) 155/88 -- 90 -- 94 % -- --   04/13/20 1930 (!) 169/85 -- 87 -- 96 % -- --   04/13/20 1915 (!) 171/89 -- 86 -- 97 % -- --   04/13/20 1900 (!) 151/102 -- 84 -- 97 % -- --   04/13/20 1845 (!) 176/92 -- 87 -- 96 % -- --   04/13/20 1830 (!) 171/94 -- 80 -- 98 % -- --   04/13/20 1815 (!) 161/84 -- 79 -- 96 % -- --   04/13/20 1800 (!) 160/83 -- 81 -- 95 % -- --   04/13/20 1720 (!) 160/94 -- 85 -- 99 % -- --   04/13/20 1715 (!) 160/94 -- -- -- -- -- --   04/13/20 1515 (!) 160/80 -- 81 -- -- -- --   04/13/20 1505 -- -- -- -- 98 % -- --   04/13/20 1455 -- -- -- -- 99 % -- --   04/13/20 1245 (!) 146/81 97.5  F (36.4  C) 65 20 97 % 1.702 m (5' 7\") 74.8 kg (165 lb)     Physical Exam  Constitutional: Pleasant. Cooperative.   Eyes: Pupils equally round and reactive  HENT: Head is normal in appearance. Oropharynx is normal with moist mucus membranes.  Cardiovascular: Irregularly irregular rhythm. Normal rate. No murmurs noted.  Respiratory: Normal respiratory effort, lungs are clear bilaterally.  GI: Abdomen is soft, non-tender, non-distended. No guarding, rebound, or rigidity.  Musculoskeletal: No asymmetry of the lower extremities, no tenderness to palpation.   Skin: Normal, without rash.  Neurologic: Cranial nerves grossly intact, normal cognition, no focal deficits. Alert and oriented x 3.   Psychiatric: Normal affect.  Nursing notes and vital signs reviewed.    Emergency Department Course   Imaging:  Radiographic " findings were communicated with the patient who voiced understanding of the findings.  CT Abdomen Pelvis:  No acute intra-abdominal abnormality demonstrated. See above.   As read by Radiology.    Laboratory:  CBC: HGB 13.0 (L) o/w WNL (WBC 6.8 and )  BMP: Chloride 110 (H), Creatinine 1.56 (H), GFR 40 (L), o/w WNL  Lactic acid whole blood (resulted 1320): 2.1 (H)    Lactic acid whole blood (resulted 1627): 0.9  Lipase: 68 (L)  Troponin: <0.015  COVID-19 Virus PCR Swab: pending    Interventions:  1343 NS 1L IV Bolus     Emergency Department Course:  Past medical records, nursing notes, and vitals reviewed.  1330: I performed an exam of the patient and obtained history, as documented above.  IV inserted and blood drawn. The patient was sent for an abdomen/pelvis CT while in the emergency department, findings above.  The patient's nose and throat were swabbed and this sample was sent for COVID testing, findings above.     1618: I rechecked the patient. Explained findings to patient.    1645: I spoke with Dr. Tyrel Lyon of radiology regarding visualization of an endoleak on CT imaging performed today.     1715: I spoke with Dr. Moe regarding possible transfer of the patient to [facility].     1717: I rechecked the patient. Findings and plan explained to the Patient.     1739: I spoke with Dr. Елена Thomas of vascular surgery regarding concern for mesenteric ischemia.     1857: I spoke with Christina Mauricio PA-C regarding a plan for the patient.     1903: I rechecked the patient. Findings and plan explained to the patient who consents to admission.     Patient admitted to the hospital.    Impression & Plan    Covid-19  Isael Garcia was evaluated during a global COVID-19 pandemic, which necessitated consideration that the patient might be at risk for infection with the SARS-CoV-2 virus that causes COVID-19. Applicable protocols for evaluation were followed during the patient's care.    Medical  Decision Making:  Isael Garcia is a 85 year old male who presents to the emergency department for evaluation of diffuse abdominal pain.  Patient was recently admitted for diffuse abdominal pain and was discharged with a diagnosis of ischemic colitis.  He was seen by vascular surgery during his admission and it was deemed that no intervention was necessary at that time.  He was discharged home about 3 weeks ago and has had no recurrent abdominal pain since that time until today.  The patient reports that today about 2 hours prior to arrival he had return of identical abdominal pain.  This pain lasted for approximately 2 hours.  Just prior to my evaluation, the patient states that he had an episode of emesis and his pain completely resolved.  See HPI as above for additional details.  Vitals and physical exam as above.  Differential was broad and included gastroenteritis, pancreatitis, atypical ACS, SBO, perforated viscus, mesenteric ischemia, AAA rupture, among others.  The above work-up was obtained.  Patient was noted to have a lactate at 2.1.  Fluids were initiated.  CT of his abdomen was obtained without evidence for acute abnormality.  CTA abdomen pelvis with contrast was originally ordered, however this was changed to CT abdomen pelvis with contrast.  On discussion with radiology, it was deemed that mesenteric arteries were properly visualized and there was no evidence for occlusion.  Radiology did note that aortic endoleak seen on previous CTA could not be properly visualized on scan obtained, and there did appear to be fluid outside of the aorta.  Nevertheless, this was thought to be chronic on prior exam.  Ultimately, change in aortic endoleak could not be ruled out at this time.  Fortunately, throughout the patient's stay, he has been completely asymptomatic.  I did speak with vascular surgery regarding my concern for recurrent ischemic colitis, and upon their review of the imaging studies, no  intervention was deemed necessary at this time.  They did recommend admission to the hospital for serial abdominal exams and monitoring of patient.  I discussed the results of the work-up with the patient, and recommended admission for serial abdominal exams.  The patient was in agreement for admission.  I discussed the case with the hospitalist, who was in agreement for admission.  All questions answered.  Patient admitted in stable condition.    On recheck, patient did note a few episodes of loose stool and history of scant blood in his stool. He was moved to [facility] and retested, which is pending at this time. He had previously been tested and was negative.    Diagnosis:     ICD-10-CM    1. Abdominal pain, generalized  R10.84 COVID-19 Virus (Coronavirus) by PCR Nasopharyngeal swab     Disposition: Admitted to med/surg bed.     I, Jamel Thomas, am serving as a scribe at 1:39 PM on 4/13/2020 to document services personally performed by Samuel Barnhart PA-C based on my observations and the provider's statements to me.     Jamel Thomas  4/13/2020   [facility] EMERGENCY DEPARTMENT       Samuel Barnhart PA-C  04/13/20 2045       Samuel Barnhart PA-C  04/13/20 2053

## 2020-04-13 NOTE — ED NOTES
Bed: ED04  Expected date:   Expected time:   Means of arrival:   Comments:  Hold for pt in room 20

## 2020-04-13 NOTE — TELEPHONE ENCOUNTER
Triage spoke with patient. He is demanding to speak with PCP now.  PCP not available. patient reports he is in severe stomach pain.    Patient huffing on phone. Stated he does not feel any better since 4/10/2020 Telephone Visit.Patient reports abdomen feeling the same as he did when he was in the hospital.   Abdomen is sore, around belly button with abdominal bloating. Patient reports the last BM occurred within 24 hours, liquid with stringy like consistency however -no blood in stool. Patient reports weakness, nausea, and severe abdominal pain. Cold sweats off and on for the last few days.    Triage advised to seek care in ER. Will update PCP. Ok to call;  Ph. [phone]      Neelam WONG, RN, PHN

## 2020-04-13 NOTE — ED NOTES
"[facility]  ED Nurse Handoff Report    Isael Garcia is a 85 year old male   ED Chief complaint: Abdominal Pain  . ED Diagnosis:   Final diagnoses:   Abdominal pain, generalized     Allergies:   Allergies   Allergen Reactions     Horse Allergy Itching     Lisinopril Cramps     Leg cramping       Code Status: Full Code  Activity level - Baseline/Home:  Independent. Activity Level - Current:   Independent. Lift room needed: No. Bariatric: No   Needed: No   Isolation: Yes. Infection: Not Applicable  PUI- COVID19.     Vital Signs:   Vitals:    04/13/20 1800 04/13/20 1815 04/13/20 1830 04/13/20 1845   BP: (!) 160/83 (!) 161/84 (!) 171/94 (!) 176/92   Pulse: 81 79 80 87   Resp:       Temp:       SpO2: 95% 96% 98% 96%   Weight:       Height:           Cardiac Rhythm:  ,      Pain level: 0-10 Pain Scale: 9  Patient confused: No. Patient Falls Risk: Yes.   Elimination Status: Has voided   Patient Report - Initial Complaint: Abdominal pain. Focused Assessment:  85 year old male with a history of abdominal aortic aneurysms s/p graft, stroke, atrial fibrillation, chronic kidney disease, peripheral vascular disease, ischemic colitis, and hypertension who presents with abdominal pain. He was seen at [facility] emergency department on 03/30 and admitted to [facility] that day after a CT abdomen showed type a endoleak. He was discharged on 04/06 with a diagnosis of ischemic colitis and a prescription for protonix 40 mg BID. Since his hospitalization, he has been somewhat uncomfortable and fatigued but otherwise improved. Two hours prior to arrival, he noticed central and left central abdominal pain, and his abdomen was tender to palpation. His pain lasted until about half an hour ago, when he vomited without blood with several episodes of dry heaving. Prior to vomiting, his pain was \"nearly identical\" to his pain prior to his hospitalization on 03/30. His pain was worsened by " sitting down. He endorses hot flashes during his episodes of emesis. He denies bowel movements today, hx of gastritis, fever, chest pain, and shortness of breath.   Tests Performed: labs, imaging. Abnormal Results:   Labs Ordered and Resulted from Time of ED Arrival Up to the Time of Departure from the ED   CBC WITH PLATELETS DIFFERENTIAL - Abnormal; Notable for the following components:       Result Value    RBC Count 4.06 (*)     Hemoglobin 13.0 (*)     Hematocrit 39.5 (*)     All other components within normal limits   BASIC METABOLIC PANEL - Abnormal; Notable for the following components:    Chloride 110 (*)     Creatinine 1.56 (*)     GFR Estimate 40 (*)     GFR Estimate If Black 46 (*)     All other components within normal limits   LACTIC ACID WHOLE BLOOD - Abnormal; Notable for the following components:    Lactic Acid 2.1 (*)     All other components within normal limits   LIPASE - Abnormal; Notable for the following components:    Lipase 68 (*)     All other components within normal limits   TROPONIN I   LACTIC ACID WHOLE BLOOD   COVID-19 VIRUS (CORONAVIRUS) BY PCR   CARDIAC CONTINUOUS MONITORING     CT Abdomen Pelvis w Contrast   Final Result   IMPRESSION: No acute intra-abdominal abnormality demonstrated. See   above.      SHANAE BROWN MD         Treatments provided: none  Family Comments: not present- Granddaughter Claudia [phone]  OBS brochure/video discussed/provided to patient:  No  ED Medications:   Medications   0.9% sodium chloride BOLUS (0 mLs Intravenous Stopped 4/13/20 1459)   iopamidol (ISOVUE-370) solution 500 mL (83 mLs Intravenous Given 4/13/20 1533)   CT Scan Flush (61 mLs Intravenous Given 4/13/20 1533)     Drips infusing:  No  For the majority of the shift, the patient's behavior Green. Interventions performed were na.    Sepsis treatment initiated: No       ED Nurse Name/Phone Number: Karol Lynch RN,   6:55 PM    RECEIVING UNIT ED HANDOFF REVIEW    Above ED Nurse Handoff Report  was reviewed: Yes  Reviewed by: Marielena Goncalves RN on April 13, 2020 at 8:17 PM

## 2020-04-13 NOTE — PHARMACY-ADMISSION MEDICATION HISTORY
Admission medication history interview status for this patient is complete. See [facility] admission navigator for allergy information, prior to admission medications and immunization status.     Medication history interview source(s):daughter Sindi @ [phone]  Medication history resources (including written lists, pill bottles, clinic record):None    Changes made to PTA medication list:  Added: none  Deleted: none  Changed: none    Actions taken by pharmacist (provider contacted, etc):None     Additional medication history information:called sindi daughter    Medication reconciliation/reorder completed by provider prior to medication history? No    For patients on insulin therapy: no (Yes/No)   Lantus/levemir/NPH/Mix 70/30 dose: ___ in AM/PM or twice daily   Sliding scale Novolog Y/N   If Yes, do you have a baseline novolog pre-meal dose: ______units with meals   Patients eat three meals a day: Y/N ---  How many episodes of hypoglycemia (low blood glucose) do you have weekly: ---   How many missed doses do you have a week: ---  How many times do you check your blood glucose per day: ---  Any Barriers to therapy: cost of medications/comfortable with giving injections (if applicable)/ comfortable and confident with current diabetes regimen ---      Prior to Admission medications    Medication Sig Last Dose Taking? Auth Provider   acetaminophen (TYLENOL) 500 MG tablet Take 500 mg by mouth 2 times daily  4/12/2020 at Unknown time Yes Madhu Velasquez MD   amLODIPine (NORVASC) 5 MG tablet Take 1 tablet (5 mg) by mouth daily 4/12/2020 at Unknown time Yes Madhu Velasquez MD   apixaban ANTICOAGULANT (ELIQUIS) 2.5 MG tablet Take 1 tablet (2.5 mg) by mouth 2 times daily 4/12/2020 at Unknown time Yes Ester Robins MD   atorvastatin (LIPITOR) 10 MG tablet Take 1 tablet (10 mg) by mouth daily 4/12/2020 at Unknown time Yes Madhu Velasquez MD   Cholecalciferol (VITAMIN D) 2000 UNITS tablet Take 2,000 Units by mouth daily 4/12/2020 at  Unknown time Yes Madhu Velasquez MD   metoprolol tartrate (LOPRESSOR) 25 MG tablet TAKE ONE-HALF (1/2) TABLET TWICE A DAY 4/12/2020 at Unknown time Yes Madhu Velasquez MD   Misc Natural Products (OSTEO BI-FLEX ADV TRIPLE ST) TABS Take 1 tablet by mouth daily 4/12/2020 at Unknown time Yes Madhu Velasquez MD   multivitamin, therapeutic with minerals (MULTI-VITAMIN) TABS Take 1 tablet by mouth daily  4/12/2020 at Unknown time Yes Madhu Velasquez MD   pantoprazole (PROTONIX) 40 MG EC tablet Take 1 tablet (40 mg) by mouth daily 4/12/2020 at Unknown time Yes Madhu Velasquez MD

## 2020-04-13 NOTE — ED TRIAGE NOTES
"Pt c/o \"terrific\" abdominal pain today. Pt was hospitalized 3/30, dx with ischemic colitis. States pain today is similar. C/o nausea, no emesis, last Bm was greater than 24 hours ago. Pain in periumbilical, radiates to left.   "

## 2020-04-14 VITALS
SYSTOLIC BLOOD PRESSURE: 141 MMHG | OXYGEN SATURATION: 96 % | HEART RATE: 81 BPM | RESPIRATION RATE: 18 BRPM | WEIGHT: 152.7 LBS | BODY MASS INDEX: 23.97 KG/M2 | TEMPERATURE: 95.6 F | HEIGHT: 67 IN | DIASTOLIC BLOOD PRESSURE: 69 MMHG

## 2020-04-14 LAB
INTERPRETATION ECG - MUSE: NORMAL
SARS-COV-2 PCR COMMENT: NORMAL
SARS-COV-2 RNA SPEC QL NAA+PROBE: NEGATIVE
SPECIMEN SOURCE: NORMAL

## 2020-04-14 PROCEDURE — 25000132 ZZH RX MED GY IP 250 OP 250 PS 637: Performed by: PHYSICIAN ASSISTANT

## 2020-04-14 PROCEDURE — 99217 ZZC OBSERVATION CARE DISCHARGE: CPT | Performed by: INTERNAL MEDICINE

## 2020-04-14 PROCEDURE — G0378 HOSPITAL OBSERVATION PER HR: HCPCS

## 2020-04-14 RX ADMIN — APIXABAN 2.5 MG: 2.5 TABLET, FILM COATED ORAL at 08:49

## 2020-04-14 RX ADMIN — METOPROLOL TARTRATE 12.5 MG: 25 TABLET ORAL at 08:47

## 2020-04-14 RX ADMIN — PANTOPRAZOLE SODIUM 40 MG: 40 TABLET, DELAYED RELEASE ORAL at 08:47

## 2020-04-14 RX ADMIN — AMLODIPINE BESYLATE 5 MG: 5 TABLET ORAL at 08:47

## 2020-04-14 NOTE — PLAN OF CARE
PRIMARY DIAGNOSIS: ACUTE PAIN  OUTPATIENT/OBSERVATION GOALS TO BE MET BEFORE DISCHARGE:  1. Pain Status: Pain free.    2. Return to near baseline physical activity: Yes    3. Cleared for discharge by consultants (if involved): N/A    Discharge Planner Nurse   Safe discharge environment identified: Yes  Barriers to discharge: Yes       Entered by: Danica Briceno 04/14/2020 12:33 PM     Please review provider order for any additional goals.   Nurse to notify provider when observation goals have been met and patient is ready for discharge.      Discharged home via daughter transportation. AVS reviewed, no further questions at this time. IV removed CDI. All belongings returned to pt.     OBSERVATION patient END time: 1320

## 2020-04-14 NOTE — PROGRESS NOTES
04/14/20 0810   Significant Event   Significant Event Critical result/value  (Na 116 )   Dr Piek paged [phone]

## 2020-04-14 NOTE — PLAN OF CARE
PRIMARY DIAGNOSIS: ACUTE ABD PAIN  OUTPATIENT/OBSERVATION GOALS TO BE MET BEFORE DISCHARGE:  1. Pain Status: Pain free.    2. Return to near baseline physical activity: Yes    3. Cleared for discharge by consultants (if involved): N/A    Discharge Planner Nurse   Safe discharge environment identified: Yes  Barriers to discharge: Yes       Entered by: Marielena Goncalves 04/14/2020 12:32 AM     Please review provider order for any additional goals.   Nurse to notify provider when observation goals have been met and patient is ready for discharge.

## 2020-04-14 NOTE — DISCHARGE SUMMARY
Admit Date:     04/13/2020   Discharge Date:     04/14/2020      FINAL DIAGNOSES:   1.  Abdominal pain, unclear etiology.  Symptoms resolved without a clear diagnosis.   2.  Recent admission to [facility] for abdominal pain.  At that time, patient had an extensive evaluation.  It was thought that the patient's symptoms were likely due to ischemic colitis with an area of inflammation found in the sigmoid colon on flexible sigmoidoscopy.      PAST MEDICAL HISTORY:   1.  Chronic kidney disease, baseline creatinine near 1.5 historically.   2.  Paroxysmal atrial fibrillation, maintained on Eliquis for anticoagulation.   3.  Hypertension.   4.  Peripheral vascular disease.   5.  History of abdominal aortic aneurysm repair.      PRINCIPAL PROCEDURES THIS ADMISSION:   1.  Abdominopelvic CT scan showing no acute intra-abdominal abnormality.   2.  COVID testing that was negative.      REASON FOR ADMISSION:  Please see dictated history and physical.  In brief, Mr. Garcia is an 85-year-old male with the above medical history who presented to the hospital for concerns about abdominal pain.  The patient reports that his pain was quite similar to his recent admission to [facility] when he was diagnosed with ischemic colitis.  The patient reported to me that while he was in the ER, he had some nausea and vomited.  Shortly after this, his abdominal pain appeared to resolve.  He was admitted to the Hospitalist Service.  CT scan in the ER demonstrated no acute findings and labs were unremarkable.      HOSPITAL COURSE:  Abdominal pain:  Etiology unclear.  Symptoms apparently similar to his previous admissions to [facility], when it was felt that he likely had ischemic colitis.  Somewhat unusual this admission as his symptoms seemed to resolve with emesis.  CT imaging demonstrated no inflammatory changes or bowel obstruction.  His course would be somewhat unusual for ischemic colitis.  Transient bowel  obstruction or gastroenteritis are also possibilities here.  In any event, patient had an unremarkable hospital stay.  He was tolerating food and had no pain today.  He requested to go home.  I did offer to keep him in the hospital overnight for further observation while continuing his diet, but both he and his daughter declined and the patient was discharged home.  I did instruct him to return to ER if he has recurrence of his symptoms.      DISCHARGE MEDICATIONS:   1.  Morphine 5 mg daily.   2.  Eliquis 2.5 mg twice a day.   3.  Atorvastatin 10 mg daily.   4.  Metoprolol 12.5 mg twice a day.   5.  Multivitamin daily.   6.  Osteo Bi-Flex.   7.  Pantoprazole 40 mg daily.   8.  Tylenol 500 mg twice a day.   9.  Vitamin D3.      FOLLOWUP INSTRUCTIONS:  Recommend return to ER for recurrence of symptoms.      I examined the patient on day of discharge.         JEAN-CLAUDE FAUST MD             D: 2020   T: 2020   MT: PK      Name:     JOHN MATA   MRN:      [id number]        Account:        [id number]   :      1934           Admit Date:     2020                                  Discharge Date: 2020      Document: [id number]       cc: Madhu Velasquez MD

## 2020-04-14 NOTE — PLAN OF CARE
ROOM # 502    Living Situation (if not independent, order SW consult): independent  Facility name:  :     Activity level at baseline: independent  Activity level on admit: independent      Patient registered to observation; given Patient Bill of Rights; given the opportunity to ask questions about observation status and their plan of care.  Patient has been oriented to the observation room, bathroom and call light is in place.    Discussed discharge goals and expectations with patient/family.

## 2020-04-14 NOTE — PLAN OF CARE
To Do:  End of Shift Summary  For vital signs and complete assessments, please see documentation flowsheets.     Pertinent assessments: A&O. Ind. Denies pain/nausea. VSS. 1 loose stool.   Major Shift Events: covid 19 negative. Isolation removed.  Treatment Plan: IVF, anti-emetics, pain control.     Discharge Readiness: Medically active  Expected Discharge Date: TBD  Discharge Disposition: home with self care  Barriers/Criteria for discharge TBD

## 2020-04-14 NOTE — PROGRESS NOTES
Pt seen and examined.  Feels well.  No complaints.  Reports that abd pain resolved yesterday after emesis.  Has had 2 meals since then without pain.  Wants to go home today.  Etiology of sx not clear.  I spoke with pt daughter over the phone.  I offered to keep him in hospital to observe overnight to insure sx do not return but he prefers to go home instead.  dtr in agreement with this.    Home today

## 2020-04-14 NOTE — H&P
Counts include 234 beds at the [facility] Outpatient / Observation Unit  History and Physical Exam     Isael Garcia MRN# [id number]   YOB: 1934 Age: 85 year old      Date of Admission:  4/13/2020    Primary care provider: Madhu Velasquez          Assessment:   Isael Garcia is a 85 year old male with a PMH significant for multiple medical problems including abdominal aortic aneurysm status post graft placement, atrial fibrillation, CKD, HTN, PVD and recent hospitalization at [facility] for abdominal pain and presumed nonocclusive ischemic colitis, who presents with sudden onset of recurrent central abdominal pain.   Work up in the ED reveals: VSS. CMP shows a stable Cr of 1.56 otherwise unremarkable. Lipase is normal. Lactic acid was minimally elevated at 2.2. Troponin was undetectable. CBC was unremarkable, Hgb stable at 13.0. CT abd/pelvis was unremarkable. EKG SR with sinus arrhythmia and 1st degree AV block, inferior infarct. He received 1L NS bolus in the ED. Lactic acid improved to 0.9 after fluids.   Patient will be registered to Observation for further evaluation and symptom management.     1. Acute abdominal pain - likely recurrence of nonocclusive ischemic colitis, although sx resolved after an episode of vomiting so question poss early obstruction. Notes diarrhea in previous days, none today. Notes poor appetite. Denies hematemesis, hematochezia or melena. Hx of AAA repair, poss endoleak on CT from previous hospitalization, vascular surgery reviewed, felt unrelated to sx, no intervention recommended. Flex sig from 4/6 showed area of inflammation in sigmoid and descending colon, likely ischemic. EGD showed non-bleeding erosive gastropathy. CT today is unremarkable, normal WBC. Lactic acid minimally elevated on arrival, resolved with IVFs. ED spoke with vascular surgery today, no change in plan, no interventions, recommended observation overnight for serial abd exams. At discharge, GI recommended colonoscopy in  1-3 months. IVFs, serial abd exam, clear liquid diet. If remains pain free overnight, advance diet. If pain reoccurs, consider GI consult, possibly reconsult with vascular surgery. Continue home PPI  2. COVID-19 rule out -  Pt swabbed in ED due to report of diarrhea. Had 2 previous negative tests during hospitalization at [facility]. If negative, isolation precautions can be discontinued.   3. CKD - Cr at baseline at 1.56. monitor.  4. Paroxysmal a fib - SR now. Resume home Eliquis and metoprolol  5. HTN - resume home amlodipine with parameters. Resume metoprolol  6. PVD and hx of AAA repair         Plan:     1. [place] to Observation  2. Serial abdominal exams  3. IV hydration with Normal saline, @, 100 ml/hr for 10 hours  4. Supportive care with anti-emetics, pain meds PRN  5. Clear liquids as tolerated, advance diet tomorrow if pain free and stable  6. Isolation precautions for COVID-19  7. Follow labs  8. DVT prophylaxis: pt at low risk, encourage ambulation.                    Chief Complaint:   Abdominal pain         History of Present Illness:   Isael Garcia is a 85 year old male with a PMH significant for multiple medical problems including abdominal aortic aneurysm status post graft placement, atrial fibrillation, CKD, HTN, PVD and recent hospitalization at [facility] for abdominal pain and presumed nonocclusive ischemic colitis, who presents with sudden onset of recurrent central abdominal pain.  The patient states his abdominal pain today was the exact same abdominal pain that he had that brought him to the hospital last week.  He noted it was centrally located and then radiated to the left side.  He became nauseous and vomited once he was in the ED and then stated his symptoms resolved.  He denies any recurrence of pain since being in the ED.  He denies fever, chills, chest pain, cough, shortness of breath or dysuria.  He notes some diarrhea in the previous days but denies diarrhea today.  He  denies blood in his vomit or blood in his stool.  He was admitted to [facility] on 3/30 to 4/6. His initial CT scan of the abdomin showed a type A endoleak.  Vascular surgery was consulted and did not recommend any intervention.  They felt like his symptoms were unlikely related to an endoleak.  He developed trevor hematochezia on 4/2 so an abdominal CT was repeated which did show inflammatory changes primarily to the descending colon with colon wall thickening.  He was initially started on antibiotics.  GI was consulted and felt his symptoms were most consistent with nonocclusive ischemic colitis.  An EGD was performed which showed nonbleeding erosive gastropathy.  A flex sigmoidoscopy was also performed which showed an area of inflammation in the descending and sigmoid colon which was likely ischemic in nature.  Biopsies were taken.  He was started on a PPI twice daily and recommended to repeat a colonoscopy in 1 to 3 months. He felt well at home since discharge until today. He does endorse a poor appetite for the past several weeks.  Of note, he was tested for Covid-19 ×2 during his previous hospitalization, both of which were negative.  Given his reported diarrhea, this testing was repeated again today.          Past Medical History:     Past Medical History:   Diagnosis Date     AAA (abdominal aortic aneurysm) (H) May 2016    infrarenal s/p graft placement at [facility]     Allergic rhinitis, cause unspecified      Atrial fibrillation (H)      CKD (chronic kidney disease) stage 3, GFR 30-59 ml/min (H) 2/4/2013     Lumbago 2000    s/p lumbar epidural injection     OA (osteoarthritis) 2/21/2015     PVD (peripheral vascular disease) (H) 3/7/2016     Shingles 7/09     left forehead     Sleep related leg cramps 2/6/2013     Unspecified cataract     bilateral     Unspecified essential hypertension      Vitamin D deficiency disease 2/6/2013               Past Surgical History:     Past Surgical History:    Procedure Laterality Date     C NONSPECIFIC PROCEDURE  1960    right inguinal hernia repair     C NONSPECIFIC PROCEDURE  1990s    TURP     C NONSPECIFIC PROCEDURE      tonsillectomy     C NONSPECIFIC PROCEDURE  April 2011    Right total knee arthroplasty     C NONSPECIFIC PROCEDURE   approx 2009     left  cataract extraction/IOL placement     C NONSPECIFIC PROCEDURE  May 2016    Endovascular AAA stent graft placed at [facility]. [place] [facility] suggests abx prophylaxis for life for procedures (dental/etc)     ENDARTERECTOMY CAROTID Left 5/31/2019    Procedure: LEFT CAROTID ENDARTERECTOMY WITH GREAT SAPHENOUS VEIN PATCH WITH EEG MONITORING;  Surgeon: Pedro Ahn MD;  Location:  OR     ESOPHAGOSCOPY, GASTROSCOPY, DUODENOSCOPY (EGD), COMBINED N/A 4/6/2020    Procedure: ESOPHAGOGASTRODUODENOSCOPY, WITH BIOPSY;  Surgeon: Erma Mercado MD;  Location:  GI     EXCISE MASS TRUNK N/A 2/28/2017    Procedure: EXCISE MASS TRUNK;  Surgeon: Ranjith Lozoya MD;  Location:  SD     EXCISE MASS UPPER EXTREMITY Left 2/28/2017    Procedure: EXCISE MASS UPPER EXTREMITY;  Surgeon: Ranjith Lozoya MD;  Location:  SD     EXPLORE NECK Left 6/16/2019    Procedure: LEFT NECK EXPLORATION;  Surgeon: Cam Jett MD;  Location:  OR     HERNIORRHAPHY INGUINAL Left 2/28/2017    Procedure: HERNIORRHAPHY INGUINAL;  Surgeon: Ranjith Lozoya MD;  Location: [facility]               Social History:     Social History     Socioeconomic History     Marital status:      Spouse name: Not on file     Number of children: Not on file     Years of education: Not on file     Highest education level: Not on file   Occupational History     Not on file   Social Needs     Financial resource strain: Not on file     Food insecurity     Worry: Not on file     Inability: Not on file     Transportation needs     Medical: Not on file     Non-medical: Not on file   Tobacco Use     Smoking status: Never Smoker      Smokeless tobacco: Never Used   Substance and Sexual Activity     Alcohol use: No     Drug use: No     Sexual activity: Never   Lifestyle     Physical activity     Days per week: Not on file     Minutes per session: Not on file     Stress: Not on file   Relationships     Social connections     Talks on phone: Not on file     Gets together: Not on file     Attends [employer] service: Not on file     Active member of club or organization: Not on file     Attends meetings of clubs or organizations: Not on file     Relationship status: Not on file     Intimate partner violence     Fear of current or ex partner: Not on file     Emotionally abused: Not on file     Physically abused: Not on file     Forced sexual activity: Not on file   Other Topics Concern     Parent/sibling w/ CABG, MI or angioplasty before 65F 55M? Not Asked   Social History Narrative     Not on file               Family History:     Family History   Problem Relation Age of Onset     Prostate Cancer Brother               Allergies:      Allergies   Allergen Reactions     Horse Allergy Itching     Lisinopril Cramps     Leg cramping               Medications:     Prior to Admission medications    Medication Sig Last Dose Taking? Auth Provider   acetaminophen (TYLENOL) 500 MG tablet Take 500 mg by mouth 2 times daily  4/12/2020 at Unknown time Yes Madhu Velasquez MD   amLODIPine (NORVASC) 5 MG tablet Take 1 tablet (5 mg) by mouth daily 4/12/2020 at Unknown time Yes Madhu Velasquez MD   apixaban ANTICOAGULANT (ELIQUIS) 2.5 MG tablet Take 1 tablet (2.5 mg) by mouth 2 times daily 4/12/2020 at Unknown time Yes Ester Robins MD   atorvastatin (LIPITOR) 10 MG tablet Take 1 tablet (10 mg) by mouth daily 4/12/2020 at Unknown time Yes Madhu Velasquez MD   Cholecalciferol (VITAMIN D) 2000 UNITS tablet Take 2,000 Units by mouth daily 4/12/2020 at Unknown time Yes Madhu Vleasquez MD   metoprolol tartrate (LOPRESSOR) 25 MG tablet TAKE ONE-HALF (1/2) TABLET TWICE A DAY 4/12/2020  "at Unknown time Yes Madhu Velasquez MD   [facility] Products (OSTEO BI-FLEX ADV TRIPLE ST) TABS Take 1 tablet by mouth daily 4/12/2020 at Unknown time Yes Madhu Velasquez MD   multivitamin, therapeutic with minerals (MULTI-VITAMIN) TABS Take 1 tablet by mouth daily  4/12/2020 at Unknown time Yes Madhu Velasquez MD   pantoprazole (PROTONIX) 40 MG EC tablet Take 1 tablet (40 mg) by mouth daily 4/12/2020 at Unknown time Yes Madhu Velasquez MD              Review of Systems:   A Comprehensive greater than 10 system review of systems was carried out.  Pertinent positives and negatives are noted above.  Otherwise negative for contributory information.     Constitutional, neuro, ENT, endocrine, pulmonary, cardiac, gastrointestinal, genitourinary, musculoskeletal, integument and psychiatric systems are negative, except as otherwise noted.         Physical Exam:   Blood pressure (!) 176/92, pulse 87, temperature 97.5  F (36.4  C), resp. rate 20, height 1.702 m (5' 7\"), weight 74.8 kg (165 lb), SpO2 96 %.    I did not personally examine the patient.  Physical exam is deferred to the ED physical exam noted in Samuel Barnhart's PA note.  Physical exam was deferred due to the Covid-19 pandemic.  Visit was done over an iPad.  Patient did not appear to be in any distress.  Speech was coherent.  He did not display any pain behaviors.  He was able to move all 4 extremities independently.            Data:     Recent Labs   Lab 04/13/20  1314   WBC 6.8   HGB 13.0*   HCT 39.5*   MCV 97        Recent Labs   Lab 04/13/20  1314      POTASSIUM 3.7   CHLORIDE 110*   CO2 24   ANIONGAP 6   GLC 96   BUN 13   CR 1.56*   GFRESTIMATED 40*   GFRESTBLACK 46*   KULDIP 8.6     Recent Labs   Lab 04/13/20  1627 04/13/20  1314   LACT 0.9 2.1*     Recent Labs   Lab 04/13/20  1314   LIPASE 68*     Recent Labs   Lab 04/13/20  1314   TROPI <0.015         Recent Results (from the past 48 hour(s))   CT Abdomen Pelvis w Contrast    Narrative    CT ABDOMEN AND " PELVIS WITH CONTRAST 4/13/2020 3:43 PM    CLINICAL HISTORY: Diffuse abdominal pain.     TECHNIQUE: CT scan of the abdomen and pelvis was performed following  injection of IV contrast. Multiplanar reformats were obtained. Dose  reduction techniques were used.    CONTRAST: 83mL Isovue-370    COMPARISON: 4/2/2020.    FINDINGS:   LOWER CHEST: Normal.    HEPATOBILIARY: Normal.    PANCREAS: Normal.    SPLEEN: Splenic granulomata.    ADRENAL GLANDS: Normal.    KIDNEYS/BLADDER: Normal.    BOWEL: There is no evidence of bowel obstruction or free  intraperitoneal air. Images through the abdomen are degraded by  respiratory motion. No pneumatosis or abnormal bowel wall thickening  is demonstrated. The appendix is normal.    PELVIC ORGANS: Normal.    ADDITIONAL FINDINGS: There is a bifurcated stent graft in the  abdominal aorta. Maximum aortic diameter is 2.7 cm. There has also  been stenting of a left external iliac artery. Although not tailored  for evaluation of the arteries, the superior mesenteric artery and  celiac axis appear patent. The inferior mesenteric artery is opacified  as well.    MUSCULOSKELETAL: Degenerative changes without suspicious lytic or  blastic bone lesion.      Impression    IMPRESSION: No acute intra-abdominal abnormality demonstrated. See  above.    MD Christina AGUILERA PA-C

## 2020-04-15 ENCOUNTER — TELEPHONE (OUTPATIENT)
Dept: INTERNAL MEDICINE | Facility: CLINIC | Age: 85
End: 2020-04-15

## 2020-04-15 ENCOUNTER — PATIENT OUTREACH (OUTPATIENT)
Dept: CARE COORDINATION | Facility: CLINIC | Age: 85
End: 2020-04-15

## 2020-04-15 NOTE — LETTER
[place] CARE COORDINATION        April 16, 2020        Isael Minaence Jose  [address] [place] [id number]      Dear Isael,    I am a clinic community health worker who works with Madhu Velasquez MD at [facility]. I have been trying to reach you recently to introduce Clinic Care Coordination and to see if there was anything I could assist you with.  Below is a description of clinic care coordination and how I can further assist you.      The clinic care coordination team is made up of a registered nurse,  and community health worker who understand the health care system. The goal of clinic care coordination is to help you manage your health and improve access to the health care system in the most efficient manner. The team can assist you in meeting your health care goals by providing education, coordinating services, strengthening the communication among your providers and supporting you with any resource needs.    Please feel free to contact me at [phone] with any questions or concerns. We are focused on providing you with the highest-quality healthcare experience possible and that all starts with you.     Sincerely,     RAND Dickens  Clinic Care Coordination  [facility]: [facility]  Phone: [phone]

## 2020-04-15 NOTE — PROGRESS NOTES
Clinic Care Coordination Contact  [facility]/Voicemail     Clinical Data: Care Coordinator Outreach  Outreach attempted x 1.  Left message on patient's voicemail with call back information and requested return call.  Plan: Care Coordinator will try to reach patient again in 1-2 business days.    RAND Dickens  Clinic Care Coordination  [facility] Clinics: [facility], and [facility]  Phone: [phone]

## 2020-04-15 NOTE — TELEPHONE ENCOUNTER
ED / Discharge Outreach Protocol    Patient Contact    Attempt # 1    Was call answered?  No.  Left message on voicemail with information to call me back.    Neelam DENTN, RN, PHN

## 2020-04-16 NOTE — PROGRESS NOTES
Clinic Care Coordination Contact  [facility]/Voicemail       Clinical Data: Care Coordinator Outreach  Outreach attempted x 2.  Left message on patient's voicemail with call back information and requested return call.  Plan: Care Coordinator will send care coordination introduction letter with care coordinator contact information and explanation of care coordination services via mail. Care Coordinator will do no further outreaches at this time.    RAND Dickens  Clinic Care Coordination  [facility] Clinics: [facility], [facility], and [facility]  Phone: [phone]

## 2020-04-17 NOTE — TELEPHONE ENCOUNTER
ED / Discharge Outreach Protocol    Patient Contact    Attempt # 3    Was call answered?  No.  Left message on voicemail with information to call triage back.    On call back, ED follow-up.

## 2020-07-27 ENCOUNTER — VIRTUAL VISIT (OUTPATIENT)
Dept: INTERNAL MEDICINE | Facility: CLINIC | Age: 85
End: 2020-07-27
Payer: COMMERCIAL

## 2020-07-27 DIAGNOSIS — K29.70 GASTRITIS WITHOUT BLEEDING, UNSPECIFIED CHRONICITY, UNSPECIFIED GASTRITIS TYPE: ICD-10-CM

## 2020-07-27 DIAGNOSIS — I10 ESSENTIAL HYPERTENSION: ICD-10-CM

## 2020-07-27 DIAGNOSIS — I48.91 ATRIAL FIBRILLATION, UNSPECIFIED TYPE (H): ICD-10-CM

## 2020-07-27 DIAGNOSIS — E78.5 HYPERLIPIDEMIA LDL GOAL <70: ICD-10-CM

## 2020-07-27 DIAGNOSIS — N18.30 CKD (CHRONIC KIDNEY DISEASE) STAGE 3, GFR 30-59 ML/MIN (H): ICD-10-CM

## 2020-07-27 DIAGNOSIS — R41.3 MEMORY LOSS: ICD-10-CM

## 2020-07-27 PROBLEM — R10.84 ABDOMINAL PAIN, GENERALIZED: Status: RESOLVED | Noted: 2020-03-30 | Resolved: 2020-07-27

## 2020-07-27 PROBLEM — R10.9 ABDOMINAL PAIN: Status: RESOLVED | Noted: 2020-04-13 | Resolved: 2020-07-27

## 2020-07-27 PROCEDURE — 99214 OFFICE O/P EST MOD 30 MIN: CPT | Mod: 95 | Performed by: INTERNAL MEDICINE

## 2020-07-27 RX ORDER — ATORVASTATIN CALCIUM 10 MG/1
10 TABLET, FILM COATED ORAL DAILY
Qty: 90 TABLET | Refills: 3 | Status: SHIPPED | OUTPATIENT
Start: 2020-07-27 | End: 2020-12-01

## 2020-07-27 RX ORDER — AMLODIPINE BESYLATE 5 MG/1
5 TABLET ORAL DAILY
Qty: 90 TABLET | Refills: 3 | Status: SHIPPED | OUTPATIENT
Start: 2020-07-27 | End: 2020-12-01

## 2020-07-27 RX ORDER — METOPROLOL TARTRATE 25 MG/1
TABLET, FILM COATED ORAL
Qty: 90 TABLET | Refills: 3 | Status: SHIPPED | OUTPATIENT
Start: 2020-07-27 | End: 2020-12-01

## 2020-07-27 RX ORDER — PANTOPRAZOLE SODIUM 40 MG/1
40 TABLET, DELAYED RELEASE ORAL DAILY
Qty: 90 TABLET | Refills: 3 | Status: SHIPPED | OUTPATIENT
Start: 2020-07-27

## 2020-07-27 NOTE — PROGRESS NOTES
"Isael Garcia is a 86 year old male who is being evaluated via a billable telephone visit.      The patient has been notified of following:     \"This telephone visit will be conducted via a call between you and your physician/provider. We have found that certain health care needs can be provided without the need for a physical exam.  This service lets us provide the care you need with a short phone conversation.  If a prescription is necessary we can send it directly to your pharmacy.  If lab work is needed we can place an order for that and you can then stop by our lab to have the test done at a later time.    Telephone visits are billed at different rates depending on your insurance coverage. During this emergency period, for some insurers they may be billed the same as an in-person visit.  Please reach out to your insurance provider with any questions.    If during the course of the call the physician/provider feels a telephone visit is not appropriate, you will not be charged for this service.\"    Patient has given verbal consent for Telephone visit?  Yes    What phone number would you like to be contacted at? [phone]    How would you like to obtain your AVS? MyChart    Subjective     Isael Garcia is a 86 year old male who presents via phone visit today for the following health issues:    HPI    Patient is wanting to discuss Alzheimer and other signs and symptoms for also Parkinson's.          Due to the current impact of the Covid19 virus and recommendations by [facility] administration, CDC, etc to limit  clinic visits and pt exposure risk, was recommend pt proceed with virtual phone visit to address acute/stable  medical needs with plan to defer other non-acute health maintenance issues/exam to a future date when less self-isolation is required.    I have reviewed the nursing note as documented above.   See below for other information/data  and my personal notes capturing the substance of " my conversation with the patient.       HPI:    Overall feeling well today.   Occ memory issues. Wonders if he has Alzheimers or parkinsons because of this   Will forget names at times.  No issues with driving.  Does own tax forms.  Has friend help with grocery shopping for safety and convenience rather than confusion. Pt knows date. Knows president and governor name   Not affecting ADLs at all   BP this /78 with HR 68  Per home check . On statin and Eliquis   Denies chest pain, shortness of breath, abdominal pain, headache, vision changes or side effects with medications.   Stable low back pain. Previous surgery. No radicular sx. No B/B incontinence. Strength OK in legs. No falls   No stiffness in extremities. No tremors        MRI OF THE BRAIN WITHOUT AND WITH CONTRAST 5/30/2019 5:00 PM      COMPARISON: Head CT same day.     HISTORY: Right-sided weakness      TECHNIQUE: Axial diffusion-weighted with ADC map, axial T2-weighted  with fat saturation, axial T1-weighted, axial turboFLAIR and coronal  T1-weighted images of the brain were acquired without intravenous  contrast. Following intravenous administration of gadolinium (6mL  Gadavist), axial T1-weighted images of the brain were acquired.      FINDINGS: There are tiny recent cortical infarcts at the high lateral  aspect of the left parietal lobe (series 602, image 39) and in the  posterolateral aspect of the left occipital lobe (series 602, image  25). No other recent infarcts noted.     There is moderate diffuse cerebral volume loss. There are a few  scattered focal and minimal patchy periventricular areas of abnormal  T2 signal hyperintensity in the cerebral white matter bilaterally that  are consistent with sequela of chronic small vessel ischemic disease.  The ventricles and basal cisterns are within normal limits in  configuration given the degree of cerebral volume loss. There is no  midline shift. There are no extra-axial fluid collections. There is  no  evidence for acute intracranial hemorrhage. There is no abnormal  contrast enhancement in the brain or its coverings.      There is no sinusitis or mastoiditis.                                                                      IMPRESSION:   1. Two tiny recent cortical infarcts in the left parietal and left  occipital lobes. No other recent infarcts.  2. Diffuse cerebral volume loss and cerebral white matter changes  consistent with chronic small vessel ischemic disease.      BARRETT HEWITT MD         Additional ROS:   Constitutional, HEENT, Cardiovascular, Pulmonary, GI and , Neuro, MSK and Psych review of systems/symptoms are otherwise negative or unchanged from previous, except as noted above.      ASSESSMENT:    1. Memory loss   Mild per pt. Likely related to vascular disease as per MRI above. Not consistent with Alzheimer sand not exhibiting any sx to suggest Parkinson's. Minimal effect on ADLs per pt. No indication for additional medication besides what pt is already taking for vascular management based on what info pt provides today by phone. If more bothersome, pt will f/u with me in clinic for more detailed  Memory testing and can consider OT eval    2. CKD (chronic kidney disease) stage 3, GFR 30-59 ml/min (H)  Due for lab follow-up  - Comprehensive metabolic panel; Future    3. Hyperlipidemia LDL goal <70  On statin therapy.  Due for lab follow-up  - Comprehensive metabolic panel; Future  - Lipid panel reflex to direct LDL Fasting; Future    4. Atrial fibrillation, unspecified type (H)  Controlled.  Continue current medication  - apixaban ANTICOAGULANT (ELIQUIS) 2.5 MG tablet; Take 1 tablet (2.5 mg) by mouth 2 times daily  Dispense: 180 tablet; Refill: 3  - metoprolol tartrate (LOPRESSOR) 25 MG tablet; TAKE ONE-HALF (1/2) TABLET TWICE A DAY  Dispense: 90 tablet; Refill: 3    5. Essential hypertension  Controlled.  Continue current medication  - amLODIPine (NORVASC) 5 MG tablet; Take 1 tablet (5  mg) by mouth daily  Dispense: 90 tablet; Refill: 3    6. Gastritis without bleeding, unspecified chronicity, unspecified gastritis type  Asymptomatic now with use of PPI therapy  - pantoprazole (PROTONIX) 40 MG EC tablet; Take 1 tablet (40 mg) by mouth daily  Dispense: 90 tablet; Refill: 3      PLAN:   Continue meds. RFs put on file  At mail order [facility]  Call  [phone] or use [employer] to schedule a future lab appointment  fasting in 1-2 weeks.   For fasting labs, please refrain from eating for 8 hours or more.   Drink 2 glasses of water before your lab appointment. It is fine to take your  oral medications on the morning of the lab test as usual  I would recommend you receive an influenza (flu) vaccine  this Fall (September or October)  Walking exercise as able to increase blood flow to the brain to help with brain function and memory  If further issues with memory loss, then be seen in clinic for more formal memory testing and possible referral to Occupational Therapy for extensive evaluation        Phone call contact time  Call Started at 10:51 am  Call Ended at 11:07 am  Total minutes: 16 min    (Chart documentation was completed, in part, with [employer] voice-recognition software. Even though reviewed, some grammatical, spelling, and word errors may remain.)    Madhu Velasquez MD  Internal Medicine Department  [facility]

## 2020-07-28 NOTE — PATIENT INSTRUCTIONS
Continue meds. RFs put on file  At mail order [facility] pharmacy  Call  [phone] or use [employer] to schedule a future lab appointment  fasting in 1-2 weeks.   For fasting labs, please refrain from eating for 8 hours or more.   Drink 2 glasses of water before your lab appointment. It is fine to take your  oral medications on the morning of the lab test as usual  I would recommend you receive an influenza (flu) vaccine  this Fall (September or October)  Walking exercise as able to increase blood flow to the brain to help with brain function and memory  If further issues with memory loss, then be seen in clinic for more formal memory testing and possible referral to Occupational Therapy for extensive evaluation

## 2020-08-04 ENCOUNTER — TELEPHONE (OUTPATIENT)
Dept: INTERNAL MEDICINE | Facility: CLINIC | Age: 85
End: 2020-08-04

## 2020-08-04 NOTE — TELEPHONE ENCOUNTER
Previous ischemic colitis in April. n spoke with pt via virtual visit last week, no GI sx. With Covid pandemic, age and CKD, , will defer follow-up colonoscopy unless recurrent sx

## 2020-08-04 NOTE — TELEPHONE ENCOUNTER
----- Message from Madhu Velasquez MD sent at 4/11/2020  4:18 PM CDT -----  Regarding: need for colonoscopy   Pt needs a colonoscopy in mid June. Talk with me about ordering colonoscopy with [facility] and calling pt to remind him they will call him to schedule

## 2020-08-27 DIAGNOSIS — Z98.890 HISTORY OF LEFT-SIDED CAROTID ENDARTERECTOMY: Primary | ICD-10-CM

## 2020-08-27 DIAGNOSIS — I65.21 ASYMPTOMATIC STENOSIS OF RIGHT CAROTID ARTERY: ICD-10-CM

## 2020-09-17 ENCOUNTER — HOSPITAL ENCOUNTER (OUTPATIENT)
Dept: ULTRASOUND IMAGING | Facility: CLINIC | Age: 85
Discharge: HOME | End: 2020-09-17
Attending: SURGERY | Admitting: SURGERY
Payer: COMMERCIAL

## 2020-09-17 DIAGNOSIS — Z98.890 HISTORY OF LEFT-SIDED CAROTID ENDARTERECTOMY: ICD-10-CM

## 2020-09-17 DIAGNOSIS — I65.21 ASYMPTOMATIC STENOSIS OF RIGHT CAROTID ARTERY: ICD-10-CM

## 2020-09-17 PROCEDURE — 93880 EXTRACRANIAL BILAT STUDY: CPT

## 2020-09-28 DIAGNOSIS — E78.5 HYPERLIPIDEMIA LDL GOAL <70: ICD-10-CM

## 2020-09-28 DIAGNOSIS — I10 ESSENTIAL HYPERTENSION: ICD-10-CM

## 2020-09-28 DIAGNOSIS — N18.30 CKD (CHRONIC KIDNEY DISEASE) STAGE 3, GFR 30-59 ML/MIN (H): ICD-10-CM

## 2020-09-28 LAB
ALBUMIN SERPL-MCNC: 3.4 G/DL (ref 3.4–5)
ALP SERPL-CCNC: 50 U/L (ref 40–150)
ALT SERPL W P-5'-P-CCNC: 21 U/L (ref 0–70)
ANION GAP SERPL CALCULATED.3IONS-SCNC: 3 MMOL/L (ref 3–14)
AST SERPL W P-5'-P-CCNC: 16 U/L (ref 0–45)
BILIRUB SERPL-MCNC: 0.4 MG/DL (ref 0.2–1.3)
BUN SERPL-MCNC: 32 MG/DL (ref 7–30)
CALCIUM SERPL-MCNC: 9.1 MG/DL (ref 8.5–10.1)
CHLORIDE SERPL-SCNC: 110 MMOL/L (ref 94–109)
CHOLEST SERPL-MCNC: 110 MG/DL
CO2 SERPL-SCNC: 26 MMOL/L (ref 20–32)
CREAT SERPL-MCNC: 1.75 MG/DL (ref 0.66–1.25)
GFR SERPL CREATININE-BSD FRML MDRD: 34 ML/MIN/{1.73_M2}
GLUCOSE SERPL-MCNC: 92 MG/DL (ref 70–99)
HDLC SERPL-MCNC: 44 MG/DL
HGB BLD-MCNC: 13.1 G/DL (ref 13.3–17.7)
LDLC SERPL CALC-MCNC: 52 MG/DL
NONHDLC SERPL-MCNC: 66 MG/DL
POTASSIUM SERPL-SCNC: 4.7 MMOL/L (ref 3.4–5.3)
PROT SERPL-MCNC: 7.2 G/DL (ref 6.8–8.8)
PTH-INTACT SERPL-MCNC: 63 PG/ML (ref 18–80)
SODIUM SERPL-SCNC: 139 MMOL/L (ref 133–144)
TRIGL SERPL-MCNC: 69 MG/DL

## 2020-09-28 PROCEDURE — 85018 HEMOGLOBIN: CPT | Performed by: INTERNAL MEDICINE

## 2020-09-28 PROCEDURE — 80061 LIPID PANEL: CPT | Performed by: INTERNAL MEDICINE

## 2020-09-28 PROCEDURE — 80053 COMPREHEN METABOLIC PANEL: CPT | Performed by: INTERNAL MEDICINE

## 2020-09-28 PROCEDURE — 36415 COLL VENOUS BLD VENIPUNCTURE: CPT | Performed by: INTERNAL MEDICINE

## 2020-09-28 PROCEDURE — 83970 ASSAY OF PARATHORMONE: CPT | Performed by: INTERNAL MEDICINE

## 2020-09-29 NOTE — PROGRESS NOTES
No nondistended [place] [facility]    Isael Garcia is 86 years old and presented with a symptomatic ulcerated left carotid stenosis.  Underwent emergent biopsy with vein patch on 5/31/2019.  Developed significant swelling of his left neck several days after discharge and required evacuation of the hematoma on 6/16/2019 growing staph aureus sensitive to all antibiotics.  Did well following this with discharge on 6/19/2018 with a 2-week course of cephalexin.  Known asymptomatic 70% right carotid stenosis.    Last follow-up 9/5/2019 at which time duplex revealed a widely patent left CEA with no signs of residual infection    PMH: Medications: Lopressor, Norvasc, Eliquis, Lipitor,Protonix              Endovascular repair repair at [facility]--history of left leg PAD with minimal symptoms.                    Care everywhere with no documented [facility] EVAR follow-up      We had a telephone consultation today due to the ongoing COVID 19 pandemic.  Patient has not been ill.  He was hospitalized this past May for nonspecific abdominal pain.  He did develop worsening CKI with serum creatinine as high as 2.87 but this has improved.  He is feeling much better.    CT scan was performed on 4/13/2020 to evaluate his abdominal discomfort.  The aortic endograft and iliac stents are widely patent with no obvious evidence of any endoleak.      9/28/2020 laboratory: Serum creatinine= 1.75   LDL= 52   Hgb= 13.1    9/17/2020 carotid duplex reveals a widely patent left CEA.  Stable moderately severe right carotid stenosis with ICA PSV= 188 cm/s.    Impression: #1.  Widely patent left CEA.                        #2.  Stable asymptomatic right carotid stenosis                        #3.   Stable AAA EVAR graft placed at [facility].  Did not follow-up with them but we do have                               the CT scanning from 4/13/2020.      Spent 15 minutes on the phone today with the patient.  We we will plan  to perform a bilateral carotid duplex in 1 year.  We will also evaluate the AAA graft with an ultrasound try and avoid contrast with his renal issues.         Pedro Ahn MD     CC  Patient Care Team:  Madhu Velasquez MD as PCP - General  Madhu Velasquez MD as Assigned PCP

## 2020-10-01 ENCOUNTER — VIRTUAL VISIT (OUTPATIENT)
Dept: OTHER | Facility: CLINIC | Age: 85
End: 2020-10-01
Attending: SURGERY
Payer: COMMERCIAL

## 2020-10-01 DIAGNOSIS — Z98.890 HISTORY OF LEFT-SIDED CAROTID ENDARTERECTOMY: ICD-10-CM

## 2020-10-01 DIAGNOSIS — I65.21 ASYMPTOMATIC STENOSIS OF RIGHT CAROTID ARTERY: ICD-10-CM

## 2020-10-01 DIAGNOSIS — I65.21 CAROTID STENOSIS, RIGHT: ICD-10-CM

## 2020-10-01 DIAGNOSIS — Z95.828 HISTORY OF ENDOVASCULAR STENT GRAFT FOR ABDOMINAL AORTIC ANEURYSM (AAA): Primary | ICD-10-CM

## 2020-10-01 DIAGNOSIS — Z98.890 HISTORY OF LEFT-SIDED CAROTID ENDARTERECTOMY: Primary | ICD-10-CM

## 2020-10-01 DIAGNOSIS — Z95.828 HISTORY OF ENDOVASCULAR STENT GRAFT FOR ABDOMINAL AORTIC ANEURYSM (AAA): ICD-10-CM

## 2020-10-01 PROCEDURE — 99213 OFFICE O/P EST LOW 20 MIN: CPT | Mod: 95 | Performed by: SURGERY

## 2020-10-01 NOTE — PROGRESS NOTES
"Isael Garcia is a 86 year old male who is being evaluated via a billable telephone visit.      The patient has been notified of following:     \"This telephone visit will be conducted via a call between you and your physician/provider. We have found that certain health care needs can be provided without the need for a physical exam.  This service lets us provide the care you need with a short phone conversation.  If a prescription is necessary we can send it directly to your pharmacy.  If lab work is needed we can place an order for that and you can then stop by our lab to have the test done at a later time.    Telephone visits are billed at different rates depending on your insurance coverage. During this emergency period, for some insurers they may be billed the same as an in-person visit.  Please reach out to your insurance provider with any questions.    If during the course of the call the physician/provider feels a telephone visit is not appropriate, you will not be charged for this service.\"    Patient has given verbal consent for Telephone visit?  Yes     What phone number would you like to be contacted at? Home phone number [phone]    How would you like to obtain your AVS?  Mailed   "

## 2020-12-01 DIAGNOSIS — I48.91 ATRIAL FIBRILLATION, UNSPECIFIED TYPE (H): ICD-10-CM

## 2020-12-01 DIAGNOSIS — I10 ESSENTIAL HYPERTENSION: ICD-10-CM

## 2020-12-01 DIAGNOSIS — E78.5 HYPERLIPIDEMIA LDL GOAL <70: ICD-10-CM

## 2020-12-01 RX ORDER — AMLODIPINE BESYLATE 5 MG/1
TABLET ORAL
Qty: 90 TABLET | Refills: 1 | Status: SHIPPED | OUTPATIENT
Start: 2020-12-01 | End: 2021-07-14

## 2020-12-01 RX ORDER — ATORVASTATIN CALCIUM 10 MG/1
TABLET, FILM COATED ORAL
Qty: 90 TABLET | Refills: 2 | Status: SHIPPED | OUTPATIENT
Start: 2020-12-01

## 2020-12-01 RX ORDER — METOPROLOL TARTRATE 25 MG/1
TABLET, FILM COATED ORAL
Qty: 90 TABLET | Refills: 1 | Status: SHIPPED | OUTPATIENT
Start: 2020-12-01 | End: 2021-07-14

## 2020-12-01 NOTE — TELEPHONE ENCOUNTER
Metoprolol & Amlodipine    Routing refill request to provider for review/approval because:  Labs out of range:  CR  BP not at goal    Neelam DENTN, RN, PHN

## 2020-12-01 NOTE — TELEPHONE ENCOUNTER
Atorvastatin    Prescription approved per [facility] Refill Protocol.    Neelam DENTN, RN, PHN

## 2020-12-01 NOTE — TELEPHONE ENCOUNTER
" Per 7/27/20 virtual visit note:  \"BP this /78 with HR 68  Per home check\"    Stable CKD, RF done for 6 mos  "

## 2021-01-05 DIAGNOSIS — I48.91 ATRIAL FIBRILLATION, UNSPECIFIED TYPE (H): ICD-10-CM

## 2021-01-05 DIAGNOSIS — N18.32 STAGE 3B CHRONIC KIDNEY DISEASE (H): Primary | ICD-10-CM

## 2021-01-06 NOTE — TELEPHONE ENCOUNTER
Routing refill request to provider for review/approval because:  Failed protocol d/t age    Neelam DENTN, RN, PHN

## 2021-01-07 RX ORDER — APIXABAN 2.5 MG/1
TABLET, FILM COATED ORAL
Qty: 180 TABLET | Refills: 0 | Status: SHIPPED | OUTPATIENT
Start: 2021-01-07 | End: 2021-03-17

## 2021-01-07 NOTE — TELEPHONE ENCOUNTER
Patient needs follow-up kidney function lab with use of Eliquis and history of chronic kidney disease.  Call patient and schedule nonfasting lab appointment in the next 2 weeks.  Medication refilled for 90 days

## 2021-01-14 ENCOUNTER — HEALTH MAINTENANCE LETTER (OUTPATIENT)
Age: 86
End: 2021-01-14

## 2021-02-05 ENCOUNTER — IMMUNIZATION (OUTPATIENT)
Dept: NURSING | Facility: CLINIC | Age: 86
End: 2021-02-05
Payer: COMMERCIAL

## 2021-02-05 PROCEDURE — 91300 PR COVID VAC PFIZER DIL RECON 30 MCG/0.3 ML IM: CPT

## 2021-02-05 PROCEDURE — 0001A PR COVID VAC PFIZER DIL RECON 30 MCG/0.3 ML IM: CPT

## 2021-02-08 ENCOUNTER — VIRTUAL VISIT (OUTPATIENT)
Dept: INTERNAL MEDICINE | Facility: CLINIC | Age: 86
End: 2021-02-08
Payer: COMMERCIAL

## 2021-02-08 DIAGNOSIS — N18.32 STAGE 3B CHRONIC KIDNEY DISEASE (H): ICD-10-CM

## 2021-02-08 DIAGNOSIS — I10 ESSENTIAL HYPERTENSION: ICD-10-CM

## 2021-02-08 DIAGNOSIS — E78.5 HYPERLIPIDEMIA LDL GOAL <70: ICD-10-CM

## 2021-02-08 DIAGNOSIS — D64.9 ANEMIA, UNSPECIFIED TYPE: ICD-10-CM

## 2021-02-08 PROCEDURE — 99442 PR PHYSICIAN TELEPHONE EVALUATION 11-20 MIN: CPT | Mod: 95 | Performed by: INTERNAL MEDICINE

## 2021-02-08 NOTE — PROGRESS NOTES
Isael is a 86 year old who is being evaluated via a billable telephone visit.      What phone number would you like to be contacted at? [phone]  How would you like to obtain your AVS? [facility]  Assessment & Plan     ASSESSMENT:   1. Essential hypertension  Previously controlled.  Recent blood pressure to review.  Patient will check blood pressures at home and send results to [facility].  Continue current medication pending results    2. Stage 3b chronic kidney disease  Mild worsening as below.  Patient to repeat labs as ordered below in the next 1 to 2 weeks  - Comprehensive metabolic panel; Future  - Vitamin D Deficiency; Future  - Parathyroid Hormone Intact; Future  - Phosphorus; Future    3. Anemia, unspecified type  Patient denies seeing blood in his stools.  Hemoglobin improving almost back to normal with most recent lab test.  Follow-up lab the next 1 to 2 weeks  - Iron and iron binding capacity; Future  - Hemoglobin; Future    4. Hyperlipidemia LDL goal <70  Controlled.  Continue statin therapy.  Repeat lipid labs again in 6 months  - Lipid panel reflex to direct LDL Fasting; Future  - Comprehensive metabolic panel; Future      PLAN:  Patient to check blood pressures twice a day for 3 days and send results to be in [facility]  Patient to have nonfasting labs drawn in the next week to follow-up on kidney status, hemoglobin, etc.  Continue current medications pending lab and blood pressure results  Second Covid vaccine 3 weeks after previous injection  Repeat fasting cholesterol labs in 6 months    Phone call contact time  Call Started at 4:22 pm  Call Ended at 4:35pm  Total minutes: 13 min    (Chart documentation was completed, in part, with [employer] voice-recognition software. Even though reviewed, some grammatical, spelling, and word errors may remain.)    Madhu Velasquez MD  Internal Medicine Department  [facility]     Isael is a 86 year old who presents to  clinic today for the following health issues  accompanied by himself:    HPI       Hyperlipidemia Follow-Up      Are you regularly taking any medication or supplement to lower your cholesterol?   Yes- Atorvastatin    Are you having muscle aches or other side effects that you think could be caused by your cholesterol lowering medication?  Yes- but unsure if it is related to the Statin or not     Hypertension Follow-up      Do you check your blood pressure regularly outside of the clinic? No     Are you following a low salt diet? Yes    Are your blood pressures ever more than 140 on the top number (systolic) OR more   than 90 on the bottom number (diastolic), for example 140/90? Unknown      Most recent lab results reviewed with pt.       Component      Latest Ref Rng & Units 4/13/2020 9/28/2020   WBC      4.0 - 11.0 10e9/L 6.8    RBC Count      4.4 - 5.9 10e12/L 4.06 (L)    Hemoglobin      13.3 - 17.7 g/dL 13.0 (L) 13.1 (L)   Hematocrit      40.0 - 53.0 % 39.5 (L)    MCV      78 - 100 fl 97    MCH      26.5 - 33.0 pg 32.0    MCHC      31.5 - 36.5 g/dL 32.9    RDW      10.0 - 15.0 % 14.1    Platelet Count      150 - 450 10e9/L 356    Diff Method       Automated Method    % Neutrophils      % 73.7    % Lymphocytes      % 15.0    % Monocytes      % 9.0    % Eosinophils      % 1.6    % Basophils      % 0.4    % Immature Granulocytes      % 0.3    Nucleated RBCs      0 /100 0    Absolute Neutrophil      1.6 - 8.3 10e9/L 5.0    Absolute Lymphocytes      0.8 - 5.3 10e9/L 1.0    Absolute Monocytes      0.0 - 1.3 10e9/L 0.6    Absolute Eosinophils      0.0 - 0.7 10e9/L 0.1    Absolute Basophils      0.0 - 0.2 10e9/L 0.0    Abs Immature Granulocytes      0 - 0.4 10e9/L 0.0    Absolute Nucleated RBC       0.0    Sodium      133 - 144 mmol/L 140 139   Potassium      3.4 - 5.3 mmol/L 3.7 4.7   Chloride      94 - 109 mmol/L 110 (H) 110 (H)   Carbon Dioxide      20 - 32 mmol/L 24 26   Anion Gap      3 - 14 mmol/L 6 3   Glucose       70 - 99 mg/dL 96 92   Urea Nitrogen      7 - 30 mg/dL 13 32 (H)   Creatinine      0.66 - 1.25 mg/dL 1.56 (H) 1.75 (H)   GFR Estimate      >60 mL/min/1.73:m2 40 (L) 34 (L)   GFR Estimate If Black      >60 mL/min/1.73:m2 46 (L) 40 (L)   Calcium      8.5 - 10.1 mg/dL 8.6 9.1   Bilirubin Total      0.2 - 1.3 mg/dL  0.4   Albumin      3.4 - 5.0 g/dL  3.4   Protein Total      6.8 - 8.8 g/dL  7.2   Alkaline Phosphatase      40 - 150 U/L  50   ALT      0 - 70 U/L  21   AST      0 - 45 U/L  16   Cholesterol      <200 mg/dL  110   Triglycerides      <150 mg/dL  69   HDL Cholesterol      >39 mg/dL  44   LDL Cholesterol Calculated      <100 mg/dL  52   Non HDL Cholesterol      <130 mg/dL  66   Parathyroid Hormone Intact      18 - 80 pg/mL  63       Patient denies chest pain, shortness of breath, abdominal pain.  Patient states he has a blood pressure monitor at home but is not been checking blood pressures for a few months.  History of back pain.  Patient has been seeing an acupuncturist also was prescribed some unknown Chinese herbs to him that he has been taking for 5 weeks.  Patient wondering if the herbs could have any interaction with his current medications.  Patient does not know the name of the herbs he is taking.  He is not sure but thinks his back is improving some.  Patient had his first Covid vaccination done last Friday.  No reactions to the vaccination  History of CKD as above some worsening.  Due for lab follow-up.  Denies NSAIDs use.  On statin therapy with lipids as above         Additional ROS:   Constitutional, HEENT, Cardiovascular, Pulmonary, GI and , Neuro, MSK and Psych review of systems/symptoms are otherwise negative or unchanged from previous, except as noted above.           Objective:  Patient reports weight of 165 which is stable from baseline.  There is a weight of 153lb in the chart same day as a previous weight of 165lb for last visit recording.  The 153lb weight appears to be an error since  quite an outlier compared to other readings    RESP: No cough, no audible wheezing, able to talk in full sentences  GEN: Normal affect. Normal though content/speech  Remainder of exam unable to be completed due to telephone visits

## 2021-02-11 NOTE — PATIENT INSTRUCTIONS
Patient to check blood pressures twice a day for 3 days and [facility]  Patient to have nonfasting labs drawn in the next week to follow-up on kidney status, hemoglobin, etc.  Continue current medications pending lab and blood pressure results  Second Covid vaccine 3 weeks after previous injection  Repeat fasting cholesterol labs in 6 months

## 2021-02-15 ENCOUNTER — TELEPHONE (OUTPATIENT)
Dept: INTERNAL MEDICINE | Facility: CLINIC | Age: 86
End: 2021-02-15

## 2021-02-15 DIAGNOSIS — N18.32 STAGE 3B CHRONIC KIDNEY DISEASE (H): ICD-10-CM

## 2021-02-15 DIAGNOSIS — D64.9 ANEMIA, UNSPECIFIED TYPE: ICD-10-CM

## 2021-02-15 LAB
ALBUMIN SERPL-MCNC: 3.6 G/DL (ref 3.4–5)
ALP SERPL-CCNC: 49 U/L (ref 40–150)
ALT SERPL W P-5'-P-CCNC: 19 U/L (ref 0–70)
ANION GAP SERPL CALCULATED.3IONS-SCNC: 2 MMOL/L (ref 3–14)
AST SERPL W P-5'-P-CCNC: 16 U/L (ref 0–45)
BILIRUB SERPL-MCNC: 0.4 MG/DL (ref 0.2–1.3)
BUN SERPL-MCNC: 32 MG/DL (ref 7–30)
CALCIUM SERPL-MCNC: 9.2 MG/DL (ref 8.5–10.1)
CHLORIDE SERPL-SCNC: 109 MMOL/L (ref 94–109)
CO2 SERPL-SCNC: 28 MMOL/L (ref 20–32)
CREAT SERPL-MCNC: 1.92 MG/DL (ref 0.66–1.25)
GFR SERPL CREATININE-BSD FRML MDRD: 31 ML/MIN/{1.73_M2}
GLUCOSE SERPL-MCNC: 86 MG/DL (ref 70–99)
HGB BLD-MCNC: 13.9 G/DL (ref 13.3–17.7)
IRON SATN MFR SERPL: 57 % (ref 15–46)
IRON SERPL-MCNC: 135 UG/DL (ref 35–180)
PHOSPHATE SERPL-MCNC: 3.4 MG/DL (ref 2.5–4.5)
POTASSIUM SERPL-SCNC: 4.4 MMOL/L (ref 3.4–5.3)
PROT SERPL-MCNC: 7.4 G/DL (ref 6.8–8.8)
PTH-INTACT SERPL-MCNC: 72 PG/ML (ref 18–80)
SODIUM SERPL-SCNC: 139 MMOL/L (ref 133–144)
TIBC SERPL-MCNC: 238 UG/DL (ref 240–430)

## 2021-02-15 PROCEDURE — 82306 VITAMIN D 25 HYDROXY: CPT | Performed by: INTERNAL MEDICINE

## 2021-02-15 PROCEDURE — 83550 IRON BINDING TEST: CPT | Performed by: INTERNAL MEDICINE

## 2021-02-15 PROCEDURE — 36415 COLL VENOUS BLD VENIPUNCTURE: CPT | Performed by: INTERNAL MEDICINE

## 2021-02-15 PROCEDURE — 85018 HEMOGLOBIN: CPT | Performed by: INTERNAL MEDICINE

## 2021-02-15 PROCEDURE — 80053 COMPREHEN METABOLIC PANEL: CPT | Performed by: INTERNAL MEDICINE

## 2021-02-15 PROCEDURE — 83540 ASSAY OF IRON: CPT | Performed by: INTERNAL MEDICINE

## 2021-02-15 PROCEDURE — 84100 ASSAY OF PHOSPHORUS: CPT | Performed by: INTERNAL MEDICINE

## 2021-02-15 PROCEDURE — 83970 ASSAY OF PARATHORMONE: CPT | Performed by: INTERNAL MEDICINE

## 2021-02-15 NOTE — TELEPHONE ENCOUNTER
Reason for Call:  Other     Detailed comments: pt was to call back with BP readings:    2/12  AM- 126/70  Pulse 59  PM-  135/75  Pulse  54   2/13  AM- 136/75  Pulse 66  PM-  139/74 Pulse   66  2/14  AM- 130/71  Pulse 54  PM-  139/73 Pulse   52    Phone Number Patient can be reached at: Home number on file [phone] (home)    Best Time:     Can we leave a detailed message on this number? YES    Call taken on 2/15/2021 at 1:08 PM by Susanne Schwartz

## 2021-02-16 LAB — DEPRECATED CALCIDIOL+CALCIFEROL SERPL-MC: 46 UG/L (ref 20–75)

## 2021-02-23 ENCOUNTER — TELEPHONE (OUTPATIENT)
Dept: INTERNAL MEDICINE | Facility: CLINIC | Age: 86
End: 2021-02-23

## 2021-02-23 NOTE — TELEPHONE ENCOUNTER
PCP please advise of 2/15/2021 lab results. Patient would like to know his next steps. Overall he feels fine.    Neelam DENTN, RN, PHN

## 2021-02-26 ENCOUNTER — IMMUNIZATION (OUTPATIENT)
Dept: NURSING | Facility: CLINIC | Age: 86
End: 2021-02-26
Attending: INTERNAL MEDICINE
Payer: COMMERCIAL

## 2021-02-26 PROCEDURE — 91300 PR COVID VAC PFIZER DIL RECON 30 MCG/0.3 ML IM: CPT

## 2021-02-26 PROCEDURE — 0002A PR COVID VAC PFIZER DIL RECON 30 MCG/0.3 ML IM: CPT

## 2021-03-15 DIAGNOSIS — I48.91 ATRIAL FIBRILLATION, UNSPECIFIED TYPE (H): ICD-10-CM

## 2021-03-17 RX ORDER — APIXABAN 2.5 MG/1
TABLET, FILM COATED ORAL
Qty: 180 TABLET | Refills: 3 | Status: SHIPPED | OUTPATIENT
Start: 2021-03-17

## 2021-03-17 NOTE — TELEPHONE ENCOUNTER
Failed protocol.  please route to  team if patient needs an appointment     Rachel POWERSRN BSN  [facility]  [phone]

## 2021-05-26 DIAGNOSIS — I10 ESSENTIAL HYPERTENSION: ICD-10-CM

## 2021-05-26 DIAGNOSIS — I48.91 ATRIAL FIBRILLATION, UNSPECIFIED TYPE (H): ICD-10-CM

## 2021-05-26 NOTE — TELEPHONE ENCOUNTER
Routing refill request to provider for review/approval because:  Labs out of range:  CR  Creatinine   Date Value Ref Range Status   02/15/2021 1.92 (H) 0.66 - 1.25 mg/dL Final      BP not current and out of range:   BP Readings from Last 3 Encounters:   04/14/20 (!) 141/69   04/06/20 121/64   02/11/20 122/70           Taylor KENT RN, BSN, PHN

## 2021-05-26 NOTE — LETTER
[address] [address]  [phone]      7/23/2021       Isael Garcia  [address] [place] [id number]        Dear Isael,   While refilling your prescription today, we noticed that you are due for a follow up appointment with Dr. Velasquez. We will refill your prescription for 90 days. Please call to schedule to a follow up appointment with Dr. Velasquez with in the next 30 days.     Taking care of your health is important to us and we look forward to seeing you in the near future.  Please call us at [phone] or [phone] (or use [facility]) to schedule an appointment.     Please disregard this notice if you have already made an appointment.    Sincerely,  Dr Madhu Velasquez/Nell López, CMA

## 2021-07-14 RX ORDER — AMLODIPINE BESYLATE 5 MG/1
TABLET ORAL
Qty: 90 TABLET | Refills: 0 | Status: SHIPPED | OUTPATIENT
Start: 2021-07-14

## 2021-07-14 RX ORDER — METOPROLOL TARTRATE 25 MG/1
TABLET, FILM COATED ORAL
Qty: 90 TABLET | Refills: 0 | Status: SHIPPED | OUTPATIENT
Start: 2021-07-14

## 2021-07-15 NOTE — TELEPHONE ENCOUNTER
Call patient.  Overdue for follow-up appointment in clinic with me.  Please schedule clinic visit with me in August.  Medications refilled for 90 days

## 2022-02-13 ENCOUNTER — HEALTH MAINTENANCE LETTER (OUTPATIENT)
Age: 87
End: 2022-02-13

## 2023-03-26 ENCOUNTER — HEALTH MAINTENANCE LETTER (OUTPATIENT)
Age: 88
End: 2023-03-26

## 2024-05-26 ENCOUNTER — HEALTH MAINTENANCE LETTER (OUTPATIENT)
Age: 89
End: 2024-05-26

## (undated) DEVICE — DRAIN JACKSON PRATT RESERVOIR 100ML SU130-1305

## (undated) DEVICE — SUCTION CANISTER MEDIVAC LINER 3000ML W/LID 65651-530

## (undated) DEVICE — IONM EEG SUPPLIES

## (undated) DEVICE — LINEN TOWEL PACK X5 5464

## (undated) DEVICE — DRAPE LAP W/ARMBOARD 29410

## (undated) DEVICE — NDL 22GA 1.5"

## (undated) DEVICE — DECANTER VIAL 2006S

## (undated) DEVICE — DRAIN PENROSE 0.50"X18" LATEX FREE GR203

## (undated) DEVICE — SU ETHILON 3-0 FS-1 18" 669H

## (undated) DEVICE — SOL NACL 0.9% INJ 250ML BAG 2B1322Q

## (undated) DEVICE — DRSG TEGADERM 2 1/2X 2 3/4"

## (undated) DEVICE — PREP CHLORAPREP W/ORANGE TINT 10.5ML 260715

## (undated) DEVICE — SU VICRYL 3-0 CT-1 36" J338H

## (undated) DEVICE — SOL NACL 0.9% IRRIG 1000ML BOTTLE 07138-09

## (undated) DEVICE — GLOVE PROTEXIS W/NEU-THERA 7.5  2D73TE75

## (undated) DEVICE — BLADE CLIPPER 4412A

## (undated) DEVICE — SU PROLENE 7-0 BV-1DA 4X30" M8703

## (undated) DEVICE — PACK VASCULAR SCV15VAFSB

## (undated) DEVICE — SYR 10ML FINGER CONTROL W/O NDL 309695

## (undated) DEVICE — SYR 03ML LL W/O NDL 309657

## (undated) DEVICE — DRSG STERI STRIP 1/2X4" R1547

## (undated) DEVICE — DECANTER BAG 2002S

## (undated) DEVICE — DRSG GAUZE 4X4" 3033

## (undated) DEVICE — DRSG STERI STRIP 1/4X3" R1541

## (undated) DEVICE — BLADE KNIFE SURG 11 371111

## (undated) DEVICE — SPONGE RAY-TEC 4X8" 7318

## (undated) DEVICE — SU PROLENE 6-0 C-1DA 30" 8706H

## (undated) DEVICE — BLADE KNIFE BEAVER MINI BEAVER6400

## (undated) DEVICE — CATH TRAY FOLEY COUDE SURESTEP 16FR W/URNE MTR STLK A304716A

## (undated) DEVICE — PREP CHLORAPREP 26ML TINTED ORANGE  260815

## (undated) DEVICE — SU MONOCRYL 4-0 PS-2 18" UND Y496G

## (undated) DEVICE — SU VICRYL 2-0 SH 27" J317H

## (undated) DEVICE — SU SILK 2-0 TIE 24" SA75H

## (undated) DEVICE — DRAPE IOBAN INCISE 23X17" 6650EZ

## (undated) DEVICE — PACK MINOR SBA15MIFSE

## (undated) DEVICE — SU NOVAFIL 2-0 T-19 30" 4459-51

## (undated) DEVICE — NDL BLUNT 19GA 1.5"

## (undated) DEVICE — SU SILK 4-0 TIE 24" SA73H

## (undated) DEVICE — SU VICRYL 3-0 SH 27" J316H

## (undated) DEVICE — SU VICRYL 4-0 PS-2 18" UND J496H

## (undated) DEVICE — Device

## (undated) DEVICE — PACK UNIVERSAL SPLIT 29131

## (undated) DEVICE — SU SILK 3-0 TIE 24" SA74H

## (undated) DEVICE — NDL ANGIOCATH 20GA 1.25" 4056

## (undated) DEVICE — SURGICEL HEMOSTAT 2X3" 1953

## (undated) DEVICE — IONM EEG UP TO 7 HOURS

## (undated) DEVICE — SYR EAR BULB 3OZ 0035830

## (undated) DEVICE — ESU GROUND PAD UNIVERSAL W/O CORD

## (undated) DEVICE — DRAIN JACKSON PRATT 19FR ROUND SU130-1325

## (undated) DEVICE — SOL WATER IRRIG 1000ML BOTTLE 2F7114

## (undated) DEVICE — NDL 19GA 1.5"

## (undated) DEVICE — SYR 10ML SLIP TIP W/O NDL

## (undated) DEVICE — BLADE KNIFE SURG 15 371115

## (undated) DEVICE — SU PDS II 3-0 SH 18" Z774D

## (undated) DEVICE — SHUNT SUNDT 3X4X10CM NL850-5060

## (undated) DEVICE — SOL NACL 0.9% IRRIG 1000ML BOTTLE 2F7124

## (undated) DEVICE — SU VICRYL 3-0 TIE 12X18" J904T

## (undated) DEVICE — GLOVE GAMMEX DERMAPRENE ULTRA SZ 8.5 LF 8517

## (undated) DEVICE — DRSG AQUACEL AG 3.5X9.75" HYDROFIBER 412011

## (undated) DEVICE — ESU ELEC BLADE 2.75" COATED/INSULATED E1455

## (undated) DEVICE — SU PDS II 2-0 CT-2 27"  Z333H

## (undated) RX ORDER — ONDANSETRON 2 MG/ML
INJECTION INTRAMUSCULAR; INTRAVENOUS
Status: DISPENSED
Start: 2019-06-16

## (undated) RX ORDER — FENTANYL CITRATE 50 UG/ML
INJECTION, SOLUTION INTRAMUSCULAR; INTRAVENOUS
Status: DISPENSED
Start: 2019-05-31

## (undated) RX ORDER — ONDANSETRON 2 MG/ML
INJECTION INTRAMUSCULAR; INTRAVENOUS
Status: DISPENSED
Start: 2017-02-28

## (undated) RX ORDER — PROPOFOL 10 MG/ML
INJECTION, EMULSION INTRAVENOUS
Status: DISPENSED
Start: 2019-05-31

## (undated) RX ORDER — LIDOCAINE HYDROCHLORIDE 10 MG/ML
INJECTION, SOLUTION INFILTRATION; PERINEURAL
Status: DISPENSED
Start: 2019-05-31

## (undated) RX ORDER — PROPOFOL 10 MG/ML
INJECTION, EMULSION INTRAVENOUS
Status: DISPENSED
Start: 2019-06-16

## (undated) RX ORDER — LIDOCAINE HYDROCHLORIDE 20 MG/ML
INJECTION, SOLUTION EPIDURAL; INFILTRATION; INTRACAUDAL; PERINEURAL
Status: DISPENSED
Start: 2017-02-28

## (undated) RX ORDER — ONDANSETRON 2 MG/ML
INJECTION INTRAMUSCULAR; INTRAVENOUS
Status: DISPENSED
Start: 2019-05-31

## (undated) RX ORDER — FENTANYL CITRATE 50 UG/ML
INJECTION, SOLUTION INTRAMUSCULAR; INTRAVENOUS
Status: DISPENSED
Start: 2020-04-06

## (undated) RX ORDER — BUPIVACAINE HYDROCHLORIDE 5 MG/ML
INJECTION, SOLUTION EPIDURAL; INTRACAUDAL
Status: DISPENSED
Start: 2019-05-31

## (undated) RX ORDER — CEFAZOLIN SODIUM 2 G/100ML
INJECTION, SOLUTION INTRAVENOUS
Status: DISPENSED
Start: 2019-06-16

## (undated) RX ORDER — NEOSTIGMINE METHYLSULFATE 1 MG/ML
VIAL (ML) INJECTION
Status: DISPENSED
Start: 2019-06-16

## (undated) RX ORDER — FENTANYL CITRATE 50 UG/ML
INJECTION, SOLUTION INTRAMUSCULAR; INTRAVENOUS
Status: DISPENSED
Start: 2017-02-28

## (undated) RX ORDER — CEFAZOLIN SODIUM 2 G/100ML
INJECTION, SOLUTION INTRAVENOUS
Status: DISPENSED
Start: 2017-02-28

## (undated) RX ORDER — LIDOCAINE HYDROCHLORIDE 20 MG/ML
INJECTION, SOLUTION EPIDURAL; INFILTRATION; INTRACAUDAL; PERINEURAL
Status: DISPENSED
Start: 2019-06-16

## (undated) RX ORDER — CEFAZOLIN SODIUM 1 G/3ML
INJECTION, POWDER, FOR SOLUTION INTRAMUSCULAR; INTRAVENOUS
Status: DISPENSED
Start: 2019-05-31

## (undated) RX ORDER — HEPARIN SODIUM 1000 [USP'U]/ML
INJECTION, SOLUTION INTRAVENOUS; SUBCUTANEOUS
Status: DISPENSED
Start: 2019-05-31

## (undated) RX ORDER — PROPOFOL 10 MG/ML
INJECTION, EMULSION INTRAVENOUS
Status: DISPENSED
Start: 2017-02-28

## (undated) RX ORDER — GLYCOPYRROLATE 0.2 MG/ML
INJECTION, SOLUTION INTRAMUSCULAR; INTRAVENOUS
Status: DISPENSED
Start: 2019-06-16

## (undated) RX ORDER — CEFAZOLIN SODIUM 2 G/100ML
INJECTION, SOLUTION INTRAVENOUS
Status: DISPENSED
Start: 2019-05-31

## (undated) RX ORDER — FENTANYL CITRATE 50 UG/ML
INJECTION, SOLUTION INTRAMUSCULAR; INTRAVENOUS
Status: DISPENSED
Start: 2019-06-16

## (undated) RX ORDER — DEXAMETHASONE SODIUM PHOSPHATE 4 MG/ML
INJECTION, SOLUTION INTRA-ARTICULAR; INTRALESIONAL; INTRAMUSCULAR; INTRAVENOUS; SOFT TISSUE
Status: DISPENSED
Start: 2017-02-28

## (undated) RX ORDER — BUPIVACAINE HYDROCHLORIDE 2.5 MG/ML
INJECTION, SOLUTION EPIDURAL; INFILTRATION; INTRACAUDAL
Status: DISPENSED
Start: 2019-06-16

## (undated) RX ORDER — DEXAMETHASONE SODIUM PHOSPHATE 4 MG/ML
INJECTION, SOLUTION INTRA-ARTICULAR; INTRALESIONAL; INTRAMUSCULAR; INTRAVENOUS; SOFT TISSUE
Status: DISPENSED
Start: 2019-06-16